# Patient Record
Sex: FEMALE | Race: WHITE | NOT HISPANIC OR LATINO | Employment: OTHER | URBAN - METROPOLITAN AREA
[De-identification: names, ages, dates, MRNs, and addresses within clinical notes are randomized per-mention and may not be internally consistent; named-entity substitution may affect disease eponyms.]

---

## 2018-02-09 ENCOUNTER — HOSPITAL ENCOUNTER (INPATIENT)
Facility: HOSPITAL | Age: 68
LOS: 17 days | Discharge: NURSING, REHAB OR LONG-TERM CARE | DRG: 291 | End: 2018-02-26
Attending: EMERGENCY MEDICINE | Admitting: INTERNAL MEDICINE
Payer: COMMERCIAL

## 2018-02-09 DIAGNOSIS — L03.116 CELLULITIS OF LEFT FOOT: ICD-10-CM

## 2018-02-09 DIAGNOSIS — J90 PLEURAL EFFUSION ON RIGHT: Primary | ICD-10-CM

## 2018-02-09 DIAGNOSIS — L03.116 BILATERAL CELLULITIS OF LOWER LEG: ICD-10-CM

## 2018-02-09 DIAGNOSIS — L03.115 CELLULITIS OF RIGHT FOOT: ICD-10-CM

## 2018-02-09 DIAGNOSIS — N63.10 MASS OF BREAST, RIGHT: ICD-10-CM

## 2018-02-09 DIAGNOSIS — I50.41 ACUTE COMBINED SYSTOLIC AND DIASTOLIC HEART FAILURE: ICD-10-CM

## 2018-02-09 DIAGNOSIS — I50.9 CHF (CONGESTIVE HEART FAILURE): ICD-10-CM

## 2018-02-09 DIAGNOSIS — L03.115 BILATERAL CELLULITIS OF LOWER LEG: ICD-10-CM

## 2018-02-09 PROBLEM — E44.0 PROTEIN-CALORIE MALNUTRITION, MODERATE: Status: ACTIVE | Noted: 2018-02-09

## 2018-02-09 PROBLEM — M86.29: Status: ACTIVE | Noted: 2018-02-09

## 2018-02-09 LAB
ALBUMIN SERPL BCP-MCNC: 3.1 G/DL
ALP SERPL-CCNC: 82 U/L
ALT SERPL W/O P-5'-P-CCNC: 11 U/L
ANION GAP SERPL CALC-SCNC: 11 MMOL/L
APTT BLDCRRT: 25.8 SEC
AST SERPL-CCNC: 22 U/L
BACTERIA #/AREA URNS HPF: ABNORMAL /HPF
BASOPHILS # BLD AUTO: 0.01 K/UL
BASOPHILS NFR BLD: 0.1 %
BILIRUB SERPL-MCNC: 1.2 MG/DL
BILIRUB UR QL STRIP: ABNORMAL
BNP SERPL-MCNC: >4900 PG/ML
BUN SERPL-MCNC: 21 MG/DL
CALCIUM SERPL-MCNC: 9 MG/DL
CHLORIDE SERPL-SCNC: 100 MMOL/L
CLARITY UR: CLEAR
CO2 SERPL-SCNC: 32 MMOL/L
COLOR UR: YELLOW
CORTIS SERPL-MCNC: 31.8 UG/DL
CREAT SERPL-MCNC: 0.7 MG/DL
DIASTOLIC DYSFUNCTION: YES
DIFFERENTIAL METHOD: ABNORMAL
EOSINOPHIL # BLD AUTO: 0 K/UL
EOSINOPHIL NFR BLD: 0 %
ERYTHROCYTE [DISTWIDTH] IN BLOOD BY AUTOMATED COUNT: 18.2 %
EST. GFR  (AFRICAN AMERICAN): >60 ML/MIN/1.73 M^2
EST. GFR  (NON AFRICAN AMERICAN): >60 ML/MIN/1.73 M^2
ESTIMATED PA SYSTOLIC PRESSURE: 24.81
GLUCOSE SERPL-MCNC: 90 MG/DL
GLUCOSE UR QL STRIP: NEGATIVE
HCT VFR BLD AUTO: 42.3 %
HGB BLD-MCNC: 14.4 G/DL
HGB UR QL STRIP: ABNORMAL
HYALINE CASTS #/AREA URNS LPF: 0 /LPF
INR PPP: 1.3
KETONES UR QL STRIP: NEGATIVE
LACTATE SERPL-SCNC: 2 MMOL/L
LACTATE SERPL-SCNC: 2.3 MMOL/L
LEUKOCYTE ESTERASE UR QL STRIP: NEGATIVE
LIPASE SERPL-CCNC: 36 U/L
LYMPHOCYTES # BLD AUTO: 0.9 K/UL
LYMPHOCYTES NFR BLD: 11.1 %
MAGNESIUM SERPL-MCNC: 1.6 MG/DL
MCH RBC QN AUTO: 29.1 PG
MCHC RBC AUTO-ENTMCNC: 34 G/DL
MCV RBC AUTO: 86 FL
MICROSCOPIC COMMENT: ABNORMAL
MITRAL VALVE REGURGITATION: ABNORMAL
MONOCYTES # BLD AUTO: 0.6 K/UL
MONOCYTES NFR BLD: 7.2 %
NEUTROPHILS # BLD AUTO: 6.5 K/UL
NEUTROPHILS NFR BLD: 81.6 %
NITRITE UR QL STRIP: NEGATIVE
PH UR STRIP: 6 [PH] (ref 5–8)
PHOSPHATE SERPL-MCNC: 3 MG/DL
PLATELET # BLD AUTO: 130 K/UL
PMV BLD AUTO: 10.5 FL
POTASSIUM SERPL-SCNC: 3.5 MMOL/L
PROCALCITONIN SERPL IA-MCNC: 0.12 NG/ML
PROT SERPL-MCNC: 6.8 G/DL
PROT UR QL STRIP: ABNORMAL
PROTHROMBIN TIME: 13 SEC
RBC # BLD AUTO: 4.94 M/UL
RBC #/AREA URNS HPF: 15 /HPF (ref 0–4)
RETIRED EF AND QEF - SEE NOTES: 10 (ref 55–65)
SODIUM SERPL-SCNC: 143 MMOL/L
SP GR UR STRIP: 1.02 (ref 1–1.03)
TRICUSPID VALVE REGURGITATION: ABNORMAL
TROPONIN I SERPL DL<=0.01 NG/ML-MCNC: 0.11 NG/ML
TROPONIN I SERPL DL<=0.01 NG/ML-MCNC: 0.12 NG/ML
TSH SERPL DL<=0.005 MIU/L-ACNC: 0.67 UIU/ML
URN SPEC COLLECT METH UR: ABNORMAL
UROBILINOGEN UR STRIP-ACNC: NEGATIVE EU/DL
WBC # BLD AUTO: 8.01 K/UL
WBC #/AREA URNS HPF: 2 /HPF (ref 0–5)

## 2018-02-09 PROCEDURE — 21400001 HC TELEMETRY ROOM

## 2018-02-09 PROCEDURE — 87077 CULTURE AEROBIC IDENTIFY: CPT

## 2018-02-09 PROCEDURE — 25500020 PHARM REV CODE 255: Performed by: INTERNAL MEDICINE

## 2018-02-09 PROCEDURE — 85730 THROMBOPLASTIN TIME PARTIAL: CPT

## 2018-02-09 PROCEDURE — 84443 ASSAY THYROID STIM HORMONE: CPT

## 2018-02-09 PROCEDURE — 85025 COMPLETE CBC W/AUTO DIFF WBC: CPT

## 2018-02-09 PROCEDURE — 96374 THER/PROPH/DIAG INJ IV PUSH: CPT

## 2018-02-09 PROCEDURE — 93306 TTE W/DOPPLER COMPLETE: CPT

## 2018-02-09 PROCEDURE — 83735 ASSAY OF MAGNESIUM: CPT

## 2018-02-09 PROCEDURE — 87186 SC STD MICRODIL/AGAR DIL: CPT

## 2018-02-09 PROCEDURE — 96361 HYDRATE IV INFUSION ADD-ON: CPT

## 2018-02-09 PROCEDURE — 83880 ASSAY OF NATRIURETIC PEPTIDE: CPT

## 2018-02-09 PROCEDURE — 80053 COMPREHEN METABOLIC PANEL: CPT

## 2018-02-09 PROCEDURE — 93005 ELECTROCARDIOGRAM TRACING: CPT

## 2018-02-09 PROCEDURE — 93010 ELECTROCARDIOGRAM REPORT: CPT | Mod: ,,, | Performed by: INTERNAL MEDICINE

## 2018-02-09 PROCEDURE — 83690 ASSAY OF LIPASE: CPT

## 2018-02-09 PROCEDURE — 87088 URINE BACTERIA CULTURE: CPT

## 2018-02-09 PROCEDURE — 84484 ASSAY OF TROPONIN QUANT: CPT

## 2018-02-09 PROCEDURE — 96375 TX/PRO/DX INJ NEW DRUG ADDON: CPT

## 2018-02-09 PROCEDURE — 87040 BLOOD CULTURE FOR BACTERIA: CPT | Mod: 59

## 2018-02-09 PROCEDURE — 99285 EMERGENCY DEPT VISIT HI MDM: CPT | Mod: 25

## 2018-02-09 PROCEDURE — 63600175 PHARM REV CODE 636 W HCPCS

## 2018-02-09 PROCEDURE — 81000 URINALYSIS NONAUTO W/SCOPE: CPT

## 2018-02-09 PROCEDURE — 63600175 PHARM REV CODE 636 W HCPCS: Performed by: NURSE PRACTITIONER

## 2018-02-09 PROCEDURE — 25000003 PHARM REV CODE 250: Performed by: EMERGENCY MEDICINE

## 2018-02-09 PROCEDURE — 25000003 PHARM REV CODE 250: Performed by: NURSE PRACTITIONER

## 2018-02-09 PROCEDURE — A9585 GADOBUTROL INJECTION: HCPCS | Performed by: INTERNAL MEDICINE

## 2018-02-09 PROCEDURE — 63600175 PHARM REV CODE 636 W HCPCS: Performed by: EMERGENCY MEDICINE

## 2018-02-09 PROCEDURE — 84100 ASSAY OF PHOSPHORUS: CPT

## 2018-02-09 PROCEDURE — 84145 PROCALCITONIN (PCT): CPT

## 2018-02-09 PROCEDURE — 93306 TTE W/DOPPLER COMPLETE: CPT | Mod: 26,,, | Performed by: INTERNAL MEDICINE

## 2018-02-09 PROCEDURE — 85610 PROTHROMBIN TIME: CPT

## 2018-02-09 PROCEDURE — 83605 ASSAY OF LACTIC ACID: CPT | Mod: 91

## 2018-02-09 PROCEDURE — 87086 URINE CULTURE/COLONY COUNT: CPT

## 2018-02-09 PROCEDURE — 82533 TOTAL CORTISOL: CPT

## 2018-02-09 PROCEDURE — 84484 ASSAY OF TROPONIN QUANT: CPT | Mod: 91

## 2018-02-09 RX ORDER — MEROPENEM AND SODIUM CHLORIDE 500 MG/50ML
500 INJECTION, SOLUTION INTRAVENOUS
Status: DISCONTINUED | OUTPATIENT
Start: 2018-02-09 | End: 2018-02-13

## 2018-02-09 RX ORDER — ACETAMINOPHEN 325 MG/1
650 TABLET ORAL EVERY 6 HOURS PRN
Status: DISCONTINUED | OUTPATIENT
Start: 2018-02-09 | End: 2018-02-14

## 2018-02-09 RX ORDER — FAMOTIDINE 20 MG/1
20 TABLET, FILM COATED ORAL 2 TIMES DAILY
Status: DISCONTINUED | OUTPATIENT
Start: 2018-02-09 | End: 2018-02-14

## 2018-02-09 RX ORDER — FUROSEMIDE 10 MG/ML
20 INJECTION INTRAMUSCULAR; INTRAVENOUS DAILY
Status: DISCONTINUED | OUTPATIENT
Start: 2018-02-10 | End: 2018-02-09

## 2018-02-09 RX ORDER — HEPARIN SODIUM 5000 [USP'U]/ML
5000 INJECTION, SOLUTION INTRAVENOUS; SUBCUTANEOUS EVERY 8 HOURS
Status: DISCONTINUED | OUTPATIENT
Start: 2018-02-09 | End: 2018-02-26 | Stop reason: HOSPADM

## 2018-02-09 RX ORDER — FUROSEMIDE 10 MG/ML
20 INJECTION INTRAMUSCULAR; INTRAVENOUS 2 TIMES DAILY
Status: DISPENSED | OUTPATIENT
Start: 2018-02-09 | End: 2018-02-11

## 2018-02-09 RX ORDER — GADOBUTROL 604.72 MG/ML
6 INJECTION INTRAVENOUS
Status: COMPLETED | OUTPATIENT
Start: 2018-02-09 | End: 2018-02-09

## 2018-02-09 RX ORDER — FUROSEMIDE 10 MG/ML
20 INJECTION INTRAMUSCULAR; INTRAVENOUS
Status: COMPLETED | OUTPATIENT
Start: 2018-02-09 | End: 2018-02-09

## 2018-02-09 RX ORDER — ONDANSETRON 2 MG/ML
4 INJECTION INTRAMUSCULAR; INTRAVENOUS EVERY 8 HOURS PRN
Status: DISCONTINUED | OUTPATIENT
Start: 2018-02-09 | End: 2018-02-13

## 2018-02-09 RX ORDER — MAG HYDROX/ALUMINUM HYD/SIMETH 200-200-20
30 SUSPENSION, ORAL (FINAL DOSE FORM) ORAL EVERY 6 HOURS PRN
Status: DISCONTINUED | OUTPATIENT
Start: 2018-02-09 | End: 2018-02-14

## 2018-02-09 RX ORDER — MEROPENEM 500 MG/1
500 INJECTION, POWDER, FOR SOLUTION INTRAVENOUS
Status: DISCONTINUED | OUTPATIENT
Start: 2018-02-09 | End: 2018-02-09

## 2018-02-09 RX ORDER — FUROSEMIDE 20 MG/1
20 TABLET ORAL DAILY
Status: DISCONTINUED | OUTPATIENT
Start: 2018-02-10 | End: 2018-02-09 | Stop reason: SDUPTHER

## 2018-02-09 RX ORDER — LISINOPRIL 2.5 MG/1
2.5 TABLET ORAL DAILY
Status: DISCONTINUED | OUTPATIENT
Start: 2018-02-09 | End: 2018-02-14

## 2018-02-09 RX ORDER — DIPHENHYDRAMINE HCL 25 MG
25 CAPSULE ORAL EVERY 6 HOURS PRN
Status: DISCONTINUED | OUTPATIENT
Start: 2018-02-09 | End: 2018-02-13

## 2018-02-09 RX ADMIN — Medication 1 G: at 10:02

## 2018-02-09 RX ADMIN — FUROSEMIDE 20 MG: 10 INJECTION, SOLUTION INTRAMUSCULAR; INTRAVENOUS at 05:02

## 2018-02-09 RX ADMIN — SODIUM CHLORIDE 2082 ML: 0.9 INJECTION, SOLUTION INTRAVENOUS at 10:02

## 2018-02-09 RX ADMIN — MEROPENEM 500 MG: 500 INJECTION, POWDER, FOR SOLUTION INTRAVENOUS at 02:02

## 2018-02-09 RX ADMIN — ACETAMINOPHEN 650 MG: 325 TABLET, FILM COATED ORAL at 11:02

## 2018-02-09 RX ADMIN — GADOBUTROL 6 ML: 604.72 INJECTION INTRAVENOUS at 10:02

## 2018-02-09 RX ADMIN — THERA TABS 1 TABLET: TAB at 05:02

## 2018-02-09 RX ADMIN — MEROPENEM AND SODIUM CHLORIDE 500 MG: 500 INJECTION, SOLUTION INTRAVENOUS at 11:02

## 2018-02-09 RX ADMIN — FAMOTIDINE 20 MG: 20 TABLET, FILM COATED ORAL at 11:02

## 2018-02-09 RX ADMIN — HEPARIN SODIUM 5000 UNITS: 5000 INJECTION, SOLUTION INTRAVENOUS; SUBCUTANEOUS at 11:02

## 2018-02-09 NOTE — ED NOTES
REAL contacted [phone] and gave a verbal report to Mrs Harris.   Patient presented to ED with bilateral cellulitis to her lower legs (see photos and RN note), and cat hair stuck to her legs/wounds.   EPS reports extremely poor living conditions.  EPS aware that patient will be admitted to hospital.

## 2018-02-09 NOTE — HPI
Kelly Mariano is a 68 yo female who states she has not seen a physician since 2009. EMS brought her to the ED today as the patient reported generalized weakness that has worsened over the last 2 days. She had trouble getting off the toilet and when she di,  she had fallen. EMS stated living conditions were deplorable with cat feces on the ibeth. ([employer] contacted). Pt is noted with cellulitis and wounds to the bilateral feet and lower legs. Pt describes anxiety attacks, SOB, leg swelling and difficulty walking. Vital signs showed tachycardia = 112, pt afebrile with controlled blood pressure. Labs indicate thrombocytopenia, metabolic alkalosis, troponin 0.114, BNP > 4900, initial lactic acid 2.3;  CXR shows pulmonary edema and a moderate right sided pleural effusion.

## 2018-02-09 NOTE — ED PROVIDER NOTES
SCRIBE #1 NOTE: I, Reagan Felix, am scribing for, and in the presence of, José Douglas Jr., MD. I have scribed the entire note.      History      Chief Complaint   Patient presents with    Leg Swelling     bilateral leg swelling, scabs, sores present, living conditions are extreme, needs social work according to EMS       Review of patient's allergies indicates:   Allergen Reactions    Pcn [penicillins]         HPI   HPI    2/9/2018, 9:23 AM   History obtained from the EMS and patient      History of Present Illness: Kelly Mariano is a 67 y.o. female patient who presents to the Emergency Department for BLE swelling and sores. Symptoms are constant and moderate in severity. Per EMS, pt's living conditions are extremely poor. EMS states that the pt needs social work assistance. No mitigating or exacerbating factors reported. No other associated sxs reported. Patient denies any chest pain, SOB, palpitations, cough, n/v/d, abd pain, and all other sxs at this time. No further complaints or concerns at this time.         Arrival mode: EMS    PCP: Primary Doctor No       Past Medical History:  History reviewed. No pertinent past medical history.    Past Surgical History:  Past surgical history reviewed not relevant      Family History:  Family history reviewed not relevant      Social History:  Social History    Social History Main Topics    Social History Main Topics    Smoking status: Unknown if ever smoked    Smokeless tobacco: Unknown if ever used    Alcohol Use: Unknown drinking history    Drug Use: Unknown if ever used    Sexual Activity: Unknown       ROS   Review of Systems   Constitutional: Negative for chills and fever.   Respiratory: Negative for cough and shortness of breath.    Cardiovascular: Positive for leg swelling (BLE). Negative for chest pain and palpitations.   Gastrointestinal: Negative for abdominal pain, diarrhea, nausea and vomiting.   Genitourinary: Negative for difficulty urinating,  dysuria, frequency, hematuria and urgency.   Skin: Positive for color change (BLE).        + BLE sores/ scabs   Neurological: Negative for dizziness, syncope, weakness, light-headedness, numbness and headaches.   All other systems reviewed and are negative.      Physical Exam      Initial Vitals [02/09/18 0921]   BP Pulse Resp Temp SpO2   136/84 (!) 112 18 -- 97 %      MAP       101.33          Physical Exam  Nursing Notes and Vital Signs Reviewed.  Constitutional: Patient is in no apparent distress. Well-developed and well-nourished.  Head: Atraumatic. Normocephalic.  Eyes: PERRL. EOM intact. Conjunctivae are not pale. No scleral icterus.  ENT: Mucous membranes are moist.   Neck: Supple. Full ROM. No lymphadenopathy.  Cardiovascular: Tachycardia. Regular rhythm. No murmurs, rubs, or gallops. Distal pulses are 2+ and symmetric.  Pulmonary/Chest: No respiratory distress. Clear to auscultation bilaterally. No wheezing or rales.  Abdominal: Soft and non-distended.  There is no tenderness.  No rebound, guarding, or rigidity. Good bowel sounds.  Musculoskeletal: Moves all extremities. No obvious deformities.   Skin: Warm and dry. Multiple ulcers and abrasions to the BLE. Erythema and edema noted circumferentially to the bilateral mid calves. See images.   Neurological:  Alert, awake, and appropriate.  Normal speech.  No acute focal neurological deficits are appreciated.  Psychiatric: Normal affect. Good eye contact. Appropriate in content.                                ED Course    Procedures  ED Vital Signs:  Vitals:    02/11/18 0901 02/11/18 1101 02/11/18 1153 02/11/18 1301   BP:   (!) 119/59    Pulse: 99 91 98 105   Resp:   18    Temp:       TempSrc:       SpO2:   95%    Weight:       Height:        02/11/18 1501 02/11/18 1536 02/11/18 1701 02/11/18 1930   BP:  (!) 99/53  (!) 115/58   Pulse: 100 92 92 89   Resp:  18  18   Temp:    97.4 °F (36.3 °C)   TempSrc:    Oral   SpO2:  (!) 93%  99%   Weight:       Height:         02/11/18 2100 02/11/18 2300 02/11/18 2330 02/12/18 0100   BP:   (!) 102/56    Pulse: 92 92 91 90   Resp:   20    Temp:   97.4 °F (36.3 °C)    TempSrc:   Axillary    SpO2:   97%    Weight:       Height:        02/12/18 0300 02/12/18 0315 02/12/18 0500   BP:  (!) 109/57    Pulse: 92 98 91   Resp:  20    Temp:  97.5 °F (36.4 °C)    TempSrc:  Axillary    SpO2:  96%    Weight:  70 kg (154 lb 5.2 oz)    Height:          Abnormal Lab Results:  Labs Reviewed   B-TYPE NATRIURETIC PEPTIDE - Abnormal; Notable for the following:        Result Value    BNP >4,900 (*)     All other components within normal limits   CBC W/ AUTO DIFFERENTIAL - Abnormal; Notable for the following:     RDW 18.2 (*)     Platelets 130 (*)     Lymph # 0.9 (*)     Gran% 81.6 (*)     Lymph% 11.1 (*)     All other components within normal limits   COMPREHENSIVE METABOLIC PANEL - Abnormal; Notable for the following:     CO2 32 (*)     Albumin 3.1 (*)     Total Bilirubin 1.2 (*)     All other components within normal limits   LACTIC ACID, PLASMA - Abnormal; Notable for the following:     Lactate (Lactic Acid) 2.3 (*)     All other components within normal limits   PROTIME-INR - Abnormal; Notable for the following:     Prothrombin Time 13.0 (*)     INR 1.3 (*)     All other components within normal limits   TROPONIN I - Abnormal; Notable for the following:     Troponin I 0.124 (*)     All other components within normal limits   URINALYSIS - Abnormal; Notable for the following:     Protein, UA 1+ (*)     Bilirubin (UA) 1+ (*)     Occult Blood UA 2+ (*)     All other components within normal limits   URINALYSIS MICROSCOPIC - Abnormal; Notable for the following:     RBC, UA 15 (*)     All other components within normal limits   TROPONIN I - Abnormal; Notable for the following:     Troponin I 0.114 (*)     All other components within normal limits   APTT   LIPASE   MAGNESIUM   PHOSPHORUS   TSH   LACTIC ACID, PLASMA        All Lab Results:  Results for orders  placed or performed during the hospital encounter of 02/09/18   Blood culture x two cultures. Draw prior to antibiotics.   Result Value Ref Range    Blood Culture, Routine No Growth to date     Blood Culture, Routine No Growth to date     Blood Culture, Routine No Growth to date    Blood culture x two cultures. Draw prior to antibiotics.   Result Value Ref Range    Blood Culture, Routine       Gram stain aer bottle: Gram positive cocci in clusters resembling Staph     Blood Culture, Routine       Results called to and read back by:Alison Ghosh RN 02/10/2018  06:43    Blood Culture, Routine       Gram stain hillary bottle: Gram positive cocci in clusters resembling Staph    Blood Culture, Routine       STAPHYLOCOCCUS AUREUS  Susceptibility pending  ID consult required at [facility].Formerly [facility],[place] and [facility] locations.     Urine culture   Result Value Ref Range    Urine Culture, Routine       COAGULASE-NEGATIVE STAPHYLOCOCCUS SPECIES  10,000 - 49,999 cfu/ml  Susceptibility testing not routinely performed.     Culture, Body Fluid (Aerobic) w/ GS   Result Value Ref Range    Gram Stain Result Rare WBC's     Gram Stain Result No organisms seen    APTT   Result Value Ref Range    aPTT 25.8 21.0 - 32.0 sec   Brain natriuretic peptide   Result Value Ref Range    BNP >4,900 (H) 0 - 99 pg/mL   CBC auto differential   Result Value Ref Range    WBC 8.01 3.90 - 12.70 K/uL    RBC 4.94 4.00 - 5.40 M/uL    Hemoglobin 14.4 12.0 - 16.0 g/dL    Hematocrit 42.3 37.0 - 48.5 %    MCV 86 82 - 98 fL    MCH 29.1 27.0 - 31.0 pg    MCHC 34.0 32.0 - 36.0 g/dL    RDW 18.2 (H) 11.5 - 14.5 %    Platelets 130 (L) 150 - 350 K/uL    MPV 10.5 9.2 - 12.9 fL    Gran # (ANC) 6.5 1.8 - 7.7 K/uL    Lymph # 0.9 (L) 1.0 - 4.8 K/uL    Mono # 0.6 0.3 - 1.0 K/uL    Eos # 0.0 0.0 - 0.5 K/uL    Baso # 0.01 0.00 - 0.20 K/uL    Gran% 81.6 (H) 38.0 - 73.0 %    Lymph% 11.1 (L) 18.0 - 48.0 %    Mono% 7.2 4.0 - 15.0 %    Eosinophil% 0.0 0.0 - 8.0 %    Basophil% 0.1 0.0 - 1.9  %    Differential Method Automated    Comprehensive metabolic panel   Result Value Ref Range    Sodium 143 136 - 145 mmol/L    Potassium 3.5 3.5 - 5.1 mmol/L    Chloride 100 95 - 110 mmol/L    CO2 32 (H) 23 - 29 mmol/L    Glucose 90 70 - 110 mg/dL    BUN, Bld 21 8 - 23 mg/dL    Creatinine 0.7 0.5 - 1.4 mg/dL    Calcium 9.0 8.7 - 10.5 mg/dL    Total Protein 6.8 6.0 - 8.4 g/dL    Albumin 3.1 (L) 3.5 - 5.2 g/dL    Total Bilirubin 1.2 (H) 0.1 - 1.0 mg/dL    Alkaline Phosphatase 82 55 - 135 U/L    AST 22 10 - 40 U/L    ALT 11 10 - 44 U/L    Anion Gap 11 8 - 16 mmol/L    eGFR if African American >60 >60 mL/min/1.73 m^2    eGFR if non African American >60 >60 mL/min/1.73 m^2   Cortisol   Result Value Ref Range    Cortisol 31.8 ug/dL   Lactic acid, plasma #1   Result Value Ref Range    Lactate (Lactic Acid) 2.3 (H) 0.5 - 2.2 mmol/L   Lipase   Result Value Ref Range    Lipase 36 4 - 60 U/L   Magnesium   Result Value Ref Range    Magnesium 1.6 1.6 - 2.6 mg/dL   Phosphorus   Result Value Ref Range    Phosphorus 3.0 2.7 - 4.5 mg/dL   Protime-INR   Result Value Ref Range    Prothrombin Time 13.0 (H) 9.0 - 12.5 sec    INR 1.3 (H) 0.8 - 1.2   Procalcitonin   Result Value Ref Range    Procalcitonin 0.12 <0.25 ng/mL   Troponin I   Result Value Ref Range    Troponin I 0.124 (H) 0.000 - 0.026 ng/mL   TSH   Result Value Ref Range    TSH 0.667 0.400 - 4.000 uIU/mL   Urinalysis   Result Value Ref Range    Specimen UA Urine, Catheterized     Color, UA Yellow Yellow, Straw, Briana    Appearance, UA Clear Clear    pH, UA 6.0 5.0 - 8.0    Specific Gravity, UA 1.020 1.005 - 1.030    Protein, UA 1+ (A) Negative    Glucose, UA Negative Negative    Ketones, UA Negative Negative    Bilirubin (UA) 1+ (A) Negative    Occult Blood UA 2+ (A) Negative    Nitrite, UA Negative Negative    Urobilinogen, UA Negative <2.0 EU/dL    Leukocytes, UA Negative Negative   Lactic acid, plasma #2   Result Value Ref Range    Lactate (Lactic Acid) 2.0 0.5 - 2.2 mmol/L    Urinalysis Microscopic   Result Value Ref Range    RBC, UA 15 (H) 0 - 4 /hpf    WBC, UA 2 0 - 5 /hpf    Bacteria, UA Rare None-Occ /hpf    Hyaline Casts, UA 0 0-1/lpf /lpf    Microscopic Comment SEE COMMENT    Troponin I   Result Value Ref Range    Troponin I 0.114 (H) 0.000 - 0.026 ng/mL   Lactic acid, plasma #3   Result Value Ref Range    Lactate (Lactic Acid) 0.9 0.5 - 2.2 mmol/L   Comprehensive metabolic panel   Result Value Ref Range    Sodium 142 136 - 145 mmol/L    Potassium 4.7 3.5 - 5.1 mmol/L    Chloride 106 95 - 110 mmol/L    CO2 21 (L) 23 - 29 mmol/L    Glucose 104 70 - 110 mg/dL    BUN, Bld 21 8 - 23 mg/dL    Creatinine 0.7 0.5 - 1.4 mg/dL    Calcium 8.5 (L) 8.7 - 10.5 mg/dL    Total Protein 6.9 6.0 - 8.4 g/dL    Albumin 2.8 (L) 3.5 - 5.2 g/dL    Total Bilirubin 0.8 0.1 - 1.0 mg/dL    Alkaline Phosphatase 76 55 - 135 U/L    AST 36 10 - 40 U/L    ALT 14 10 - 44 U/L    Anion Gap 15 8 - 16 mmol/L    eGFR if African American >60 >60 mL/min/1.73 m^2    eGFR if non African American >60 >60 mL/min/1.73 m^2   VANCOMYCIN, TROUGH before 4th dose   Result Value Ref Range    Vancomycin-Trough 16.8 10.0 - 22.0 ug/mL   CBC auto differential   Result Value Ref Range    WBC 6.79 3.90 - 12.70 K/uL    RBC 4.44 4.00 - 5.40 M/uL    Hemoglobin 12.8 12.0 - 16.0 g/dL    Hematocrit 42.4 37.0 - 48.5 %    MCV 96 82 - 98 fL    MCH 28.8 27.0 - 31.0 pg    MCHC 30.2 (L) 32.0 - 36.0 g/dL    RDW 18.0 (H) 11.5 - 14.5 %    Platelets 118 (L) 150 - 350 K/uL    MPV 10.9 9.2 - 12.9 fL    Gran # (ANC) 5.8 1.8 - 7.7 K/uL    Lymph # 0.6 (L) 1.0 - 4.8 K/uL    Mono # 0.4 0.3 - 1.0 K/uL    Eos # 0.0 0.0 - 0.5 K/uL    Baso # 0.00 0.00 - 0.20 K/uL    Gran% 85.2 (H) 38.0 - 73.0 %    Lymph% 8.8 (L) 18.0 - 48.0 %    Mono% 5.9 4.0 - 15.0 %    Eosinophil% 0.1 0.0 - 8.0 %    Basophil% 0.0 0.0 - 1.9 %    Differential Method Automated    Comprehensive metabolic panel   Result Value Ref Range    Sodium 141 136 - 145 mmol/L    Potassium 3.1 (L) 3.5 - 5.1  mmol/L    Chloride 98 95 - 110 mmol/L    CO2 35 (H) 23 - 29 mmol/L    Glucose 95 70 - 110 mg/dL    BUN, Bld 18 8 - 23 mg/dL    Creatinine 0.7 0.5 - 1.4 mg/dL    Calcium 8.2 (L) 8.7 - 10.5 mg/dL    Total Protein 5.9 (L) 6.0 - 8.4 g/dL    Albumin 2.6 (L) 3.5 - 5.2 g/dL    Total Bilirubin 0.7 0.1 - 1.0 mg/dL    Alkaline Phosphatase 74 55 - 135 U/L    AST 21 10 - 40 U/L    ALT 13 10 - 44 U/L    Anion Gap 8 8 - 16 mmol/L    eGFR if African American >60 >60 mL/min/1.73 m^2    eGFR if non African American >60 >60 mL/min/1.73 m^2   Hemoglobin A1c   Result Value Ref Range    Hemoglobin A1C 5.5 4.0 - 5.6 %    Estimated Avg Glucose 111 68 - 131 mg/dL   High sensitivity CRP   Result Value Ref Range    CRP, High Sensitivity 28.01 (H) 0.00 - 3.19 mg/L   Magnesium   Result Value Ref Range    Magnesium 1.5 (L) 1.6 - 2.6 mg/dL   Phosphorus   Result Value Ref Range    Phosphorus 2.7 2.7 - 4.5 mg/dL   Sedimentation rate, manual   Result Value Ref Range    Sed Rate 10 0 - 20 mm/Hr   Lactate dehydrogenase   Result Value Ref Range     110 - 260 U/L   Glucose, random   Result Value Ref Range    Glucose 102 70 - 110 mg/dL   Protein, total   Result Value Ref Range    Total Protein 5.8 (L) 6.0 - 8.4 g/dL   Cholesterol, Body Fluid (Reference Lab) Pleural Fluid, Right   Result Value Ref Range    Body Fluid Source, Refrigerated Pleural Fluid, Right    Protein, Peritoneal, Pleural Fluid or CHIP Drainage, In-House Thoracentesis Fluid   Result Value Ref Range    Body Fluid Source, Total Protein Thoracentesis Fluid     Body Fluid, Protein 1.7 Not established g/dL   Albumin, Peritoneal, Pleural Fluid or CHIP Drainage, In-House Thoracentesis Fluid   Result Value Ref Range    Body Fluid Source, Albumin Thoracentesis Fluid     Body Fluid Albumin 1.0 See text g/dL   Glucose, Peritoneal, Pleural Fluid or CHIP Drainage, In-House Thoracentesis Fluid   Result Value Ref Range    Body Fluid Source, Glucose Thoracentesis Fluid     Glucose, Fluid 107 Not  established mg/dL   Amylase, Peritoneal, Pleural Fluid or CHIP Drainage, In-House Thoracentesis Fluid   Result Value Ref Range    Body Fluid Source Amylase Thoracentesis Fluid     Amylase, Fluid 16 Not established U/L   WBC & Diff,Body Fluid Thoracentesis Fluid   Result Value Ref Range    Body Fluid Type Thoracentesis Fluid     Fluid Appearance Clear     Fluid Color Yellow     WBC, Body Fluid 131 /cu mm    Segs, Fluid 34 %    Lymphs, Fluid 6 %    Monocytes/Macrophages, Fluid 60 %   2D echo with color flow doppler   Result Value Ref Range    EF 10 (A) 55 - 65    Mitral Valve Regurgitation MILD TO MODERATE     Diastolic Dysfunction Yes (A)     Est. PA Systolic Pressure 24.81     Tricuspid Valve Regurgitation MILD TO MODERATE    POCT glucose   Result Value Ref Range    POCT Glucose 109 70 - 110 mg/dL         Imaging Results:  Imaging Results          X-Ray Chest 1 View (Final result)  Result time 02/11/18 16:28:09    Final result by ASHWIN Cobb Sr., MD (02/11/18 16:28:09)                 Impression:        1. There's been interval development of a tiny right apical pneumothorax. This is consistent with the patient's recent thoracentesis.  2. The findings are characteristic of moderate cardiomegaly with mild bilateral pulmonary edema.  3. There is opacification of the costophrenic angles bilaterally. These are characteristic of tiny pleural effusions.  4. Surgical changes      Electronically signed by: ASHWIN COBB MD  Date:     02/11/18  Time:    16:28              Narrative:    One-view chest x-ray    Clinical History: Rectal effusion; recent thoracentesis    Finding: Comparison was made to prior examination performed at 5:25 AM on 2/11/2018. There's been interval development of a tiny right apical pneumothorax. There is moderate cardiomegaly. There is a mild amount of interstitial and alveolar opacities seen in both lungs. There is opacification of the costophrenic angles bilaterally. A right PICC remains in place.  There is partial visualization of anterior spinal fusion hardware in the cervical spine.                             US Chest Mediastinum (Final result)  Result time 02/11/18 16:02:21    Final result by ASHWIN Cobb Sr., MD (02/11/18 16:02:21)                 Impression:      1. Large right pleural effusion  2. Small left pleural effusion      Electronically signed by: ASHWIN COBB MD  Date:     02/11/18  Time:    16:02              Narrative:    Ultrasound examination of the thorax    Clinical History: Pleural effusions    Technique: Multiple static ultrasound images were submitted for interpretation.    Finding: There are bilateral pleural effusions. The calculated volume of fluid in the right pleural space this 1088 mL. The calculated amount of fluid in the left pleural space is 157 mL of fluid.                             Fl Modified Barium Swallow Speech (Final result)  Result time 02/11/18 11:44:02    Final result by AHSWIN Cobb Sr., MD (02/11/18 11:44:02)                 Narrative:    Modified barium swallow examination    Clinical History:     Dysphasia    Findings/impression: This examination was performed by speech pathology. There was no radiologist present during the procedure.          Electronically signed by: ASHWIN COBB MD  Date:     02/11/18  Time:    11:44                              X-Ray Chest 1 View (Final result)  Result time 02/11/18 08:16:25    Final result by Rodrigo Gallegos MD (02/11/18 08:16:25)                 Impression:     See above            Electronically signed by: RODRIGO GALLEGOS MD  Date:     02/11/18  Time:    08:16              Narrative:    Exam: Portable chest radiograph    Clinical History:   right pleural effusion .     Comparison: Chest x-ray, 02/10/2018.    Findings:.     There is a right-sided PICC with tip in the SVC. There is a large right pleural effusion, unchanged.  There is compressive atelectasis and right hilar region and right lower lobe and right middle lobe.  There is cardiomegaly.  There is a small pleural effusion with left basilar atelectasis and/or infiltrate.  No pneumothorax.                             CTA Lower Extremity Bilateral (Final result)  Result time 02/10/18 18:41:03   Procedure changed from CTA Lower Extremity Left     Final result by Rodrigo Gallegos MD (02/10/18 18:41:03)                 Impression:      1.  There is no hemodynamically significant stenosis of either lower extremity arterial vasculature.  There is three-vessel runoff at the ankles bilaterally.    2.  There is an infrarenal abdominal aorta ulcerative plaque which is incompletely imaged on this exam.  3. Ulcerative plaque versus small focal dissection measuring 8 mm in the left common iliac artery which causes a mild to moderate grade focal stenosis just above the bifurcation into the left external and left internal iliac arteries.      Electronically signed by: RODRIGO GALLEGOS MD  Date:     02/10/18  Time:    18:41              Narrative:    CT angiogram of the bilateral lower extremities with IV contrast    Indication: Lower extremity wound.  Peripheral vascular disease.    Technique: The pelvis and bilateral lower extremities were scanned during the intravenous administration of 100 cc of Omnipaque 350 utilizing an arterial angiogram protocol.  Axial and 3-D MIP images are reviewed.    Findings: The distal infrarenal abdominal aorta just above its bifurcation shows an ulcerative plaque which is incompletely imaged on this exam.  In the left common iliac artery there is an ulcerative plaque versus a small focal dissection measuring 8 mm which causes a mild to moderate grade stenosis of the distal left common iliac artery just proximal to the bifurcation into the external iliac and internal iliac arteries.  The bilateral hypogastric arteries are patent.  The bilateral external iliac arteries and bilateral common femoral arteries are patent.  The bilateral profunda femoris are patent.  The  bilateral superficial femoral arteries and bilateral popliteal arteries are patent.  There is three-vessel runoff to the ankles bilaterally.  Both lower extremities shows some mild marginal calcific and soft atherosclerotic plaque throughout the arterial tree but none that causes any significant stenosis.    There is subcutaneous edema throughout the pelvis and bilateral lower extremities.  No abscess is seen.                             X-Ray Chest 1 View for PICC_Central line (Final result)  Result time 02/10/18 15:55:12    Final result by ASHWIN Cobb Sr., MD (02/10/18 15:55:12)                 Impression:        1. There has been interval placement of a right PICC. The tip is in the superior vena cava. There is no pneumothorax.  2. There has been interval worsening of the appearance of the lungs. There is a moderate amount of interstitial and alveolar opacities seen in the medial aspect of both lungs. The right border of the heart is silhouetted. This is characteristic of pulmonary edema.  3. There is opacification of the base of the right hemithorax. This is characteristic of a moderate size right pleural effusion with associated atelectasis.      Electronically signed by: ASHWIN COBB MD  Date:     02/10/18  Time:    15:55              Narrative:    One-view chest x-ray    Clinical History: PICC placement    Finding: Comparison was made to prior examination performed on 2/9/2018. There has been interval placement of a right PICC. The tip is in the superior vena cava. There has been interval worsening of the appearance of the lungs. There is a moderate amount of interstitial and alveolar opacities seen in the medial aspect of both lungs. The right border of the heart is silhouetted. There is opacification of the base of the right hemithorax. The left costophrenic angle is sharp. There is no pneumothorax.                             MRI Foot Toes W WO Contrast Right (Final result)  Result time 02/10/18  07:56:49    Final result by Rodrigo Gallegos MD (02/10/18 07:56:49)                 Impression:      1.  There is no evidence for osteomyelitis of the right foot.  2.  No abscess.  3.  Nonspecific subcutaneous and muscle edema in the plantar and dorsal aspects of the right foot.      Electronically signed by: RODRIGO GALLEGOS MD  Date:     02/10/18  Time:    07:56              Narrative:    Right foot MRI with and without intravenous contrast.    Indication: Osteomyelitis right foot.  Diabetes.    Technique: Multiplanar multisequence imaging of the right foot is performed with and without intravenous administration of 6 cc of Gadavist.    Comparison: Right foot x-ray, 02/09/2018.    Findings: There is no loss of the normal T1 fatty marrow signal intensity and any of the bones of the right foot.  No marrow enhancement or osseous destruction or cortical erosion.  No marrow edema is seen on the T2 sequences.    There is no focal fluid collection in the right foot.  There is dorsal subcutaneous edema and muscle edema in the plantar and dorsal aspects of the right foot.  The tendons are intact.                             MRI Foot Toes W WO Contrast Left (Final result)  Result time 02/10/18 08:01:23    Final result by Rodrigo Gallegos MD (02/10/18 08:01:23)                 Impression:      1.  No evidence for osteomyelitis of the left foot.  2.  No left foot soft tissue abscess.  3.  Nonspecific muscle edema in the plantar and dorsal musculature of the left foot and mild dorsal subcutaneous edema.      Electronically signed by: RODRIGO GALLEGOS MD  Date:     02/10/18  Time:    08:01              Narrative:    Left foot MRI with and without intravenous contrast    Indication: Left foot osteomyelitis.    Technique: Multiplanar multisequence imaging of the left foot is performed with and without the intravenous administration of 6 cc of Gadavist.    Comparison: Left foot x-ray, 02/09/2018.    Findings: There is no loss of the normal T1  fatty marrow signal intensity are.  No osseous erosion or cortical destruction.  No marrow enhancement or marrow edema.    There is no soft tissue abscess.  The tendons appear intact.  There is nonspecific muscle edema in the plantar musculature and dorsal musculature of the left foot.  There is some mild dorsal subcutaneous edema.                             US Lower Extremity Arteries Bilateral (Final result)  Result time 02/09/18 16:46:28   Procedure changed from US Lower Extrem Arteries Bilat with KIRAN (xpd)     Final result by Griffin Muniz MD (02/09/18 16:46:28)                 Impression:        Limited study.    No hemodynamically significant stenosis demonstrated in the right or left lower extremity arterial system within the visualized segments. Consider CTA.      Electronically signed by: GRIFFIN MUNIZ MD  Date:     02/09/18  Time:    16:46              Narrative:    BILATERAL LOWER EXTREMITY DUPLEX  ARTERIAL ULTRASOUND EXAMINATION.     Technique:  Bilateral lower extremity arterial duplex ultrasound examination performed. Multiple gray scale and color doppler images were obtained in addition to waveform analysis.      Comparison: None.    History:  Peripheral vascular disease    Findings: Study is limited by skin ulceration and wounds.    The peak systolic velocities on the right are as follows, in centimeters/second:  Common femoral artery: 116  Superficial femoral artery, proximal: 92  Superficial femoral artery, distal: 136  Popliteal artery: 94  Posterior tibial artery: 36  Dorsalis pedis artery: 18  Anterior tibial artery: 40  Peroneal artery not seen    The peak systolic velocities on the left are as follows, in centimeters/second:  Common femoral artery: 108  Superficial femoral artery, proximal: 87  Superficial femoral artery, distal: 84  Popliteal artery: 67  Posterior tibial artery:  61  Dorsalis pedis artery: Not seen  Anterior tibial artery:  56  Peroneal artery not seen    Biphasic  waveforms are seen below the superficial femoral arteries bilaterally.                             X-Ray Foot Complete Left (Final result)  Result time 02/09/18 11:24:21    Final result by ASHWIN Cobb Sr., MD (02/09/18 11:24:21)                 Impression:      1. There is mild prominence of the soft tissue thickness in the dorsum of the left foot. This is consistent with the patient's history and characteristic of cellulitis.  2. There is a suspected erosion in the periarticular portion of the medial proximal aspect of the possible phalanx of the great toe. This is characteristic of an inflammatory arthritis. However, osteomyelitis cannot be excluded.  3. There is no dislocation. There are mild degenerative changes in the midfoot.   4. There is a small spur at the site of attachment of the plantar fascia to the calcaneus. There is a moderate-sized spur at the site of attachment of the Achilles tendon to the calcaneus.  5. If additional imaging evaluation is clinically indicated, I recommend consideration of an MRI examination of the foot without and with IV contrast.      Electronically signed by: ASHWIN COBB MD  Date:     02/09/18  Time:    11:24              Narrative:    3 view x-ray of the left foot    Clinical History:     Cellulitis of left lower limb    Findings: There is mild prominence of the soft tissue thickness in the dorsum of the left foot. There is a suspected erosion in the periarticular portion of the medial proximal aspect of the possible phalanx of the great toe. There is no dislocation. There are mild degenerative changes in the midfoot. There is a small spur at the site of attachment of the plantar fascia to the calcaneus. There is a moderate-sized spur at the site of attachment of the Achilles tendon to the calcaneus.                             X-Ray Foot Complete Right (Final result)  Result time 02/09/18 11:15:47    Final result by ASHWIN Cobb Sr., MD (02/09/18 11:15:47)                  Impression:      1. There is suspected bony demineralization in the proximal half of the distal phalanx of the great toe. Question myelitis cannot be excluded additional imaging evaluation is clinically indicated, recommend consideration of an MRI examination of the right foot without and with IV contrast.  2. There is moderate prominence of the soft tissue thickness in the dorsum of the right foot. This is consistent with patient's history and characteristic of cellulitis.  3. There is a small spur at the site of attachment of the plantar fascia to the calcaneus. There is a moderate-sized right-sided attachment of the Achilles tendon to the calcaneus.   4. There are mild degenerative changes in the midfoot.       Electronically signed by: ASHWIN COBB MD  Date:     02/09/18  Time:    11:15              Narrative:    3 view x-ray of the right foot    Clinical History:     Cellulitis    Findings:     There is no fracture. There is no dislocation. There is suspected bony demineralization in the proximal half of the distal phalanx of the great toe. There is a small spur at the site of attachment of the plantar fascia to the calcaneus. There is a moderate-sized right-sided attachment of the Achilles tendon to the calcaneus. There are mild degenerative changes in the midfoot. There is moderate prominence of the soft tissue thickness in the dorsum of the right foot.                             X-Ray Chest AP Portable (Final result)  Result time 02/09/18 10:43:38    Final result by ASHWIN Cobb Sr., MD (02/09/18 10:43:38)                 Impression:        1. There is a mild amount of interstitial and alveolar opacities seen in the medial aspect of both lungs. This is characteristic of pulmonary edema.  2. There is opacification of the inferior one third of the right hemithorax. This is characteristic of a moderate size right pleural effusion with associated atelectasis.   3. There is partial visualization of  anterior spinal fusion hardware projected over the cervical spine.         Electronically signed by: ASHWIN RAYMOND MD  Date:     02/09/18  Time:    10:43              Narrative:    One-view chest x-ray    Clinical History: Sepsis    Finding: There is opacification of the inferior one third of the right hemithorax. There is a mild amount of interstitial and alveolar opacities seen in the medial aspect of both lungs. There is no pneumothorax. The left costophrenic angle is sharp. The right border the heart is silhouetted. There is partial visualization of anterior spinal fusion hardware projected over the cervical spine.                             The EKG was ordered, reviewed, and independently interpreted by the ED provider.  Interpretation time: 9:41  Rate: 109 BPM  Rhythm: sinus tachycardia  Interpretation: Possible left atrial enlargement. Left posterior fascicular block. Anterior infarct. No STEMI.           The Emergency Provider reviewed the vital signs and test results, which are outlined above.    ED Discussion     12:54 PM: Discussed case with TONY Harvey ([facility]) who recommends administering meropenem. Dr. Boston agrees with current care and management of pt and accepts admission.   Admitting Service: Hospital medicine   Admitting Physician: Dr. Hall  Admit to: Tele    12:54 PM: Re-evaluated pt. I have discussed test results, shared treatment plan, and the need for admission with patient and family at bedside. Pt and family express understanding at this time and agree with all information. All questions answered. Pt and family have no further questions or concerns at this time. Pt is ready for admit.      ED Medication(s):  Medications   acetaminophen tablet 650 mg (650 mg Oral Given 2/11/18 6955)   aluminum-magnesium hydroxide-simethicone 200-200-20 mg/5 mL suspension 30 mL (30 mLs Oral Given 2/11/18 6932)   diphenhydrAMINE capsule 25 mg (25 mg Oral Given 2/11/18 0146)    famotidine tablet 20 mg (20 mg Oral Given 2/11/18 2112)   ondansetron injection 4 mg (not administered)   folic acid-vit B6-vit B12 2.5-25-2 mg tablet 1 tablet (1 tablet Oral Given 2/11/18 0858)   multivitamin tablet 1 tablet (1 tablet Oral Given 2/11/18 0858)   lisinopril tablet 2.5 mg (2.5 mg Oral Given 2/11/18 0858)   furosemide injection 20 mg (20 mg Intravenous Given 2/11/18 0855)   heparin (porcine) injection 5,000 Units (5,000 Units Subcutaneous Given 2/12/18 0548)   meropenem-0.9% sodium chloride 500 mg/50 mL IVPB (500 mg Intravenous New Bag 2/12/18 0548)   vancomycin 1 g in 0.9% sodium chloride 250 mL IVPB (ready to mix system) (1 g Intravenous Trough Due 30 minutes Before Dose 2/12/18 0815)   furosemide injection 40 mg (40 mg Intravenous Given 2/11/18 1731)   metoprolol succinate (TOPROL-XL) 24 hr tablet 25 mg (25 mg Oral Given 2/11/18 1123)   lidocaine HCL 10 mg/ml (1%) injection 1 mL (not administered)   sodium chloride 0.9% bolus 2,082 mL (0 mL/kg × 69.4 kg Intravenous Stopped 2/9/18 1235)   vancomycin 1 g in 0.9% sodium chloride 250 mL IVPB (ready to mix system) (1 g Intravenous New Bag 2/9/18 1059)   furosemide injection 20 mg (20 mg Intravenous Given 2/9/18 1751)   gadobutrol 6 mL (6 mLs Intravenous Given 2/9/18 2235)   omnipaque 350 iohexol 100 mL (100 mLs Intravenous Given 2/10/18 1807)   ez paste cream ba sulfate 20 g (20 g Oral Given 2/11/18 1052)   ez paque ba sulfate 200 mL (200 mLs Oral Given 2/11/18 1053)   potassium chloride SA CR tablet 40 mEq (40 mEq Oral Given 2/11/18 2112)   magnesium sulfate 2g in water 50mL IVPB (premix) (2 g Intravenous New Bag 2/11/18 1350)       There are no discharge medications for this patient.      Follow-up Information     Brett Puga MD. Call in 1 day.    Specialty:  General Surgery  Why:  make appt for out pt follow up.  If this clniic does not accept your insurance, find another vascular doc  Contact information:  [address]  [address]  VASCULAR  [facility] [address]  [phone]                     Medical Decision Making    Medical Decision Making:   Clinical Tests:   Lab Tests: Reviewed and Ordered  Radiological Study: Reviewed and Ordered  Medical Tests: Reviewed and Ordered           Scribe Attestation:   Scribe #1: I performed the above scribed service and the documentation accurately describes the services I performed. I attest to the accuracy of the note.    Attending:   Physician Attestation Statement for Scribe #1: I, José Douglas Jr., MD, personally performed the services described in this documentation, as scribed by Reagan Felix, in my presence, and it is both accurate and complete.          Clinical Impression       ICD-10-CM ICD-9-CM   1. Pleural effusion on right J90 511.9   2. Bilateral cellulitis of lower leg L03.116 682.6    L03.115    3. Cellulitis of left foot L03.116 682.7   4. Cellulitis of right foot L03.115 682.7   5. CHF (congestive heart failure) I50.9 428.0       Disposition:   Disposition: Admitted  Condition: Julian Douglas Jr., MD  02/12/18 0601

## 2018-02-09 NOTE — ED NOTES
"Level of Consciousness: The patient is awake, alert, and oriented with appropriate affect and speech; oriented to person, place and time.  Appearance: Sitting up in bed with no acute distress noted. Pt in hospital gown.   Skin: WDL except: BLE, drainage, brown crust and cat hair attached to skin. Mucous membranes are dry. Fingers are cyanotic in appearance. Pt states they have "been this way for about two weeks." Redness to groin areas and under breasts. Pictures added to patient's chart.   Musculoskeletal: Swelling to BLE. Generalized weakness. Mild impairment. Pt states ankles hurt.   Respiratory: Airway open and patent, respirations spontaneous, even and unlabored. No accessory muscles in use.   Cardiac: Slightly tachycardiac at 105 BPM, capillary refill > 3 seconds.  Abdomen: Soft, non-tender to palpation. No distention noted. Turc[place] draining yellow urine.   Neurologic: PERRLA, face exhibits symmetrical expression, hand grasps equal and even bilaterally, normal sensation noted to all extremities and face when touched by a finger.    Patient verbalized understanding of status and plan of care. Side rails up x 2, call light in reach, bed low and locked. Will continue to monitor.        "

## 2018-02-09 NOTE — ASSESSMENT & PLAN NOTE
Telemetry monitoring, daily weights, low sodium diet, fluid restriction,    Lasix 20 mg IV twice  Cardiology consult  Lisinopril 2.5 mg  Elevated troponin likely due to congestive heart failure - trend

## 2018-02-09 NOTE — ASSESSMENT & PLAN NOTE
Bilateral foot plain films note some questionable osteo to bilateral great toes  MRI pending  Evaluate arterial blood flow with U/S and ABIs

## 2018-02-09 NOTE — ED NOTES
Hospital medicine at bedside. Pt requesting sip of water.  Per Dr. Hall, ok to give sip of water.

## 2018-02-09 NOTE — PROGRESS NOTES
Clinical Pharmacy Consult Note: Vancomycin Dosing   Date: 2/9/2018      Pharmacy consulted by Dr. Douglas to dose vancomycin for treatment of cellulitis.   Goal trough: 12-15 mcg/mL   Tmax/24h: 97.8    Estimated Creatinine Clearance: 75.6 mL/min (based on SCr of 0.7 mg/dL).   Lab Results   Component Value Date    BUN 21 02/09/2018      Lab Results   Component Value Date    WBC 8.01 02/09/2018      Microbiology Results (last 7 days)       Procedure Component Value Units Date/Time    Urine culture [[id number]] Collected:  02/09/18 1048    Order Status:  Sent Specimen:  Urine from Urine, Catheterized Updated:  02/09/18 1048    Blood culture x two cultures. Draw prior to antibiotics. [[id number]] Collected:  02/09/18 1000    Order Status:  Sent Specimen:  Blood from Peripheral, Forearm, Right Updated:  02/09/18 1012    Blood culture x two cultures. Draw prior to antibiotics. [[id number]] Collected:  02/09/18 0955    Order Status:  Sent Specimen:  Blood from Peripheral, Antecubital, Left Updated:  02/09/18 1012           [place] BW: 54.7 Kg<---Will use for dosing weight.    Actual BW: 69.4 Kg (<130% IBW)     Antibiotics       Start     Stop Route Frequency Ordered    02/09/18 2300  vancomycin 1 g in 0.9% sodium chloride 250 mL IVPB (ready to mix system)      -- IV Every 12 hours (non-standard times) 02/09/18 1320    02/09/18 1308  meropenem injection 500 mg      -- IV Every 6 hours (non-standard times) 02/09/18 1242          Plan: Based on Estimated Creatinine Clearance: 75.6 mL/min (based on SCr of 0.7 mg/dL). and weight of 54.7 Kg, pharmacy will initiate vancomycin 1000 mg IV every 12 hours and draw a level prior to the 4th dose.   Vancomycin trough due 02/10/18 at 2200.     Pharmacy will continue to follow patients clinical status, renal function, C&S, and adjust dose as necessary to maintain trough levels between 12 and 15 mcg/mL.     Thank you for allowing us to participate in this patient's care.     Baylee López    2/9/2018 1:25 PM

## 2018-02-09 NOTE — H&P
[facility] Hospital Medicine  History & Physical    Patient Name: Kelly Mariano  MRN: [id number]  Admission Date: 2/9/2018  Attending Physician: José Douglas Jr., MD   Primary Care Provider: Primary Doctor No         Patient information was obtained from patient and ER records.     Subjective:     Principal Problem:Bilateral cellulitis of lower leg    Chief Complaint:   Chief Complaint   Patient presents with    Leg Swelling     bilateral leg swelling, scabs, sores present, living conditions are extreme, needs social work according to EMS        HPI: Kelly Mariano is a 66 yo female who states she has not seen a physician since 2009. EMS brought her to the ED today as the patient reported generalized weakness that has worsened over the last 2 days. She had trouble getting off the toilet and when she di,  she had fallen. EMS stated living conditions were deplorable with cat feces on the ibeth. ([employer]lder[employer] contacted). Pt is noted with cellulitis and wounds to the bilateral feet and lower legs. Pt describes anxiety attacks, SOB, leg swelling and difficulty walking. Vital signs showed tachycardia = 112, pt afebrile with controlled blood pressure. Labs indicate thrombocytopenia, metabolic alkalosis, troponin 0.114, BNP > 4900, initial lactic acid 2.3;  CXR shows pulmonary edema and a moderate right sided pleural effusion.    History reviewed.  Denies any medical history    Past Surgical History:   Procedure Laterality Date    SPINAL FUSION         Review of patient's allergies indicates:   Allergen Reactions    Pcn [penicillins] Itching       No current facility-administered medications on file prior to encounter.      No current outpatient prescriptions on file prior to encounter.     Family History     Reviewed and not pertinent        Social History Main Topics    Smoking status: Former Smoker     Types: Cigarettes    Smokeless tobacco: Not on file    Alcohol use Yes      Comment:  occasional    Drug use: No    Sexual activity: Not on file     Review of Systems   Constitutional: Positive for activity change, appetite change and fatigue. Negative for chills, diaphoresis and fever.   HENT: Negative.    Eyes: Negative for pain and redness.   Respiratory: Positive for shortness of breath. Negative for cough and wheezing.    Cardiovascular: Positive for leg swelling. Negative for chest pain and palpitations.   Gastrointestinal: Negative for abdominal pain, constipation, diarrhea, nausea and vomiting.   Genitourinary: Negative for decreased urine volume, difficulty urinating, dysuria and hematuria.   Musculoskeletal: Negative for arthralgias and myalgias.        Falls   Skin: Positive for rash and wound.   Neurological: Positive for weakness. Negative for dizziness and light-headedness.   Psychiatric/Behavioral: Negative for confusion. The patient is not nervous/anxious.      Objective:     Vital Signs (Most Recent):  Temp: 98.5 °F (36.9 °C) (02/09/18 1448)  Pulse: 100 (02/09/18 1401)  Resp: 18 (02/09/18 1404)  BP: 115/65 (02/09/18 1401)  SpO2: 100 % (02/09/18 1401) Vital Signs (24h Range):  Temp:  [97.8 °F (36.6 °C)-98.5 °F (36.9 °C)] 98.5 °F (36.9 °C)  Pulse:  [100-112] 100  Resp:  [18-19] 18  SpO2:  [97 %-100 %] 100 %  BP: (115-136)/(65-84) 115/65     Weight: 69.4 kg (153 lb)  Body mass index is 26.26 kg/m².    Physical Exam   Constitutional: She is oriented to person, place, and time. She appears well-developed.  Non-toxic appearance.   Unkempt  female - appears stated age   HENT:   Head: Normocephalic and atraumatic.       Mouth/Throat: Mucous membranes are dry.   Dry tongue and oral MM   Eyes: Conjunctivae are normal.   Neck: Normal range of motion. Neck supple.   Cardiovascular: Regular rhythm.  Tachycardia present.    Pulmonary/Chest: Effort normal. She has rales.   Abdominal: Soft. Bowel sounds are normal. She exhibits distension. There is no tenderness. There is no rebound and  no guarding.   Musculoskeletal: She exhibits edema (+2/+3 pitting edema to BLE).   Generalized weakness   Neurological: She is alert and oriented to person, place, and time.   Skin:   Abrasion to forehead and left chin area  Candidal appearing rash underneath breasts and abdominal fold  Ulcerations and cellulitis to feet   Psychiatric: She has a normal mood and affect. Her behavior is normal.   Nursing note and vitals reviewed.                          Significant Labs:   CBC:   Recent Labs  Lab 02/09/18  0955   WBC 8.01   HGB 14.4   HCT 42.3   *     CMP:   Recent Labs  Lab 02/09/18  0955      K 3.5      CO2 32*   GLU 90   BUN 21   CREATININE 0.7   CALCIUM 9.0   PROT 6.8   ALBUMIN 3.1*   BILITOT 1.2*   ALKPHOS 82   AST 22   ALT 11   ANIONGAP 11   EGFRNONAA >60     Lactic Acid:   Recent Labs  Lab 02/09/18  0955 02/09/18  1417   LACTATE 2.3* 2.0     Troponin:   Recent Labs  Lab 02/09/18 0955 02/09/18  1557   TROPONINI 0.124* 0.114*       Significant Imaging: I have reviewed and interpreted all pertinent imaging results/findings within the past 24 hours.         Assessment/Plan:     * Bilateral cellulitis of lower leg      IV Merrem and Vanco  Wound Care  Arterial US to evaluate blood flow for proper healing        Subacute osteomyelitis of multiple sites    Bilateral foot plain films note some questionable osteo to bilateral great toes  MRI pending  Evaluate arterial blood flow with U/S and ABIs          Protein-calorie malnutrition, moderate    Will start Folbic, MVI  Nutrition consult          Acute combined systolic and diastolic heart failure    Telemetry monitoring, daily weights, low sodium diet, fluid restriction,    Lasix 20 mg IV twice  Cardiology consult  Lisinopril 2.5 mg  Elevated troponin likely due to congestive heart failure - trend              Pleural effusion on right    Diuresis with IV lasix  Supplemental oxygen to maintain sat > 92 %  Will likely need thoracentesis, consult  Pulmonology when needed            VTE Risk Mitigation         Ordered     Medium Risk of VTE  Once      02/09/18 1300             Odette Layton NP  Department of Hospital Medicine   [facility]

## 2018-02-09 NOTE — ASSESSMENT & PLAN NOTE
Diuresis with IV lasix  Supplemental oxygen to maintain sat > 92 %  Will likely need thoracentesis, consult Pulmonology when needed

## 2018-02-09 NOTE — SUBJECTIVE & OBJECTIVE
History reviewed. No pertinent past medical history.    Past Surgical History:   Procedure Laterality Date    SPINAL FUSION         Review of patient's allergies indicates:   Allergen Reactions    Pcn [penicillins] Itching       No current facility-administered medications on file prior to encounter.      No current outpatient prescriptions on file prior to encounter.     Family History     None        Social History Main Topics    Smoking status: Former Smoker     Types: Cigarettes    Smokeless tobacco: Not on file    Alcohol use Yes      Comment: occasional    Drug use: No    Sexual activity: Not on file     Review of Systems   Constitutional: Positive for activity change, appetite change and fatigue. Negative for chills, diaphoresis and fever.   HENT: Negative.    Eyes: Negative for pain and redness.   Respiratory: Positive for shortness of breath. Negative for cough and wheezing.    Cardiovascular: Positive for leg swelling. Negative for chest pain and palpitations.   Gastrointestinal: Negative for abdominal pain, constipation, diarrhea, nausea and vomiting.   Genitourinary: Negative for decreased urine volume, difficulty urinating, dysuria and hematuria.   Musculoskeletal: Negative for arthralgias and myalgias.        Falls   Skin: Positive for rash and wound.   Neurological: Positive for weakness. Negative for dizziness and light-headedness.   Psychiatric/Behavioral: Negative for confusion. The patient is not nervous/anxious.      Objective:     Vital Signs (Most Recent):  Temp: 98.5 °F (36.9 °C) (02/09/18 1448)  Pulse: 100 (02/09/18 1401)  Resp: 18 (02/09/18 1404)  BP: 115/65 (02/09/18 1401)  SpO2: 100 % (02/09/18 1401) Vital Signs (24h Range):  Temp:  [97.8 °F (36.6 °C)-98.5 °F (36.9 °C)] 98.5 °F (36.9 °C)  Pulse:  [100-112] 100  Resp:  [18-19] 18  SpO2:  [97 %-100 %] 100 %  BP: (115-136)/(65-84) 115/65     Weight: 69.4 kg (153 lb)  Body mass index is 26.26 kg/m².    Physical Exam   Constitutional:  She is oriented to person, place, and time. She appears well-developed.  Non-toxic appearance.   Unkempt  female - appears stated age   HENT:   Head: Normocephalic and atraumatic.       Mouth/Throat: Mucous membranes are dry.   Dry tongue and oral MM   Eyes: Conjunctivae are normal.   Neck: Normal range of motion. Neck supple.   Cardiovascular: Regular rhythm.  Tachycardia present.    Pulmonary/Chest: Effort normal. She has rales.   Abdominal: Soft. Bowel sounds are normal. She exhibits distension. There is no tenderness. There is no rebound and no guarding.   Musculoskeletal: She exhibits edema (+2/+3 pitting edema to BLE).   Generalized weakness   Neurological: She is alert and oriented to person, place, and time.   Skin:   Abrasion to forehead and left chin area  Candidal appearing rash underneath breasts and abdominal fold  Ulcerations and cellulitis to feet   Psychiatric: She has a normal mood and affect. Her behavior is normal.   Nursing note and vitals reviewed.                          Significant Labs:   CBC:   Recent Labs  Lab 02/09/18  0955   WBC 8.01   HGB 14.4   HCT 42.3   *     CMP:   Recent Labs  Lab 02/09/18  0955      K 3.5      CO2 32*   GLU 90   BUN 21   CREATININE 0.7   CALCIUM 9.0   PROT 6.8   ALBUMIN 3.1*   BILITOT 1.2*   ALKPHOS 82   AST 22   ALT 11   ANIONGAP 11   EGFRNONAA >60     Lactic Acid:   Recent Labs  Lab 02/09/18  0955 02/09/18  1417   LACTATE 2.3* 2.0     Troponin:   Recent Labs  Lab 02/09/18  0955 02/09/18  1557   TROPONINI 0.124* 0.114*       Significant Imaging: I have reviewed and interpreted all pertinent imaging results/findings within the past 24 hours.

## 2018-02-10 LAB
ALBUMIN SERPL BCP-MCNC: 2.8 G/DL
ALP SERPL-CCNC: 76 U/L
ALT SERPL W/O P-5'-P-CCNC: 14 U/L
ANION GAP SERPL CALC-SCNC: 15 MMOL/L
AST SERPL-CCNC: 36 U/L
BILIRUB SERPL-MCNC: 0.8 MG/DL
BUN SERPL-MCNC: 21 MG/DL
CALCIUM SERPL-MCNC: 8.5 MG/DL
CHLORIDE SERPL-SCNC: 106 MMOL/L
CO2 SERPL-SCNC: 21 MMOL/L
CREAT SERPL-MCNC: 0.7 MG/DL
EST. GFR  (AFRICAN AMERICAN): >60 ML/MIN/1.73 M^2
EST. GFR  (NON AFRICAN AMERICAN): >60 ML/MIN/1.73 M^2
GLUCOSE SERPL-MCNC: 104 MG/DL
POTASSIUM SERPL-SCNC: 4.7 MMOL/L
PROT SERPL-MCNC: 6.9 G/DL
SODIUM SERPL-SCNC: 142 MMOL/L

## 2018-02-10 PROCEDURE — 25000003 PHARM REV CODE 250: Performed by: NURSE PRACTITIONER

## 2018-02-10 PROCEDURE — 36569 INSJ PICC 5 YR+ W/O IMAGING: CPT

## 2018-02-10 PROCEDURE — 94761 N-INVAS EAR/PLS OXIMETRY MLT: CPT

## 2018-02-10 PROCEDURE — 97802 MEDICAL NUTRITION INDIV IN: CPT

## 2018-02-10 PROCEDURE — 99223 1ST HOSP IP/OBS HIGH 75: CPT | Mod: ,,, | Performed by: INTERNAL MEDICINE

## 2018-02-10 PROCEDURE — 02HV33Z INSERTION OF INFUSION DEVICE INTO SUPERIOR VENA CAVA, PERCUTANEOUS APPROACH: ICD-10-PCS | Performed by: FAMILY MEDICINE

## 2018-02-10 PROCEDURE — 63600175 PHARM REV CODE 636 W HCPCS: Performed by: FAMILY MEDICINE

## 2018-02-10 PROCEDURE — 63600175 PHARM REV CODE 636 W HCPCS

## 2018-02-10 PROCEDURE — 27000221 HC OXYGEN, UP TO 24 HOURS

## 2018-02-10 PROCEDURE — 36415 COLL VENOUS BLD VENIPUNCTURE: CPT

## 2018-02-10 PROCEDURE — 63600175 PHARM REV CODE 636 W HCPCS: Performed by: NURSE PRACTITIONER

## 2018-02-10 PROCEDURE — 80053 COMPREHEN METABOLIC PANEL: CPT

## 2018-02-10 PROCEDURE — 63600175 PHARM REV CODE 636 W HCPCS: Performed by: EMERGENCY MEDICINE

## 2018-02-10 PROCEDURE — 63600175 PHARM REV CODE 636 W HCPCS: Performed by: INTERNAL MEDICINE

## 2018-02-10 PROCEDURE — 25500020 PHARM REV CODE 255: Performed by: FAMILY MEDICINE

## 2018-02-10 PROCEDURE — 21400001 HC TELEMETRY ROOM

## 2018-02-10 PROCEDURE — 99222 1ST HOSP IP/OBS MODERATE 55: CPT | Mod: ,,, | Performed by: INTERNAL MEDICINE

## 2018-02-10 RX ADMIN — MEROPENEM AND SODIUM CHLORIDE 500 MG: 500 INJECTION, SOLUTION INTRAVENOUS at 05:02

## 2018-02-10 RX ADMIN — VANCOMYCIN HYDROCHLORIDE 1 G: 1 INJECTION, POWDER, LYOPHILIZED, FOR SOLUTION INTRAVENOUS at 05:02

## 2018-02-10 RX ADMIN — ACETAMINOPHEN 650 MG: 325 TABLET, FILM COATED ORAL at 05:02

## 2018-02-10 RX ADMIN — MEROPENEM AND SODIUM CHLORIDE 500 MG: 500 INJECTION, SOLUTION INTRAVENOUS at 11:02

## 2018-02-10 RX ADMIN — FUROSEMIDE 20 MG: 10 INJECTION, SOLUTION INTRAMUSCULAR; INTRAVENOUS at 07:02

## 2018-02-10 RX ADMIN — HEPARIN SODIUM 5000 UNITS: 5000 INJECTION, SOLUTION INTRAVENOUS; SUBCUTANEOUS at 02:02

## 2018-02-10 RX ADMIN — FAMOTIDINE 20 MG: 20 TABLET, FILM COATED ORAL at 09:02

## 2018-02-10 RX ADMIN — LISINOPRIL 2.5 MG: 2.5 TABLET ORAL at 09:02

## 2018-02-10 RX ADMIN — HEPARIN SODIUM 5000 UNITS: 5000 INJECTION, SOLUTION INTRAVENOUS; SUBCUTANEOUS at 09:02

## 2018-02-10 RX ADMIN — MEROPENEM AND SODIUM CHLORIDE 500 MG: 500 INJECTION, SOLUTION INTRAVENOUS at 06:02

## 2018-02-10 RX ADMIN — Medication 1 TABLET: at 09:02

## 2018-02-10 RX ADMIN — VANCOMYCIN HYDROCHLORIDE 1 G: 1 INJECTION, POWDER, LYOPHILIZED, FOR SOLUTION INTRAVENOUS at 01:02

## 2018-02-10 RX ADMIN — IOHEXOL 100 ML: 350 INJECTION, SOLUTION INTRAVENOUS at 06:02

## 2018-02-10 RX ADMIN — THERA TABS 1 TABLET: TAB at 09:02

## 2018-02-10 RX ADMIN — HEPARIN SODIUM 5000 UNITS: 5000 INJECTION, SOLUTION INTRAVENOUS; SUBCUTANEOUS at 06:02

## 2018-02-10 NOTE — PLAN OF CARE
Problem: Patient Care Overview  Goal: Plan of Care Review  Outcome: Ongoing (interventions implemented as appropriate)  Shift assessment completed. Patient updated on POC for the day, complaints of pain - PRN medication administered. IV saline locked in RAC. Neuro: alert & oriented x3. Continuous telemetry monitoring maintained, patient NSR, HR= 95-97. O2 @ 3L , Sats=greater than 92%. Patient voids per [place]. MS:Generalized weakness. Skin:excoriated skin under breasts. Breast tissue hardened. Excoriated skin on abdomen. Bilateral leg and feet wounds. Heels floated off the bed. Reviewed fall precautions with patient and bed alarm on. Hourly rounding done. Bed low & locked. Call light within reach. Will continue to monitor.

## 2018-02-10 NOTE — PLAN OF CARE
Problem: Patient Care Overview  Goal: Plan of Care Review  Pt on 02 tolerating well. No distress noted at this time.

## 2018-02-10 NOTE — CONSULTS
Preliminary consult note                  66 y/o fmelae with no significant Pmhx with significant cellulitis of BLE. Pt also with mild CHF and echo shwoing EF10-15%. EKG is wnl limits.Continue diuresis, b blockers, ace (-) and antibiotics. Ischemic w/u once cellulitis improves.

## 2018-02-10 NOTE — PROCEDURES
"Kelly Mariano is a 67 y.o. female patient.    Temp: 97.5 °F (36.4 °C) (02/10/18 1144)  Pulse: 100 (02/10/18 1144)  Resp: 18 (02/10/18 1144)  BP: 132/62 (02/10/18 1144)  SpO2: 97 % (02/10/18 1144)  Weight: 69.4 kg (153 lb) (02/10/18 1000)  Height: 5' 4" (162.6 cm) (02/10/18 1000)    PICC  Date/Time: 2/10/2018 3:36 PM  Performed by: AUBREY FARIA  Supervising provider: MANSOOR FERREIRA  Consent Done: Yes  Time out: Immediately prior to procedure a time out was called to verify the correct patient, procedure, equipment, support staff and site/side marked as required  Indications: med administration and vascular access  Anesthesia: local infiltration  Local anesthetic: lidocaine 1% without epinephrine  Anesthetic Total (mL): 3  Preparation: skin prepped with ChloraPrep  Skin prep agent dried: skin prep agent completely dried prior to procedure  Sterile barriers: all five maximum sterile barriers used - cap, mask, sterile gown, sterile gloves, and large sterile sheet  Hand hygiene: hand hygiene performed prior to central venous catheter insertion  Location details: right basilic  Catheter type: double lumen  Catheter size: 5 Fr  Catheter Length: 33cm    Ultrasound guidance: yes  Vessel Caliber: medium and patent, compressibility normal  Vascular Doppler: not done  Needle advanced into vessel with real time Ultrasound guidance.  Guidewire confirmed in vessel.  Sterile sheath used.  no esophageal manometryNumber of attempts: 1  Post-procedure: blood return through all ports, chlorhexidine patch and sterile dressing applied  Estimated blood loss (mL): 1  Specimens: No  Implants: No  Assessment: placement verified by x-ray  Tip Termination Explanation: see radiology report  Complications: none  Comments: biopatch to site, written instructions at bedside,   Chest x ray reviewed, pending radiologist report    Do Not use until placement verified by MD Aubrey Faria  2/10/2018    "

## 2018-02-10 NOTE — PLAN OF CARE
Problem: Patient Care Overview  Goal: Plan of Care Review  Outcome: Ongoing (interventions implemented as appropriate)  Shift assessment completed. Patient updated on POC for the day, complaints of pain - PRN medication administered. IV saline locked in RAC. No redness at site. IVF infusing:  ***  Neuro: ***  CVR: Continuous telemetry monitoring maintained, patient *** , HR= ***  Resp: BBS *** , O2 @ *** , Sats=***  GI:***  :***  MS:***  Skin:***  Reviewed fall precautions with patient and bed alarm on.

## 2018-02-10 NOTE — PROGRESS NOTES
Vancomycin Progress Notes:    Estimated Creatinine Clearance: 75.6 mL/min (based on SCr of 0.7 mg/dL).  white blood cell count = 8.01  Tmax/24h = 98.5 F  Cultures -blood and urine collected/pending  Serum creatinine =0.7 from 2/9  Estimated Creatinine Clearance: 75.6 mL/min (based on SCr of 0.7 mg/dL).  Continue current dose of 1 gm iv q12hrs  Adjusted trough time due 2/11 @ 0030/Asha Sagastume [facility] 2/10/2018 9:16 AM

## 2018-02-10 NOTE — PLAN OF CARE
Problem: Patient Care Overview  Goal: Plan of Care Review  Outcome: Ongoing (interventions implemented as appropriate)  Recommendation/Intervention:   1. Continue low sodium diet with 1500ml fluid restriction (or per team)   2. Continue multivitamin supplement as ordered   3. On-site RD to perform nutrition focused physical assessment at follow-up and to assess diet education needs     Goals:   1. Meet >85% EEN and EPN   2. Prevent dry weight loss of >2% per week   3. Promote nutrition related labs wnl

## 2018-02-10 NOTE — ASSESSMENT & PLAN NOTE
Nutrition Diagnosis:  Decreased nutrient needs (Na)    Related to (etiology):   CHF    Signs and Symptoms (as evidenced by):   Pt with edema 2/2 CHF    Interventions/Recommendations (treatment strategy):  Low sodium diet with 1500ml fluid restriction    Nutrition Diagnosis Status:   New

## 2018-02-10 NOTE — CONSULTS
[facility] -   Adult Nutrition  Consult Note    SUMMARY     Recommendations    Recommendation/Intervention:   1. Continue low sodium diet with 1500ml fluid restriction (or per team)   2. Continue multivitamin supplement as ordered   3. On-site RD to perform nutrition focused physical assessment at follow-up and to assess diet education needs    Goals:   1. Meet >85% EEN and EPN   2. Prevent dry weight loss of >2% per week   3. Promote nutrition related labs wnl    Nutrition Goal Status: new  Communication of RD Recs: other (comment) (care plan)    Reason for Assessment    Reason for Assessment: consult  Diagnosis:  1. Pleural effusion on right    2. Bilateral cellulitis of lower leg    3. Cellulitis of left foot    4. Cellulitis of right foot    5. CHF (congestive heart failure)      History reviewed. No pertinent past medical history.     General Information Comments: Remote assessment completed. Pt noted with possible malnutrition. +2/+3 pitting edema to BLE noted. Cellulitis and wounds to feet and legs.     Nutrition Discharge Planning: d/c on low Na diet    Nutrition Prescription Ordered    Current Diet Order: low Na  Nutrition Order Comments: 1500ml fluid restriction    Evaluation of Received Nutrients/Fluid Intake    % Intake of Estimated Energy Needs: SAEID  % Meal Intake: SAEID    Nutrition/Diet History    Food Preferences: No cultural/spiritual preferences noted     Labs/Tests/Procedures/Meds    Pertinent Labs Reviewed: reviewed  Lab Results   Component Value Date     02/09/2018    K 3.5 02/09/2018     02/09/2018    CO2 32 (H) 02/09/2018    BUN 21 02/09/2018    CREATININE 0.7 02/09/2018    CALCIUM 9.0 02/09/2018    PHOS 3.0 02/09/2018    MG 1.6 02/09/2018    ESTGFRAFRICA >60 02/09/2018    EGFRNONAA >60 02/09/2018    ALBUMIN 3.1 (L) 02/09/2018     Pertinent Medications Reviewed: reviewed  Scheduled Meds:   famotidine  20 mg Oral BID    folic acid-vit B6-vit B12 2.5-25-2 mg  1 tablet  "Oral Daily    furosemide  20 mg Intravenous BID    heparin (porcine)  5,000 Units Subcutaneous Q8H    lisinopril  2.5 mg Oral Daily    meropenem (MERREM) IVPB  500 mg Intravenous Q6H    multivitamin  1 tablet Oral Daily    vancomycin 1 g in 0.9% sodium chloride 250 mL IVPB (ready to mix system)  1 g Intravenous Q12H     Physical Findings    Overall Physical Appearance: edematous  Skin: other (see comments) (cellulitis and leg wounds noted)    Anthropometrics    Temp: 96.3 °F (35.7 °C)  Height: 5' 4" (162.6 cm)  Weight Method: Bed Scale  Weight: 69.4 kg (153 lb)  Ideal Body Weight (IBW), Female: 120 lb  % Ideal Body Weight, Female (lb): 127.5 lb  BMI (Calculated): 26.3  BMI Grade: 25 - 29.9 - overweight     Estimated/Assessed Needs    Weight Used For Calorie Calculations: 69.4 kg (153 lb)   Energy Calorie Requirements (kcal): 5328-6052  Energy Need Method: Kcal/kg (25-30 kcal/kg)     Weight Used For Protein Calculations: 69.4 kg (153 lb)  Protein Requirements: 69-83 gm/d (1-1.2 gm/kg)  Fluid Requirements (mL): 1500 (fluid restriction per team)    Assessment and Plan    Acute combined systolic and diastolic heart failure    Nutrition Diagnosis:  Decreased nutrient needs (Na)    Related to (etiology):   CHF    Signs and Symptoms (as evidenced by):   Pt with edema 2/2 CHF    Interventions/Recommendations (treatment strategy):  Low sodium diet with 1500ml fluid restriction    Nutrition Diagnosis Status:   New          Monitor and Evaluation    Food and Nutrient Intake: energy intake, food and beverage intake  Food and Nutrient Adminstration: diet order  Knowledge/Beliefs/Attitudes: food and nutrition knowledge/skill, beliefs and attitudes  Physical Activity and Function: nutrition-related ADLs and IADLs  Anthropometric Measurements: weight, weight change  Biochemical Data, Medical Tests and Procedures: electrolyte and renal panel, gastrointestinal profile, glucose/endocrine profile, inflammatory profile, lipid " profile  Nutrition-Focused Physical Findings: overall appearance, extremities, muscles and bones, skin    Nutrition Risk    Level of Risk: other (see comments) (f/u 2x/wk)    Nutrition Follow-Up    RD Follow-up?: Yes

## 2018-02-11 PROBLEM — N63.10 MASS OF BREAST, RIGHT: Status: ACTIVE | Noted: 2018-02-11

## 2018-02-11 PROBLEM — R79.89 ELEVATED TROPONIN: Status: ACTIVE | Noted: 2018-02-11

## 2018-02-11 PROBLEM — B95.61 BACTEREMIA DUE TO STAPHYLOCOCCUS AUREUS: Status: ACTIVE | Noted: 2018-02-11

## 2018-02-11 PROBLEM — R78.81 BACTEREMIA DUE TO STAPHYLOCOCCUS AUREUS: Status: ACTIVE | Noted: 2018-02-11

## 2018-02-11 LAB
ALBUMIN FLD-MCNC: 1 G/DL
ALBUMIN SERPL BCP-MCNC: 2.6 G/DL
ALP SERPL-CCNC: 74 U/L
ALT SERPL W/O P-5'-P-CCNC: 13 U/L
AMYLASE, BODY FLUID: 16 U/L
ANION GAP SERPL CALC-SCNC: 8 MMOL/L
APPEARANCE FLD: CLEAR
AST SERPL-CCNC: 21 U/L
BACTERIA UR CULT: NORMAL
BASOPHILS # BLD AUTO: 0 K/UL
BASOPHILS NFR BLD: 0 %
BILIRUB SERPL-MCNC: 0.7 MG/DL
BODY FLD TYPE: NORMAL
BODY FLUID SOURCE AMYLASE: NORMAL
BUN SERPL-MCNC: 18 MG/DL
CALCIUM SERPL-MCNC: 8.2 MG/DL
CHLORIDE SERPL-SCNC: 98 MMOL/L
CO2 SERPL-SCNC: 35 MMOL/L
COLOR FLD: YELLOW
CREAT SERPL-MCNC: 0.7 MG/DL
CRP SERPL-MCNC: 28.01 MG/L
DIFFERENTIAL METHOD: ABNORMAL
EOSINOPHIL # BLD AUTO: 0 K/UL
EOSINOPHIL NFR BLD: 0.1 %
ERYTHROCYTE [DISTWIDTH] IN BLOOD BY AUTOMATED COUNT: 18 %
ERYTHROCYTE [SEDIMENTATION RATE] IN BLOOD BY WESTERGREN METHOD: 10 MM/HR
EST. GFR  (AFRICAN AMERICAN): >60 ML/MIN/1.73 M^2
EST. GFR  (NON AFRICAN AMERICAN): >60 ML/MIN/1.73 M^2
ESTIMATED AVG GLUCOSE: 111 MG/DL
GLUCOSE FLD-MCNC: 107 MG/DL
GLUCOSE SERPL-MCNC: 102 MG/DL
GLUCOSE SERPL-MCNC: 95 MG/DL
HBA1C MFR BLD HPLC: 5.5 %
HCT VFR BLD AUTO: 42.4 %
HGB BLD-MCNC: 12.8 G/DL
LACTATE SERPL-SCNC: 0.9 MMOL/L
LDH SERPL L TO P-CCNC: 112 U/L
LYMPHOCYTES # BLD AUTO: 0.6 K/UL
LYMPHOCYTES NFR BLD: 8.8 %
LYMPHOCYTES NFR FLD MANUAL: 6 %
MAGNESIUM SERPL-MCNC: 1.5 MG/DL
MCH RBC QN AUTO: 28.8 PG
MCHC RBC AUTO-ENTMCNC: 30.2 G/DL
MCV RBC AUTO: 96 FL
MONOCYTES # BLD AUTO: 0.4 K/UL
MONOCYTES NFR BLD: 5.9 %
MONOS+MACROS NFR FLD MANUAL: 60 %
NEUTROPHILS # BLD AUTO: 5.8 K/UL
NEUTROPHILS NFR BLD: 85.2 %
NEUTROPHILS NFR FLD MANUAL: 34 %
PHOSPHATE SERPL-MCNC: 2.7 MG/DL
PLATELET # BLD AUTO: 118 K/UL
PMV BLD AUTO: 10.9 FL
POCT GLUCOSE: 109 MG/DL (ref 70–110)
POTASSIUM SERPL-SCNC: 3.1 MMOL/L
PROT FLD-MCNC: 1.7 G/DL
PROT SERPL-MCNC: 5.8 G/DL
PROT SERPL-MCNC: 5.9 G/DL
RBC # BLD AUTO: 4.44 M/UL
SODIUM SERPL-SCNC: 141 MMOL/L
SPECIMEN SOURCE: NORMAL
VANCOMYCIN TROUGH SERPL-MCNC: 16.8 UG/ML
WBC # BLD AUTO: 6.79 K/UL
WBC # FLD: 131 /CU MM

## 2018-02-11 PROCEDURE — 85651 RBC SED RATE NONAUTOMATED: CPT

## 2018-02-11 PROCEDURE — 92611 MOTION FLUOROSCOPY/SWALLOW: CPT

## 2018-02-11 PROCEDURE — 87102 FUNGUS ISOLATION CULTURE: CPT

## 2018-02-11 PROCEDURE — 80202 ASSAY OF VANCOMYCIN: CPT

## 2018-02-11 PROCEDURE — 94761 N-INVAS EAR/PLS OXIMETRY MLT: CPT

## 2018-02-11 PROCEDURE — 89051 BODY FLUID CELL COUNT: CPT

## 2018-02-11 PROCEDURE — 85025 COMPLETE CBC W/AUTO DIFF WBC: CPT

## 2018-02-11 PROCEDURE — 25000003 PHARM REV CODE 250: Performed by: NURSE PRACTITIONER

## 2018-02-11 PROCEDURE — 87070 CULTURE OTHR SPECIMN AEROBIC: CPT

## 2018-02-11 PROCEDURE — 21400001 HC TELEMETRY ROOM

## 2018-02-11 PROCEDURE — 25000003 PHARM REV CODE 250: Performed by: FAMILY MEDICINE

## 2018-02-11 PROCEDURE — 84157 ASSAY OF PROTEIN OTHER: CPT

## 2018-02-11 PROCEDURE — 63600175 PHARM REV CODE 636 W HCPCS

## 2018-02-11 PROCEDURE — 27000221 HC OXYGEN, UP TO 24 HOURS

## 2018-02-11 PROCEDURE — 63600175 PHARM REV CODE 636 W HCPCS: Performed by: FAMILY MEDICINE

## 2018-02-11 PROCEDURE — 82947 ASSAY GLUCOSE BLOOD QUANT: CPT

## 2018-02-11 PROCEDURE — 86141 C-REACTIVE PROTEIN HS: CPT

## 2018-02-11 PROCEDURE — 99223 1ST HOSP IP/OBS HIGH 75: CPT | Mod: ,,, | Performed by: INTERNAL MEDICINE

## 2018-02-11 PROCEDURE — 63600175 PHARM REV CODE 636 W HCPCS: Performed by: INTERNAL MEDICINE

## 2018-02-11 PROCEDURE — 88305 TISSUE EXAM BY PATHOLOGIST: CPT | Mod: 26,,, | Performed by: PATHOLOGY

## 2018-02-11 PROCEDURE — 88305 TISSUE EXAM BY PATHOLOGIST: CPT | Performed by: PATHOLOGY

## 2018-02-11 PROCEDURE — 32555 ASPIRATE PLEURA W/ IMAGING: CPT | Mod: RT,,, | Performed by: INTERNAL MEDICINE

## 2018-02-11 PROCEDURE — 83615 LACTATE (LD) (LDH) ENZYME: CPT

## 2018-02-11 PROCEDURE — 84100 ASSAY OF PHOSPHORUS: CPT

## 2018-02-11 PROCEDURE — 82150 ASSAY OF AMYLASE: CPT

## 2018-02-11 PROCEDURE — 96372 THER/PROPH/DIAG INJ SC/IM: CPT

## 2018-02-11 PROCEDURE — 83735 ASSAY OF MAGNESIUM: CPT

## 2018-02-11 PROCEDURE — 63600175 PHARM REV CODE 636 W HCPCS: Performed by: PHYSICIAN ASSISTANT

## 2018-02-11 PROCEDURE — 63600175 PHARM REV CODE 636 W HCPCS: Performed by: NURSE PRACTITIONER

## 2018-02-11 PROCEDURE — 99233 SBSQ HOSP IP/OBS HIGH 50: CPT | Mod: 25,,, | Performed by: INTERNAL MEDICINE

## 2018-02-11 PROCEDURE — 83605 ASSAY OF LACTIC ACID: CPT

## 2018-02-11 PROCEDURE — 0W993ZZ DRAINAGE OF RIGHT PLEURAL CAVITY, PERCUTANEOUS APPROACH: ICD-10-PCS | Performed by: INTERNAL MEDICINE

## 2018-02-11 PROCEDURE — 80053 COMPREHEN METABOLIC PANEL: CPT

## 2018-02-11 PROCEDURE — 82042 OTHER SOURCE ALBUMIN QUAN EA: CPT

## 2018-02-11 PROCEDURE — 84155 ASSAY OF PROTEIN SERUM: CPT

## 2018-02-11 PROCEDURE — 87205 SMEAR GRAM STAIN: CPT

## 2018-02-11 PROCEDURE — 25000003 PHARM REV CODE 250: Performed by: PHYSICIAN ASSISTANT

## 2018-02-11 PROCEDURE — 82945 GLUCOSE OTHER FLUID: CPT

## 2018-02-11 PROCEDURE — 83036 HEMOGLOBIN GLYCOSYLATED A1C: CPT

## 2018-02-11 PROCEDURE — 84311 SPECTROPHOTOMETRY: CPT

## 2018-02-11 PROCEDURE — 83615 LACTATE (LD) (LDH) ENZYME: CPT | Mod: 91

## 2018-02-11 PROCEDURE — 99232 SBSQ HOSP IP/OBS MODERATE 35: CPT | Mod: ,,, | Performed by: INTERNAL MEDICINE

## 2018-02-11 PROCEDURE — 87116 MYCOBACTERIA CULTURE: CPT

## 2018-02-11 PROCEDURE — 88112 CYTOPATH CELL ENHANCE TECH: CPT | Mod: 26,,, | Performed by: PATHOLOGY

## 2018-02-11 RX ORDER — POTASSIUM CHLORIDE 20 MEQ/1
40 TABLET, EXTENDED RELEASE ORAL 2 TIMES DAILY
Status: COMPLETED | OUTPATIENT
Start: 2018-02-11 | End: 2018-02-11

## 2018-02-11 RX ORDER — LIDOCAINE HYDROCHLORIDE 10 MG/ML
1 INJECTION INFILTRATION; PERINEURAL
Status: DISPENSED | OUTPATIENT
Start: 2018-02-11 | End: 2018-02-12

## 2018-02-11 RX ORDER — MAGNESIUM SULFATE HEPTAHYDRATE 40 MG/ML
2 INJECTION, SOLUTION INTRAVENOUS ONCE
Status: COMPLETED | OUTPATIENT
Start: 2018-02-11 | End: 2018-02-11

## 2018-02-11 RX ORDER — ASPIRIN 81 MG/1
81 TABLET ORAL DAILY
Status: DISCONTINUED | OUTPATIENT
Start: 2018-02-11 | End: 2018-02-11

## 2018-02-11 RX ORDER — FUROSEMIDE 10 MG/ML
40 INJECTION INTRAMUSCULAR; INTRAVENOUS 2 TIMES DAILY
Status: DISCONTINUED | OUTPATIENT
Start: 2018-02-11 | End: 2018-02-13

## 2018-02-11 RX ORDER — METOPROLOL SUCCINATE 25 MG/1
25 TABLET, EXTENDED RELEASE ORAL DAILY
Status: DISCONTINUED | OUTPATIENT
Start: 2018-02-11 | End: 2018-02-14

## 2018-02-11 RX ADMIN — THERA TABS 1 TABLET: TAB at 08:02

## 2018-02-11 RX ADMIN — POTASSIUM CHLORIDE 40 MEQ: 1500 TABLET, EXTENDED RELEASE ORAL at 11:02

## 2018-02-11 RX ADMIN — DIPHENHYDRAMINE HYDROCHLORIDE 25 MG: 25 CAPSULE ORAL at 01:02

## 2018-02-11 RX ADMIN — MEROPENEM AND SODIUM CHLORIDE 500 MG: 500 INJECTION, SOLUTION INTRAVENOUS at 11:02

## 2018-02-11 RX ADMIN — MEROPENEM AND SODIUM CHLORIDE 500 MG: 500 INJECTION, SOLUTION INTRAVENOUS at 05:02

## 2018-02-11 RX ADMIN — FUROSEMIDE 20 MG: 10 INJECTION, SOLUTION INTRAMUSCULAR; INTRAVENOUS at 08:02

## 2018-02-11 RX ADMIN — ALUMINUM HYDROXIDE, MAGNESIUM HYDROXIDE, AND SIMETHICONE 30 ML: 200; 200; 20 SUSPENSION ORAL at 06:02

## 2018-02-11 RX ADMIN — HEPARIN SODIUM 5000 UNITS: 5000 INJECTION, SOLUTION INTRAVENOUS; SUBCUTANEOUS at 01:02

## 2018-02-11 RX ADMIN — ASPIRIN 81 MG: 81 TABLET, COATED ORAL at 11:02

## 2018-02-11 RX ADMIN — ALUMINUM HYDROXIDE, MAGNESIUM HYDROXIDE, AND SIMETHICONE 30 ML: 200; 200; 20 SUSPENSION ORAL at 12:02

## 2018-02-11 RX ADMIN — FAMOTIDINE 20 MG: 20 TABLET, FILM COATED ORAL at 09:02

## 2018-02-11 RX ADMIN — FAMOTIDINE 20 MG: 20 TABLET, FILM COATED ORAL at 08:02

## 2018-02-11 RX ADMIN — LISINOPRIL 2.5 MG: 2.5 TABLET ORAL at 08:02

## 2018-02-11 RX ADMIN — MAGNESIUM SULFATE HEPTAHYDRATE 2 G: 40 INJECTION, SOLUTION INTRAVENOUS at 01:02

## 2018-02-11 RX ADMIN — Medication 1 TABLET: at 08:02

## 2018-02-11 RX ADMIN — VANCOMYCIN HYDROCHLORIDE 1 G: 1 INJECTION, POWDER, LYOPHILIZED, FOR SOLUTION INTRAVENOUS at 09:02

## 2018-02-11 RX ADMIN — ACETAMINOPHEN 650 MG: 325 TABLET, FILM COATED ORAL at 08:02

## 2018-02-11 RX ADMIN — FUROSEMIDE 40 MG: 10 INJECTION, SOLUTION INTRAMUSCULAR; INTRAVENOUS at 05:02

## 2018-02-11 RX ADMIN — HEPARIN SODIUM 5000 UNITS: 5000 INJECTION, SOLUTION INTRAVENOUS; SUBCUTANEOUS at 06:02

## 2018-02-11 RX ADMIN — METOPROLOL SUCCINATE 25 MG: 25 TABLET, EXTENDED RELEASE ORAL at 11:02

## 2018-02-11 RX ADMIN — HEPARIN SODIUM 5000 UNITS: 5000 INJECTION, SOLUTION INTRAVENOUS; SUBCUTANEOUS at 10:02

## 2018-02-11 RX ADMIN — POTASSIUM CHLORIDE 40 MEQ: 1500 TABLET, EXTENDED RELEASE ORAL at 09:02

## 2018-02-11 NOTE — PT/OT/SLP PROGRESS
Speech Language Pathology Treatment  MODIFIED BARIUM SWALLOW STUDY    Patient Name:  Kelly Mariano   MRN:  [id number]  Admitting Diagnosis: Bilateral cellulitis of lower leg    Recommendations:                 General Recommendations:  Soft Mechanical Consistency diet and Nectar Thick Liquids  Diet recommendations:  SEE ABOVE     Aspiration Precautions: In place  General Precautions: Standard, aspiration, nectar thick  Communication strategies:  Verbal    Subjective     Pt is a 67 year old female admitted with cellulitis and large mass in right breast;  Pt. was recommended for a MBSS due to concerns regarding swallowing status.   Patient goals: to go home     Pain/Comfort:  · Pain Rating 1: 0/10    Objective:     Has the patient been evaluated by SLP for swallowing?   Yes  Keep patient NPO? No   Current Respiratory Status: nasal cannula      MODIFIED BARIUM SWALLOW STUDY RESULTS:  THIN LIQUIDS:  Min delay in initiating a swallow;  Pt. With ASPIRATION DURING THE SWALLOW WITH NO COUGH ;  Min residue and clearing with 2 swallows.    THICK LIQUIDS: Min delay in initiating a swallow;  Pt. With No SUPRAGLOTTIC PENETRATION OR ASPIRATION;   Min.-mod residue following swallow and cleared with multiple cued swallows.    PUREED:   Min delay in initiating a swallow;   Pt with NO SUPRAGLOTTIC PENETRATION OR ASPIRATION;   Min residue with cues to clear    SOLIDS:  Min delay in initiating a swallow;   Pt with NO SUPRAGLOTTIC PENETRATION OR ASPIRATION;   Min residue with cues to clear    Assessment:     Kelly Mariano is a 67 y.o. female with an SLP diagnosis of DYSPHAGIA.   Per this MBSS, she presented with SILENT ASPIRATION OF THIN LIQUIDS.  No supraglottic penetration or aspiration of thick liquids, pureed or solids.  Pt.did need cues to clear pharyngeal residue in pyriform sinuses.   Overall, tongue base retraction and laryngeal elevation were decreased as well as sensory component with silent aspiration.  Pt alert and followed  commands well during the study.   She has been recommended for a soft mechanical consistency diet and nectar thick liquids as well as S.T.   Results and recommendations were reviewed with the patient.    Goals:    SLP Goals        Problem: SLP Goal    Goal Priority Disciplines Outcome   SLP Goal     SLP    Description:  Long Term Goal:  Pt to tolerate least restrictive diet consistency safely and efficiently.    Short Term Goals:  1. Tongue Base Retraction and Laryngeal Elevation tasks x20 with min cues  2. Oral Motor tasks x20 with min cues  3. Recommend education on swallow precautions and diet recommendations.                    Plan:     · Patient to be seen:      · Plan of Care expires:     · Plan of Care reviewed with:  patient, spouse, caregiver   · SLP Follow-Up:          Discharge recommendations:    S.T at discharge to address swallowing skills  Barriers to Discharge:  Pt  Needs assistance    Time Tracking:     SLP Treatment Date:   02/11/18  Speech Start Time:  1015  Speech Stop Time:  1045     Speech Total Time (min):  30 min    Billable Minutes: 30 minutes    Miesha Santos CCC-SLP  02/11/2018

## 2018-02-11 NOTE — ASSESSMENT & PLAN NOTE
Optimize CHF regimen.  Cardiology consult  Preload and afterload reduction.  Daily weighing.  Dietary salt restriction.  Alcohol and tobacco avoidance.

## 2018-02-11 NOTE — HOSPITAL COURSE
2/11/18-Patient seen and examined today, resting in bed. States she feels ok, still weak and fatigued. Complains of SOB. No chest pain or tightness. BC +Staph. Labs reviewed. K low at 3.1. Creatinine stable.     2/12/18 Pt hemodynamically stable on Toprol and Lisinopril. Will need biopsy of breast mass. Will discuss stress once infection treated and pt is diuresed.     2/13/18- Patient remains hemodynamically stable. No reports of chest pain or SOB. Patient is right post thoracentesis on 2/11/18. 1500cc drained.  Noted to have EF of 10-15% on echo. Has right breast mass that needs biopsy. Remains of IV abx for extensive lower extremity cellulitis.     2/14/18- Patient with noted to have Bibasilar infiltrates and effusions on repeat CXR. Pulmonology started Diamox and stopped Lasix. Still awaiting breast biopsy results. BP stable . Had hypoxia this morning and is confused. No family at bedside     2/15/18-Patient seen and examined today, sitting up in bed. Still confused. BP remains stable. Labs reviewed, K slightly low at 3.4. Biopsy results still pending.     2/16/18 - no acute events overnight. Pt seems more coherent but distant/angry. Diuresed well    2/17/18 - Mental status improved, continued on diuretics, seems improved     2/18/18 - Improved mental status, no acute events o/n. Denies CP/SOB. Diuretics increased with metolazone. HF meds titrated.

## 2018-02-11 NOTE — ASSESSMENT & PLAN NOTE
Diagnostic thoracentesis:  Complications  of the procedure discussed in detail with patient. Complications including but not limited to infection that may require hospital  admission, bleeding that may require blood transfusion and or hospital  admission, perforation of the lung which may require surgery. Patient expressed and verbalized understanding.  Alternate treatments and material risks associated with such alternatives were  discussed with pateint. These include radiologic surveillance  with  minimal risk and sugery with an indeterminate risk. The material risks  of refusing the procedure was discussed in detail. This includes no  diagnosis or confirmation of diagnisis and rendering of appropriate treatment the risk of which depends on the nature of the diagnosed illness. Patient expressed and verbalized understanding. Pleural fluid will be sent for chemistry, microbiology and cytology.

## 2018-02-11 NOTE — PROGRESS NOTES
Estimated Creatinine Clearance: 79 mL/min (based on SCr of 0.7 mg/dL).  white blood cell count = 6.79  Tmax/24h =97.9 F  Cultures -Urine- collected   Blood-STAPHYLOCOCCUS AUREUS   Susceptibility pending   Serum creatinine stable @ 0.7  Continue current dose of 1 gm iv q12hrs  Note: doses were hung late by about 3 -4hrs on 2 doses  Instructed nursing on importance of hanging doses on time to get an accurate trough  Reordering trough for 2/12 @ 0815  Asha Sagastume [facility] 2/11/2018 9:13 AM

## 2018-02-11 NOTE — SUBJECTIVE & OBJECTIVE
History reviewed. No pertinent past medical history.    Past Surgical History:   Procedure Laterality Date    SPINAL FUSION         Review of patient's allergies indicates:   Allergen Reactions    Pcn [penicillins] Itching     Scheduled Meds:   famotidine  20 mg Oral BID    folic acid-vit B6-vit B12 2.5-25-2 mg  1 tablet Oral Daily    furosemide  20 mg Intravenous BID    heparin (porcine)  5,000 Units Subcutaneous Q8H    lisinopril  2.5 mg Oral Daily    meropenem (MERREM) IVPB  500 mg Intravenous Q6H    multivitamin  1 tablet Oral Daily    vancomycin 1 g in 0.9% sodium chloride 250 mL IVPB (ready to mix system)  1 g Intravenous Q12H     Continuous Infusions:  PRN Meds:.acetaminophen, aluminum-magnesium hydroxide-simethicone, diphenhydrAMINE, ondansetron    Family History     None        Social History Main Topics    Smoking status: Former Smoker     Types: Cigarettes    Smokeless tobacco: Not on file    Alcohol use Yes      Comment: occasional    Drug use: No    Sexual activity: Not on file           Review of Systems   Constitutional: Positive for activity change, appetite change and fatigue. Negative for chills, diaphoresis and fever.   HENT: Negative.    Eyes: Negative for pain and redness.   Respiratory: Positive for shortness of breath. Negative for cough and wheezing.    Cardiovascular: Positive for leg swelling. Negative for chest pain and palpitations.   Gastrointestinal: Negative for abdominal pain, constipation, diarrhea, nausea and vomiting.   Genitourinary: Negative for decreased urine volume, difficulty urinating, dysuria and hematuria.   Musculoskeletal: Negative for arthralgias and myalgias.        Falls   Skin: Positive for rash and wound.   Neurological: Positive for weakness. Negative for dizziness and light-headedness.   Psychiatric/Behavioral: Negative for confusion. The patient is not nervous/anxious.      Objective:     Vital Signs (Most Recent):  Temp: 97.7 °F (36.5 °C)  (02/10/18 1556)  Pulse: 99 (02/10/18 1556)  Resp: 18 (02/10/18 1556)  BP: (!) 125/56 (02/10/18 1556)  SpO2: 96 % (02/10/18 1556) Vital Signs (24h Range):  Temp:  [96.3 °F (35.7 °C)-97.7 °F (36.5 °C)] 97.7 °F (36.5 °C)  Pulse:  [] 99  Resp:  [18-20] 18  SpO2:  [95 %-99 %] 96 %  BP: (112-132)/(56-67) 125/56     Weight: 69.4 kg (153 lb)  Body mass index is 26.26 kg/m².      Intake/Output Summary (Last 24 hours) at 02/10/18 1909  Last data filed at 02/10/18 1200   Gross per 24 hour   Intake              360 ml   Output             1200 ml   Net             -840 ml       Physical Exam   Constitutional: She is oriented to person, place, and time. She appears well-developed.  Non-toxic appearance.   Unkempt  female - appears stated age   HENT:   Head: Normocephalic and atraumatic.       Mouth/Throat: Mucous membranes are dry.   Dry tongue and oral MM   Eyes: Conjunctivae are normal.   Neck: Normal range of motion. Neck supple.   Cardiovascular: Regular rhythm.  Tachycardia present.    Pulmonary/Chest: Effort normal. She has rales.   Abdominal: Soft. Bowel sounds are normal. She exhibits distension. There is no tenderness. There is no rebound and no guarding.   Musculoskeletal: She exhibits edema (+2/+3 pitting edema to BLE).   Generalized weakness   Neurological: She is alert and oriented to person, place, and time.   Skin:   Abrasion to forehead and left chin area  Candidal appearing rash underneath breasts and abdominal fold  Ulcerations and cellulitis to feet   Psychiatric: She has a normal mood and affect. Her behavior is normal.   Nursing note and vitals reviewed.      Vents:  Oxygen Concentration (%): 28 (02/10/18 0846)    Lines/Drains/Airways     Peripherally Inserted Central Catheter Line                 PICC Double Lumen 02/10/18 1524 right basilic less than 1 day          Drain                 Urethral Catheter 02/09/18 1045 Latex 16 Fr. 1 day          Peripheral Intravenous Line                  Peripheral IV - Single Lumen 02/09/18 0955 Left Antecubital 1 day         Peripheral IV - Single Lumen 02/09/18 Right Antecubital 1 day                Significant Labs:    CBC/Anemia Profile:    Recent Labs  Lab 02/09/18 0955   WBC 8.01   HGB 14.4   HCT 42.3   *   MCV 86   RDW 18.2*        Chemistries:    Recent Labs  Lab 02/09/18  0955 02/10/18  1303    142   K 3.5 4.7    106   CO2 32* 21*   BUN 21 21   CREATININE 0.7 0.7   CALCIUM 9.0 8.5*   ALBUMIN 3.1* 2.8*   PROT 6.8 6.9   BILITOT 1.2* 0.8   ALKPHOS 82 76   ALT 11 14   AST 22 36   MG 1.6  --    PHOS 3.0  --        Lactic Acid:   Recent Labs  Lab 02/09/18  0955 02/09/18  1417   LACTATE 2.3* 2.0     Troponin:   Recent Labs  Lab 02/09/18 0955 02/09/18  1557   TROPONINI 0.124* 0.114*       Significant Imaging:       X-Ray Chest 1 View for PICC_Central line     1. There has been interval placement of a right PICC. The tip is in the superior vena cava. There is no pneumothorax.  2. There has been interval worsening of the appearance of the lungs. There is a moderate amount of interstitial and alveolar opacities seen in the medial aspect of both lungs. The right border of the heart is silhouetted. This is characteristic of pulmonary edema.  3. There is opacification of the base of the right hemithorax. This is characteristic of a moderate size right pleural effusion with associated atelectasis.

## 2018-02-11 NOTE — SUBJECTIVE & OBJECTIVE
Oncology Treatment Plan:   [No treatment plan]    Medications:  Continuous Infusions:  Scheduled Meds:   famotidine  20 mg Oral BID    folic acid-vit B6-vit B12 2.5-25-2 mg  1 tablet Oral Daily    furosemide  20 mg Intravenous BID    heparin (porcine)  5,000 Units Subcutaneous Q8H    lisinopril  2.5 mg Oral Daily    meropenem (MERREM) IVPB  500 mg Intravenous Q6H    multivitamin  1 tablet Oral Daily    vancomycin 1 g in 0.9% sodium chloride 250 mL IVPB (ready to mix system)  1 g Intravenous Q12H     PRN Meds:acetaminophen, aluminum-magnesium hydroxide-simethicone, diphenhydrAMINE, ondansetron     Review of patient's allergies indicates:   Allergen Reactions    Pcn [penicillins] Itching        History reviewed. No pertinent past medical history.  Past Surgical History:   Procedure Laterality Date    SPINAL FUSION       Family History     None        Social History Main Topics    Smoking status: Former Smoker     Types: Cigarettes    Smokeless tobacco: Not on file    Alcohol use Yes      Comment: occasional    Drug use: No    Sexual activity: Not on file       Review of Systems   Constitutional: Positive for activity change and fatigue. Negative for appetite change, chills, diaphoresis, fever and unexpected weight change.   HENT: Negative for congestion, dental problem, drooling, ear discharge, ear pain, facial swelling, hearing loss, mouth sores, nosebleeds, postnasal drip, rhinorrhea, sinus pressure, sneezing, sore throat, tinnitus, trouble swallowing and voice change.    Eyes: Negative for photophobia, pain, discharge, redness, itching and visual disturbance.   Respiratory: Negative for cough, choking, chest tightness, shortness of breath, wheezing and stridor.    Cardiovascular: Positive for chest pain. Negative for palpitations and leg swelling.   Gastrointestinal: Negative for abdominal distention, abdominal pain, anal bleeding, blood in stool, constipation, diarrhea, nausea, rectal pain and  vomiting.   Endocrine: Negative for cold intolerance, heat intolerance, polydipsia, polyphagia and polyuria.   Genitourinary: Negative for decreased urine volume, difficulty urinating, dyspareunia, dysuria, enuresis, flank pain, frequency, genital sores, hematuria, menstrual problem, pelvic pain, urgency, vaginal bleeding, vaginal discharge and vaginal pain.   Musculoskeletal: Negative for arthralgias, back pain, gait problem, joint swelling, myalgias, neck pain and neck stiffness.   Skin: Negative for color change, pallor and rash.   Allergic/Immunologic: Negative for environmental allergies, food allergies and immunocompromised state.   Neurological: Positive for weakness. Negative for dizziness, tremors, seizures, syncope, facial asymmetry, speech difficulty, light-headedness, numbness and headaches.   Hematological: Negative for adenopathy. Does not bruise/bleed easily.   Psychiatric/Behavioral: Positive for dysphoric mood. Negative for agitation, behavioral problems, confusion, decreased concentration, hallucinations, self-injury, sleep disturbance and suicidal ideas. The patient is nervous/anxious. The patient is not hyperactive.      Objective:     Vital Signs (Most Recent):  Temp: 97.3 °F (36.3 °C) (02/11/18 0756)  Pulse: 99 (02/11/18 0756)  Resp: 18 (02/11/18 0756)  BP: 114/64 (02/11/18 0756)  SpO2: (!) 92 % (02/11/18 0756) Vital Signs (24h Range):  Temp:  [96.3 °F (35.7 °C)-97.9 °F (36.6 °C)] 97.3 °F (36.3 °C)  Pulse:  [] 99  Resp:  [18-22] 18  SpO2:  [92 %-99 %] 92 %  BP: (109-132)/(56-67) 114/64     Weight: 76.2 kg (167 lb 15.9 oz)  Body mass index is 28.84 kg/m².  Body surface area is 1.85 meters squared.      Intake/Output Summary (Last 24 hours) at 02/11/18 3717  Last data filed at 02/11/18 0530   Gross per 24 hour   Intake              360 ml   Output             1575 ml   Net            -1215 ml       Physical Exam   Constitutional: She is oriented to person, place, and time. She has a sickly  appearance. She appears ill. She appears distressed.   HENT:   Head: Normocephalic and atraumatic.   Right Ear: External ear normal.   Left Ear: External ear normal.   Nose: Nose normal. Right sinus exhibits no maxillary sinus tenderness and no frontal sinus tenderness. Left sinus exhibits no maxillary sinus tenderness and no frontal sinus tenderness.   Mouth/Throat: Oropharynx is clear and moist. No oropharyngeal exudate.   Eyes: Conjunctivae, EOM and lids are normal. Pupils are equal, round, and reactive to light. Right eye exhibits no discharge. Left eye exhibits no discharge. Right conjunctiva is not injected. Right conjunctiva has no hemorrhage. Left conjunctiva is not injected. Left conjunctiva has no hemorrhage. No scleral icterus.   Neck: Normal range of motion. Neck supple. No JVD present. No tracheal deviation present. No thyromegaly present.   Cardiovascular: Normal rate, regular rhythm, normal heart sounds and intact distal pulses.    Pulmonary/Chest: Effort normal and breath sounds normal. No stridor. No respiratory distress. She exhibits mass, edema and swelling. She exhibits no tenderness. Right breast exhibits mass and tenderness. Breasts are asymmetrical. There is breast swelling.       Abdominal: Soft. Bowel sounds are normal. She exhibits no distension and no mass. There is no splenomegaly or hepatomegaly. There is no tenderness. There is no rebound.   Genitourinary: There is breast tenderness.   Musculoskeletal: Normal range of motion. She exhibits no edema or tenderness.   Lymphadenopathy:     She has no cervical adenopathy.     She has no axillary adenopathy.        Right: No supraclavicular adenopathy present.        Left: No supraclavicular adenopathy present.   Neurological: She is alert and oriented to person, place, and time. No cranial nerve deficit. Coordination normal.   Skin: Skin is dry. No rash noted. She is not diaphoretic. No erythema.   Psychiatric: Her behavior is normal.  Judgment and thought content normal. Her mood appears anxious. She exhibits a depressed mood.   Vitals reviewed.      Significant Labs:   BMP:   Recent Labs  Lab 02/09/18  0955 02/10/18  1303 02/11/18  0436   GLU 90 104 95    142 141   K 3.5 4.7 3.1*    106 98   CO2 32* 21* 35*   BUN 21 21 18   CREATININE 0.7 0.7 0.7   CALCIUM 9.0 8.5* 8.2*   MG 1.6  --  1.5*   , CBC:   Recent Labs  Lab 02/09/18  0955 02/11/18  0436   WBC 8.01 6.79   HGB 14.4 12.8   HCT 42.3 42.4   * 118*    and CMP:   Recent Labs  Lab 02/09/18  0955 02/10/18  1303 02/11/18  0436    142 141   K 3.5 4.7 3.1*    106 98   CO2 32* 21* 35*   GLU 90 104 95   BUN 21 21 18   CREATININE 0.7 0.7 0.7   CALCIUM 9.0 8.5* 8.2*   PROT 6.8 6.9 5.9*   ALBUMIN 3.1* 2.8* 2.6*   BILITOT 1.2* 0.8 0.7   ALKPHOS 82 76 74   AST 22 36 21   ALT 11 14 13   ANIONGAP 11 15 8   EGFRNONAA >60 >60 >60       Diagnostic Results:  I have reviewed and interpreted all pertinent imaging results/findings within the past 24 hours.

## 2018-02-11 NOTE — PROCEDURES
"Kelly Mariano is a 67 y.o. female patient.    Temp: 97.3 °F (36.3 °C) (18 0756)  Pulse: 98 (18 1153)  Resp: 18 (18 1153)  BP: (!) 119/59 (18 1153)  SpO2: 95 % (18 1153)  Weight: 76.2 kg (167 lb 15.9 oz) (18 0756)  Height: 5' 4" (162.6 cm) (02/10/18 1000)       Thoracentesis  Date/Time: 2018 3:00 PM  Location procedure was performed: [facility] TELEMETRY  Performed by: CHETAN ANDRE  Authorized by: CHETAN ANDRE   Pre-operative diagnosis: Pleural effusion right  Post-operative diagnosis: SAme  Consent Done: Yes  Consent: Verbal consent obtained. Written consent obtained.  Risks and benefits: risks, benefits and alternatives were discussed  Consent given by: patient  Patient understanding: patient states understanding of the procedure being performed  Patient consent: the patient's understanding of the procedure matches consent given  Procedure consent: procedure consent matches procedure scheduled  Relevant documents: relevant documents present and verified  Test results: test results available and properly labeled  Site marked: the operative site was marked  Imaging studies: imaging studies available  Patient identity confirmed: , MRN, name and verbally with patient  Time out: Immediately prior to procedure a "time out" was called to verify the correct patient, procedure, equipment, support staff and site/side marked as required.  Procedure purpose: diagnostic and therapeutic  Indications: pleural effusion  Preparation: Patient was prepped and draped in the usual sterile fashion.  Local anesthesia used: yes    Anesthesia:  Local anesthesia used: yes  Local Anesthetic: lidocaine 1% without epinephrine  Anesthetic total: 10 mL  Patient sedated: no  Preparation: skin prepped with ChloraPrep  Patient position: supported by bedside stand  Ultrasound guidance: yes  Location: right posterior  Intercostal space: 8th  Puncture method: over-the-needle catheter  Needle size: 18  Catheter " size: 8 [place]  Number of attempts: 1  Drainage amount: 1500 ml  Drainage characteristics: serous  Patient tolerance: Patient tolerated the procedure well with no immediate complications  Chest x-ray performed: yes  Chest x-ray interpreted by me.  Chest x-ray findings: normal findings  Complications: No  Estimated blood loss (mL): 5  Specimens: No  Implants: No  Comments: Specimens:   Pleural fluid cell count  Pleural fluid LDH  Pleural fluid Protein  Pleural fluid Microbiology - Bacterial, fungal and AFB  Pleural fluid cytology          Ivan Alfonso  2/11/2018

## 2018-02-11 NOTE — PLAN OF CARE
Problem: Patient Care Overview  Goal: Plan of Care Review  Outcome: Ongoing (interventions implemented as appropriate)  Patient remained free from injury.  C/o LE pain.PRN pain med administered as prescribed. Calm. Watching TV. No distress noted. Oriented x3. Q2 turns. Wedge in place. Respirations even and non labored. PICC patent and infusing. LE elevated. Bed locked and low. Call light in reach. Safety measures in place. Will continue to monitor. Reviewed plan of care. Patient verbalized understanding and teach back.    12hr chart check complete.

## 2018-02-11 NOTE — ASSESSMENT & PLAN NOTE
-Likely secondary to demand ischemia from bacteremia, cellulitis, and volume overload  -Add ASA 81 mg daily  -Check lipid panel  -See plan under CHF

## 2018-02-11 NOTE — HOSPITAL COURSE
2/10- no fever, other vss. Pt feels weakness, leg swelling and sob improving.Blood cultures positive for staph, on vanc. and guillermo. MRI negative for osteo. ID consulted. CTA BLE to determine if adequate circulation. Per nurse, concerned for left breast deformity, concerning for malignancy, hemonc consulted.   2/11 - Legs swelling and weakness improved, no longer sob. Echo shows ejection fraction 10%, diuresing well on lasix. Per cards left heart cath or stress test when better. Seen by hem/onc this am, concern for breast ca, right pleural effusion drained today w/o complications, swallow study positive for aspiration, counseled patient. Continued on antibx for lower extremity cellulitis. CT legs with no obvious arterial occlusion, however CTA lower extremeties describes Ulcerative plaque versus small focal dissection measuring 8 mm in the left common iliac artery which causes a mild to moderate grade focal stenosis just above the bifurcation into the left external and left internal iliac arteries.  Contacted Brett Puga, vascular surgeon, no intervention needed now. Recommending lower extremity leg wraps and follow up w/vascular in outpt.   2/12 - Patient feeling better, platelets trending down, crp elevated at 28, sed rate normal. Blood cultures positive for s. Aeureus, awaiting sensitivity.  Xray with tiny right pneumothorax after thoracentesis. General sx biopsied breast mass.   2/13 - Pt with alt mental status this am, alkalotic, possibly secondary to over-diuresis and thoracentesis, xray shows pleural effusion, returned possibly due to chf and/or met. Breast ca. Case discussed w/oncology and no additional imaging needed at this point. Blood cultures positive for MRSA, vanc continued  2/14 - increased lethargy, bicarb improved,chest xray unchanged,  awaiting breast biopsy path report, being diuresed w/acetozolamide, attempted to contact Xavier Rosenbaum, left vmail. Meeting with case management and elderly protection  services to discuss contacting any family member of friend, ordered CT head/chest/abd/pelvis   2/15 - vss, including pulse ox on 2 Liters, mentation slightly improved, CT head/chest/abd/pelvis - head no acute processes, chest/abd/pelvis - possible pelvic mass cannot exclude bone mets  2/16 - ABG and platelets improved , cxray unchanged, cytology from thoracentesis negative for malignancy.   mentation drastically improved, pt able to re-state conversation w/hemonc this am. Pt wanting full work up and tx for breast ca at this point. Pt requesting nursing home placement, is demonstrating competency in decision making. However concern for grave disability due to such severe self-neglect in home environment, that she is better suited to live in a supervised setting.     2/17- pt looks comfortable and more alert and responsive. No walking yet. Both legs covered in tight dressing which is CDI. CXR shows recurrence of R sided effusion, however no SOB and no malignancy seen on thoracentesis.    2/18- continues to improve slowly, repeat Blood Cx x 2 remain NGTD. Dressing is clean, eating drinking better, now more open to placement. Await Breast Biopsy    2/19- feels much better, leg wounds improved, getting OT/ PT/ St and improving, eating better, HCO3 improved after switching bumex to Diamox. Will get a repeat CXR in am.    2/20-- feels tired, lying in bed, states she walked three times yesterday-- hence exhausted. Still await any SNF accepting her. She refused PT/OT today. Repeat CXR today appears better. HCO3 improved on diamox. Legs look better.will give inj B 12 and start some ritalin as well.  2/21- resting in bed quietly, did a little exercise with OT/ PT but not much. Started on Ritalin today to improve vigor and vitality and to improved appetite, await SNF placement.    2/22- feels better except some abd bloating, gassiness, has had a regular BM in a couple of days. Remains in bed mostly, await SNF placement.   2/23-  looks much better, walking in the hallway with PT. Await placement authorization from her insurance.  2/24- looks well but c/o abd pain despite the large BM yesterday and two more BMs today, improved after Ritalin held and recurred with the Ritalin again. However her BP improved with Ritalin. So Ritalin stopped and replaced with Midodrin and Bentyl added for abd pain prn.   2/25- resting comfortably, abd pain and BP improved. Await placement.    Patient is 68 yo female admitted to hospital for weakness , Breast Mass, EF 10%. Patient underwent a biopsy of the breast mass with plan for sx followup. Cardiology evaluated patient while in house. Patient with b/l le cellulitis which improved over the course of her stay. Patient seen by PT/OT recommending SNF placement. Patient to benefit from ongoing support at SNF. Patient seen and examined today stating she is improving steadily. Patient deemed stable for discharge to SNF.

## 2018-02-11 NOTE — HOSPITAL COURSE
Admitted to telemetry. IV antibiotics. IV diuresis. Cardiology consulted. Wound care consulted.  Thoracentesis at bedside today.  2/13 Worsening resp failure due to hypoxmia - placed on AVAPS today  2/15 Tolerating Noninvasive Positive Pressure Ventilation at night. Still very weak  2/16  Noninvasive Positive Pressure Ventilation - adjusted  2/17 Improved mentation but still too weak to sit on side of bed  2/18 Still very weak, unable to get out of bed  2/19 comfortable in bed, no distress on 2 L oxygen.  2/20 comfortable in bed, no distress on 2 L oxygen, c/o diarrhea overnight   2/26: No interval concerns

## 2018-02-11 NOTE — CONSULTS
[facility] -   Hematology/Oncology  Consult Note    Patient Name: Kelly Mariano  MRN: [id number]  Admission Date: 2/9/2018  Hospital Length of Stay: 2 days  Code Status: Full Code   Attending Provider: Odilon Subramanian MD  Consulting Provider: Alberto Purvis MD  Primary Care Physician: Primary Doctor No  Principal Problem:Bilateral cellulitis of lower leg    Consults  Subjective:     HPI:  67-year-old female admitted to the hospital for cellulitis was found have a large mass involving her right breast also asked see the patient for further evaluation    Oncology Treatment Plan:   [No treatment plan]    Medications:  Continuous Infusions:  Scheduled Meds:   famotidine  20 mg Oral BID    folic acid-vit B6-vit B12 2.5-25-2 mg  1 tablet Oral Daily    furosemide  20 mg Intravenous BID    heparin (porcine)  5,000 Units Subcutaneous Q8H    lisinopril  2.5 mg Oral Daily    meropenem (MERREM) IVPB  500 mg Intravenous Q6H    multivitamin  1 tablet Oral Daily    vancomycin 1 g in 0.9% sodium chloride 250 mL IVPB (ready to mix system)  1 g Intravenous Q12H     PRN Meds:acetaminophen, aluminum-magnesium hydroxide-simethicone, diphenhydrAMINE, ondansetron     Review of patient's allergies indicates:   Allergen Reactions    Pcn [penicillins] Itching        History reviewed. No pertinent past medical history.  Past Surgical History:   Procedure Laterality Date    SPINAL FUSION       Family History     None        Social History Main Topics    Smoking status: Former Smoker     Types: Cigarettes    Smokeless tobacco: Not on file    Alcohol use Yes      Comment: occasional    Drug use: No    Sexual activity: Not on file       Review of Systems   Constitutional: Positive for activity change and fatigue. Negative for appetite change, chills, diaphoresis, fever and unexpected weight change.   HENT: Negative for congestion, dental problem, drooling, ear discharge, ear pain, facial swelling, hearing loss, mouth  sores, nosebleeds, postnasal drip, rhinorrhea, sinus pressure, sneezing, sore throat, tinnitus, trouble swallowing and voice change.    Eyes: Negative for photophobia, pain, discharge, redness, itching and visual disturbance.   Respiratory: Negative for cough, choking, chest tightness, shortness of breath, wheezing and stridor.    Cardiovascular: Positive for chest pain. Negative for palpitations and leg swelling.   Gastrointestinal: Negative for abdominal distention, abdominal pain, anal bleeding, blood in stool, constipation, diarrhea, nausea, rectal pain and vomiting.   Endocrine: Negative for cold intolerance, heat intolerance, polydipsia, polyphagia and polyuria.   Genitourinary: Negative for decreased urine volume, difficulty urinating, dyspareunia, dysuria, enuresis, flank pain, frequency, genital sores, hematuria, menstrual problem, pelvic pain, urgency, vaginal bleeding, vaginal discharge and vaginal pain.   Musculoskeletal: Negative for arthralgias, back pain, gait problem, joint swelling, myalgias, neck pain and neck stiffness.   Skin: Negative for color change, pallor and rash.   Allergic/Immunologic: Negative for environmental allergies, food allergies and immunocompromised state.   Neurological: Positive for weakness. Negative for dizziness, tremors, seizures, syncope, facial asymmetry, speech difficulty, light-headedness, numbness and headaches.   Hematological: Negative for adenopathy. Does not bruise/bleed easily.   Psychiatric/Behavioral: Positive for dysphoric mood. Negative for agitation, behavioral problems, confusion, decreased concentration, hallucinations, self-injury, sleep disturbance and suicidal ideas. The patient is nervous/anxious. The patient is not hyperactive.      Objective:     Vital Signs (Most Recent):  Temp: 97.3 °F (36.3 °C) (02/11/18 0756)  Pulse: 99 (02/11/18 0756)  Resp: 18 (02/11/18 0756)  BP: 114/64 (02/11/18 0756)  SpO2: (!) 92 % (02/11/18 0756) Vital Signs (24h  Range):  Temp:  [96.3 °F (35.7 °C)-97.9 °F (36.6 °C)] 97.3 °F (36.3 °C)  Pulse:  [] 99  Resp:  [18-22] 18  SpO2:  [92 %-99 %] 92 %  BP: (109-132)/(56-67) 114/64     Weight: 76.2 kg (167 lb 15.9 oz)  Body mass index is 28.84 kg/m².  Body surface area is 1.85 meters squared.      Intake/Output Summary (Last 24 hours) at 02/11/18 0757  Last data filed at 02/11/18 0530   Gross per 24 hour   Intake              360 ml   Output             1575 ml   Net            -1215 ml       Physical Exam   Constitutional: She is oriented to person, place, and time. She has a sickly appearance. She appears ill. She appears distressed.   HENT:   Head: Normocephalic and atraumatic.   Right Ear: External ear normal.   Left Ear: External ear normal.   Nose: Nose normal. Right sinus exhibits no maxillary sinus tenderness and no frontal sinus tenderness. Left sinus exhibits no maxillary sinus tenderness and no frontal sinus tenderness.   Mouth/Throat: Oropharynx is clear and moist. No oropharyngeal exudate.   Eyes: Conjunctivae, EOM and lids are normal. Pupils are equal, round, and reactive to light. Right eye exhibits no discharge. Left eye exhibits no discharge. Right conjunctiva is not injected. Right conjunctiva has no hemorrhage. Left conjunctiva is not injected. Left conjunctiva has no hemorrhage. No scleral icterus.   Neck: Normal range of motion. Neck supple. No JVD present. No tracheal deviation present. No thyromegaly present.   Cardiovascular: Normal rate, regular rhythm, normal heart sounds and intact distal pulses.    Pulmonary/Chest: Effort normal and breath sounds normal. No stridor. No respiratory distress. She exhibits mass, edema and swelling. She exhibits no tenderness. Right breast exhibits mass and tenderness. Breasts are asymmetrical. There is breast swelling.       Abdominal: Soft. Bowel sounds are normal. She exhibits no distension and no mass. There is no splenomegaly or hepatomegaly. There is no tenderness.  There is no rebound.   Genitourinary: There is breast tenderness.   Musculoskeletal: Normal range of motion. She exhibits no edema or tenderness.   Lymphadenopathy:     She has no cervical adenopathy.     She has no axillary adenopathy.        Right: No supraclavicular adenopathy present.        Left: No supraclavicular adenopathy present.   Neurological: She is alert and oriented to person, place, and time. No cranial nerve deficit. Coordination normal.   Skin: Skin is dry. No rash noted. She is not diaphoretic. No erythema.   Psychiatric: Her behavior is normal. Judgment and thought content normal. Her mood appears anxious. She exhibits a depressed mood.   Vitals reviewed.      Significant Labs:   BMP:   Recent Labs  Lab 02/09/18  0955 02/10/18  1303 02/11/18  0436   GLU 90 104 95    142 141   K 3.5 4.7 3.1*    106 98   CO2 32* 21* 35*   BUN 21 21 18   CREATININE 0.7 0.7 0.7   CALCIUM 9.0 8.5* 8.2*   MG 1.6  --  1.5*   , CBC:   Recent Labs  Lab 02/09/18  0955 02/11/18  0436   WBC 8.01 6.79   HGB 14.4 12.8   HCT 42.3 42.4   * 118*    and CMP:   Recent Labs  Lab 02/09/18  0955 02/10/18  1303 02/11/18  0436    142 141   K 3.5 4.7 3.1*    106 98   CO2 32* 21* 35*   GLU 90 104 95   BUN 21 21 18   CREATININE 0.7 0.7 0.7   CALCIUM 9.0 8.5* 8.2*   PROT 6.8 6.9 5.9*   ALBUMIN 3.1* 2.8* 2.6*   BILITOT 1.2* 0.8 0.7   ALKPHOS 82 76 74   AST 22 36 21   ALT 11 14 13   ANIONGAP 11 15 8   EGFRNONAA >60 >60 >60       Diagnostic Results:  I have reviewed and interpreted all pertinent imaging results/findings within the past 24 hours.    Assessment/Plan:     Mass of breast, right    Patient has a large mass involving her right breast highly suggestive of malignancy with his cramping right arm swelling and cellulitis on anterior chest wall possible secondary to metastatic disease would recommend a biopsy of her right breast to obtain adequate tissue for diagnosis including ER/TX and HER-2/jaclyn status  which should direct therapy            Thank you for your consult. I will follow-up with patient. Please contact us if you have any additional questions.    Alberto Purvis MD  Hematology/Oncology  [facility] [place]

## 2018-02-11 NOTE — ASSESSMENT & PLAN NOTE
-Unclear etiology  -Add Torpol XL 12.5 mg nightly  -Continue IV diuresis, ACEI  -Consider inotropic support pending diuretic response  -Will need ischemic workup-LHC vs stress test once infection issues improve and she is more euvolemic

## 2018-02-11 NOTE — SUBJECTIVE & OBJECTIVE
Interval History: Seen and examined at bedside. Hospital chart reviewed. No acute interval detrimental events noted. She reports that she  has moderately improved      Review of Systems   Constitutional: Negative for chills and weight loss.   HENT: Negative for ear discharge and ear pain.    Eyes: Negative for double vision and photophobia.   Respiratory: Positive for shortness of breath.    Cardiovascular: Negative for palpitations and orthopnea.   Gastrointestinal: Negative for nausea and vomiting.   Genitourinary: Negative for dysuria and urgency.   Musculoskeletal: Negative for neck pain.   Skin: Positive for rash.   Neurological: Negative for tingling and headaches.   Psychiatric/Behavioral: Negative for substance abuse and suicidal ideas.       Scheduled Meds:   aspirin  81 mg Oral Daily    famotidine  20 mg Oral BID    folic acid-vit B6-vit B12 2.5-25-2 mg  1 tablet Oral Daily    furosemide  20 mg Intravenous BID    furosemide  40 mg Intravenous BID    heparin (porcine)  5,000 Units Subcutaneous Q8H    lisinopril  2.5 mg Oral Daily    meropenem (MERREM) IVPB  500 mg Intravenous Q6H    metoprolol succinate  25 mg Oral Daily    multivitamin  1 tablet Oral Daily    potassium chloride  40 mEq Oral BID    vancomycin 1 g in 0.9% sodium chloride 250 mL IVPB (ready to mix system)  1 g Intravenous Q12H     Continuous Infusions:  PRN Meds:.acetaminophen, aluminum-magnesium hydroxide-simethicone, diphenhydrAMINE, ondansetron     Objective:     Vital Signs (Most Recent):  Temp: 97.3 °F (36.3 °C) (02/11/18 0756)  Pulse: 98 (02/11/18 1153)  Resp: 18 (02/11/18 1153)  BP: (!) 119/59 (02/11/18 1153)  SpO2: 95 % (02/11/18 1153) Vital Signs (24h Range):  Temp:  [96.3 °F (35.7 °C)-97.9 °F (36.6 °C)] 97.3 °F (36.3 °C)  Pulse:  [] 98  Resp:  [18-22] 18  SpO2:  [92 %-98 %] 95 %  BP: (109-125)/(56-65) 119/59     Weight: 76.2 kg (167 lb 15.9 oz)  Body mass index is 28.84 kg/m².      Intake/Output Summary (Last 24  hours) at 02/11/18 1304  Last data filed at 02/11/18 0530   Gross per 24 hour   Intake                0 ml   Output             1575 ml   Net            -1575 ml       Physical Exam   Constitutional: She is oriented to person, place, and time. She appears well-developed.  Non-toxic appearance.   Unkempt  female - appears stated age   HENT:   Head: Normocephalic and atraumatic.       Mouth/Throat: Mucous membranes are dry.   Dry tongue and oral MM   Eyes: Conjunctivae are normal.   Neck: Normal range of motion. Neck supple.   Cardiovascular: Regular rhythm.  Tachycardia present.    Pulmonary/Chest: Effort normal. She has rales.   Abdominal: Soft. Bowel sounds are normal. She exhibits distension. There is no tenderness. There is no rebound and no guarding.   Musculoskeletal: She exhibits edema (+2/+3 pitting edema to BLE).   Generalized weakness   Neurological: She is alert and oriented to person, place, and time.   Skin:   Abrasion to forehead and left chin area  Candidal appearing rash underneath breasts and abdominal fold  Ulcerations and cellulitis to feet   Psychiatric: She has a normal mood and affect. Her behavior is normal.   Nursing note and vitals reviewed.      Vents:  Oxygen Concentration (%): 28 (02/11/18 0900)    Lines/Drains/Airways     Peripherally Inserted Central Catheter Line                 PICC Double Lumen 02/10/18 1524 right basilic less than 1 day          Drain                 Urethral Catheter 02/09/18 1045 Latex 16 Fr. 2 days                Significant Labs:    CBC/Anemia Profile:    Recent Labs  Lab 02/11/18  0436   WBC 6.79   HGB 12.8   HCT 42.4   *   MCV 96   RDW 18.0*        Chemistries:    Recent Labs  Lab 02/10/18  1303 02/11/18  0436    141   K 4.7 3.1*    98   CO2 21* 35*   BUN 21 18   CREATININE 0.7 0.7   CALCIUM 8.5* 8.2*   ALBUMIN 2.8* 2.6*   PROT 6.9 5.9*   BILITOT 0.8 0.7   ALKPHOS 76 74   ALT 14 13   AST 36 21   MG  --  1.5*   PHOS  --  2.7            Significant Imaging:    CXR: There is a right-sided PICC with tip in the SVC. There is a large right pleural effusion, unchanged.  There is compressive atelectasis and right hilar region and right lower lobe and right middle lobe. There is cardiomegaly.  There is a small pleural effusion with left basilar atelectasis and/or infiltrate.  No pneumothorax.

## 2018-02-11 NOTE — ASSESSMENT & PLAN NOTE
Telemetry monitoring, daily weights, low sodium diet, fluid restriction,    Lasix 20 mg IV twice  Cardiology consult  Lisinopril 2.5 mg  Elevated troponin likely due to congestive heart failure - trend  2/10 -   1 - Moderate left atrial enlargement.     2 - Eccentric hypertrophy.     3 - No wall motion abnormalities.     4 - Severely depressed left ventricular systolic function (EF 10-15%).     5 - Impaired LV relaxation, normal LAP (grade 1 diastolic dysfunction).     6 - Low normal to mildly depressed right ventricular systolic function .     7 - The estimated PA systolic pressure is 25 mmHg.     8 - Mild to moderate mitral regurgitation.     9 - Mild to moderate tricuspid regurgitation.     10 - Intermediate central venous pressure.     11 - Right pleural effusion.    Cards following

## 2018-02-11 NOTE — PROGRESS NOTES
[facility]  Pulmonology  Progress Note    Patient Name: Kelly Mariano  MRN: [id number]  Admission Date: 2/9/2018  Hospital Length of Stay: 2 days  Code Status: Full Code  Attending Provider: Odilon Subramanian MD  Primary Care Provider: Primary Doctor No   Principal Problem: Bilateral cellulitis of lower leg    Subjective:     Interval History: Seen and examined at bedside. Hospital chart reviewed. No acute interval detrimental events noted. She reports that she  has moderately improved      Review of Systems   Constitutional: Negative for chills and weight loss.   HENT: Negative for ear discharge and ear pain.    Eyes: Negative for double vision and photophobia.   Respiratory: Positive for shortness of breath.    Cardiovascular: Negative for palpitations and orthopnea.   Gastrointestinal: Negative for nausea and vomiting.   Genitourinary: Negative for dysuria and urgency.   Musculoskeletal: Negative for neck pain.   Skin: Positive for rash.   Neurological: Negative for tingling and headaches.   Psychiatric/Behavioral: Negative for substance abuse and suicidal ideas.       Scheduled Meds:   aspirin  81 mg Oral Daily    famotidine  20 mg Oral BID    folic acid-vit B6-vit B12 2.5-25-2 mg  1 tablet Oral Daily    furosemide  20 mg Intravenous BID    furosemide  40 mg Intravenous BID    heparin (porcine)  5,000 Units Subcutaneous Q8H    lisinopril  2.5 mg Oral Daily    meropenem (MERREM) IVPB  500 mg Intravenous Q6H    metoprolol succinate  25 mg Oral Daily    multivitamin  1 tablet Oral Daily    potassium chloride  40 mEq Oral BID    vancomycin 1 g in 0.9% sodium chloride 250 mL IVPB (ready to mix system)  1 g Intravenous Q12H     Continuous Infusions:  PRN Meds:.acetaminophen, aluminum-magnesium hydroxide-simethicone, diphenhydrAMINE, ondansetron     Objective:     Vital Signs (Most Recent):  Temp: 97.3 °F (36.3 °C) (02/11/18 0756)  Pulse: 98 (02/11/18 1153)  Resp: 18 (02/11/18 1153)  BP: (!)  119/59 (02/11/18 1153)  SpO2: 95 % (02/11/18 1153) Vital Signs (24h Range):  Temp:  [96.3 °F (35.7 °C)-97.9 °F (36.6 °C)] 97.3 °F (36.3 °C)  Pulse:  [] 98  Resp:  [18-22] 18  SpO2:  [92 %-98 %] 95 %  BP: (109-125)/(56-65) 119/59     Weight: 76.2 kg (167 lb 15.9 oz)  Body mass index is 28.84 kg/m².      Intake/Output Summary (Last 24 hours) at 02/11/18 1304  Last data filed at 02/11/18 0530   Gross per 24 hour   Intake                0 ml   Output             1575 ml   Net            -1575 ml       Physical Exam   Constitutional: She is oriented to person, place, and time. She appears well-developed.  Non-toxic appearance.   Unkempt  female - appears stated age   HENT:   Head: Normocephalic and atraumatic.       Mouth/Throat: Mucous membranes are dry.   Dry tongue and oral MM   Eyes: Conjunctivae are normal.   Neck: Normal range of motion. Neck supple.   Cardiovascular: Regular rhythm.  Tachycardia present.    Pulmonary/Chest: Effort normal. She has rales.   Abdominal: Soft. Bowel sounds are normal. She exhibits distension. There is no tenderness. There is no rebound and no guarding.   Musculoskeletal: She exhibits edema (+2/+3 pitting edema to BLE).   Generalized weakness   Neurological: She is alert and oriented to person, place, and time.   Skin:   Abrasion to forehead and left chin area  Candidal appearing rash underneath breasts and abdominal fold  Ulcerations and cellulitis to feet   Psychiatric: She has a normal mood and affect. Her behavior is normal.   Nursing note and vitals reviewed.      Vents:  Oxygen Concentration (%): 28 (02/11/18 0900)    Lines/Drains/Airways     Peripherally Inserted Central Catheter Line                 PICC Double Lumen 02/10/18 1524 right basilic less than 1 day          Drain                 Urethral Catheter 02/09/18 1045 Latex 16 Fr. 2 days                Significant Labs:    CBC/Anemia Profile:    Recent Labs  Lab 02/11/18  0436   WBC 6.79   HGB 12.8   HCT 42.4    *   MCV 96   RDW 18.0*        Chemistries:    Recent Labs  Lab 02/10/18  1303 02/11/18  0436    141   K 4.7 3.1*    98   CO2 21* 35*   BUN 21 18   CREATININE 0.7 0.7   CALCIUM 8.5* 8.2*   ALBUMIN 2.8* 2.6*   PROT 6.9 5.9*   BILITOT 0.8 0.7   ALKPHOS 76 74   ALT 14 13   AST 36 21   MG  --  1.5*   PHOS  --  2.7           Significant Imaging:    CXR: There is a right-sided PICC with tip in the SVC. There is a large right pleural effusion, unchanged.  There is compressive atelectasis and right hilar region and right lower lobe and right middle lobe. There is cardiomegaly.  There is a small pleural effusion with left basilar atelectasis and/or infiltrate.  No pneumothorax.      Assessment/Plan:     Acute combined systolic and diastolic heart failure    Optimize CHF regimen.  Cardiology consult  Preload and afterload reduction.  Daily weighing.  Dietary salt restriction.  Alcohol and tobacco avoidance.        Pleural effusion on right    Diagnostic thoracentesis:  Complications  of the procedure discussed in detail with patient. Complications including but not limited to infection that may require hospital  admission, bleeding that may require blood transfusion and or hospital  admission, perforation of the lung which may require surgery. Patient expressed and verbalized understanding.  Alternate treatments and material risks associated with such alternatives were  discussed with pateint. These include radiologic surveillance  with  minimal risk and sugery with an indeterminate risk. The material risks  of refusing the procedure was discussed in detail. This includes no  diagnosis or confirmation of diagnisis and rendering of appropriate treatment the risk of which depends on the nature of the diagnosed illness. Patient expressed and verbalized understanding. Pleural fluid will be sent for chemistry, microbiology and cytology.               Ivan Alfonso MD  Pulmonology  [facility]

## 2018-02-11 NOTE — HPI
67 year old female who  has no past medical history on file admitted for bilateral cellulits, CHF and moderate right sided pleural effusion.  She had  thoracentesis ×1  1600 mls: -ve for malignancy, Lymphocytes

## 2018-02-11 NOTE — PROGRESS NOTES
[facility] Hospital Medicine  Progress Note    Patient Name: Kelly Mariano  MRN: [id number]  Patient Class: IP- Inpatient   Admission Date: 2/9/2018  Length of Stay: 1 days  Attending Physician: Odilon Subramanian MD  Primary Care Provider: Primary Doctor No        Subjective:     Principal Problem:Bilateral cellulitis of lower leg    HPI:  Kelly Mariano is a 68 yo female who states she has not seen a physician since 2009. EMS brought her to the ED today as the patient reported generalized weakness that has worsened over the last 2 days. She had trouble getting off the toilet and when she di,  she had fallen. EMS stated living conditions were deplorable with cat feces on the ibeth. ([employer] contacted). Pt is noted with cellulitis and wounds to the bilateral feet and lower legs. Pt describes anxiety attacks, SOB, leg swelling and difficulty walking. Vital signs showed tachycardia = 112, pt afebrile with controlled blood pressure. Labs indicate thrombocytopenia, metabolic alkalosis, troponin 0.114, BNP > 4900, initial lactic acid 2.3;  CXR shows pulmonary edema and a moderate right sided pleural effusion.    Hospital Course:  2/10- no fever, other vss. Pt feels weakness, leg swelling and sob improving.Blood cultures positive for staph, on vanc. and guillermo. MRI negative for osteo. ID consulted. CTA BLE to determine if adequate circulation. Per nurse, concerned for left breast deformity, concerning for malignancy, hemonc consulted.     Interval History:     Review of Systems   Constitutional: Positive for activity change, appetite change and fatigue. Negative for chills, diaphoresis and fever.   HENT: Negative.    Eyes: Negative for pain and redness.   Respiratory: Positive for shortness of breath. Negative for cough and wheezing.    Cardiovascular: Positive for leg swelling. Negative for chest pain and palpitations.   Gastrointestinal: Negative for abdominal pain, constipation, diarrhea,  nausea and vomiting.   Genitourinary: Negative for decreased urine volume, difficulty urinating, dysuria and hematuria.   Musculoskeletal: Negative for arthralgias and myalgias.        Falls   Skin: Positive for rash and wound.   Neurological: Positive for weakness. Negative for dizziness and light-headedness.   Psychiatric/Behavioral: Negative for confusion. The patient is not nervous/anxious.      Objective:     Vital Signs (Most Recent):  Temp: 97.6 °F (36.4 °C) (02/10/18 1952)  Pulse: 102 (02/10/18 1952)  Resp: (!) 22 (02/10/18 1952)  BP: 122/65 (02/10/18 1952)  SpO2: (!) 93 % (02/10/18 1952) Vital Signs (24h Range):  Temp:  [96.3 °F (35.7 °C)-97.7 °F (36.5 °C)] 97.6 °F (36.4 °C)  Pulse:  [] 102  Resp:  [18-22] 22  SpO2:  [93 %-99 %] 93 %  BP: (112-132)/(56-67) 122/65     Weight: 69.4 kg (153 lb)  Body mass index is 26.26 kg/m².    Intake/Output Summary (Last 24 hours) at 02/10/18 2054  Last data filed at 02/10/18 1200   Gross per 24 hour   Intake              360 ml   Output                0 ml   Net              360 ml      Physical Exam   Constitutional: She is oriented to person, place, and time. She appears well-developed.  Non-toxic appearance.   Unkempt  female - appears stated age   HENT:   Head: Normocephalic and atraumatic.       Mouth/Throat: Mucous membranes are dry.   Dry tongue and oral MM   Eyes: Conjunctivae are normal.   Neck: Normal range of motion. Neck supple.   Cardiovascular: Regular rhythm.  Tachycardia present.    Pulmonary/Chest: Effort normal. She has rales.   Abdominal: Soft. Bowel sounds are normal. She exhibits distension. There is no tenderness. There is no rebound and no guarding.   Musculoskeletal: She exhibits edema (+2/+3 pitting edema to BLE).   Generalized weakness   Neurological: She is alert and oriented to person, place, and time.   Skin:   Abrasion to forehead and left chin area  Candidal appearing rash underneath breasts and abdominal fold  Ulcerations and  cellulitis to feet   Psychiatric: She has a normal mood and affect. Her behavior is normal.   Nursing note and vitals reviewed.      Significant Labs: All pertinent labs within the past 24 hours have been reviewed.    Significant Imaging: I have reviewed all pertinent imaging results/findings within the past 24 hours.    Assessment/Plan:      * Bilateral cellulitis of lower leg      IV Merrem and Vanco  Wound Care  Arterial US to evaluate blood flow for proper healing        Subacute osteomyelitis of multiple sites    Bilateral foot plain films note some questionable osteo to bilateral great toes  MRI pending  Evaluate arterial blood flow with U/S and ABIs          Protein-calorie malnutrition, moderate    Will start Folbic, MVI  Nutrition consult          Acute combined systolic and diastolic heart failure    Telemetry monitoring, daily weights, low sodium diet, fluid restriction,    Lasix 20 mg IV twice  Cardiology consult  Lisinopril 2.5 mg  Elevated troponin likely due to congestive heart failure - trend  2/10 -   1 - Moderate left atrial enlargement.     2 - Eccentric hypertrophy.     3 - No wall motion abnormalities.     4 - Severely depressed left ventricular systolic function (EF 10-15%).     5 - Impaired LV relaxation, normal LAP (grade 1 diastolic dysfunction).     6 - Low normal to mildly depressed right ventricular systolic function .     7 - The estimated PA systolic pressure is 25 mmHg.     8 - Mild to moderate mitral regurgitation.     9 - Mild to moderate tricuspid regurgitation.     10 - Intermediate central venous pressure.     11 - Right pleural effusion.    Cards following            Pleural effusion on right    Diuresis with IV lasix  Supplemental oxygen to maintain sat > 92 %  Will likely need thoracentesis, consult Pulmonology when needed  2/10 - moderate size, concerning for malignancy due to left breast deformity (metastic disease?)          VTE Risk Mitigation         Ordered     heparin  (porcine) injection 5,000 Units  Every 8 hours     Route:  Subcutaneous        02/09/18 4806     Medium Risk of VTE  Once      02/09/18 1300              Odilon Subramanian MD  Department of Hospital Medicine   [facility]

## 2018-02-11 NOTE — SUBJECTIVE & OBJECTIVE
Interval History:     Review of Systems   Constitutional: Positive for activity change, appetite change and fatigue. Negative for chills, diaphoresis and fever.   HENT: Negative.    Eyes: Negative for pain and redness.   Respiratory: Positive for shortness of breath. Negative for cough and wheezing.    Cardiovascular: Positive for leg swelling. Negative for chest pain and palpitations.   Gastrointestinal: Negative for abdominal pain, constipation, diarrhea, nausea and vomiting.   Genitourinary: Negative for decreased urine volume, difficulty urinating, dysuria and hematuria.   Musculoskeletal: Negative for arthralgias and myalgias.        Falls   Skin: Positive for rash and wound.   Neurological: Positive for weakness. Negative for dizziness and light-headedness.   Psychiatric/Behavioral: Negative for confusion. The patient is not nervous/anxious.      Objective:     Vital Signs (Most Recent):  Temp: 97.6 °F (36.4 °C) (02/10/18 1952)  Pulse: 102 (02/10/18 1952)  Resp: (!) 22 (02/10/18 1952)  BP: 122/65 (02/10/18 1952)  SpO2: (!) 93 % (02/10/18 1952) Vital Signs (24h Range):  Temp:  [96.3 °F (35.7 °C)-97.7 °F (36.5 °C)] 97.6 °F (36.4 °C)  Pulse:  [] 102  Resp:  [18-22] 22  SpO2:  [93 %-99 %] 93 %  BP: (112-132)/(56-67) 122/65     Weight: 69.4 kg (153 lb)  Body mass index is 26.26 kg/m².    Intake/Output Summary (Last 24 hours) at 02/10/18 2054  Last data filed at 02/10/18 1200   Gross per 24 hour   Intake              360 ml   Output                0 ml   Net              360 ml      Physical Exam   Constitutional: She is oriented to person, place, and time. She appears well-developed.  Non-toxic appearance.   Unkempt  female - appears stated age   HENT:   Head: Normocephalic and atraumatic.       Mouth/Throat: Mucous membranes are dry.   Dry tongue and oral MM   Eyes: Conjunctivae are normal.   Neck: Normal range of motion. Neck supple.   Cardiovascular: Regular rhythm.  Tachycardia present.     Pulmonary/Chest: Effort normal. She has rales.   Abdominal: Soft. Bowel sounds are normal. She exhibits distension. There is no tenderness. There is no rebound and no guarding.   Musculoskeletal: She exhibits edema (+2/+3 pitting edema to BLE).   Generalized weakness   Neurological: She is alert and oriented to person, place, and time.   Skin:   Abrasion to forehead and left chin area  Candidal appearing rash underneath breasts and abdominal fold  Ulcerations and cellulitis to feet   Psychiatric: She has a normal mood and affect. Her behavior is normal.   Nursing note and vitals reviewed.      Significant Labs: All pertinent labs within the past 24 hours have been reviewed.    Significant Imaging: I have reviewed all pertinent imaging results/findings within the past 24 hours.

## 2018-02-11 NOTE — HPI
67-year-old female admitted to the hospital for cellulitis was found have a large mass involving her right breast also asked see the patient for further evaluation

## 2018-02-11 NOTE — ASSESSMENT & PLAN NOTE
Patient has a large mass involving her right breast highly suggestive of malignancy with his cramping right arm swelling and cellulitis on anterior chest wall possible secondary to metastatic disease would recommend a biopsy of her right breast to obtain adequate tissue for diagnosis including ER/WV and HER-2/jaclyn status which should direct therapy

## 2018-02-11 NOTE — SUBJECTIVE & OBJECTIVE
Review of Systems   Constitution: Positive for weakness and malaise/fatigue.   HENT: Negative.    Eyes: Negative.    Cardiovascular: Positive for dyspnea on exertion.   Respiratory: Positive for shortness of breath.    Endocrine: Negative.    Hematologic/Lymphatic: Negative.    Skin: Positive for poor wound healing and rash.   Musculoskeletal: Positive for arthritis.   Gastrointestinal: Negative.    Genitourinary: Negative.    Psychiatric/Behavioral: Negative.    Allergic/Immunologic: Negative.      Objective:     Vital Signs (Most Recent):  Temp: 97.3 °F (36.3 °C) (02/11/18 0756)  Pulse: 98 (02/11/18 0900)  Resp: 18 (02/11/18 0900)  BP: 114/64 (02/11/18 0756)  SpO2: 97 % (02/11/18 0900) Vital Signs (24h Range):  Temp:  [96.3 °F (35.7 °C)-97.9 °F (36.6 °C)] 97.3 °F (36.3 °C)  Pulse:  [] 98  Resp:  [18-22] 18  SpO2:  [92 %-98 %] 97 %  BP: (109-132)/(56-65) 114/64     Weight: 76.2 kg (167 lb 15.9 oz)  Body mass index is 28.84 kg/m².     SpO2: 97 %  O2 Device (Oxygen Therapy): nasal cannula      Intake/Output Summary (Last 24 hours) at 02/11/18 1051  Last data filed at 02/11/18 0530   Gross per 24 hour   Intake              120 ml   Output             1575 ml   Net            -1455 ml       Lines/Drains/Airways     Peripherally Inserted Central Catheter Line                 PICC Double Lumen 02/10/18 1524 right basilic less than 1 day          Drain                 Urethral Catheter 02/09/18 1045 Latex 16 Fr. 2 days                Physical Exam   Constitutional: She is oriented to person, place, and time. No distress.   Disheveled     HENT:   Head: Normocephalic and atraumatic.   Eyes: Pupils are equal, round, and reactive to light. Right eye exhibits no discharge. Left eye exhibits no discharge.   Neck: Neck supple. JVD present.   Cardiovascular: S1 normal and S2 normal.  Tachycardia present.  Exam reveals no S3 and no S4.    Murmur heard.  High-pitched blowing holosystolic murmur is present  at the  apex  Pulmonary/Chest: No respiratory distress. She has no wheezes.   Diminished BS RML, RLL  Left-sided rales, diminished at base   Abdominal: Soft. She exhibits no distension. There is no tenderness. There is no rebound.   +truncal edema       Musculoskeletal: She exhibits edema (BLE).   Neurological: She is alert and oriented to person, place, and time.   Skin: Skin is warm and dry. She is not diaphoretic.   Cellulitis BLE  Yeast appearing rash on stomach and near breast folds   Psychiatric: She has a normal mood and affect. Her behavior is normal. Thought content normal.   Nursing note and vitals reviewed.      Significant Labs:   Blood Culture: No results for input(s): LABBLOO in the last 48 hours., CMP   Recent Labs  Lab 02/10/18  1303 02/11/18  0436    141   K 4.7 3.1*    98   CO2 21* 35*    95   BUN 21 18   CREATININE 0.7 0.7   CALCIUM 8.5* 8.2*   PROT 6.9 5.9*   ALBUMIN 2.8* 2.6*   BILITOT 0.8 0.7   ALKPHOS 76 74   AST 36 21   ALT 14 13   ANIONGAP 15 8   ESTGFRAFRICA >60 >60   EGFRNONAA >60 >60   , CBC   Recent Labs  Lab 02/11/18  0436   WBC 6.79   HGB 12.8   HCT 42.4   *   , Lipid Panel No results for input(s): CHOL, HDL, LDLCALC, TRIG, CHOLHDL in the last 48 hours., Troponin   Recent Labs  Lab 02/09/18  1557   TROPONINI 0.114*    and All pertinent lab results from the last 24 hours have been reviewed.    Significant Imaging: Echocardiogram:   2D echo with color flow doppler:   Results for orders placed or performed during the hospital encounter of 02/09/18   2D echo with color flow doppler   Result Value Ref Range    EF 10 (A) 55 - 65    Mitral Valve Regurgitation MILD TO MODERATE     Diastolic Dysfunction Yes (A)     Est. PA Systolic Pressure 24.81     Tricuspid Valve Regurgitation MILD TO MODERATE

## 2018-02-11 NOTE — ASSESSMENT & PLAN NOTE
Diuresis with IV lasix  Supplemental oxygen to maintain sat > 92 %  Will likely need thoracentesis, consult Pulmonology when needed  2/10 - moderate size, concerning for malignancy due to left breast deformity (metastic disease?)

## 2018-02-11 NOTE — CONSULTS
[facility] -   Cardiology  Consult Note    Patient Name: Kelly Mariano  MRN: [id number]  Admission Date: 2/9/2018  Hospital Length of Stay: 1 days  Code Status: Full Code   Attending Provider: Odilon Subramanian MD   Consulting Provider: Albino Hernandez MD  Primary Care Physician: Primary Doctor No  Principal Problem:Bilateral cellulitis of lower leg    Patient information was obtained from patient and ER records.     Consults  Subjective:     Chief Complaint:  SOB     HPI:   Consult Note  Cardiology    Consult Requested By:  Reason for Consult:     SUBJECTIVE:     History of Present Illness:  Patient is a 67 y.o. female presents with LE cellulitis and CHF.  66 y/o fmelae with no significant Pmhx with significant cellulitis of BLE. Pt also with mild CHF and echo shwoing EF10-15%. EKG is wnl limits.  Continue diuresis, b blockers, ace (-) and antibiotics. Ischemic w/u once cellulitis improves  Review of patient's allergies indicates:   Allergen Reactions    Pcn [penicillins] Itching       History reviewed. No pertinent past medical history.  Past Surgical History:   Procedure Laterality Date    SPINAL FUSION       History reviewed. No pertinent family history.  Social History   Substance Use Topics    Smoking status: Former Smoker     Types: Cigarettes    Smokeless tobacco: Not on file    Alcohol use Yes      Comment: occasional        Review of Systems:  Constitutional: negative  Eyes: negative  Ears, nose, mouth, throat, and face: negative  Respiratory: negative  Cardiovascular: negative  Gastrointestinal: negative  Genitourinary:negative  Hematologic/lymphatic: negative  Musculoskeletal:negative,   Neurological: negative  Behavioral/Psych: negative  Endocrine: negative  Allergic/Immunologic: negative    OBJECTIVE:     Vital Signs (Most Recent)  Temp: 97.6 °F (36.4 °C) (02/10/18 1952)  Pulse: 102 (02/10/18 1952)  Resp: (!) 22 (02/10/18 1952)  BP: 122/65 (02/10/18 1952)  SpO2: (!) 93 % (02/10/18  1952)    Vital Signs Range (Last 24H):  Temp:  [96.3 °F (35.7 °C)-97.7 °F (36.5 °C)]   Pulse:  []   Resp:  [18-22]   BP: (112-132)/(56-67)   SpO2:  [93 %-99 %]     Current Facility-Administered Medications   Medication    acetaminophen tablet 650 mg    aluminum-magnesium hydroxide-simethicone 200-200-20 mg/5 mL suspension 30 mL    diphenhydrAMINE capsule 25 mg    famotidine tablet 20 mg    folic acid-vit B6-vit B12 2.5-25-2 mg tablet 1 tablet    furosemide injection 20 mg    heparin (porcine) injection 5,000 Units    lisinopril tablet 2.5 mg    meropenem-0.9% sodium chloride 500 mg/50 mL IVPB    multivitamin tablet 1 tablet    ondansetron injection 4 mg    vancomycin 1 g in 0.9% sodium chloride 250 mL IVPB (ready to mix system)     No current facility-administered medications on file prior to encounter.      No current outpatient prescriptions on file prior to encounter.       Physical Exam:  General appearance: alert, appears stated age and cooperative  Head: Normocephalic, without obvious abnormality, atraumatic  Eyes:  conjunctivae/corneas clear. PERRL, EOM's intact. Fundi benign.  Nose: no discharge  Throat: normal findings: tongue midline and normal  Neck: no adenopathy, no carotid bruit, no JVD, supple, symmetrical, trachea midline and thyroid not enlarged, symmetric, no tenderness/mass/nodules  Back:  no skin lesions, erythema, or scars  Lungs:  clear to auscultation bilaterally  Chest wall: no tenderness  Heart: regular rate and rhythm, S1, S2 normal, no murmur, click, rub or gallop  Abdomen: soft, non-tender; bowel sounds normal; no masses,  no organomegaly  Extremities: extremities normal, atraumatic, no cyanosis or edema  Pulses: 2+ and symmetric  Skin: Skin color, texture, turgor normal. No rashes or lesions  Neurologic: Grossly normal    Laboratory:  Chemistry:   Lab Results   Component Value Date     02/10/2018    K 4.7 02/10/2018     02/10/2018    CO2 21 (L) 02/10/2018     BUN 21 02/10/2018    CREATININE 0.7 02/10/2018    CALCIUM 8.5 (L) 02/10/2018     Cardiac Markers:   Lab Results   Component Value Date    TROPONINI 0.114 (H) 02/09/2018     Cardiac Markers (Last 3):   Lab Results   Component Value Date    TROPONINI 0.114 (H) 02/09/2018    TROPONINI 0.124 (H) 02/09/2018     CBC:   Lab Results   Component Value Date    WBC 8.01 02/09/2018    HGB 14.4 02/09/2018    HCT 42.3 02/09/2018    MCV 86 02/09/2018     (L) 02/09/2018     Lipids: No results found for: CHOL, TRIG, HDL, LDLDIRECT  Coagulation:   Lab Results   Component Value Date    INR 1.3 (H) 02/09/2018    APTT 25.8 02/09/2018       Diagnostic Results:  ECG: Reviewed  X-Ray: Reviewed  US: Reviewed  CT: Reviewed  Echo: Reviewed      ASSESSMENT/PLAN:     Patient Active Problem List   Diagnosis    Bilateral cellulitis of lower leg    Cellulitis of left foot    Pleural effusion on right    Cellulitis of right foot    Acute combined systolic and diastolic heart failure    Protein-calorie malnutrition, moderate    Subacute osteomyelitis of multiple sites        Plan: Continue diuresis, b blockers, ace (-) and antibiotics. Ischemic w/u once cellulitis improves    History reviewed. No pertinent past medical history.    Past Surgical History:   Procedure Laterality Date    SPINAL FUSION         Review of patient's allergies indicates:   Allergen Reactions    Pcn [penicillins] Itching       No current facility-administered medications on file prior to encounter.      No current outpatient prescriptions on file prior to encounter.     Family History     None        Social History Main Topics    Smoking status: Former Smoker     Types: Cigarettes    Smokeless tobacco: Not on file    Alcohol use Yes      Comment: occasional    Drug use: No    Sexual activity: Not on file     Review of Systems   Constitution: Negative. Negative for weight gain.   HENT: Negative.    Eyes: Negative.    Cardiovascular: Negative.  Negative for  chest pain, leg swelling and palpitations.   Respiratory: Negative.  Negative for shortness of breath.    Endocrine: Negative.    Hematologic/Lymphatic: Negative.    Skin: Negative.    Musculoskeletal: Negative for muscle weakness.   Gastrointestinal: Negative.    Genitourinary: Negative.    Neurological: Negative.  Negative for dizziness.   Psychiatric/Behavioral: Negative.    Allergic/Immunologic: Negative.      Objective:     Vital Signs (Most Recent):  Temp: 97.6 °F (36.4 °C) (02/10/18 1952)  Pulse: 102 (02/10/18 1952)  Resp: (!) 22 (02/10/18 1952)  BP: 122/65 (02/10/18 1952)  SpO2: (!) 93 % (02/10/18 1952) Vital Signs (24h Range):  Temp:  [96.3 °F (35.7 °C)-97.7 °F (36.5 °C)] 97.6 °F (36.4 °C)  Pulse:  [] 102  Resp:  [18-22] 22  SpO2:  [93 %-99 %] 93 %  BP: (112-132)/(56-67) 122/65     Weight: 69.4 kg (153 lb)  Body mass index is 26.26 kg/m².    SpO2: (!) 93 %  O2 Device (Oxygen Therapy): room air      Intake/Output Summary (Last 24 hours) at 02/10/18 2056  Last data filed at 02/10/18 1200   Gross per 24 hour   Intake              360 ml   Output                0 ml   Net              360 ml       Lines/Drains/Airways     Peripherally Inserted Central Catheter Line                 PICC Double Lumen 02/10/18 1524 right basilic less than 1 day          Drain                 Urethral Catheter 02/09/18 1045 Latex 16 Fr. 1 day          Peripheral Intravenous Line                 Peripheral IV - Single Lumen 02/09/18 Right Antecubital 1 day                Physical Exam   Constitutional: She is oriented to person, place, and time. She appears well-developed and well-nourished.   HENT:   Head: Normocephalic and atraumatic.   Eyes: Conjunctivae and EOM are normal. Pupils are equal, round, and reactive to light.   Neck: Normal range of motion. Neck supple.   Cardiovascular: Normal rate, regular rhythm, normal heart sounds and intact distal pulses.    Pulmonary/Chest: Effort normal and breath sounds normal.    Abdominal: Soft. Bowel sounds are normal.   Musculoskeletal: Normal range of motion.   Neurological: She is alert and oriented to person, place, and time.   Skin: Skin is warm and dry.   Psychiatric: She has a normal mood and affect.   Nursing note and vitals reviewed.      Significant Labs: All pertinent lab results from the last 24 hours have been reviewed.    Significant Imaging: X-Ray: CXR: X-Ray Chest 1 View (CXR): No results found for this visit on 02/09/18.    Assessment and Plan:     Acute combined systolic and diastolic heart failure    Continue diuresis, b blockers, ace (-) and antibiotics. Ischemic w/u once cellulitis improves            VTE Risk Mitigation         Ordered     heparin (porcine) injection 5,000 Units  Every 8 hours     Route:  Subcutaneous        02/09/18 1749     Medium Risk of VTE  Once      02/09/18 1300          Thank you for your consult. I will follow-up with patient. Please contact us if you have any additional questions.    Albino Hernandez MD  Cardiology   [facility] -

## 2018-02-11 NOTE — CONSULTS
[facility] -   Pulmonology  Consult Note    Patient Name: Kelly Mariano  MRN: [id number]  Admission Date: 2/9/2018  Hospital Length of Stay: 1 days  Code Status: Full Code  Attending Physician: Odilon Subramanian MD  Primary Care Provider: Primary Doctor No   Principal Problem: Bilateral cellulitis of lower leg      Subjective:     HPI:  67 year old female who  has no past medical history on file admitted for bilateral cellulits, CHF and moderate right sided pleural effusion.     History reviewed. No pertinent past medical history.    Past Surgical History:   Procedure Laterality Date    SPINAL FUSION         Review of patient's allergies indicates:   Allergen Reactions    Pcn [penicillins] Itching     Scheduled Meds:   famotidine  20 mg Oral BID    folic acid-vit B6-vit B12 2.5-25-2 mg  1 tablet Oral Daily    furosemide  20 mg Intravenous BID    heparin (porcine)  5,000 Units Subcutaneous Q8H    lisinopril  2.5 mg Oral Daily    meropenem (MERREM) IVPB  500 mg Intravenous Q6H    multivitamin  1 tablet Oral Daily    vancomycin 1 g in 0.9% sodium chloride 250 mL IVPB (ready to mix system)  1 g Intravenous Q12H     Continuous Infusions:  PRN Meds:.acetaminophen, aluminum-magnesium hydroxide-simethicone, diphenhydrAMINE, ondansetron    Family History     None        Social History Main Topics    Smoking status: Former Smoker     Types: Cigarettes    Smokeless tobacco: Not on file    Alcohol use Yes      Comment: occasional    Drug use: No    Sexual activity: Not on file           Review of Systems   Constitutional: Positive for activity change, appetite change and fatigue. Negative for chills, diaphoresis and fever.   HENT: Negative.    Eyes: Negative for pain and redness.   Respiratory: Positive for shortness of breath. Negative for cough and wheezing.    Cardiovascular: Positive for leg swelling. Negative for chest pain and palpitations.   Gastrointestinal: Negative for abdominal pain,  constipation, diarrhea, nausea and vomiting.   Genitourinary: Negative for decreased urine volume, difficulty urinating, dysuria and hematuria.   Musculoskeletal: Negative for arthralgias and myalgias.        Falls   Skin: Positive for rash and wound.   Neurological: Positive for weakness. Negative for dizziness and light-headedness.   Psychiatric/Behavioral: Negative for confusion. The patient is not nervous/anxious.      Objective:     Vital Signs (Most Recent):  Temp: 97.7 °F (36.5 °C) (02/10/18 1556)  Pulse: 99 (02/10/18 1556)  Resp: 18 (02/10/18 1556)  BP: (!) 125/56 (02/10/18 1556)  SpO2: 96 % (02/10/18 1556) Vital Signs (24h Range):  Temp:  [96.3 °F (35.7 °C)-97.7 °F (36.5 °C)] 97.7 °F (36.5 °C)  Pulse:  [] 99  Resp:  [18-20] 18  SpO2:  [95 %-99 %] 96 %  BP: (112-132)/(56-67) 125/56     Weight: 69.4 kg (153 lb)  Body mass index is 26.26 kg/m².      Intake/Output Summary (Last 24 hours) at 02/10/18 1909  Last data filed at 02/10/18 1200   Gross per 24 hour   Intake              360 ml   Output             1200 ml   Net             -840 ml       Physical Exam   Constitutional: She is oriented to person, place, and time. She appears well-developed.  Non-toxic appearance.   Unkempt  female - appears stated age   HENT:   Head: Normocephalic and atraumatic.       Mouth/Throat: Mucous membranes are dry.   Dry tongue and oral MM   Eyes: Conjunctivae are normal.   Neck: Normal range of motion. Neck supple.   Cardiovascular: Regular rhythm.  Tachycardia present.    Pulmonary/Chest: Effort normal. She has rales.   Abdominal: Soft. Bowel sounds are normal. She exhibits distension. There is no tenderness. There is no rebound and no guarding.   Musculoskeletal: She exhibits edema (+2/+3 pitting edema to BLE).   Generalized weakness   Neurological: She is alert and oriented to person, place, and time.   Skin:   Abrasion to forehead and left chin area  Candidal appearing rash underneath breasts and abdominal  fold  Ulcerations and cellulitis to feet   Psychiatric: She has a normal mood and affect. Her behavior is normal.   Nursing note and vitals reviewed.      Vents:  Oxygen Concentration (%): 28 (02/10/18 0846)    Lines/Drains/Airways     Peripherally Inserted Central Catheter Line                 PICC Double Lumen 02/10/18 1524 right basilic less than 1 day          Drain                 Urethral Catheter 02/09/18 1045 Latex 16 Fr. 1 day          Peripheral Intravenous Line                 Peripheral IV - Single Lumen 02/09/18 0955 Left Antecubital 1 day         Peripheral IV - Single Lumen 02/09/18 Right Antecubital 1 day                Significant Labs:    CBC/Anemia Profile:    Recent Labs  Lab 02/09/18 0955   WBC 8.01   HGB 14.4   HCT 42.3   *   MCV 86   RDW 18.2*        Chemistries:    Recent Labs  Lab 02/09/18  0955 02/10/18  1303    142   K 3.5 4.7    106   CO2 32* 21*   BUN 21 21   CREATININE 0.7 0.7   CALCIUM 9.0 8.5*   ALBUMIN 3.1* 2.8*   PROT 6.8 6.9   BILITOT 1.2* 0.8   ALKPHOS 82 76   ALT 11 14   AST 22 36   MG 1.6  --    PHOS 3.0  --        Lactic Acid:   Recent Labs  Lab 02/09/18 0955 02/09/18  1417   LACTATE 2.3* 2.0     Troponin:   Recent Labs  Lab 02/09/18 0955 02/09/18  1557   TROPONINI 0.124* 0.114*       Significant Imaging:       X-Ray Chest 1 View for PICC_Central line     1. There has been interval placement of a right PICC. The tip is in the superior vena cava. There is no pneumothorax.  2. There has been interval worsening of the appearance of the lungs. There is a moderate amount of interstitial and alveolar opacities seen in the medial aspect of both lungs. The right border of the heart is silhouetted. This is characteristic of pulmonary edema.  3. There is opacification of the base of the right hemithorax. This is characteristic of a moderate size right pleural effusion with associated atelectasis.        Assessment/Plan:     * Bilateral cellulitis of lower leg     Broad spectrum antibiotics.  Wound care.         Acute combined systolic and diastolic heart failure    Optimize CHF regimen.  Cardiology consult  Preload and afterload reduction.  Daily weighing.  Dietary salt restriction.  Alcohol and tobacco avoidance.        Pleural effusion on right    Diagnostic thoracentesis:  Complications  of the procedure discussed in detail with patient. Complications including but not limited to infection that may require hospital  admission, bleeding that may require blood transfusion and or hospital  admission, perforation of the lung which may require surgery. Patient expressed and verbalized understanding.  Alternate treatments and material risks associated with such alternatives were  discussed with pateint. These include radiologic surveillance  with  minimal risk and sugery with an indeterminate risk. The material risks  of refusing the procedure was discussed in detail. This includes no  diagnosis or confirmation of diagnisis and rendering of appropriate treatment the risk of which depends on the nature of the diagnosed illness. Patient expressed and verbalized understanding. Pleural fluid will be sent for chemistry, microbiology and cytology.              Thank you for your consult. I will follow-up with patient. Please contact us if you have any additional questions.     Ivan Alfonso MD  Pulmonology  [facility] -

## 2018-02-11 NOTE — PROGRESS NOTES
[facility] -   Cardiology  Progress Note    Patient Name: Kelly Mariano  MRN: [id number]  Admission Date: 2/9/2018  Hospital Length of Stay: 2 days  Code Status: Full Code   Attending Physician: Odilon Subramanian MD   Primary Care Physician: Primary Doctor No  Expected Discharge Date:   Principal Problem:Bilateral cellulitis of lower leg    Subjective:   HPI:  Patient is a 67 y.o. female presents with LE cellulitis and CHF.  66 y/o fmelae with no significant Pmhx with significant cellulitis of BLE. Pt also with mild CHF and echo shwoing EF10-15%. EKG is wnl limits.    Hospital Course:   2/11/18-Patient seen and examined today, resting in bed. States she feels ok, still weak and fatigued. Complains of SOB. No chest pain or tightness. BC +Staph. Right breast mass noted, suspicious for malignancy. Labs reviewed. K low at 3.1. Creatinine stable.        Review of Systems   Constitution: Positive for weakness and malaise/fatigue.   HENT: Negative.    Eyes: Negative.    Cardiovascular: Positive for dyspnea on exertion.   Respiratory: Positive for shortness of breath.    Endocrine: Negative.    Hematologic/Lymphatic: Negative.    Skin: Positive for poor wound healing and rash.   Musculoskeletal: Positive for arthritis.   Gastrointestinal: Negative.    Genitourinary: Negative.    Psychiatric/Behavioral: Negative.    Allergic/Immunologic: Negative.      Objective:     Vital Signs (Most Recent):  Temp: 97.3 °F (36.3 °C) (02/11/18 0756)  Pulse: 98 (02/11/18 0900)  Resp: 18 (02/11/18 0900)  BP: 114/64 (02/11/18 0756)  SpO2: 97 % (02/11/18 0900) Vital Signs (24h Range):  Temp:  [96.3 °F (35.7 °C)-97.9 °F (36.6 °C)] 97.3 °F (36.3 °C)  Pulse:  [] 98  Resp:  [18-22] 18  SpO2:  [92 %-98 %] 97 %  BP: (109-132)/(56-65) 114/64     Weight: 76.2 kg (167 lb 15.9 oz)  Body mass index is 28.84 kg/m².     SpO2: 97 %  O2 Device (Oxygen Therapy): nasal cannula      Intake/Output Summary (Last 24 hours) at 02/11/18 1051  Last data  filed at 02/11/18 0530   Gross per 24 hour   Intake              120 ml   Output             1575 ml   Net            -1455 ml       Lines/Drains/Airways     Peripherally Inserted Central Catheter Line                 PICC Double Lumen 02/10/18 1524 right basilic less than 1 day          Drain                 Urethral Catheter 02/09/18 1045 Latex 16 Fr. 2 days                Physical Exam   Constitutional: She is oriented to person, place, and time. No distress.   Disheveled     HENT:   Head: Normocephalic and atraumatic.   Eyes: Pupils are equal, round, and reactive to light. Right eye exhibits no discharge. Left eye exhibits no discharge.   Neck: Neck supple. JVD present.   Cardiovascular: S1 normal and S2 normal.  Tachycardia present.  Exam reveals no S3 and no S4.    Murmur heard.  High-pitched blowing holosystolic murmur is present  at the apex  Pulmonary/Chest: No respiratory distress. She has no wheezes.   Diminished BS RML, RLL  Left-sided rales, diminished at base   Abdominal: Soft. She exhibits no distension. There is no tenderness. There is no rebound.   +truncal edema       Musculoskeletal: She exhibits edema (BLE).   Neurological: She is alert and oriented to person, place, and time.   Skin: Skin is warm and dry. She is not diaphoretic.   Cellulitis BLE  Yeast appearing rash on stomach and near breast folds   Psychiatric: She has a normal mood and affect. Her behavior is normal. Thought content normal.   Nursing note and vitals reviewed.      Significant Labs:   Blood Culture: No results for input(s): LABBLOO in the last 48 hours., CMP   Recent Labs  Lab 02/10/18  1303 02/11/18  0436    141   K 4.7 3.1*    98   CO2 21* 35*    95   BUN 21 18   CREATININE 0.7 0.7   CALCIUM 8.5* 8.2*   PROT 6.9 5.9*   ALBUMIN 2.8* 2.6*   BILITOT 0.8 0.7   ALKPHOS 76 74   AST 36 21   ALT 14 13   ANIONGAP 15 8   ESTGFRAFRICA >60 >60   EGFRNONAA >60 >60   , CBC   Recent Labs  Lab 02/11/18  0436   WBC 6.79    HGB 12.8   HCT 42.4   *   , Lipid Panel No results for input(s): CHOL, HDL, LDLCALC, TRIG, CHOLHDL in the last 48 hours., Troponin   Recent Labs  Lab 02/09/18  1557   TROPONINI 0.114*    and All pertinent lab results from the last 24 hours have been reviewed.    Significant Imaging: Echocardiogram:   2D echo with color flow doppler:   Results for orders placed or performed during the hospital encounter of 02/09/18   2D echo with color flow doppler   Result Value Ref Range    EF 10 (A) 55 - 65    Mitral Valve Regurgitation MILD TO MODERATE     Diastolic Dysfunction Yes (A)     Est. PA Systolic Pressure 24.81     Tricuspid Valve Regurgitation MILD TO MODERATE      Assessment and Plan:   Patient who presents with acute combined CHF, LE cellulitis, and bacteremia. Needs diuresis/med optimization. Add BB to regimen. Add ASA 81 mg daily. Continue other meds. Will need ischemic workup once condition improves.    * Bilateral cellulitis of lower leg    -Mgmt as per primary team  -Continue abx        Elevated troponin    -Likely secondary to demand ischemia from bacteremia, cellulitis, and volume overload  -Add ASA 81 mg daily  -Check lipid panel  -See plan under CHF        Acute combined systolic and diastolic heart failure    -Unclear etiology  -Add Torpol XL 25 mg daily  -Continue IV diuresis, ACEI  -Replete K  -Consider inotropic support pending diuretic response  -Will need ischemic workup-LHC vs stress test once infection issues improve and she is more euvolemic        Pleural effusion on right    -Will likely need thoracentesis  -Continue IV diuresis              VTE Risk Mitigation         Ordered     heparin (porcine) injection 5,000 Units  Every 8 hours     Route:  Subcutaneous        02/09/18 1419     Medium Risk of VTE  Once      02/09/18 1300          Lorraine Chung PA-C  Cardiology  [facility]    Chart reviewed. Dr. Hernandez examined patient and agrees with plan as outlined above.

## 2018-02-11 NOTE — PLAN OF CARE
Problem: Patient Care Overview  Goal: Plan of Care Review  Outcome: Ongoing (interventions implemented as appropriate)  POC discussed w/patient, verbalized understanding. NSR on monitor. VSS. Voids per polanco by gravity. Assistance provided to reposition patient. Fall precautions in place, side rails up X 2, belongings in reach of patient. Patient complained of gas and unable to sleep during the night. Given Malox and benadryl and is currently sleeping. Pillows used for postioning and legs evaluated. Purple port of PICC line unable to be flushed or blood return. Will continue to monitor.

## 2018-02-11 NOTE — NURSING
Jonathan breast tissue is hard. Right breast larger than left. Dr. Subramanian was notified. Oncology consult in place. Will continue to monitor.

## 2018-02-11 NOTE — HPI
Patient is a 67 y.o. female presents with LE cellulitis and CHF.  66 y/o fmelae with no significant Pmhx with significant cellulitis of BLE. Pt also with mild CHF and echo shwoing EF10-15%. EKG is wnl limits.

## 2018-02-12 LAB
ALBUMIN SERPL BCP-MCNC: 2.4 G/DL
ALP SERPL-CCNC: 72 U/L
ALT SERPL W/O P-5'-P-CCNC: 9 U/L
ANION GAP SERPL CALC-SCNC: 8 MMOL/L
AST SERPL-CCNC: 22 U/L
BASOPHILS # BLD AUTO: 0 K/UL
BASOPHILS NFR BLD: 0 %
BILIRUB SERPL-MCNC: 0.6 MG/DL
BNP SERPL-MCNC: 4675 PG/ML
BODY FLUID SOURCE, LDH: NORMAL
BUN SERPL-MCNC: 13 MG/DL
CALCIUM SERPL-MCNC: 7.7 MG/DL
CHLORIDE SERPL-SCNC: 95 MMOL/L
CO2 SERPL-SCNC: 40 MMOL/L
CREAT SERPL-MCNC: 0.6 MG/DL
DIFFERENTIAL METHOD: ABNORMAL
EOSINOPHIL # BLD AUTO: 0 K/UL
EOSINOPHIL NFR BLD: 0.3 %
ERYTHROCYTE [DISTWIDTH] IN BLOOD BY AUTOMATED COUNT: 17.6 %
EST. GFR  (AFRICAN AMERICAN): >60 ML/MIN/1.73 M^2
EST. GFR  (NON AFRICAN AMERICAN): >60 ML/MIN/1.73 M^2
GLUCOSE SERPL-MCNC: 94 MG/DL
HCT VFR BLD AUTO: 42.4 %
HGB BLD-MCNC: 12.7 G/DL
LDH FLD L TO P-CCNC: 51 U/L
LYMPHOCYTES # BLD AUTO: 0.5 K/UL
LYMPHOCYTES NFR BLD: 7.5 %
MCH RBC QN AUTO: 28.6 PG
MCHC RBC AUTO-ENTMCNC: 30 G/DL
MCV RBC AUTO: 96 FL
MONOCYTES # BLD AUTO: 0.4 K/UL
MONOCYTES NFR BLD: 5.7 %
NEUTROPHILS # BLD AUTO: 5.8 K/UL
NEUTROPHILS NFR BLD: 86.5 %
PATH INTERP FLD-IMP: NORMAL
PLATELET # BLD AUTO: 110 K/UL
PMV BLD AUTO: 11.1 FL
POTASSIUM SERPL-SCNC: 3.8 MMOL/L
PROT SERPL-MCNC: 5.4 G/DL
RBC # BLD AUTO: 4.44 M/UL
SODIUM SERPL-SCNC: 143 MMOL/L
TROPONIN I SERPL DL<=0.01 NG/ML-MCNC: 0.07 NG/ML
VANCOMYCIN TROUGH SERPL-MCNC: 17.9 UG/ML
WBC # BLD AUTO: 6.66 K/UL

## 2018-02-12 PROCEDURE — 87040 BLOOD CULTURE FOR BACTERIA: CPT

## 2018-02-12 PROCEDURE — 25000003 PHARM REV CODE 250: Performed by: PHYSICIAN ASSISTANT

## 2018-02-12 PROCEDURE — 85025 COMPLETE CBC W/AUTO DIFF WBC: CPT

## 2018-02-12 PROCEDURE — 80053 COMPREHEN METABOLIC PANEL: CPT

## 2018-02-12 PROCEDURE — 25000003 PHARM REV CODE 250: Performed by: NURSE PRACTITIONER

## 2018-02-12 PROCEDURE — 63600175 PHARM REV CODE 636 W HCPCS: Performed by: INTERNAL MEDICINE

## 2018-02-12 PROCEDURE — 36415 COLL VENOUS BLD VENIPUNCTURE: CPT

## 2018-02-12 PROCEDURE — 97803 MED NUTRITION INDIV SUBSEQ: CPT

## 2018-02-12 PROCEDURE — 80202 ASSAY OF VANCOMYCIN: CPT

## 2018-02-12 PROCEDURE — 27000221 HC OXYGEN, UP TO 24 HOURS

## 2018-02-12 PROCEDURE — 99222 1ST HOSP IP/OBS MODERATE 55: CPT | Mod: 57,,, | Performed by: PHYSICIAN ASSISTANT

## 2018-02-12 PROCEDURE — 21400001 HC TELEMETRY ROOM

## 2018-02-12 PROCEDURE — 88307 TISSUE EXAM BY PATHOLOGIST: CPT | Mod: 26,,, | Performed by: PATHOLOGY

## 2018-02-12 PROCEDURE — 63600175 PHARM REV CODE 636 W HCPCS: Performed by: FAMILY MEDICINE

## 2018-02-12 PROCEDURE — 88307 TISSUE EXAM BY PATHOLOGIST: CPT | Performed by: PATHOLOGY

## 2018-02-12 PROCEDURE — 84484 ASSAY OF TROPONIN QUANT: CPT

## 2018-02-12 PROCEDURE — 83880 ASSAY OF NATRIURETIC PEPTIDE: CPT

## 2018-02-12 PROCEDURE — 92526 ORAL FUNCTION THERAPY: CPT

## 2018-02-12 PROCEDURE — 99233 SBSQ HOSP IP/OBS HIGH 50: CPT | Mod: ,,, | Performed by: INTERNAL MEDICINE

## 2018-02-12 PROCEDURE — 63600175 PHARM REV CODE 636 W HCPCS: Performed by: NURSE PRACTITIONER

## 2018-02-12 PROCEDURE — 94761 N-INVAS EAR/PLS OXIMETRY MLT: CPT

## 2018-02-12 PROCEDURE — 25000003 PHARM REV CODE 250: Performed by: FAMILY MEDICINE

## 2018-02-12 PROCEDURE — 96372 THER/PROPH/DIAG INJ SC/IM: CPT

## 2018-02-12 PROCEDURE — 19100 BX BREAST PERCUT W/O IMAGE: CPT | Mod: ,,, | Performed by: PHYSICIAN ASSISTANT

## 2018-02-12 PROCEDURE — 63600175 PHARM REV CODE 636 W HCPCS: Performed by: PHYSICIAN ASSISTANT

## 2018-02-12 PROCEDURE — 99232 SBSQ HOSP IP/OBS MODERATE 35: CPT | Mod: ,,, | Performed by: INTERNAL MEDICINE

## 2018-02-12 RX ORDER — LIDOCAINE HYDROCHLORIDE AND EPINEPHRINE 10; 10 MG/ML; UG/ML
10 INJECTION, SOLUTION INFILTRATION; PERINEURAL ONCE
Status: COMPLETED | OUTPATIENT
Start: 2018-02-12 | End: 2018-02-12

## 2018-02-12 RX ADMIN — FUROSEMIDE 40 MG: 10 INJECTION, SOLUTION INTRAMUSCULAR; INTRAVENOUS at 08:02

## 2018-02-12 RX ADMIN — LIDOCAINE HYDROCHLORIDE,EPINEPHRINE BITARTRATE 10 ML: 10; .01 INJECTION, SOLUTION INFILTRATION; PERINEURAL at 02:02

## 2018-02-12 RX ADMIN — HEPARIN SODIUM 5000 UNITS: 5000 INJECTION, SOLUTION INTRAVENOUS; SUBCUTANEOUS at 09:02

## 2018-02-12 RX ADMIN — MEROPENEM AND SODIUM CHLORIDE 500 MG: 500 INJECTION, SOLUTION INTRAVENOUS at 11:02

## 2018-02-12 RX ADMIN — Medication 1 TABLET: at 08:02

## 2018-02-12 RX ADMIN — HEPARIN SODIUM 5000 UNITS: 5000 INJECTION, SOLUTION INTRAVENOUS; SUBCUTANEOUS at 02:02

## 2018-02-12 RX ADMIN — MEROPENEM AND SODIUM CHLORIDE 500 MG: 500 INJECTION, SOLUTION INTRAVENOUS at 05:02

## 2018-02-12 RX ADMIN — VANCOMYCIN HYDROCHLORIDE 1 G: 1 INJECTION, POWDER, LYOPHILIZED, FOR SOLUTION INTRAVENOUS at 10:02

## 2018-02-12 RX ADMIN — METOPROLOL SUCCINATE 25 MG: 25 TABLET, EXTENDED RELEASE ORAL at 08:02

## 2018-02-12 RX ADMIN — FAMOTIDINE 20 MG: 20 TABLET, FILM COATED ORAL at 08:02

## 2018-02-12 RX ADMIN — FUROSEMIDE 40 MG: 10 INJECTION, SOLUTION INTRAMUSCULAR; INTRAVENOUS at 05:02

## 2018-02-12 RX ADMIN — HEPARIN SODIUM 5000 UNITS: 5000 INJECTION, SOLUTION INTRAVENOUS; SUBCUTANEOUS at 05:02

## 2018-02-12 RX ADMIN — THERA TABS 1 TABLET: TAB at 08:02

## 2018-02-12 RX ADMIN — FAMOTIDINE 20 MG: 20 TABLET, FILM COATED ORAL at 09:02

## 2018-02-12 RX ADMIN — LISINOPRIL 2.5 MG: 2.5 TABLET ORAL at 08:02

## 2018-02-12 NOTE — PLAN OF CARE
Problem: Patient Care Overview  Goal: Plan of Care Review  Outcome: Ongoing (interventions implemented as appropriate)  Recommendations     Recommendation/Intervention: 1. Remove bariatric restriction. 2. Add oral supplements ~ boost plus all meals. 3. Will continue to encourage PO intake.  Goals: PO intake > 75 % by next RD visit   Nutrition Goal Status: new  Communication of RD Recs: POC, sticky note

## 2018-02-12 NOTE — SUBJECTIVE & OBJECTIVE
 famotidine  20 mg Oral BID    folic acid-vit B6-vit B12 2.5-25-2 mg  1 tablet Oral Daily    furosemide  40 mg Intravenous BID    heparin (porcine)  5,000 Units Subcutaneous Q8H    lisinopril  2.5 mg Oral Daily    meropenem (MERREM) IVPB  500 mg Intravenous Q6H    metoprolol succinate  25 mg Oral Daily    multivitamin  1 tablet Oral Daily    vancomycin 1 g in 0.9% sodium chloride 250 mL IVPB (ready to mix system)  1 g Intravenous Q18H       Review of Systems   Constitution: Positive for weakness and malaise/fatigue.   HENT: Negative.    Eyes: Negative.    Cardiovascular: Positive for dyspnea on exertion (improved).   Respiratory: Negative for shortness of breath (improved).    Endocrine: Negative.    Hematologic/Lymphatic: Negative.    Skin: Positive for poor wound healing and rash.   Musculoskeletal: Positive for arthritis.   Gastrointestinal: Negative.    Genitourinary: Negative.    Psychiatric/Behavioral: Negative.    Allergic/Immunologic: Negative.      Objective:     Vital Signs (Most Recent):  Temp: 98.8 °F (37.1 °C) (02/12/18 1649)  Pulse: 95 (02/12/18 1649)  Resp: 18 (02/12/18 1212)  BP: (!) 110/56 (02/12/18 1649)  SpO2: 99 % (02/12/18 1649) Vital Signs (24h Range):  Temp:  [97.4 °F (36.3 °C)-98.8 °F (37.1 °C)] 98.8 °F (37.1 °C)  Pulse:  [87-98] 95  Resp:  [17-20] 18  SpO2:  [93 %-100 %] 99 %  BP: ()/(53-59) 110/56     Weight: 70 kg (154 lb 5.2 oz)  Body mass index is 26.49 kg/m².     SpO2: 99 %  O2 Device (Oxygen Therapy): nasal cannula      Intake/Output Summary (Last 24 hours) at 02/12/18 1712  Last data filed at 02/12/18 1400   Gross per 24 hour   Intake              360 ml   Output             3375 ml   Net            -3015 ml       Lines/Drains/Airways     Peripherally Inserted Central Catheter Line                 PICC Double Lumen 02/10/18 1524 right basilic 2 days          Drain                 Urethral Catheter 02/09/18 1045 Latex 16 Fr. 3 days                Physical Exam    Constitutional: She is oriented to person, place, and time. No distress.   Disheveled     HENT:   Head: Normocephalic and atraumatic.   Eyes: Pupils are equal, round, and reactive to light. Right eye exhibits no discharge. Left eye exhibits no discharge.   Neck: Neck supple. No JVD present.   Cardiovascular: S1 normal and S2 normal.  Tachycardia present.  Exam reveals no S3 and no S4.    Murmur heard.  High-pitched blowing holosystolic murmur is present  at the apex  Pulmonary/Chest: No respiratory distress. She has no wheezes.   Diminished BS RML, RLL  Left-sided rales, diminished at base   Abdominal: Soft. She exhibits no distension. There is no tenderness. There is no rebound.   +truncal edema       Musculoskeletal: She exhibits edema (BLE - improved).   Neurological: She is alert and oriented to person, place, and time.   Skin: Skin is warm and dry. She is not diaphoretic.   Cellulitis BLE  Yeast appearing rash on stomach and near breast folds   Psychiatric: She has a normal mood and affect. Her behavior is normal. Thought content normal.   Nursing note and vitals reviewed.      Significant Labs:   All pertinent lab results from the last 24 hours have been reviewed. and   Recent Lab Results       02/12/18  0826 02/12/18  0620 02/12/18  0423 02/11/18  2328      Albumin  2.4(L)       Alkaline Phosphatase  72       ALT  9(L)       Anion Gap  8       AST  22       Baso #  0.00       Basophil%  0.0       Total Bilirubin  0.6  Comment:  For infants and newborns, interpretation of results should be based  on gestational age, weight and in agreement with clinical  observations.  Premature Infant recommended reference ranges:  Up to 24 hours.............<8.0 mg/dL  Up to 48 hours............<12.0 mg/dL  3-5 days..................<15.0 mg/dL  6-29 days.................<15.0 mg/dL         BNP   4,675  Comment:  Values of less than 100 pg/ml are consistent with non-CHF populations.(H)      BUN, Bld  13       Calcium   7.7(L)       Chloride  95       CO2  40(H)       Creatinine  0.6       Differential Method  Automated       eGFR if   >60       eGFR if non   >60  Comment:  Calculation used to obtain the estimated glomerular filtration  rate (eGFR) is the CKD-EPI equation.          Eos #  0.0       Eosinophil%  0.3       Glucose  94       Gran # (ANC)  5.8       Gran%  86.5(H)       Hematocrit  42.4       Hemoglobin  12.7       Lymph #  0.5(L)       Lymph%  7.5(L)       MCH  28.6       MCHC  30.0(L)       MCV  96       Mono #  0.4       Mono%  5.7       MPV  11.1       Platelets  110(L)       POCT Glucose    109     Potassium  3.8       Total Protein  5.4(L)       RBC  4.44       RDW  17.6(H)       Sodium  143       Troponin I   0.072  Comment:  The reference interval for Troponin I represents the 99th percentile   cutoff   for our facility and is consistent with 3rd generation assay   performance.  (H)      Vancomycin-Trough 17.9        WBC  6.66             Significant Imaging: Echocardiogram:   2D echo with color flow doppler:   Results for orders placed or performed during the hospital encounter of 02/09/18   2D echo with color flow doppler   Result Value Ref Range    EF 10 (A) 55 - 65    Mitral Valve Regurgitation MILD TO MODERATE     Diastolic Dysfunction Yes (A)     Est. PA Systolic Pressure 24.81     Tricuspid Valve Regurgitation MILD TO MODERATE     and X-Ray: CXR: X-Ray Chest 1 View (CXR):   Results for orders placed or performed during the hospital encounter of 02/09/18   X-Ray Chest 1 View    Narrative    One-view chest x-ray    Clinical History: Rectal effusion; recent thoracentesis    Finding: Comparison was made to prior examination performed at 5:25 AM on 2/11/2018. There's been interval development of a tiny right apical pneumothorax. There is moderate cardiomegaly. There is a mild amount of interstitial and alveolar opacities seen in both lungs. There is opacification of the  costophrenic angles bilaterally. A right PICC remains in place. There is partial visualization of anterior spinal fusion hardware in the cervical spine.    Impression        1. There's been interval development of a tiny right apical pneumothorax. This is consistent with the patient's recent thoracentesis.  2. The findings are characteristic of moderate cardiomegaly with mild bilateral pulmonary edema.  3. There is opacification of the costophrenic angles bilaterally. These are characteristic of tiny pleural effusions.  4. Surgical changes      Electronically signed by: ASHWIN RAYMOND MD  Date:     02/11/18  Time:    16:28

## 2018-02-12 NOTE — PROGRESS NOTES
[facility] -   Cardiology  Progress Note    Patient Name: Kelly Mariano  MRN: [id number]  Admission Date: 2/9/2018  Hospital Length of Stay: 3 days  Code Status: Full Code   Attending Physician: Odilon Subramanian MD   Primary Care Physician: Primary Doctor No  Expected Discharge Date:   Principal Problem:Bilateral cellulitis of lower leg    Subjective:   HPI  Patient is a 67 y.o. female presents with LE cellulitis and CHF.  66 y/o male with no significant Pmhx with significant cellulitis of BLE. Pt also with mild CHF and echo shwoing EF10-15%. EKG is wnl limits.      Hospital Course:   2/11/18-Patient seen and examined today, resting in bed. States she feels ok, still weak and fatigued. Complains of SOB. No chest pain or tightness. BC +Staph. Labs reviewed. K low at 3.1. Creatinine stable.     2/12/18 Pt hemodynamically table on Toprol and Lisinpril. Will need biopsy of breast mass. Will discuss stress once infection treated and pt is diuresed.      famotidine  20 mg Oral BID    folic acid-vit B6-vit B12 2.5-25-2 mg  1 tablet Oral Daily    furosemide  40 mg Intravenous BID    heparin (porcine)  5,000 Units Subcutaneous Q8H    lisinopril  2.5 mg Oral Daily    meropenem (MERREM) IVPB  500 mg Intravenous Q6H    metoprolol succinate  25 mg Oral Daily    multivitamin  1 tablet Oral Daily    vancomycin 1 g in 0.9% sodium chloride 250 mL IVPB (ready to mix system)  1 g Intravenous Q18H       Review of Systems   Constitution: Positive for weakness and malaise/fatigue.   HENT: Negative.    Eyes: Negative.    Cardiovascular: Positive for dyspnea on exertion (improved).   Respiratory: Negative for shortness of breath (improved).    Endocrine: Negative.    Hematologic/Lymphatic: Negative.    Skin: Positive for poor wound healing and rash.   Musculoskeletal: Positive for arthritis.   Gastrointestinal: Negative.    Genitourinary: Negative.    Psychiatric/Behavioral: Negative.    Allergic/Immunologic: Negative.       Objective:     Vital Signs (Most Recent):  Temp: 98.8 °F (37.1 °C) (02/12/18 1649)  Pulse: 95 (02/12/18 1649)  Resp: 18 (02/12/18 1212)  BP: (!) 110/56 (02/12/18 1649)  SpO2: 99 % (02/12/18 1649) Vital Signs (24h Range):  Temp:  [97.4 °F (36.3 °C)-98.8 °F (37.1 °C)] 98.8 °F (37.1 °C)  Pulse:  [87-98] 95  Resp:  [17-20] 18  SpO2:  [93 %-100 %] 99 %  BP: ()/(53-59) 110/56     Weight: 70 kg (154 lb 5.2 oz)  Body mass index is 26.49 kg/m².     SpO2: 99 %  O2 Device (Oxygen Therapy): nasal cannula      Intake/Output Summary (Last 24 hours) at 02/12/18 1712  Last data filed at 02/12/18 1400   Gross per 24 hour   Intake              360 ml   Output             3375 ml   Net            -3015 ml       Lines/Drains/Airways     Peripherally Inserted Central Catheter Line                 PICC Double Lumen 02/10/18 1524 right basilic 2 days          Drain                 Urethral Catheter 02/09/18 1045 Latex 16 Fr. 3 days                Physical Exam   Constitutional: She is oriented to person, place, and time. No distress.   Disheveled     HENT:   Head: Normocephalic and atraumatic.   Eyes: Pupils are equal, round, and reactive to light. Right eye exhibits no discharge. Left eye exhibits no discharge.   Neck: Neck supple. No JVD present.   Cardiovascular: S1 normal and S2 normal.  Tachycardia present.  Exam reveals no S3 and no S4.    Murmur heard.  High-pitched blowing holosystolic murmur is present  at the apex  Pulmonary/Chest: No respiratory distress. She has no wheezes.   Diminished BS RML, RLL  Left-sided rales, diminished at base   Abdominal: Soft. She exhibits no distension. There is no tenderness. There is no rebound.   +truncal edema       Musculoskeletal: She exhibits edema (BLE - improved).   Neurological: She is alert and oriented to person, place, and time.   Skin: Skin is warm and dry. She is not diaphoretic.   Cellulitis BLE  Yeast appearing rash on stomach and near breast folds   Psychiatric: She has  a normal mood and affect. Her behavior is normal. Thought content normal.   Nursing note and vitals reviewed.      Significant Labs:   All pertinent lab results from the last 24 hours have been reviewed. and   Recent Lab Results       02/12/18  0826 02/12/18  0620 02/12/18  0423 02/11/18  2328      Albumin  2.4(L)       Alkaline Phosphatase  72       ALT  9(L)       Anion Gap  8       AST  22       Baso #  0.00       Basophil%  0.0       Total Bilirubin  0.6  Comment:  For infants and newborns, interpretation of results should be based  on gestational age, weight and in agreement with clinical  observations.  Premature Infant recommended reference ranges:  Up to 24 hours.............<8.0 mg/dL  Up to 48 hours............<12.0 mg/dL  3-5 days..................<15.0 mg/dL  6-29 days.................<15.0 mg/dL         BNP   4,675  Comment:  Values of less than 100 pg/ml are consistent with non-CHF populations.(H)      BUN, Bld  13       Calcium  7.7(L)       Chloride  95       CO2  40(H)       Creatinine  0.6       Differential Method  Automated       eGFR if   >60       eGFR if non   >60  Comment:  Calculation used to obtain the estimated glomerular filtration  rate (eGFR) is the CKD-EPI equation.          Eos #  0.0       Eosinophil%  0.3       Glucose  94       Gran # (ANC)  5.8       Gran%  86.5(H)       Hematocrit  42.4       Hemoglobin  12.7       Lymph #  0.5(L)       Lymph%  7.5(L)       MCH  28.6       MCHC  30.0(L)       MCV  96       Mono #  0.4       Mono%  5.7       MPV  11.1       Platelets  110(L)       POCT Glucose    109     Potassium  3.8       Total Protein  5.4(L)       RBC  4.44       RDW  17.6(H)       Sodium  143       Troponin I   0.072  Comment:  The reference interval for Troponin I represents the 99th percentile   cutoff   for our facility and is consistent with 3rd generation assay   performance.  (H)      Vancomycin-Trough 17.9        WBC  6.66              Significant Imaging: Echocardiogram:   2D echo with color flow doppler:   Results for orders placed or performed during the hospital encounter of 02/09/18   2D echo with color flow doppler   Result Value Ref Range    EF 10 (A) 55 - 65    Mitral Valve Regurgitation MILD TO MODERATE     Diastolic Dysfunction Yes (A)     Est. PA Systolic Pressure 24.81     Tricuspid Valve Regurgitation MILD TO MODERATE     and X-Ray: CXR: X-Ray Chest 1 View (CXR):   Results for orders placed or performed during the hospital encounter of 02/09/18   X-Ray Chest 1 View    Narrative    One-view chest x-ray    Clinical History: Rectal effusion; recent thoracentesis    Finding: Comparison was made to prior examination performed at 5:25 AM on 2/11/2018. There's been interval development of a tiny right apical pneumothorax. There is moderate cardiomegaly. There is a mild amount of interstitial and alveolar opacities seen in both lungs. There is opacification of the costophrenic angles bilaterally. A right PICC remains in place. There is partial visualization of anterior spinal fusion hardware in the cervical spine.    Impression        1. There's been interval development of a tiny right apical pneumothorax. This is consistent with the patient's recent thoracentesis.  2. The findings are characteristic of moderate cardiomegaly with mild bilateral pulmonary edema.  3. There is opacification of the costophrenic angles bilaterally. These are characteristic of tiny pleural effusions.  4. Surgical changes      Electronically signed by: ASHWIN RAYMOND MD  Date:     02/11/18  Time:    16:28      Assessment and Plan:         * Bilateral cellulitis of lower leg    -Mgmt as per primary team  -Continue abx        Elevated troponin    -Likely secondary to demand ischemia from bacteremia, cellulitis, and volume overload  -Add ASA 81 mg daily  -See plan under CHF        Acute combined systolic and diastolic heart failure    -Unclear etiology  -Cont Torpol  XL , titrate  -Continue IV diuresis, ACEI   -Will need ischemic workup-LHC vs stress test once infection issues improve and she is euvolemic        Pleural effusion on right    S/p thoracentesis - 1500 cc  -Continue IV diuresis              VTE Risk Mitigation         Ordered     heparin (porcine) injection 5,000 Units  Every 8 hours     Route:  Subcutaneous        02/09/18 1749     Medium Risk of VTE  Once      02/09/18 1300          Ramu Martinez Md, MD  Cardiology  [facility]

## 2018-02-12 NOTE — PROGRESS NOTES
[facility] Hospital Medicine  Progress Note    Patient Name: Kelly Mariano  MRN: [id number]  Patient Class: IP- Inpatient   Admission Date: 2/9/2018  Length of Stay: 2 days  Attending Physician: Odilon Subramanian MD  Primary Care Provider: Primary Doctor No        Subjective:     Principal Problem:Bilateral cellulitis of lower leg    HPI:  Kelly Mariano is a 68 yo female who states she has not seen a physician since 2009. EMS brought her to the ED today as the patient reported generalized weakness that has worsened over the last 2 days. She had trouble getting off the toilet and when she di,  she had fallen. EMS stated living conditions were deplorable with cat feces on the ibeth. (Elderly Prote[employer] Services contacted). Pt is noted with cellulitis and wounds to the bilateral feet and lower legs. Pt describes anxiety attacks, SOB, leg swelling and difficulty walking. Vital signs showed tachycardia = 112, pt afebrile with controlled blood pressure. Labs indicate thrombocytopenia, metabolic alkalosis, troponin 0.114, BNP > 4900, initial lactic acid 2.3;  CXR shows pulmonary edema and a moderate right sided pleural effusion.    Hospital Course:  2/10- no fever, other vss. Pt feels weakness, leg swelling and sob improving.Blood cultures positive for staph, on vanc. and guillermo. MRI negative for osteo. ID consulted. CTA BLE to determine if adequate circulation. Per nurse, concerned for left breast deformity, concerning for malignancy, hemonc consulted.   2/11 - Legs swelling and weakness improved, no longer sob. Echo shows ejection fraction 10%, diuresing well on lasix. Per cards left heart cath or stress test when better. Seen by hem/onc this am, concern for breast ca, right pleural effusion drained today w/o complications, swallow study positive for aspiration, counseled patient. Continued on antibx for lower extremity cellulitis. CT legs with no obvious arterial occlusion, however CTA lower extremeties  describes Ulcerative plaque versus small focal dissection measuring 8 mm in the left common iliac artery which causes a mild to moderate grade focal stenosis just above the bifurcation into the left external and left internal iliac arteries.  Contacted Brett Puga, vascular surgeon, no intervention needed now. Recommending lower extremity leg wraps and follow up w/vascular in outpt.     Interval History:     Review of Systems   Constitutional: Positive for activity change, appetite change and fatigue. Negative for chills, diaphoresis and fever.   HENT: Negative.    Eyes: Negative for pain and redness.   Respiratory: Positive for shortness of breath. Negative for cough and wheezing.    Cardiovascular: Positive for leg swelling. Negative for chest pain and palpitations.   Gastrointestinal: Negative for abdominal pain, constipation, diarrhea, nausea and vomiting.   Genitourinary: Negative for decreased urine volume, difficulty urinating, dysuria and hematuria.   Musculoskeletal: Negative for arthralgias and myalgias.        Falls   Skin: Positive for rash and wound.   Neurological: Positive for weakness. Negative for dizziness and light-headedness.   Psychiatric/Behavioral: Negative for confusion. The patient is not nervous/anxious.      Objective:     Vital Signs (Most Recent):  Temp: 97.4 °F (36.3 °C) (02/11/18 1930)  Pulse: 89 (02/11/18 1930)  Resp: 18 (02/11/18 1930)  BP: (!) 115/58 (02/11/18 1930)  SpO2: 99 % (02/11/18 1930) Vital Signs (24h Range):  Temp:  [96.3 °F (35.7 °C)-97.9 °F (36.6 °C)] 97.4 °F (36.3 °C)  Pulse:  [] 89  Resp:  [18] 18  SpO2:  [92 %-99 %] 99 %  BP: ()/(53-64) 115/58     Weight: 76.2 kg (167 lb 15.9 oz)  Body mass index is 28.84 kg/m².    Intake/Output Summary (Last 24 hours) at 02/11/18 2106  Last data filed at 02/11/18 0530   Gross per 24 hour   Intake                0 ml   Output              975 ml   Net             -975 ml      Physical Exam   Constitutional: She is  oriented to person, place, and time. She appears well-developed.  Non-toxic appearance.   Unkempt  female - appears stated age   HENT:   Head: Normocephalic and atraumatic.       Mouth/Throat: Mucous membranes are dry.   Dry tongue and oral MM   Eyes: Conjunctivae are normal.   Neck: Normal range of motion. Neck supple.   Cardiovascular: Regular rhythm.  Tachycardia present.    Pulmonary/Chest: Effort normal. She has rales.   Abdominal: Soft. Bowel sounds are normal. She exhibits distension. There is no tenderness. There is no rebound and no guarding.   Musculoskeletal: She exhibits edema (+2/+3 pitting edema to BLE).   Generalized weakness   Neurological: She is alert and oriented to person, place, and time.   Skin:   Abrasion to forehead and left chin area  Candidal appearing rash underneath breasts and abdominal fold  Ulcerations and cellulitis to feet   Psychiatric: She has a normal mood and affect. Her behavior is normal.   Nursing note and vitals reviewed.      Significant Labs: All pertinent labs within the past 24 hours have been reviewed.    Significant Imaging: I have reviewed all pertinent imaging results/findings within the past 24 hours.    Assessment/Plan:      * Bilateral cellulitis of lower leg      IV Merrem and Vanco  Wound Care  Arterial US to evaluate blood flow for proper healing        Mass of breast, right    2/11 - oncology recommending biopsy of breast mass          Subacute osteomyelitis of multiple sites    Bilateral foot plain films note some questionable osteo to bilateral great toes  MRI pending  Evaluate arterial blood flow with U/S and ABIs          Protein-calorie malnutrition, moderate    Will start Folbic, MVI  Nutrition consult          Acute combined systolic and diastolic heart failure    Telemetry monitoring, daily weights, low sodium diet, fluid restriction,    Lasix 20 mg IV twice  Cardiology consult  Lisinopril 2.5 mg  Elevated troponin likely due to congestive  heart failure - trend  2/10 -   1 - Moderate left atrial enlargement.     2 - Eccentric hypertrophy.     3 - No wall motion abnormalities.     4 - Severely depressed left ventricular systolic function (EF 10-15%).     5 - Impaired LV relaxation, normal LAP (grade 1 diastolic dysfunction).     6 - Low normal to mildly depressed right ventricular systolic function .     7 - The estimated PA systolic pressure is 25 mmHg.     8 - Mild to moderate mitral regurgitation.     9 - Mild to moderate tricuspid regurgitation.     10 - Intermediate central venous pressure.     11 - Right pleural effusion.    Cards following            Pleural effusion on right    Diuresis with IV lasix  Supplemental oxygen to maintain sat > 92 %  Will likely need thoracentesis, consult Pulmonology when needed  2/10 - moderate size, concerning for malignancy due to left breast deformity (metastic disease?)          VTE Risk Mitigation         Ordered     heparin (porcine) injection 5,000 Units  Every 8 hours     Route:  Subcutaneous        02/09/18 1749     Medium Risk of VTE  Once      02/09/18 1300              Odilon Subramanian MD  Department of Hospital Medicine   [facility]

## 2018-02-12 NOTE — ASSESSMENT & PLAN NOTE
Cardiac echo did not show endocarditis .  Will follow final drug susceptibility of org  Continue vanco.  Source is likely the lower extremity skin lesions

## 2018-02-12 NOTE — PROGRESS NOTES
[facility] -   Adult Nutrition  Progress Note    SUMMARY     Recommendations    Recommendation/Intervention: 1. Remove bariatric restriction. 2. Add oral supplements ~ boost plus all meals. 3. Will continue to encourage PO intake.  Goals: PO intake > 75 % by next RD visit   Nutrition Goal Status: new  Communication of RD Recs: POC, sticky note    Reason for Assessment    Reason for Assessment: RD follow up  Diagnosis:  1. Pleural effusion on right    2. Bilateral cellulitis of lower leg    3. Cellulitis of left foot    4. Cellulitis of right foot    5. CHF (congestive heart failure)      History reviewed. No pertinent past medical history.     General Information Comments: Leg swelling and weakness improved. Pt seen by oncology, awaiting tissue diagnosis, concern for breast cancer. Pt on O2. No family members at bedside.  Pt unable to provide weight hx. Per Epic records ~ no weight hx. Patient reports good intake prior to admission. PO intake ~ 50 % today.     Nutrition Discharge Planning: d/c on low Na diet with dental soft consistency per SLP    Nutrition Prescription Ordered    Current Diet Order: low Na Bariatric soft mechanical consistency and nectar thick liquids  Nutrition Order Comments: 1500ml fluid restriction    Evaluation of Received Nutrients/Fluid Intake    Intake/Output Summary (Last 24 hours) at 02/12/18 1558  Last data filed at 02/12/18 1400   Gross per 24 hour   Intake              360 ml   Output             3375 ml   Net            -3015 ml       % Intake of Estimated Energy Needs: 50 - 75 %  % Meal Intake: 50 %      Nutrition/Diet History    Food Preferences: No cultural/Anglican food preferences reported     Labs/Tests/Procedures/Meds    Pertinent Labs Reviewed: reviewed  Lab Results   Component Value Date     02/12/2018    K 3.8 02/12/2018    CL 95 02/12/2018    CO2 40 (H) 02/12/2018    BUN 13 02/12/2018    CREATININE 0.6 02/12/2018    CALCIUM 7.7 (L) 02/12/2018    PHOS  "2.7 02/11/2018    MG 1.5 (L) 02/11/2018    ESTGFRAFRICA >60 02/12/2018    EGFRNONAA >60 02/12/2018    ALBUMIN 2.4 (L) 02/12/2018     Lab Results   Component Value Date    ALBUMIN 2.4 (L) 02/12/2018     Lab Results   Component Value Date    CALCIUM 7.7 (L) 02/12/2018    PHOS 2.7 02/11/2018       Recent Labs  Lab 02/11/18  2328   POCTGLUCOSE 109       Pertinent Medications :  reviewed    Physical Findings    Overall Physical Appearance: edematous  Skin: other (see comments) (cellulitis and leg wounds noted)    Anthropometrics    Temp: 96.3 °F (35.7 °C)  Height: 5' 4" (162.6 cm)  Weight Method: Bed Scale  Weight: 69.4 kg (153 lb)  Ideal Body Weight (IBW), Female: 120 lb  % Ideal Body Weight, Female (lb): 127.5 lb  BMI (Calculated): 26.3  BMI Grade: 25 - 29.9 - overweight     Estimated/Assessed Needs    Weight Used For Calorie Calculations: 69.4 kg (153 lb)   Energy Calorie Requirements (kcal): 7006-5498  Energy Need Method: Kcal/kg (25-30 kcal/kg)     Weight Used For Protein Calculations: 69.4 kg (153 lb)  Protein Requirements: 83 gm/d (1.2 gm/kg)  Fluid Requirements (mL): 1500 (fluid restriction per team)    Assessment and Plan    Acute combined systolic and diastolic heart failure    Nutrition Diagnosis:  Decreased nutrient needs (Na)    Related to (etiology):   CHF    Signs and Symptoms (as evidenced by):   Pt with edema 2/2 CHF    Interventions/Recommendations (treatment strategy):  Low sodium diet with 1500ml fluid restriction    Nutrition Diagnosis Status:   New          Monitor and Evaluation    Food and Nutrient Intake: energy intake, food and beverage intake  Food and Nutrient Adminstration: diet order  Knowledge/Beliefs/Attitudes: food and nutrition knowledge/skill, beliefs and attitudes  Physical Activity and Function: nutrition-related ADLs and IADLs  Anthropometric Measurements: weight, weight change  Biochemical Data, Medical Tests and Procedures: electrolyte and renal panel, gastrointestinal profile, " glucose/endocrine profile, inflammatory profile, lipid profile  Nutrition-Focused Physical Findings: overall appearance, extremities, muscles and bones, skin      Nutrition Follow-Up    RD Follow-up?: Yes (1xweekly)

## 2018-02-12 NOTE — ASSESSMENT & PLAN NOTE
Cardiac echo did not show endocarditis .  Will follow final drug susceptibility of org  Continue vanco and meropenem for now   Source is likely the lower extremity skin lesions

## 2018-02-12 NOTE — CONSULTS
Pharmacy Vancomycin Consult Note    WBC=6.66  Tmax=97.9  CrCl=88.5ml/min Scr=0.6    Cultures: Urine-coag neg staph  Blood-staph-susceptibility pending  Goal trough=15-20mcg/ml    Vancomycin trough=17.9mcg/ml   HOWEVER,, the second dose was given nearly 4 hours late. If that dose had been given on time, this trough would likely be much higher than it is now.  I will adjust dose to Vancomycin 1 gram Q18.  Will get trough before next dose to make sure interval is appropriate.  Trough due 2/13 @0300    Thank you for allowing us to participate in this patient's care.  Afsaneh Hollingsworth, Pharm D 2/12/2018 9:24 AM

## 2018-02-12 NOTE — PLAN OF CARE
Problem: Patient Care Overview  Goal: Plan of Care Review  Outcome: Ongoing (interventions implemented as appropriate)  POC discussed w/patient, verbalized understanding. NSR on monitor. VSS. Voids per polanco. No c/o pain. Weight shift assistance provided. IV atx administered.  Fall precautions in place, bed alarm on. Patient denies needs at this time.

## 2018-02-12 NOTE — PT/OT/SLP PROGRESS
Speech Language Pathology Treatment    Patient Name:  Kelly Mariano   MRN:  [id number]  Admitting Diagnosis: Bilateral cellulitis of lower leg    Recommendations:                 General Recommendations:  Dysphagia therapy  Diet recommendations:  Chopped meat, Liquid Diet Level: Nectar Thick   Aspiration Precautions: 1 bite/sip at a time, HOB to 90 degrees and Small bites/sips   General Precautions: Standard, aspiration  Communication strategies:  none    Subjective     Pt alert but confused  Patient goals: none stated    Pain/Comfort:  · Pain Rating 1: 0/10  · Pain Rating Post-Intervention 2: 0/10    Objective:     Has the patient been evaluated by SLP for swallowing?   Yes  Keep patient NPO? No   Current Respiratory Status: nasal cannula      Pt tolerated meal of puree, mech soft and nectar thick liquids without overt s/s of aspiration. She was cued to use compensatory strategies but did not follow instructions. Pt completed oral motor and tongue base retraction ex x 5- 10 each with max cues. Reduced participation with ex due to inability to follow commands.     Assessment:     Kelly Mariano is a 67 y.o. female with an SLP diagnosis of Dysphagia.  She presents with impaired swallow.    Goals:    SLP Goals        Problem: SLP Goal    Goal Priority Disciplines Outcome   SLP Goal     SLP Ongoing (interventions implemented as appropriate)   Description:  Long Term Goal:  Pt to tolerate least restrictive diet consistency safely and efficiently.    Short Term Goals:  1. Tongue Base Retraction and Laryngeal Elevation tasks x20 with min cues  2. Oral Motor tasks x20 with min cues  3. Recommend education on swallow precautions and diet recommendations.                    Plan:     · Patient to be seen:      · Plan of Care expires:     · Plan of Care reviewed with:  patient   · SLP Follow-Up:  Yes       Discharge recommendations:      Barriers to Discharge:  None    Time Tracking:     SLP Treatment Date:   02/12/18  Speech Start  Time:  1206  Speech Stop Time:  1222     Speech Total Time (min):  16 min    Billable Minutes: Treatment Swallowing Dysfunction 16    Anni Xie CCC-SLP  02/12/2018

## 2018-02-12 NOTE — PLAN OF CARE
Problem: SLP Goal  Goal: SLP Goal  Long Term Goal:  Pt to tolerate least restrictive diet consistency safely and efficiently.    Short Term Goals:  1. Tongue Base Retraction and Laryngeal Elevation tasks x20 with min cues  2. Oral Motor tasks x20 with min cues  3. Recommend education on swallow precautions and diet recommendations.   Outcome: Ongoing (interventions implemented as appropriate)  Pt tolerated mech soft and nectar thick liquids without difficulty.

## 2018-02-12 NOTE — PLAN OF CARE
Problem: Patient Care Overview  Goal: Plan of Care Review  Outcome: Ongoing (interventions implemented as appropriate)  Pt AAO x 4   VSS.  Pt remained free of falls this shift.   Pt pain has been controlled.  Plan of care reviewed. Patient verbalizes understanding.    Patient sinus tach on monitor.   Bed low, side rails up x 2, wheels locked, call light in reach.   Patient instructed to call for assistance.   Hourly rounding completed.   Will continue to monitor.

## 2018-02-12 NOTE — CONSULTS
[facility] -   General Surgery  Consult Note    Patient Name: Kelly Mariano  MRN: [id number]  Code Status: Full Code  Admission Date: 2/9/2018  Hospital Length of Stay: 3 days  Attending Physician: Vishal Martinez MD  Primary Care Provider: Primary Doctor No    Patient information was obtained from patient and ER records.     Inpatient consult to General Surgery  Consult performed by: ÁNGEL JOSE  Consult ordered by: VISHAL MARTINEZ        Subjective:     Principal Problem: Bilateral cellulitis of lower leg    History of Present Illness: Kelly Mariano is a 67 y.o. female who is currently admitted for lower extremity cellulitis and was found to have a large mass of her right breast and right upper extremity edema. General Surgery is consulted for biopsy of the breast mass.   Pt says she first noticed mass several months ago. It has gradually increased in size. There is mild associated discomfort and no nipple discharge    No current facility-administered medications on file prior to encounter.      No current outpatient prescriptions on file prior to encounter.       Review of patient's allergies indicates:   Allergen Reactions    Pcn [penicillins] Itching       History reviewed. No pertinent past medical history.  Past Surgical History:   Procedure Laterality Date    SPINAL FUSION       Family History     None        Social History Main Topics    Smoking status: Former Smoker     Types: Cigarettes    Smokeless tobacco: Not on file    Alcohol use Yes      Comment: occasional    Drug use: No    Sexual activity: Not on file     Review of Systems   Constitutional: Positive for activity change and fatigue. Negative for chills and fever.   Respiratory: Negative for shortness of breath.    Cardiovascular: Negative for chest pain.   Gastrointestinal: Negative for abdominal pain, nausea and vomiting.   Genitourinary: Negative for dysuria.   Musculoskeletal: Positive for arthralgias. Negative for  myalgias.   Skin: Positive for color change, rash and wound.   Neurological: Negative for weakness.   Hematological: Does not bruise/bleed easily.   Psychiatric/Behavioral: Positive for dysphoric mood. The patient is nervous/anxious.      Objective:     Vital Signs (Most Recent):  Temp: 97.9 °F (36.6 °C) (02/12/18 0744)  Pulse: 95 (02/12/18 0841)  Resp: 17 (02/12/18 0744)  BP: (!) 111/58 (02/12/18 0841)  SpO2: 100 % (02/12/18 0744) Vital Signs (24h Range):  Temp:  [97.4 °F (36.3 °C)-97.9 °F (36.6 °C)] 97.9 °F (36.6 °C)  Pulse:  [] 95  Resp:  [17-20] 17  SpO2:  [93 %-100 %] 100 %  BP: ()/(53-59) 111/58     Weight: 70 kg (154 lb 5.2 oz)  Body mass index is 26.49 kg/m².    Physical Exam   Constitutional: She appears well-developed and well-nourished.   HENT:   Head: Normocephalic and atraumatic.   Eyes: EOM are normal.   Cardiovascular: Normal rate and regular rhythm.    Pulmonary/Chest: Effort normal. No respiratory distress.       Musculoskeletal: Normal range of motion. She exhibits edema (osito LE).   Neurological: She is alert.   Skin: Skin is warm. Rash noted. There is erythema.   Cellulitis, crusting of osito LE and feet   Psychiatric: She has a normal mood and affect. Thought content normal.   Vitals reviewed.      Significant Labs:  CBC:   Recent Labs  Lab 02/12/18  0620   WBC 6.66   RBC 4.44   HGB 12.7   HCT 42.4   *   MCV 96   MCH 28.6   MCHC 30.0*     CMP:   Recent Labs  Lab 02/12/18  0620   GLU 94   CALCIUM 7.7*   ALBUMIN 2.4*   PROT 5.4*      K 3.8   CO2 40*   CL 95   BUN 13   CREATININE 0.6   ALKPHOS 72   ALT 9*   AST 22   BILITOT 0.6       Significant Diagnostics:  I have reviewed all pertinent imaging results/findings within the past 24 hours.    Assessment/Plan:     Mass of breast, right    Large breast mass of right breast  Will plan for Trucut biopsy at the bedside under local anesthetic.   The procedure was discussed with the patient, who agrees to proceed as planned.            VTE Risk Mitigation         Ordered     heparin (porcine) injection 5,000 Units  Every 8 hours     Route:  Subcutaneous        02/09/18 1749     Medium Risk of VTE  Once      02/09/18 1300          Thank you for your consult. I will follow-up with patient. Please contact us if you have any additional questions.    Yuliet Quach PA-C  General Surgery  [facility]

## 2018-02-12 NOTE — HPI
67 year old wo,an who was admitted with bilateral lower extremity swelling   and sores.. Per EMS, pt's living conditions are extremely poor.   Since admission ,blood cultures on 02/09-staph aureus .  CT angiogram of the lower extremity -02/10-   1.  There is no hemodynamically significant stenosis of either lower extremity arterial vasculature.  There is three-vessel runoff at the ankles bilaterally.      Chest ultrasound done on 02/11- 1. Large right pleural effusion  MRI of the foot -both left and right did not show any evidence of osteomyelitis.

## 2018-02-12 NOTE — PROGRESS NOTES
[facility] -   Hematology/Oncology  Progress Note    Patient Name: Kelly Mariano  Admission Date: 2/9/2018  Hospital Length of Stay: 3 days  Code Status: Full Code     Subjective:     HPI:  67-year-old female admitted to the hospital for cellulitis was found have a large mass involving her right breast also asked see the patient for further evaluation    Interval History: Patient resting comfortably at bed spoke with hospital medicine needs biopsy either through general surgery interventional radiology for tissue diagnosis Oncology Treatment Plan:   [No treatment plan]    Medications:  Continuous Infusions:  Scheduled Meds:   famotidine  20 mg Oral BID    folic acid-vit B6-vit B12 2.5-25-2 mg  1 tablet Oral Daily    furosemide  40 mg Intravenous BID    heparin (porcine)  5,000 Units Subcutaneous Q8H    lisinopril  2.5 mg Oral Daily    meropenem (MERREM) IVPB  500 mg Intravenous Q6H    metoprolol succinate  25 mg Oral Daily    multivitamin  1 tablet Oral Daily    vancomycin 1 g in 0.9% sodium chloride 250 mL IVPB (ready to mix system)  1 g Intravenous Q12H     PRN Meds:acetaminophen, aluminum-magnesium hydroxide-simethicone, diphenhydrAMINE, ondansetron     Review of Systems   Constitutional: Positive for activity change and fatigue. Negative for appetite change, chills, diaphoresis, fever and unexpected weight change.   HENT: Negative for congestion, dental problem, drooling, ear discharge, ear pain, facial swelling, hearing loss, mouth sores, nosebleeds, postnasal drip, rhinorrhea, sinus pressure, sneezing, sore throat, tinnitus, trouble swallowing and voice change.    Eyes: Negative for photophobia, pain, discharge, redness, itching and visual disturbance.   Respiratory: Negative for cough, choking, chest tightness, shortness of breath, wheezing and stridor.    Cardiovascular: Negative for chest pain, palpitations and leg swelling.   Gastrointestinal: Negative for abdominal distention,  abdominal pain, anal bleeding, blood in stool, constipation, diarrhea, nausea, rectal pain and vomiting.   Endocrine: Negative for cold intolerance, heat intolerance, polydipsia, polyphagia and polyuria.   Genitourinary: Negative for decreased urine volume, difficulty urinating, dyspareunia, dysuria, enuresis, flank pain, frequency, genital sores, hematuria, menstrual problem, pelvic pain, urgency, vaginal bleeding, vaginal discharge and vaginal pain.   Musculoskeletal: Negative for arthralgias, back pain, gait problem, joint swelling, myalgias, neck pain and neck stiffness.   Skin: Negative for color change, pallor and rash.   Allergic/Immunologic: Negative for environmental allergies, food allergies and immunocompromised state.   Neurological: Positive for weakness. Negative for dizziness, tremors, seizures, syncope, facial asymmetry, speech difficulty, light-headedness, numbness and headaches.   Hematological: Negative for adenopathy. Does not bruise/bleed easily.   Psychiatric/Behavioral: Positive for dysphoric mood. Negative for agitation, behavioral problems, confusion, decreased concentration, hallucinations, self-injury, sleep disturbance and suicidal ideas. The patient is nervous/anxious. The patient is not hyperactive.      Objective:     Vital Signs (Most Recent):  Temp: 97.9 °F (36.6 °C) (02/12/18 0744)  Pulse: 87 (02/12/18 0744)  Resp: 17 (02/12/18 0744)  BP: (!) 98/53 (02/12/18 0744)  SpO2: 100 % (02/12/18 0744) Vital Signs (24h Range):  Temp:  [97.4 °F (36.3 °C)-97.9 °F (36.6 °C)] 97.9 °F (36.6 °C)  Pulse:  [] 87  Resp:  [17-20] 17  SpO2:  [93 %-100 %] 100 %  BP: ()/(53-59) 98/53     Weight: 70 kg (154 lb 5.2 oz)  Body mass index is 26.49 kg/m².  Body surface area is 1.78 meters squared.      Intake/Output Summary (Last 24 hours) at 02/12/18 0819  Last data filed at 02/12/18 5157   Gross per 24 hour   Intake                0 ml   Output             2400 ml   Net            -2400 ml        Physical Exam   Constitutional: She is oriented to person, place, and time. She has a sickly appearance. She appears ill. She appears distressed.   HENT:   Head: Normocephalic and atraumatic.   Right Ear: External ear normal.   Left Ear: External ear normal.   Nose: Nose normal. Right sinus exhibits no maxillary sinus tenderness and no frontal sinus tenderness. Left sinus exhibits no maxillary sinus tenderness and no frontal sinus tenderness.   Mouth/Throat: Oropharynx is clear and moist. No oropharyngeal exudate.   Eyes: Conjunctivae, EOM and lids are normal. Pupils are equal, round, and reactive to light. Right eye exhibits no discharge. Left eye exhibits no discharge. Right conjunctiva is not injected. Right conjunctiva has no hemorrhage. Left conjunctiva is not injected. Left conjunctiva has no hemorrhage. No scleral icterus.   Neck: Normal range of motion. Neck supple. No JVD present. No tracheal deviation present. No thyromegaly present.   Cardiovascular: Normal rate and regular rhythm.    Pulmonary/Chest: Effort normal. No stridor. No respiratory distress. She exhibits no tenderness. Right breast exhibits mass and skin change. There is breast swelling.       Abdominal: Soft. She exhibits no distension and no mass. There is no splenomegaly or hepatomegaly. There is no tenderness. There is no rebound.   Musculoskeletal: Normal range of motion. She exhibits no edema or tenderness.   Lymphadenopathy:     She has no cervical adenopathy.     She has no axillary adenopathy.        Right: No supraclavicular adenopathy present.        Left: No supraclavicular adenopathy present.   Neurological: She is alert and oriented to person, place, and time. No cranial nerve deficit. Coordination normal.   Skin: Skin is dry. No rash noted. She is not diaphoretic. No erythema.   Psychiatric: Her behavior is normal. Judgment and thought content normal. Her mood appears anxious. She exhibits a depressed mood.   Vitals  reviewed.      Significant Labs:   BMP:   Recent Labs  Lab 02/10/18  1303 02/11/18  0436 02/11/18  1512 02/12/18  0620    95 102 94    141  --  143   K 4.7 3.1*  --  3.8    98  --  95   CO2 21* 35*  --  40*   BUN 21 18  --  13   CREATININE 0.7 0.7  --  0.6   CALCIUM 8.5* 8.2*  --  7.7*   MG  --  1.5*  --   --    , CBC:   Recent Labs  Lab 02/11/18  0436 02/12/18  0620   WBC 6.79 6.66   HGB 12.8 12.7   HCT 42.4 42.4   * 110*    and CMP:   Recent Labs  Lab 02/10/18  1303 02/11/18  0436 02/11/18  1512 02/12/18  0620    141  --  143   K 4.7 3.1*  --  3.8    98  --  95   CO2 21* 35*  --  40*    95 102 94   BUN 21 18  --  13   CREATININE 0.7 0.7  --  0.6   CALCIUM 8.5* 8.2*  --  7.7*   PROT 6.9 5.9* 5.8* 5.4*   ALBUMIN 2.8* 2.6*  --  2.4*   BILITOT 0.8 0.7  --  0.6   ALKPHOS 76 74  --  72   AST 36 21  --  22   ALT 14 13  --  9*   ANIONGAP 15 8  --  8   EGFRNONAA >60 >60  --  >60       Diagnostic Results:  I have reviewed and interpreted all pertinent imaging results/findings within the past 24 hours.    Assessment/Plan:     Mass of breast, right    Patient has a large mass involving her right breast highly suggestive of malignancy with his cramping right arm swelling and cellulitis on anterior chest wall possible secondary to metastatic disease would recommend a biopsy of her right breast to obtain adequate tissue for diagnosis including ER/DC and HER-2/jaclyn status which should direct therapy.  2/12/18 awaiting tissue diagnosis either through general surgery or interventional radiology to determine diagnosis for treatment recommendations for ER/DC and HER-2/jaclyn status discussed with hospital medicine course of action            Thank you for your consult. I will follow-up with patient. Please contact us if you have any additional questions.     Alberto Purvis MD  Hematology/Oncology  [facility]

## 2018-02-12 NOTE — SUBJECTIVE & OBJECTIVE
Interval History:     Review of Systems   Constitutional: Positive for activity change, appetite change and fatigue. Negative for chills, diaphoresis and fever.   HENT: Negative.    Eyes: Negative for pain and redness.   Respiratory: Positive for shortness of breath. Negative for cough and wheezing.    Cardiovascular: Positive for leg swelling. Negative for chest pain and palpitations.   Gastrointestinal: Negative for abdominal pain, constipation, diarrhea, nausea and vomiting.   Genitourinary: Negative for decreased urine volume, difficulty urinating, dysuria and hematuria.   Musculoskeletal: Negative for arthralgias and myalgias.        Falls   Skin: Positive for rash and wound.   Neurological: Positive for weakness. Negative for dizziness and light-headedness.   Psychiatric/Behavioral: Negative for confusion. The patient is not nervous/anxious.      Objective:     Vital Signs (Most Recent):  Temp: 97.4 °F (36.3 °C) (02/11/18 1930)  Pulse: 89 (02/11/18 1930)  Resp: 18 (02/11/18 1930)  BP: (!) 115/58 (02/11/18 1930)  SpO2: 99 % (02/11/18 1930) Vital Signs (24h Range):  Temp:  [96.3 °F (35.7 °C)-97.9 °F (36.6 °C)] 97.4 °F (36.3 °C)  Pulse:  [] 89  Resp:  [18] 18  SpO2:  [92 %-99 %] 99 %  BP: ()/(53-64) 115/58     Weight: 76.2 kg (167 lb 15.9 oz)  Body mass index is 28.84 kg/m².    Intake/Output Summary (Last 24 hours) at 02/11/18 2106  Last data filed at 02/11/18 0530   Gross per 24 hour   Intake                0 ml   Output              975 ml   Net             -975 ml      Physical Exam   Constitutional: She is oriented to person, place, and time. She appears well-developed.  Non-toxic appearance.   Unkempt  female - appears stated age   HENT:   Head: Normocephalic and atraumatic.       Mouth/Throat: Mucous membranes are dry.   Dry tongue and oral MM   Eyes: Conjunctivae are normal.   Neck: Normal range of motion. Neck supple.   Cardiovascular: Regular rhythm.  Tachycardia present.     Pulmonary/Chest: Effort normal. She has rales.   Abdominal: Soft. Bowel sounds are normal. She exhibits distension. There is no tenderness. There is no rebound and no guarding.   Musculoskeletal: She exhibits edema (+2/+3 pitting edema to BLE).   Generalized weakness   Neurological: She is alert and oriented to person, place, and time.   Skin:   Abrasion to forehead and left chin area  Candidal appearing rash underneath breasts and abdominal fold  Ulcerations and cellulitis to feet   Psychiatric: She has a normal mood and affect. Her behavior is normal.   Nursing note and vitals reviewed.      Significant Labs: All pertinent labs within the past 24 hours have been reviewed.    Significant Imaging: I have reviewed all pertinent imaging results/findings within the past 24 hours.

## 2018-02-12 NOTE — SUBJECTIVE & OBJECTIVE
Interval History: Patient resting comfortably at bed spoke with [facility] medicine needs biopsy either through general surgery interventional radiology for tissue diagnosis Oncology Treatment Plan:   [No treatment plan]    Medications:  Continuous Infusions:  Scheduled Meds:   famotidine  20 mg Oral BID    folic acid-vit B6-vit B12 2.5-25-2 mg  1 tablet Oral Daily    furosemide  40 mg Intravenous BID    heparin (porcine)  5,000 Units Subcutaneous Q8H    lisinopril  2.5 mg Oral Daily    meropenem (MERREM) IVPB  500 mg Intravenous Q6H    metoprolol succinate  25 mg Oral Daily    multivitamin  1 tablet Oral Daily    vancomycin 1 g in 0.9% sodium chloride 250 mL IVPB (ready to mix system)  1 g Intravenous Q12H     PRN Meds:acetaminophen, aluminum-magnesium hydroxide-simethicone, diphenhydrAMINE, ondansetron     Review of Systems   Constitutional: Positive for activity change and fatigue. Negative for appetite change, chills, diaphoresis, fever and unexpected weight change.   HENT: Negative for congestion, dental problem, drooling, ear discharge, ear pain, facial swelling, hearing loss, mouth sores, nosebleeds, postnasal drip, rhinorrhea, sinus pressure, sneezing, sore throat, tinnitus, trouble swallowing and voice change.    Eyes: Negative for photophobia, pain, discharge, redness, itching and visual disturbance.   Respiratory: Negative for cough, choking, chest tightness, shortness of breath, wheezing and stridor.    Cardiovascular: Negative for chest pain, palpitations and leg swelling.   Gastrointestinal: Negative for abdominal distention, abdominal pain, anal bleeding, blood in stool, constipation, diarrhea, nausea, rectal pain and vomiting.   Endocrine: Negative for cold intolerance, heat intolerance, polydipsia, polyphagia and polyuria.   Genitourinary: Negative for decreased urine volume, difficulty urinating, dyspareunia, dysuria, enuresis, flank pain, frequency, genital sores, hematuria, menstrual  problem, pelvic pain, urgency, vaginal bleeding, vaginal discharge and vaginal pain.   Musculoskeletal: Negative for arthralgias, back pain, gait problem, joint swelling, myalgias, neck pain and neck stiffness.   Skin: Negative for color change, pallor and rash.   Allergic/Immunologic: Negative for environmental allergies, food allergies and immunocompromised state.   Neurological: Positive for weakness. Negative for dizziness, tremors, seizures, syncope, facial asymmetry, speech difficulty, light-headedness, numbness and headaches.   Hematological: Negative for adenopathy. Does not bruise/bleed easily.   Psychiatric/Behavioral: Positive for dysphoric mood. Negative for agitation, behavioral problems, confusion, decreased concentration, hallucinations, self-injury, sleep disturbance and suicidal ideas. The patient is nervous/anxious. The patient is not hyperactive.      Objective:     Vital Signs (Most Recent):  Temp: 97.9 °F (36.6 °C) (02/12/18 0744)  Pulse: 87 (02/12/18 0744)  Resp: 17 (02/12/18 0744)  BP: (!) 98/53 (02/12/18 0744)  SpO2: 100 % (02/12/18 0744) Vital Signs (24h Range):  Temp:  [97.4 °F (36.3 °C)-97.9 °F (36.6 °C)] 97.9 °F (36.6 °C)  Pulse:  [] 87  Resp:  [17-20] 17  SpO2:  [93 %-100 %] 100 %  BP: ()/(53-59) 98/53     Weight: 70 kg (154 lb 5.2 oz)  Body mass index is 26.49 kg/m².  Body surface area is 1.78 meters squared.      Intake/Output Summary (Last 24 hours) at 02/12/18 0819  Last data filed at 02/12/18 0410   Gross per 24 hour   Intake                0 ml   Output             2400 ml   Net            -2400 ml       Physical Exam   Constitutional: She is oriented to person, place, and time. She has a sickly appearance. She appears ill. She appears distressed.   HENT:   Head: Normocephalic and atraumatic.   Right Ear: External ear normal.   Left Ear: External ear normal.   Nose: Nose normal. Right sinus exhibits no maxillary sinus tenderness and no frontal sinus tenderness. Left  sinus exhibits no maxillary sinus tenderness and no frontal sinus tenderness.   Mouth/Throat: Oropharynx is clear and moist. No oropharyngeal exudate.   Eyes: Conjunctivae, EOM and lids are normal. Pupils are equal, round, and reactive to light. Right eye exhibits no discharge. Left eye exhibits no discharge. Right conjunctiva is not injected. Right conjunctiva has no hemorrhage. Left conjunctiva is not injected. Left conjunctiva has no hemorrhage. No scleral icterus.   Neck: Normal range of motion. Neck supple. No JVD present. No tracheal deviation present. No thyromegaly present.   Cardiovascular: Normal rate and regular rhythm.    Pulmonary/Chest: Effort normal. No stridor. No respiratory distress. She exhibits no tenderness. Right breast exhibits mass and skin change. There is breast swelling.       Abdominal: Soft. She exhibits no distension and no mass. There is no splenomegaly or hepatomegaly. There is no tenderness. There is no rebound.   Musculoskeletal: Normal range of motion. She exhibits no edema or tenderness.   Lymphadenopathy:     She has no cervical adenopathy.     She has no axillary adenopathy.        Right: No supraclavicular adenopathy present.        Left: No supraclavicular adenopathy present.   Neurological: She is alert and oriented to person, place, and time. No cranial nerve deficit. Coordination normal.   Skin: Skin is dry. No rash noted. She is not diaphoretic. No erythema.   Psychiatric: Her behavior is normal. Judgment and thought content normal. Her mood appears anxious. She exhibits a depressed mood.   Vitals reviewed.      Significant Labs:   BMP:   Recent Labs  Lab 02/10/18  1303 02/11/18  0436 02/11/18  1512 02/12/18  0620    95 102 94    141  --  143   K 4.7 3.1*  --  3.8    98  --  95   CO2 21* 35*  --  40*   BUN 21 18  --  13   CREATININE 0.7 0.7  --  0.6   CALCIUM 8.5* 8.2*  --  7.7*   MG  --  1.5*  --   --    , CBC:   Recent Labs  Lab 02/11/18  6306  02/12/18  0620   WBC 6.79 6.66   HGB 12.8 12.7   HCT 42.4 42.4   * 110*    and CMP:   Recent Labs  Lab 02/10/18  1303 02/11/18  0436 02/11/18  1512 02/12/18  0620    141  --  143   K 4.7 3.1*  --  3.8    98  --  95   CO2 21* 35*  --  40*    95 102 94   BUN 21 18  --  13   CREATININE 0.7 0.7  --  0.6   CALCIUM 8.5* 8.2*  --  7.7*   PROT 6.9 5.9* 5.8* 5.4*   ALBUMIN 2.8* 2.6*  --  2.4*   BILITOT 0.8 0.7  --  0.6   ALKPHOS 76 74  --  72   AST 36 21  --  22   ALT 14 13  --  9*   ANIONGAP 15 8  --  8   EGFRNONAA >60 >60  --  >60       Diagnostic Results:  I have reviewed and interpreted all pertinent imaging results/findings within the past 24 hours.

## 2018-02-12 NOTE — ASSESSMENT & PLAN NOTE
Patient has a large mass involving her right breast highly suggestive of malignancy with his cramping right arm swelling and cellulitis on anterior chest wall possible secondary to metastatic disease would recommend a biopsy of her right breast to obtain adequate tissue for diagnosis including ER/ME and HER-2/jaclyn status which should direct therapy.  2/12/18 awaiting tissue diagnosis either through general surgery or interventional radiology to determine diagnosis for treatment recommendations for ER/ME and HER-2/jaclyn status discussed with hospital medicine course of action

## 2018-02-12 NOTE — HPI
Kelly Mariano is a 67 y.o. female who is currently admitted for lower extremity cellulitis and was found to have a large mass of her right breast and right upper extremity edema. General Surgery is consulted for biopsy of the breast mass.   Pt says she first noticed mass several months ago. It has gradually increased in size. There is mild associated discomfort and no nipple discharge

## 2018-02-12 NOTE — PROGRESS NOTES
[facility]  Infectious Disease  Progress Note    Patient Name: Kelly Mariano  MRN: [id number]  Admission Date: 2/9/2018  Length of Stay: 3 days  Attending Physician: Odilon Subramanian MD  Primary Care Provider: Primary Doctor No    Isolation Status: No active isolations  Assessment/Plan:      * Bilateral cellulitis of lower leg    Will continue vanco/meropenem for now.    Wound care, will use cultures to deescalate therapy         Bacteremia due to Staphylococcus aureus    Cardiac echo did not show endocarditis .  Will follow final drug susceptibility of org  Continue vanco and meropenem for now   Source is likely the lower extremity skin lesions         Acute combined systolic and diastolic heart failure    Diuresis as per primary team            Anticipated Disposition:     Thank you for your consult. I will follow-up with patient. Please contact us if you have any additional questions.    Xavier Castillo MD  Infectious Disease  [facility]    Subjective:     Principal Problem:Bilateral cellulitis of lower leg    HPI: 67 year old wo,an who was admitted with bilateral lower extremity swelling   and sores.. Per EMS, pt's living conditions are extremely poor.   Since admission ,blood cultures on 02/09-staph aureus .  CT angiogram of the lower extremity -02/10-   1.  There is no hemodynamically significant stenosis of either lower extremity arterial vasculature.  There is three-vessel runoff at the ankles bilaterally.      Chest ultrasound done on 02/11- 1. Large right pleural effusion  MRI of the foot -both left and right did not show any evidence of osteomyelitis.  Interval History:  67 year old woman with staph aureus bacteremia and leg cellulitis .  Chest x-ray showed small right apical pneumothorax.  She feels better.    Review of Systems  Objective:     Vital Signs (Most Recent):  Temp: 98.8 °F (37.1 °C) (02/12/18 1649)  Pulse: 95 (02/12/18 1649)  Resp: 18 (02/12/18 1212)  BP: (!)  110/56 (02/12/18 1649)  SpO2: 99 % (02/12/18 1649) Vital Signs (24h Range):  Temp:  [97.4 °F (36.3 °C)-98.8 °F (37.1 °C)] 98.8 °F (37.1 °C)  Pulse:  [87-98] 95  Resp:  [17-20] 18  SpO2:  [93 %-100 %] 99 %  BP: ()/(53-59) 110/56     Weight: 70 kg (154 lb 5.2 oz)  Body mass index is 26.49 kg/m².    Estimated Creatinine Clearance: 88.5 mL/min (based on SCr of 0.6 mg/dL).    Physical Exam   Constitutional: She is oriented to person, place, and time. She appears well-developed.  Non-toxic appearance.   Unkempt  female - appears stated age   HENT:   Head: Normocephalic and atraumatic.       Mouth/Throat: Mucous membranes are dry.   Dry tongue and oral MM   Eyes: Conjunctivae are normal.   Neck: Normal range of motion. Neck supple.   Cardiovascular: Regular rhythm.  Tachycardia present.    Pulmonary/Chest: Effort normal. She has rales.   Abdominal: Soft. Bowel sounds are normal. She exhibits distension. There is no tenderness. There is no rebound and no guarding.   Musculoskeletal: She exhibits edema (+2/+3 pitting edema to BLE).   Generalized weakness   Neurological: She is alert and oriented to person, place, and time.   Skin:   Abrasion to forehead and left chin area  Candidal appearing rash underneath breasts and abdominal fold  Ulcerations and cellulitis to feet   Psychiatric: She has a normal mood and affect. Her behavior is normal.   Nursing note and vitals reviewed.      Significant Labs:   Blood Culture:   Recent Labs  Lab 02/09/18  0955 02/09/18  1000   LABBLOO No Growth to date  No Growth to date  No Growth to date Gram stain aer bottle: Gram positive cocci in clusters resembling Staph   Results called to and read back by:Alison Ghosh RN 02/10/2018  06:43  Gram stain hillary bottle: Gram positive cocci in clusters resembling Staph  STAPHYLOCOCCUS AUREUSSusceptibility pendingID consult required at [facility],[place] and [facility].       Significant Imaging: I have reviewed all pertinent  imaging results/findings within the past 24 hours.

## 2018-02-12 NOTE — CONSULTS
02/12/18 1015   Handoff Report   Given To DC Miranda   Pain/Comfort Assessments   Pain Assessment Performed Yes       Number Scale   Presence of Pain denies   Skin   Skin WDL ex   Skin Color Pale   Skin Temperature warm   Skin Moisture Dry   Skin Integrity Other (see comments)  (wounds)   Harjit Risk Assessment   Sensory Perception 4-->no impairment   Moisture 4-->rarely moist   Activity 1-->bedfast   Mobility 2-->very limited   Nutrition 2-->probably inadequate   Friction and Shear 1-->problem   Harjit Score 14       Wound 02/09/18 Ulceration lower Leg   Date First Assessed: 02/09/18   Pre-existing: Yes  Wound Type: Ulceration  Side: Left  Orientation: lower  Location: Leg   Wound Image     Wound WDL ex   Dressing Appearance Open to air   Drainage Amount None   Appearance Dry;Yellow   Periwound Area Dry;Redness;Swelling   Wound Edges Callused;Other (see comments)  (hyperkeratotic)   Care Cleansed with:;Soap and water;Applied:;Skin Barrier;Moisturizing agent   Dressing Applied;Hydrogel;Hydrofiber;Foam;Compression wrap   Periwound Care Absorptive dressing applied;Cleansed with pH balanced cleanser;Dry periwound area maintained;Moisturizer applied;Skin barrier film applied   Dressing Change Due 02/14/18       Wound 02/09/18 Ulceration lower Leg   Date First Assessed: 02/09/18   Pre-existing: Yes  Wound Type: Ulceration  Side: Right  Orientation: lower  Location: Leg   Wound WDL ex   Dressing Appearance Open to air   Drainage Amount None   Appearance Yellow;Red;Dry   Periwound Area Dry;Redness;Swelling   Wound Edges Callused;Other (see comments)  (hyperkeratotic)   Care Cleansed with:;Soap and water;Applied:;Skin Barrier   Dressing Applied;Hydrogel;Hydrofiber;Foam;Compression wrap   Periwound Care Absorptive dressing applied;Cleansed with pH balanced cleanser;Dry periwound area maintained;Moisturizer applied;Skin barrier film applied   Dressing Change Due 02/14/18   Skin Interventions   Pressure Reduction Devices  "Pressure-redistributing mattress utilized;Foam padding utilized   Pressure Reduction Techniques frequent weight shift encouraged;heels elevated off bed;positioned off wounds   Skin Protection Adhesive use limited;Incontinence pads;Skin sealant/moisture barrier       Consulted on this 66 y/o F patient for present on admission wounds and ulcerations to BLE. Patient lives alone in poor conditions.  She does not ambulate much.  She has multiple dry ulcerations to BLE with hyperkeratotic wound edges, dry thick callused skin.  Edema has improved since admission.  Bilateral feel and lower extremity  Wounds noted.  Wound beds dry and yellow. Animal hair noted to be adherent in wound beds, and unable to get it all out.   normlgel AG applied to dry wound beds and covered with aquacel extra, and topped with mepilex non bordered foam.  AttracTain cream applied to BLE dry flaky scaly skin.  Dressings secured with Tubigrip compression stocking. BLE then wrapped with cast padding and 4" Coban for compression.  UNNAs boot compression wrap not recommended at this time due to limited mobility of brandyn, but can be readressed as OP if her mobility improves.  She will need Wound care follow up, either SNF or  to continue to treat these wounds, and may need debridment of hyperkeritotic wound edges in clinic once vascular status established. (she is to F/U with vascular surgery per Dr. Prince notes).  Please see below for wound care recommendations at this time:    BLE ulcerations:  1. Cleanse with easi cleanse foam wipes  2. Pat dry  3. Paint intact sarah wound skin with cavilon  4. Apply Normlgel AG to dry wound beds  5. Cover with Aquacel extra  6. Cover with Mepilex foam  7. Secure with Tubigrip compression stocking  8. Wrap with specialist cast padding 4"  9. Wrap with 4" Coban for compression  10. Change M/W/F per wound care nurse    Skin Care Precautions / Pressure Injury Prevention:  1. Follow "Guidelines for Prevention of " "Pressure Ulcers in At Risk Patients"  These guidelines can be found on the [facility] Intr[employer]et by searching "Wound Care / Ostomy Resources"  2. Document wound assessment in [facility] using guidelines in Brijesh's "Assessment : Wound" procedure  3. Limit the amount of linen/underpad between patient and mattress surface to ONE fitted sheet and ONE covidien underpad - NO draw sheet/briefs.  4. Obtain Easi Cleans Foam Wipes for providing sarah care - avoid the use of wash cloths to areas affected by IAD.  5. Apply Clear Barrier Ointment to perineal / perirectal areas in a thin even layer to clean dry skin BID and after each episode of pericare  6. Apply sween 24 moisturizer cream to all dry skin after daily bath and prn  7. Obtain foam wedge from materials management to assist with maintaining proper position changes at least q 2hours and document actual position in EPIC q 2hours  8. Elevate heels off mattress on 2 separate pillows placed lengthwise under each leg supporting the leg from knee to ankle.  Document in EPIC flow sheet every 2 hours.  9. .Do NOT elevate HOB greater than 30 degrees unless contraindicated.  10. Remove SCD/Plexi Pulses/JESS's every 12 hours for 30 minutes and assess skin underneath these devices for breakdown              "

## 2018-02-12 NOTE — SUBJECTIVE & OBJECTIVE
No current facility-administered medications on file prior to encounter.      No current outpatient prescriptions on file prior to encounter.       Review of patient's allergies indicates:   Allergen Reactions    Pcn [penicillins] Itching       History reviewed. No pertinent past medical history.  Past Surgical History:   Procedure Laterality Date    SPINAL FUSION       Family History     None        Social History Main Topics    Smoking status: Former Smoker     Types: Cigarettes    Smokeless tobacco: Not on file    Alcohol use Yes      Comment: occasional    Drug use: No    Sexual activity: Not on file     Review of Systems   Constitutional: Positive for activity change and fatigue. Negative for chills and fever.   Respiratory: Negative for shortness of breath.    Cardiovascular: Negative for chest pain.   Gastrointestinal: Negative for abdominal pain, nausea and vomiting.   Genitourinary: Negative for dysuria.   Musculoskeletal: Positive for arthralgias. Negative for myalgias.   Skin: Positive for color change, rash and wound.   Neurological: Negative for weakness.   Hematological: Does not bruise/bleed easily.   Psychiatric/Behavioral: Positive for dysphoric mood. The patient is nervous/anxious.      Objective:     Vital Signs (Most Recent):  Temp: 97.9 °F (36.6 °C) (02/12/18 0744)  Pulse: 95 (02/12/18 0841)  Resp: 17 (02/12/18 0744)  BP: (!) 111/58 (02/12/18 0841)  SpO2: 100 % (02/12/18 0744) Vital Signs (24h Range):  Temp:  [97.4 °F (36.3 °C)-97.9 °F (36.6 °C)] 97.9 °F (36.6 °C)  Pulse:  [] 95  Resp:  [17-20] 17  SpO2:  [93 %-100 %] 100 %  BP: ()/(53-59) 111/58     Weight: 70 kg (154 lb 5.2 oz)  Body mass index is 26.49 kg/m².    Physical Exam   Constitutional: She appears well-developed and well-nourished.   HENT:   Head: Normocephalic and atraumatic.   Eyes: EOM are normal.   Cardiovascular: Normal rate and regular rhythm.    Pulmonary/Chest: Effort normal. No respiratory distress.        Musculoskeletal: Normal range of motion. She exhibits edema (osito LE).   Neurological: She is alert.   Skin: Skin is warm. Rash noted. There is erythema.   Cellulitis, crusting of osito LE and feet   Psychiatric: She has a normal mood and affect. Thought content normal.   Vitals reviewed.      Significant Labs:  CBC:   Recent Labs  Lab 02/12/18  0620   WBC 6.66   RBC 4.44   HGB 12.7   HCT 42.4   *   MCV 96   MCH 28.6   MCHC 30.0*     CMP:   Recent Labs  Lab 02/12/18  0620   GLU 94   CALCIUM 7.7*   ALBUMIN 2.4*   PROT 5.4*      K 3.8   CO2 40*   CL 95   BUN 13   CREATININE 0.6   ALKPHOS 72   ALT 9*   AST 22   BILITOT 0.6       Significant Diagnostics:  I have reviewed all pertinent imaging results/findings within the past 24 hours.

## 2018-02-12 NOTE — SUBJECTIVE & OBJECTIVE
Interval History:  67 year old woman with staph aureus bacteremia and leg cellulitis .  Chest x-ray showed small right apical pneumothorax.  She feels better.    Review of Systems  Objective:     Vital Signs (Most Recent):  Temp: 98.8 °F (37.1 °C) (02/12/18 1649)  Pulse: 95 (02/12/18 1649)  Resp: 18 (02/12/18 1212)  BP: (!) 110/56 (02/12/18 1649)  SpO2: 99 % (02/12/18 1649) Vital Signs (24h Range):  Temp:  [97.4 °F (36.3 °C)-98.8 °F (37.1 °C)] 98.8 °F (37.1 °C)  Pulse:  [87-98] 95  Resp:  [17-20] 18  SpO2:  [93 %-100 %] 99 %  BP: ()/(53-59) 110/56     Weight: 70 kg (154 lb 5.2 oz)  Body mass index is 26.49 kg/m².    Estimated Creatinine Clearance: 88.5 mL/min (based on SCr of 0.6 mg/dL).    Physical Exam   Constitutional: She is oriented to person, place, and time. She appears well-developed.  Non-toxic appearance.   Unkempt  female - appears stated age   HENT:   Head: Normocephalic and atraumatic.       Mouth/Throat: Mucous membranes are dry.   Dry tongue and oral MM   Eyes: Conjunctivae are normal.   Neck: Normal range of motion. Neck supple.   Cardiovascular: Regular rhythm.  Tachycardia present.    Pulmonary/Chest: Effort normal. She has rales.   Abdominal: Soft. Bowel sounds are normal. She exhibits distension. There is no tenderness. There is no rebound and no guarding.   Musculoskeletal: She exhibits edema (+2/+3 pitting edema to BLE).   Generalized weakness   Neurological: She is alert and oriented to person, place, and time.   Skin:   Abrasion to forehead and left chin area  Candidal appearing rash underneath breasts and abdominal fold  Ulcerations and cellulitis to feet   Psychiatric: She has a normal mood and affect. Her behavior is normal.   Nursing note and vitals reviewed.      Significant Labs:   Blood Culture:   Recent Labs  Lab 02/09/18  0955 02/09/18  1000   LABBLOO No Growth to date  No Growth to date  No Growth to date Gram stain aer bottle: Gram positive cocci in clusters  resembling Staph   Results called to and read back by:Alison Ghosh RN 02/10/2018  06:43  Gram stain hillary bottle: Gram positive cocci in clusters resembling Staph  STAPHYLOCOCCUS AUREUSSusceptibility pendingID consult required at [facility] [place]Juanita Crowell and Elizabeth quesada.       Significant Imaging: I have reviewed all pertinent imaging results/findings within the past 24 hours.

## 2018-02-12 NOTE — ASSESSMENT & PLAN NOTE
Large breast mass of right breast  Will plan for Trucut biopsy at the bedside under local anesthetic.   The procedure was discussed with the patient, who agrees to proceed as planned.

## 2018-02-12 NOTE — ASSESSMENT & PLAN NOTE
-Unclear etiology  -Cont Torpol XL , titrate  -Continue IV diuresis, ACEI   -Will need ischemic workup-LHC vs stress test once infection issues improve and she is euvolemic

## 2018-02-12 NOTE — SUBJECTIVE & OBJECTIVE
History reviewed. No pertinent past medical history.    Past Surgical History:   Procedure Laterality Date    SPINAL FUSION         Review of patient's allergies indicates:   Allergen Reactions    Pcn [penicillins] Itching       Medications:  No prescriptions prior to admission.     Antibiotics     Start     Stop Route Frequency Ordered    02/10/18 1330  vancomycin 1 g in 0.9% sodium chloride 250 mL IVPB (ready to mix system)      -- IV Every 12 hours (non-standard times) 02/10/18 0926    02/09/18 2030  meropenem-0.9% sodium chloride 500 mg/50 mL IVPB      -- IV Every 6 hours (non-standard times) 02/09/18 1814        Antifungals     None        Antivirals     None             There is no immunization history on file for this patient.    Family History     None        Social History     Social History    Marital status: Single     Spouse name: N/A    Number of children: N/A    Years of education: N/A     Social History Main Topics    Smoking status: Former Smoker     Types: Cigarettes    Smokeless tobacco: None    Alcohol use Yes      Comment: occasional    Drug use: No    Sexual activity: Not Asked     Other Topics Concern    None     Social History Narrative    None     Review of Systems   Constitutional: Positive for activity change, appetite change and fatigue. Negative for chills, diaphoresis and fever.   HENT: Negative.    Eyes: Negative for pain and redness.   Respiratory: Positive for shortness of breath. Negative for cough and wheezing.    Cardiovascular: Positive for leg swelling. Negative for chest pain and palpitations.   Gastrointestinal: Negative for abdominal pain, constipation, diarrhea, nausea and vomiting.   Genitourinary: Negative for decreased urine volume, difficulty urinating, dysuria and hematuria.   Musculoskeletal: Negative for arthralgias and myalgias.        Falls   Skin: Positive for rash and wound.   Neurological: Positive for weakness. Negative for dizziness and  light-headedness.   Psychiatric/Behavioral: Negative for confusion. The patient is not nervous/anxious.      Objective:     Vital Signs (Most Recent):  Temp: 97.4 °F (36.3 °C) (02/11/18 1930)  Pulse: 89 (02/11/18 1930)  Resp: 18 (02/11/18 1930)  BP: (!) 115/58 (02/11/18 1930)  SpO2: 99 % (02/11/18 1930) Vital Signs (24h Range):  Temp:  [96.3 °F (35.7 °C)-97.9 °F (36.6 °C)] 97.4 °F (36.3 °C)  Pulse:  [] 89  Resp:  [18] 18  SpO2:  [92 %-99 %] 99 %  BP: ()/(53-64) 115/58     Weight: 76.2 kg (167 lb 15.9 oz)  Body mass index is 28.84 kg/m².    Estimated Creatinine Clearance: 79 mL/min (based on SCr of 0.7 mg/dL).    Physical Exam   Constitutional: She is oriented to person, place, and time. She appears well-developed.  Non-toxic appearance.   Unkempt  female - appears stated age   HENT:   Head: Normocephalic and atraumatic.       Mouth/Throat: Mucous membranes are dry.   Dry tongue and oral MM   Eyes: Conjunctivae are normal.   Neck: Normal range of motion. Neck supple.   Cardiovascular: Regular rhythm.  Tachycardia present.    Pulmonary/Chest: Effort normal. She has rales.   Abdominal: Soft. Bowel sounds are normal. She exhibits distension. There is no tenderness. There is no rebound and no guarding.   Musculoskeletal: She exhibits edema (+2/+3 pitting edema to BLE).   Generalized weakness   Neurological: She is alert and oriented to person, place, and time.   Skin:   Abrasion to forehead and left chin area  Candidal appearing rash underneath breasts and abdominal fold  Ulcerations and cellulitis to feet   Psychiatric: She has a normal mood and affect. Her behavior is normal.   Nursing note and vitals reviewed.      Significant Labs:   Blood Culture:   Recent Labs  Lab 02/09/18  0955 02/09/18  1000   LABBLOO No Growth to date  No Growth to date  No Growth to date Gram stain aer bottle: Gram positive cocci in clusters resembling Staph   Results called to and read back by:Alison Ghosh RN  02/10/2018  06:43  Gram stain hillary bottle: Gram positive cocci in clusters resembling Staph  STAPHYLOCOCCUS AUREUSSusceptibility pendingID consult required [place] [facility] [place]Juanita Crowell and Elizabeth quesada.     BMP:   Recent Labs  Lab 02/11/18 0436 02/11/18  1512   GLU 95 102     --    K 3.1*  --    CL 98  --    CO2 35*  --    BUN 18  --    CREATININE 0.7  --    CALCIUM 8.2*  --    MG 1.5*  --      CBC:   Recent Labs  Lab 02/11/18 0436   WBC 6.79   HGB 12.8   HCT 42.4   *     All pertinent labs within the past 24 hours have been reviewed.    Significant Imaging: I have reviewed all pertinent imaging results/findings within the past 24 hours.

## 2018-02-13 PROBLEM — R41.82 ALTERED MENTAL STATUS: Status: ACTIVE | Noted: 2018-02-13

## 2018-02-13 PROBLEM — J96.21 ACUTE ON CHRONIC RESPIRATORY FAILURE WITH HYPOXIA AND HYPERCAPNIA: Status: ACTIVE | Noted: 2018-02-13

## 2018-02-13 PROBLEM — J96.22 ACUTE ON CHRONIC RESPIRATORY FAILURE WITH HYPOXIA AND HYPERCAPNIA: Status: ACTIVE | Noted: 2018-02-13

## 2018-02-13 LAB
ALBUMIN SERPL BCP-MCNC: 2.5 G/DL
ALLENS TEST: ABNORMAL
ALP SERPL-CCNC: 68 U/L
ALT SERPL W/O P-5'-P-CCNC: 9 U/L
ANION GAP SERPL CALC-SCNC: 10 MMOL/L
AST SERPL-CCNC: 24 U/L
BACTERIA BLD CULT: NORMAL
BASOPHILS # BLD AUTO: 0.01 K/UL
BASOPHILS NFR BLD: 0.1 %
BILIRUB SERPL-MCNC: 0.7 MG/DL
BUN SERPL-MCNC: 12 MG/DL
CALCIUM SERPL-MCNC: 8 MG/DL
CHLORIDE SERPL-SCNC: 88 MMOL/L
CO2 SERPL-SCNC: 43 MMOL/L
CREAT SERPL-MCNC: 0.5 MG/DL
DELSYS: ABNORMAL
DIFFERENTIAL METHOD: ABNORMAL
EOSINOPHIL # BLD AUTO: 0 K/UL
EOSINOPHIL NFR BLD: 0.1 %
ERYTHROCYTE [DISTWIDTH] IN BLOOD BY AUTOMATED COUNT: 17 %
EST. GFR  (AFRICAN AMERICAN): >60 ML/MIN/1.73 M^2
EST. GFR  (NON AFRICAN AMERICAN): >60 ML/MIN/1.73 M^2
FIO2: 28
GLUCOSE SERPL-MCNC: 74 MG/DL
HCO3 UR-SCNC: 57.7 MMOL/L (ref 24–28)
HCT VFR BLD AUTO: 43.5 %
HGB BLD-MCNC: 12.9 G/DL
LYMPHOCYTES # BLD AUTO: 0.5 K/UL
LYMPHOCYTES NFR BLD: 6.9 %
MAGNESIUM SERPL-MCNC: 1.4 MG/DL
MCH RBC QN AUTO: 28.4 PG
MCHC RBC AUTO-ENTMCNC: 29.7 G/DL
MCV RBC AUTO: 96 FL
MODE: ABNORMAL
MONOCYTES # BLD AUTO: 0.5 K/UL
MONOCYTES NFR BLD: 6.5 %
NEUTROPHILS # BLD AUTO: 6 K/UL
NEUTROPHILS NFR BLD: 86.4 %
PCO2 BLDA: 84.5 MMHG (ref 35–45)
PH SMN: 7.44 [PH] (ref 7.35–7.45)
PLATELET # BLD AUTO: 105 K/UL
PMV BLD AUTO: 11.2 FL
PO2 BLDA: 98 MMHG (ref 80–100)
POC BE: >30 MMOL/L
POC SATURATED O2: 97 % (ref 95–100)
POCT GLUCOSE: 104 MG/DL (ref 70–110)
POTASSIUM SERPL-SCNC: 3.6 MMOL/L
PROT SERPL-MCNC: 5.8 G/DL
RBC # BLD AUTO: 4.55 M/UL
SAMPLE: ABNORMAL
SITE: ABNORMAL
SODIUM SERPL-SCNC: 141 MMOL/L
VANCOMYCIN TROUGH SERPL-MCNC: 14.2 UG/ML
WBC # BLD AUTO: 6.96 K/UL

## 2018-02-13 PROCEDURE — 63600175 PHARM REV CODE 636 W HCPCS: Performed by: PHYSICIAN ASSISTANT

## 2018-02-13 PROCEDURE — 27000190 HC CPAP FULL FACE MASK W/VALVE

## 2018-02-13 PROCEDURE — 92526 ORAL FUNCTION THERAPY: CPT

## 2018-02-13 PROCEDURE — 25000003 PHARM REV CODE 250: Performed by: INTERNAL MEDICINE

## 2018-02-13 PROCEDURE — 80053 COMPREHEN METABOLIC PANEL: CPT

## 2018-02-13 PROCEDURE — 80202 ASSAY OF VANCOMYCIN: CPT

## 2018-02-13 PROCEDURE — 99900035 HC TECH TIME PER 15 MIN (STAT)

## 2018-02-13 PROCEDURE — 0HBT3ZX EXCISION OF RIGHT BREAST, PERCUTANEOUS APPROACH, DIAGNOSTIC: ICD-10-PCS | Performed by: SURGERY

## 2018-02-13 PROCEDURE — 83735 ASSAY OF MAGNESIUM: CPT

## 2018-02-13 PROCEDURE — 99233 SBSQ HOSP IP/OBS HIGH 50: CPT | Mod: ,,, | Performed by: INTERNAL MEDICINE

## 2018-02-13 PROCEDURE — 94660 CPAP INITIATION&MGMT: CPT

## 2018-02-13 PROCEDURE — 63600175 PHARM REV CODE 636 W HCPCS: Performed by: NURSE PRACTITIONER

## 2018-02-13 PROCEDURE — 27000221 HC OXYGEN, UP TO 24 HOURS

## 2018-02-13 PROCEDURE — 25000003 PHARM REV CODE 250: Performed by: FAMILY MEDICINE

## 2018-02-13 PROCEDURE — 25000003 PHARM REV CODE 250: Performed by: PHYSICIAN ASSISTANT

## 2018-02-13 PROCEDURE — 63600175 PHARM REV CODE 636 W HCPCS: Performed by: INTERNAL MEDICINE

## 2018-02-13 PROCEDURE — 21400001 HC TELEMETRY ROOM

## 2018-02-13 PROCEDURE — 85025 COMPLETE CBC W/AUTO DIFF WBC: CPT

## 2018-02-13 PROCEDURE — 99232 SBSQ HOSP IP/OBS MODERATE 35: CPT | Mod: ,,, | Performed by: INTERNAL MEDICINE

## 2018-02-13 PROCEDURE — 25000003 PHARM REV CODE 250: Performed by: NURSE PRACTITIONER

## 2018-02-13 PROCEDURE — 63600175 PHARM REV CODE 636 W HCPCS: Performed by: FAMILY MEDICINE

## 2018-02-13 PROCEDURE — 82803 BLOOD GASES ANY COMBINATION: CPT

## 2018-02-13 PROCEDURE — 36600 WITHDRAWAL OF ARTERIAL BLOOD: CPT

## 2018-02-13 RX ORDER — ACETAZOLAMIDE 250 MG/1
250 TABLET ORAL 3 TIMES DAILY
Status: COMPLETED | OUTPATIENT
Start: 2018-02-13 | End: 2018-02-16

## 2018-02-13 RX ORDER — ACETAZOLAMIDE 250 MG/1
250 TABLET ORAL 3 TIMES DAILY
Status: DISCONTINUED | OUTPATIENT
Start: 2018-02-13 | End: 2018-02-13

## 2018-02-13 RX ORDER — POTASSIUM CHLORIDE 20 MEQ/1
40 TABLET, EXTENDED RELEASE ORAL ONCE
Status: DISCONTINUED | OUTPATIENT
Start: 2018-02-13 | End: 2018-02-13

## 2018-02-13 RX ORDER — POTASSIUM CHLORIDE 7.45 MG/ML
10 INJECTION INTRAVENOUS
Status: COMPLETED | OUTPATIENT
Start: 2018-02-13 | End: 2018-02-13

## 2018-02-13 RX ADMIN — ACETAZOLAMIDE 250 MG: 250 TABLET ORAL at 08:02

## 2018-02-13 RX ADMIN — THERA TABS 1 TABLET: TAB at 09:02

## 2018-02-13 RX ADMIN — LISINOPRIL 2.5 MG: 2.5 TABLET ORAL at 09:02

## 2018-02-13 RX ADMIN — HEPARIN SODIUM 5000 UNITS: 5000 INJECTION, SOLUTION INTRAVENOUS; SUBCUTANEOUS at 03:02

## 2018-02-13 RX ADMIN — POTASSIUM CHLORIDE 10 MEQ: 10 INJECTION, SOLUTION INTRAVENOUS at 04:02

## 2018-02-13 RX ADMIN — POTASSIUM CHLORIDE 10 MEQ: 10 INJECTION, SOLUTION INTRAVENOUS at 05:02

## 2018-02-13 RX ADMIN — FAMOTIDINE 20 MG: 20 TABLET, FILM COATED ORAL at 08:02

## 2018-02-13 RX ADMIN — METOPROLOL SUCCINATE 25 MG: 25 TABLET, EXTENDED RELEASE ORAL at 09:02

## 2018-02-13 RX ADMIN — HEPARIN SODIUM 5000 UNITS: 5000 INJECTION, SOLUTION INTRAVENOUS; SUBCUTANEOUS at 05:02

## 2018-02-13 RX ADMIN — HEPARIN SODIUM 5000 UNITS: 5000 INJECTION, SOLUTION INTRAVENOUS; SUBCUTANEOUS at 08:02

## 2018-02-13 RX ADMIN — SODIUM CHLORIDE 500 ML: 0.9 INJECTION, SOLUTION INTRAVENOUS at 03:02

## 2018-02-13 RX ADMIN — FUROSEMIDE 40 MG: 10 INJECTION, SOLUTION INTRAMUSCULAR; INTRAVENOUS at 09:02

## 2018-02-13 RX ADMIN — Medication 1 TABLET: at 09:02

## 2018-02-13 RX ADMIN — MEROPENEM AND SODIUM CHLORIDE 500 MG: 500 INJECTION, SOLUTION INTRAVENOUS at 11:02

## 2018-02-13 RX ADMIN — MEROPENEM AND SODIUM CHLORIDE 500 MG: 500 INJECTION, SOLUTION INTRAVENOUS at 04:02

## 2018-02-13 RX ADMIN — VANCOMYCIN HYDROCHLORIDE 1 G: 1 INJECTION, POWDER, LYOPHILIZED, FOR SOLUTION INTRAVENOUS at 08:02

## 2018-02-13 RX ADMIN — POTASSIUM CHLORIDE 10 MEQ: 10 INJECTION, SOLUTION INTRAVENOUS at 06:02

## 2018-02-13 RX ADMIN — FAMOTIDINE 20 MG: 20 TABLET, FILM COATED ORAL at 09:02

## 2018-02-13 NOTE — ASSESSMENT & PLAN NOTE
Patient has a large mass involving her right breast highly suggestive of malignancy with his cramping right arm swelling and cellulitis on anterior chest wall possible secondary to metastatic disease would recommend a biopsy of her right breast to obtain adequate tissue for diagnosis including ER/MD and HER-2/jaclyn status which should direct therapy.  2/12/18 awaiting tissue diagnosis either through general surgery or interventional radiology to determine diagnosis for treatment recommendations for ER/MD and HER-2/jaclyn status discussed with hospital medicine course of action.  2/13/18 awaiting results of thoracentesis will need ER/MD HER-2/jaclyn status would recommend needle biopsy of breast with is a higher likelihood of making diagnosis as well as obtaining ER/MD and HER-2 status if she is ER/MD positive kidney treated with hormonal therapy either by mouth or by an injection on a monthly basis with 50-60% response rates in a non-curative setting

## 2018-02-13 NOTE — PROGRESS NOTES
[facility] Hospital Medicine  Progress Note    Patient Name: Kelly Mariano  MRN: [id number]  Patient Class: IP- Inpatient   Admission Date: 2/9/2018  Length of Stay: 3 days  Attending Physician: Odilon Subramanian MD  Primary Care Provider: Primary Doctor No        Subjective:     Principal Problem:Bilateral cellulitis of lower leg    HPI:  Kelly Mariano is a 68 yo female who states she has not seen a physician since 2009. EMS brought her to the ED today as the patient reported generalized weakness that has worsened over the last 2 days. She had trouble getting off the toilet and when she di,  she had fallen. EMS stated living conditions were deplorable with cat feces on the ibeth. (Elder[employer] contacted). Pt is noted with cellulitis and wounds to the bilateral feet and lower legs. Pt describes anxiety attacks, SOB, leg swelling and difficulty walking. Vital signs showed tachycardia = 112, pt afebrile with controlled blood pressure. Labs indicate thrombocytopenia, metabolic alkalosis, troponin 0.114, BNP > 4900, initial lactic acid 2.3;  CXR shows pulmonary edema and a moderate right sided pleural effusion.    Hospital Course:  2/10- no fever, other vss. Pt feels weakness, leg swelling and sob improving.Blood cultures positive for staph, on vanc. and guillermo. MRI negative for osteo. ID consulted. CTA BLE to determine if adequate circulation. Per nurse, concerned for left breast deformity, concerning for malignancy, hemonc consulted.   2/11 - Legs swelling and weakness improved, no longer sob. Echo shows ejection fraction 10%, diuresing well on lasix. Per cards left heart cath or stress test when better. Seen by hem/onc this am, concern for breast ca, right pleural effusion drained today w/o complications, swallow study positive for aspiration, counseled patient. Continued on antibx for lower extremity cellulitis. CT legs with no obvious arterial occlusion, however CTA lower extremeties  describes Ulcerative plaque versus small focal dissection measuring 8 mm in the left common iliac artery which causes a mild to moderate grade focal stenosis just above the bifurcation into the left external and left internal iliac arteries.  Contacted Brett Puga, vascular surgeon, no intervention needed now. Recommending lower extremity leg wraps and follow up w/vascular in outpt.   2/12 - Patient feeling better, platelets trending down, crp elevated at 28, sed rate normal. Blood cultures positive for s. Aeureus, awaiting sensitivity.  Xray with tiny right pneumothorax after thoracentesis. General sx biopsied breast mass.     Interval History:     Review of Systems   Constitutional: Positive for activity change, appetite change and fatigue. Negative for chills, diaphoresis and fever.   HENT: Negative.    Eyes: Negative for pain and redness.   Respiratory: Positive for shortness of breath. Negative for cough and wheezing.    Cardiovascular: Positive for leg swelling. Negative for chest pain and palpitations.   Gastrointestinal: Negative for abdominal pain, constipation, diarrhea, nausea and vomiting.   Genitourinary: Negative for decreased urine volume, difficulty urinating, dysuria and hematuria.   Musculoskeletal: Negative for arthralgias and myalgias.        Falls   Skin: Positive for rash and wound.   Neurological: Positive for weakness. Negative for dizziness and light-headedness.   Psychiatric/Behavioral: Negative for confusion. The patient is not nervous/anxious.      Objective:     Vital Signs (Most Recent):  Temp: 98.8 °F (37.1 °C) (02/12/18 1649)  Pulse: 92 (02/12/18 2158)  Resp: 15 (02/12/18 1944)  BP: (!) 95/50 (02/12/18 1944)  SpO2: 99 % (02/12/18 1649) Vital Signs (24h Range):  Temp:  [97.4 °F (36.3 °C)-98.8 °F (37.1 °C)] 98.8 °F (37.1 °C)  Pulse:  [87-99] 92  Resp:  [15-20] 15  SpO2:  [93 %-100 %] 99 %  BP: ()/(50-59) 95/50     Weight: 70 kg (154 lb 5.2 oz)  Body mass index is 26.49  kg/m².    Intake/Output Summary (Last 24 hours) at 02/12/18 2242  Last data filed at 02/12/18 2158   Gross per 24 hour   Intake              910 ml   Output             4925 ml   Net            -4015 ml      Physical Exam   Constitutional: She is oriented to person, place, and time. She appears well-developed.  Non-toxic appearance.   Unkempt  female - appears stated age   HENT:   Head: Normocephalic and atraumatic.       Mouth/Throat: Mucous membranes are dry.   Dry tongue and oral MM   Eyes: Conjunctivae are normal.   Neck: Normal range of motion. Neck supple.   Cardiovascular: Regular rhythm.  Tachycardia present.    Pulmonary/Chest: Effort normal. She has rales.   Abdominal: Soft. Bowel sounds are normal. She exhibits distension. There is no tenderness. There is no rebound and no guarding.   Musculoskeletal: She exhibits edema (+2/+3 pitting edema to BLE).   Generalized weakness   Neurological: She is alert and oriented to person, place, and time.   Skin:   Abrasion to forehead and left chin area  Candidal appearing rash underneath breasts and abdominal fold  Ulcerations and cellulitis to feet   Psychiatric: She has a normal mood and affect. Her behavior is normal.   Nursing note and vitals reviewed.      Significant Labs: All pertinent labs within the past 24 hours have been reviewed.    Significant Imaging: I have reviewed all pertinent imaging results/findings within the past 24 hours.    Assessment/Plan:      * Bilateral cellulitis of lower leg      IV Merrem and Vanco  Wound Care  Arterial US to evaluate blood flow for proper healing  2/12 - s.a. In blood, awaiting sensitivities, continued on vanc and repeat blood cultures        Mass of breast, right    2/11 - oncology recommending biopsy of breast mass  2/12 - path specimen sent for eval of breast mass, and for [id number]. Case discussed with gen. Surgery and hem/onc. Will consider any additional imaging for staging while inpt.            Subacute osteomyelitis of multiple sites    Bilateral foot plain films note some questionable osteo to bilateral great toes  MRI pending  Evaluate arterial blood flow with U/S and ABIs          Protein-calorie malnutrition, moderate    Will start Folbic, MVI  Nutrition consult          Acute combined systolic and diastolic heart failure    Telemetry monitoring, daily weights, low sodium diet, fluid restriction,    Lasix 20 mg IV twice  Cardiology consult  Lisinopril 2.5 mg  Elevated troponin likely due to congestive heart failure - trend  2/10 -   1 - Moderate left atrial enlargement.     2 - Eccentric hypertrophy.     3 - No wall motion abnormalities.     4 - Severely depressed left ventricular systolic function (EF 10-15%).     5 - Impaired LV relaxation, normal LAP (grade 1 diastolic dysfunction).     6 - Low normal to mildly depressed right ventricular systolic function .     7 - The estimated PA systolic pressure is 25 mmHg.     8 - Mild to moderate mitral regurgitation.     9 - Mild to moderate tricuspid regurgitation.     10 - Intermediate central venous pressure.     11 - Right pleural effusion.    Cards following            Pleural effusion on right    Diuresis with IV lasix  Supplemental oxygen to maintain sat > 92 %  Will likely need thoracentesis, consult Pulmonology when needed  2/10 - moderate size, concerning for malignancy due to left breast deformity (metastic disease?)  2/12 - will follow path report analysis of pleural fluid          VTE Risk Mitigation         Ordered     heparin (porcine) injection 5,000 Units  Every 8 hours     Route:  Subcutaneous        02/09/18 1749     Medium Risk of VTE  Once      02/09/18 1300              Odilon Subramanian MD  Department of Hospital Medicine   [facility]

## 2018-02-13 NOTE — SUBJECTIVE & OBJECTIVE
Interval History:     Review of Systems   Constitutional: Positive for activity change, appetite change and fatigue. Negative for chills, diaphoresis and fever.   HENT: Negative.    Eyes: Negative for pain and redness.   Respiratory: Positive for shortness of breath. Negative for cough and wheezing.    Cardiovascular: Positive for leg swelling. Negative for chest pain and palpitations.   Gastrointestinal: Negative for abdominal pain, constipation, diarrhea, nausea and vomiting.   Genitourinary: Negative for decreased urine volume, difficulty urinating, dysuria and hematuria.   Musculoskeletal: Negative for arthralgias and myalgias.        Falls   Skin: Positive for rash and wound.   Neurological: Positive for weakness. Negative for dizziness and light-headedness.   Psychiatric/Behavioral: Negative for confusion. The patient is not nervous/anxious.      Objective:     Vital Signs (Most Recent):  Temp: 98.8 °F (37.1 °C) (02/12/18 1649)  Pulse: 92 (02/12/18 2158)  Resp: 15 (02/12/18 1944)  BP: (!) 95/50 (02/12/18 1944)  SpO2: 99 % (02/12/18 1649) Vital Signs (24h Range):  Temp:  [97.4 °F (36.3 °C)-98.8 °F (37.1 °C)] 98.8 °F (37.1 °C)  Pulse:  [87-99] 92  Resp:  [15-20] 15  SpO2:  [93 %-100 %] 99 %  BP: ()/(50-59) 95/50     Weight: 70 kg (154 lb 5.2 oz)  Body mass index is 26.49 kg/m².    Intake/Output Summary (Last 24 hours) at 02/12/18 2242  Last data filed at 02/12/18 2158   Gross per 24 hour   Intake              910 ml   Output             4925 ml   Net            -4015 ml      Physical Exam   Constitutional: She is oriented to person, place, and time. She appears well-developed.  Non-toxic appearance.   Unkempt  female - appears stated age   HENT:   Head: Normocephalic and atraumatic.       Mouth/Throat: Mucous membranes are dry.   Dry tongue and oral MM   Eyes: Conjunctivae are normal.   Neck: Normal range of motion. Neck supple.   Cardiovascular: Regular rhythm.  Tachycardia present.     Pulmonary/Chest: Effort normal. She has rales.   Abdominal: Soft. Bowel sounds are normal. She exhibits distension. There is no tenderness. There is no rebound and no guarding.   Musculoskeletal: She exhibits edema (+2/+3 pitting edema to BLE).   Generalized weakness   Neurological: She is alert and oriented to person, place, and time.   Skin:   Abrasion to forehead and left chin area  Candidal appearing rash underneath breasts and abdominal fold  Ulcerations and cellulitis to feet   Psychiatric: She has a normal mood and affect. Her behavior is normal.   Nursing note and vitals reviewed.      Significant Labs: All pertinent labs within the past 24 hours have been reviewed.    Significant Imaging: I have reviewed all pertinent imaging results/findings within the past 24 hours.

## 2018-02-13 NOTE — PLAN OF CARE
Problem: Pressure Ulcer Risk (Harjit Scale) (Adult,Obstetrics,Pediatric)  Intervention: Prevent/Manage Excess Moisture  Patient turn q 2hrs with wedge

## 2018-02-13 NOTE — ASSESSMENT & PLAN NOTE
IV Merrem and Vanco  Wound Care  Arterial US to evaluate blood flow for proper healing  2/12 - s.a. In blood, awaiting sensitivities, continued on vanc and repeat blood cultures

## 2018-02-13 NOTE — PROGRESS NOTES
[facility]   Pulmonology  Progress Note    Patient Name: Kelly Mariano  MRN: [id number]  Admission Date: 2/9/2018  Hospital Length of Stay: 4 days  Code Status: Full Code  Attending Provider: Odilon Subramanian MD  Primary Care Provider: Primary Doctor No   Principal Problem: Bilateral cellulitis of lower leg    Subjective: worsening dyspnea     Interval History: Seen and examined at bedside. Hospital chart reviewed. No acute interval detrimental events noted. She reports that she  has moderately improved      Review of Systems   Constitutional: Negative for chills and weight loss.   HENT: Negative for ear discharge and ear pain.    Eyes: Negative for double vision and photophobia.   Respiratory: Positive for shortness of breath.    Cardiovascular: Negative for palpitations and orthopnea.   Gastrointestinal: Negative for nausea and vomiting.   Genitourinary: Negative for dysuria and urgency.   Musculoskeletal: Negative for neck pain.   Skin: Positive for rash.   Neurological: Negative for tingling and headaches.   Psychiatric/Behavioral: Negative for substance abuse and suicidal ideas.       Scheduled Meds:   acetaZOLAMIDE  250 mg Oral TID    famotidine  20 mg Oral BID    folic acid-vit B6-vit B12 2.5-25-2 mg  1 tablet Oral Daily    heparin (porcine)  5,000 Units Subcutaneous Q8H    lisinopril  2.5 mg Oral Daily    metoprolol succinate  25 mg Oral Daily    multivitamin  1 tablet Oral Daily    potassium chloride  10 mEq Intravenous Q1H    vancomycin 1 g in 0.9% sodium chloride 250 mL IVPB (ready to mix system)  1 g Intravenous Q18H     Continuous Infusions:  PRN Meds:.acetaminophen, aluminum-magnesium hydroxide-simethicone     Objective:     Vital Signs (Most Recent):  Temp: 98.4 °F (36.9 °C) (02/13/18 1556)  Pulse: 96 (02/13/18 1556)  Resp: 20 (02/13/18 1556)  BP: (!) 116/57 (02/13/18 1556)  SpO2: 96 % (02/13/18 1556) Vital Signs (24h Range):  Temp:  [97.3 °F (36.3 °C)-98.4 °F (36.9 °C)] 98.4 °F  (36.9 °C)  Pulse:  [86-99] 96  Resp:  [15-20] 20  SpO2:  [96 %-99 %] 96 %  BP: ()/(50-64) 116/57     Weight: 65.3 kg (143 lb 15.4 oz)  Body mass index is 24.71 kg/m².      Intake/Output Summary (Last 24 hours) at 02/13/18 1745  Last data filed at 02/13/18 1618   Gross per 24 hour   Intake              100 ml   Output             4350 ml   Net            -4250 ml       Physical Exam   Constitutional: She is oriented to person, place, and time. She appears well-developed.  Non-toxic appearance.   Unkempt  female - appears stated age   HENT:   Head: Normocephalic and atraumatic.       Mouth/Throat: Mucous membranes are dry.   Dry tongue and oral MM   Eyes: Conjunctivae are normal.   Neck: Normal range of motion. Neck supple.   Cardiovascular: Regular rhythm.  Tachycardia present.    Pulmonary/Chest: Effort normal. She has rales.   Abdominal: Soft. Bowel sounds are normal. She exhibits distension. There is no tenderness. There is no rebound and no guarding.   Musculoskeletal: She exhibits edema (+2/+3 pitting edema to BLE).   Generalized weakness   Neurological: She is alert and oriented to person, place, and time.   Skin:   Abrasion to forehead and left chin area  Candidal appearing rash underneath breasts and abdominal fold  Ulcerations and cellulitis to feet   Psychiatric: She has a normal mood and affect. Her behavior is normal.   Nursing note and vitals reviewed.      Vents:  Oxygen Concentration (%): 28 (02/13/18 1715)    Lines/Drains/Airways     Peripherally Inserted Central Catheter Line                 PICC Double Lumen 02/10/18 1524 right basilic 3 days          Drain                 Urethral Catheter 02/09/18 1045 Latex 16 Fr. 4 days                Significant Labs:    CBC/Anemia Profile:    Recent Labs  Lab 02/12/18  0620 02/13/18  0419   WBC 6.66 6.96   HGB 12.7 12.9   HCT 42.4 43.5   * 105*   MCV 96 96   RDW 17.6* 17.0*        Chemistries:    Recent Labs  Lab 02/12/18  0620  02/13/18  0419 02/13/18  1610    141  --    K 3.8 3.6  --    CL 95 88*  --    CO2 40* 43*  --    BUN 13 12  --    CREATININE 0.6 0.5  --    CALCIUM 7.7* 8.0*  --    ALBUMIN 2.4* 2.5*  --    PROT 5.4* 5.8*  --    BILITOT 0.6 0.7  --    ALKPHOS 72 68  --    ALT 9* 9*  --    AST 22 24  --    MG  --   --  1.4*           Significant Imaging:    CXR: There is a right-sided PICC with tip in the SVC. There is a large right pleural effusion, unchanged.  There is compressive atelectasis and right hilar region and right lower lobe and right middle lobe. There is cardiomegaly.  There is a small pleural effusion with left basilar atelectasis and/or infiltrate.  No pneumothorax.      Assessment/Plan:     Acute on chronic respiratory failure with hypoxia and hypercapnia    2/13 Switch to diamox, continue AVAPS        Noninvasive positive pressure ventilation initiated and monitored/ continued at the patient's beside with continuous monitoring of pulse oximetry, cardiac rhythm and patient's mental status. Coordination of care with respiratory therapist and nursing staff for manipulation of variables of  Ventilation pressure ( Inspiratory and Expiratory), oxygen concentration, backup rate and volume settings with monitoring of patient's response to therapy.         Abimael Vance MD  Pulmonology  [facility]

## 2018-02-13 NOTE — SUBJECTIVE & OBJECTIVE
Interval History: Seen and examined at bedside. Hospital chart reviewed. No acute interval detrimental events noted. She reports that she  has moderately improved      Review of Systems   Constitutional: Negative for chills and weight loss.   HENT: Negative for ear discharge and ear pain.    Eyes: Negative for double vision and photophobia.   Respiratory: Positive for shortness of breath.    Cardiovascular: Negative for palpitations and orthopnea.   Gastrointestinal: Negative for nausea and vomiting.   Genitourinary: Negative for dysuria and urgency.   Musculoskeletal: Negative for neck pain.   Skin: Positive for rash.   Neurological: Negative for tingling and headaches.   Psychiatric/Behavioral: Negative for substance abuse and suicidal ideas.       Scheduled Meds:   acetaZOLAMIDE  250 mg Oral TID    famotidine  20 mg Oral BID    folic acid-vit B6-vit B12 2.5-25-2 mg  1 tablet Oral Daily    heparin (porcine)  5,000 Units Subcutaneous Q8H    lisinopril  2.5 mg Oral Daily    metoprolol succinate  25 mg Oral Daily    multivitamin  1 tablet Oral Daily    potassium chloride  10 mEq Intravenous Q1H    vancomycin 1 g in 0.9% sodium chloride 250 mL IVPB (ready to mix system)  1 g Intravenous Q18H     Continuous Infusions:  PRN Meds:.acetaminophen, aluminum-magnesium hydroxide-simethicone     Objective:     Vital Signs (Most Recent):  Temp: 98.4 °F (36.9 °C) (02/13/18 1556)  Pulse: 96 (02/13/18 1556)  Resp: 20 (02/13/18 1556)  BP: (!) 116/57 (02/13/18 1556)  SpO2: 96 % (02/13/18 1556) Vital Signs (24h Range):  Temp:  [97.3 °F (36.3 °C)-98.4 °F (36.9 °C)] 98.4 °F (36.9 °C)  Pulse:  [86-99] 96  Resp:  [15-20] 20  SpO2:  [96 %-99 %] 96 %  BP: ()/(50-64) 116/57     Weight: 65.3 kg (143 lb 15.4 oz)  Body mass index is 24.71 kg/m².      Intake/Output Summary (Last 24 hours) at 02/13/18 5228  Last data filed at 02/13/18 1618   Gross per 24 hour   Intake              100 ml   Output             4350 ml   Net             -4250 ml       Physical Exam   Constitutional: She is oriented to person, place, and time. She appears well-developed.  Non-toxic appearance.   Unkempt  female - appears stated age   HENT:   Head: Normocephalic and atraumatic.       Mouth/Throat: Mucous membranes are dry.   Dry tongue and oral MM   Eyes: Conjunctivae are normal.   Neck: Normal range of motion. Neck supple.   Cardiovascular: Regular rhythm.  Tachycardia present.    Pulmonary/Chest: Effort normal. She has rales.   Abdominal: Soft. Bowel sounds are normal. She exhibits distension. There is no tenderness. There is no rebound and no guarding.   Musculoskeletal: She exhibits edema (+2/+3 pitting edema to BLE).   Generalized weakness   Neurological: She is alert and oriented to person, place, and time.   Skin:   Abrasion to forehead and left chin area  Candidal appearing rash underneath breasts and abdominal fold  Ulcerations and cellulitis to feet   Psychiatric: She has a normal mood and affect. Her behavior is normal.   Nursing note and vitals reviewed.      Vents:  Oxygen Concentration (%): 28 (02/13/18 1715)    Lines/Drains/Airways     Peripherally Inserted Central Catheter Line                 PICC Double Lumen 02/10/18 1524 right basilic 3 days          Drain                 Urethral Catheter 02/09/18 1045 Latex 16 Fr. 4 days                Significant Labs:    CBC/Anemia Profile:    Recent Labs  Lab 02/12/18  0620 02/13/18  0419   WBC 6.66 6.96   HGB 12.7 12.9   HCT 42.4 43.5   * 105*   MCV 96 96   RDW 17.6* 17.0*        Chemistries:    Recent Labs  Lab 02/12/18  0620 02/13/18  0419 02/13/18  1610    141  --    K 3.8 3.6  --    CL 95 88*  --    CO2 40* 43*  --    BUN 13 12  --    CREATININE 0.6 0.5  --    CALCIUM 7.7* 8.0*  --    ALBUMIN 2.4* 2.5*  --    PROT 5.4* 5.8*  --    BILITOT 0.6 0.7  --    ALKPHOS 72 68  --    ALT 9* 9*  --    AST 22 24  --    MG  --   --  1.4*           Significant Imaging:    CXR: There is a  right-sided PICC with tip in the SVC. There is a large right pleural effusion, unchanged.  There is compressive atelectasis and right hilar region and right lower lobe and right middle lobe. There is cardiomegaly.  There is a small pleural effusion with left basilar atelectasis and/or infiltrate.  No pneumothorax.

## 2018-02-13 NOTE — ASSESSMENT & PLAN NOTE
-Likely secondary to demand ischemia from bacteremia, cellulitis, and volume overload  -Add ASA 81 mg daily  -See plan under CHF

## 2018-02-13 NOTE — SUBJECTIVE & OBJECTIVE
Interval History: Patient is resting in sleeping awaiting results of thoracentesis also would recommend consideration of needle biopsy of breast    Oncology Treatment Plan:   [No treatment plan]    Medications:  Continuous Infusions:  Scheduled Meds:   famotidine  20 mg Oral BID    folic acid-vit B6-vit B12 2.5-25-2 mg  1 tablet Oral Daily    furosemide  40 mg Intravenous BID    heparin (porcine)  5,000 Units Subcutaneous Q8H    lisinopril  2.5 mg Oral Daily    meropenem (MERREM) IVPB  500 mg Intravenous Q6H    metoprolol succinate  25 mg Oral Daily    multivitamin  1 tablet Oral Daily    vancomycin 1 g in 0.9% sodium chloride 250 mL IVPB (ready to mix system)  1 g Intravenous Q18H     PRN Meds:acetaminophen, aluminum-magnesium hydroxide-simethicone, diphenhydrAMINE, ondansetron     Review of Systems   Constitutional: Positive for activity change and fatigue. Negative for appetite change, chills, diaphoresis, fever and unexpected weight change.   HENT: Negative for congestion, dental problem, drooling, ear discharge, ear pain, facial swelling, hearing loss, mouth sores, nosebleeds, postnasal drip, rhinorrhea, sinus pressure, sneezing, sore throat, tinnitus, trouble swallowing and voice change.    Eyes: Negative for photophobia, pain, discharge, redness, itching and visual disturbance.   Respiratory: Negative for cough, choking, chest tightness, shortness of breath, wheezing and stridor.    Cardiovascular: Negative for chest pain, palpitations and leg swelling.   Gastrointestinal: Negative for abdominal distention, abdominal pain, anal bleeding, blood in stool, constipation, diarrhea, nausea, rectal pain and vomiting.   Endocrine: Negative for cold intolerance, heat intolerance, polydipsia, polyphagia and polyuria.   Genitourinary: Negative for decreased urine volume, difficulty urinating, dyspareunia, dysuria, enuresis, flank pain, frequency, genital sores, hematuria, menstrual problem, pelvic pain,  urgency, vaginal bleeding, vaginal discharge and vaginal pain.   Musculoskeletal: Negative for arthralgias, back pain, gait problem, joint swelling, myalgias, neck pain and neck stiffness.   Skin: Negative for color change, pallor and rash.   Allergic/Immunologic: Negative for environmental allergies, food allergies and immunocompromised state.   Neurological: Positive for weakness. Negative for dizziness, tremors, seizures, syncope, facial asymmetry, speech difficulty, light-headedness, numbness and headaches.   Hematological: Negative for adenopathy. Does not bruise/bleed easily.   Psychiatric/Behavioral: Positive for dysphoric mood. Negative for agitation, behavioral problems, confusion, decreased concentration, hallucinations, self-injury, sleep disturbance and suicidal ideas. The patient is nervous/anxious. The patient is not hyperactive.      Objective:     Vital Signs (Most Recent):  Temp: 97.6 °F (36.4 °C) (02/13/18 0732)  Pulse: 93 (02/13/18 0732)  Resp: 18 (02/13/18 0732)  BP: 118/64 (02/13/18 0732)  SpO2: 97 % (02/13/18 0732) Vital Signs (24h Range):  Temp:  [97.3 °F (36.3 °C)-98.8 °F (37.1 °C)] 97.6 °F (36.4 °C)  Pulse:  [86-99] 93  Resp:  [15-20] 18  SpO2:  [93 %-99 %] 97 %  BP: ()/(50-64) 118/64     Weight: 65.3 kg (143 lb 15.4 oz)  Body mass index is 24.71 kg/m².  Body surface area is 1.72 meters squared.      Intake/Output Summary (Last 24 hours) at 02/13/18 0815  Last data filed at 02/13/18 0435   Gross per 24 hour   Intake              890 ml   Output             4150 ml   Net            -3260 ml       Physical Exam   Constitutional: She is oriented to person, place, and time. She appears cachectic. She has a sickly appearance. She appears ill. She appears distressed.   HENT:   Head: Normocephalic and atraumatic.   Right Ear: External ear normal.   Left Ear: External ear normal.   Nose: Nose normal. Right sinus exhibits no maxillary sinus tenderness and no frontal sinus tenderness. Left sinus  exhibits no maxillary sinus tenderness and no frontal sinus tenderness.   Mouth/Throat: Oropharynx is clear and moist. No oropharyngeal exudate.   Eyes: Conjunctivae, EOM and lids are normal. Pupils are equal, round, and reactive to light. Right eye exhibits no discharge. Left eye exhibits no discharge. Right conjunctiva is not injected. Right conjunctiva has no hemorrhage. Left conjunctiva is not injected. Left conjunctiva has no hemorrhage. No scleral icterus.   Neck: Normal range of motion. Neck supple. No JVD present. No tracheal deviation present. No thyromegaly present.   Cardiovascular: Normal rate and regular rhythm.    Pulmonary/Chest: Effort normal. No stridor. No respiratory distress. She exhibits no tenderness.   Abdominal: Soft. She exhibits no distension and no mass. There is no splenomegaly or hepatomegaly. There is no tenderness. There is no rebound.   Musculoskeletal: Normal range of motion. She exhibits no edema or tenderness.   Lymphadenopathy:     She has no cervical adenopathy.     She has no axillary adenopathy.        Right: No supraclavicular adenopathy present.        Left: No supraclavicular adenopathy present.   Neurological: She is alert and oriented to person, place, and time. No cranial nerve deficit. Coordination normal.   Skin: Skin is dry. No rash noted. She is not diaphoretic. No erythema.   Psychiatric: Her behavior is normal. Judgment and thought content normal. Her mood appears anxious. She exhibits a depressed mood.   Vitals reviewed.      Significant Labs:   BMP:   Recent Labs  Lab 02/11/18  1512 02/12/18 0620 02/13/18 0419    94 74   NA  --  143 141   K  --  3.8 3.6   CL  --  95 88*   CO2  --  40* 43*   BUN  --  13 12   CREATININE  --  0.6 0.5   CALCIUM  --  7.7* 8.0*   , CBC:   Recent Labs  Lab 02/12/18 0620 02/13/18 0419   WBC 6.66 6.96   HGB 12.7 12.9   HCT 42.4 43.5   * 105*    and CMP:   Recent Labs  Lab 02/11/18  1512 02/12/18  0620 02/13/18  0419   NA   --  143 141   K  --  3.8 3.6   CL  --  95 88*   CO2  --  40* 43*    94 74   BUN  --  13 12   CREATININE  --  0.6 0.5   CALCIUM  --  7.7* 8.0*   PROT 5.8* 5.4* 5.8*   ALBUMIN  --  2.4* 2.5*   BILITOT  --  0.6 0.7   ALKPHOS  --  72 68   AST  --  22 24   ALT  --  9* 9*   ANIONGAP  --  8 10   EGFRNONAA  --  >60 >60       Diagnostic Results:  I have reviewed and interpreted all pertinent imaging results/findings within the past 24 hours.

## 2018-02-13 NOTE — PT/OT/SLP PROGRESS
Speech Language Pathology Treatment    Patient Name:  Kelly Mariano   MRN:  [id number]  Admitting Diagnosis: Bilateral cellulitis of lower leg    Recommendations:                 General Recommendations:  Dysphagia therapy  Diet recommendations:  Chopped meat, No Bread, No Rice, Liquid Diet Level: Nectar Thick   Aspiration Precautions: 1 bite/sip at a time, Assistance with meals, HOB to 90 degrees and Small bites/sips   General Precautions: Standard, aspiration  Communication strategies:  none    Subjective     Pt with increased confusion over the last 2 days.  Patient goals: unable to state     Pain/Comfort:  · Pain Rating 1: 0/10  · Pain Rating Post-Intervention 2: 0/10    Objective:     Has the patient been evaluated by SLP for swallowing?   Yes  Keep patient NPO? No   Current Respiratory Status: nasal cannula      Pt presents with increased confusion since initial assessment. She is unable to follow commands or complete swallow ex at this time. She tolerated sips of nectar thick liquids and soft solids without difficulty. She had difficulty, weak cough, with dry cracker. Recommend changing po diet to mech soft with chopped meats, no rice or bread, nectar thick liquids.     Assessment:     Kelly Mariano is a 67 y.o. female with an SLP diagnosis of Dysphagia.  She presents with impaired swallow.    Goals:    SLP Goals        Problem: SLP Goal    Goal Priority Disciplines Outcome   SLP Goal     SLP Ongoing (interventions implemented as appropriate)   Description:  Long Term Goal:  Pt to tolerate least restrictive diet consistency safely and efficiently.    Short Term Goals:  1. Tongue Base Retraction and Laryngeal Elevation tasks x20 with min cues  2. Oral Motor tasks x20 with min cues  3. Recommend education on swallow precautions and diet recommendations.                    Plan:     · Patient to be seen:  3 x/week   · Plan of Care expires:     · Plan of Care reviewed with:  patient   · SLP Follow-Up:  Yes        Discharge recommendations:      Barriers to Discharge:      Time Tracking:     SLP Treatment Date:   02/13/18  Speech Start Time:  1119  Speech Stop Time:  1129     Speech Total Time (min):  10 min    Billable Minutes: Treatment Swallowing Dysfunction 10    Anni Xie CCC-SLP  02/13/2018

## 2018-02-13 NOTE — PROGRESS NOTES
[facility]  Infectious Disease  Progress Note    Patient Name: Kelly Mariano  MRN: [id number]  Admission Date: 2/9/2018  Length of Stay: 4 days  Attending Physician: Odilon Subramanian MD  Primary Care Provider: Primary Doctor No    Isolation Status: Contact  Assessment/Plan:      Bacteremia due to Staphylococcus aureus    Cardiac echo did not show endocarditis .  Isolate is MRSA-source is likely the lower extremity lesion    Will stop meropenem and continue Vancomycin    Repeat blood culture is negative          Cellulitis of right foot    Will continue IV vancomycin .  Wound care            Anticipated Disposition:     Thank you for your consult. I will follow-up with patient. Please contact us if you have any additional questions.    Xavier Castillo MD  Infectious Disease  [facility]    Subjective:     Principal Problem:Bilateral cellulitis of lower leg    HPI: 67 year old wo,an who was admitted with bilateral lower extremity swelling   and sores.. Per EMS, pt's living conditions are extremely poor.   Since admission ,blood cultures on 02/09-staph aureus .  CT angiogram of the lower extremity -02/10-   1.  There is no hemodynamically significant stenosis of either lower extremity arterial vasculature.  There is three-vessel runoff at the ankles bilaterally.      Chest ultrasound done on 02/11- 1. Large right pleural effusion  MRI of the foot -both left and right did not show any evidence of osteomyelitis.  Interval History:  67 year old woman with staph aureus bacteremia and leg cellulitis .  Chest x-ray showed small right apical pneumothorax.  She feels better.    Review of Systems   Constitutional: Positive for activity change, appetite change and fatigue. Negative for chills, diaphoresis and fever.   HENT: Negative.    Eyes: Negative for pain and redness.   Respiratory: Positive for shortness of breath. Negative for cough and wheezing.    Cardiovascular: Positive for leg swelling.  Negative for chest pain and palpitations.   Gastrointestinal: Negative for abdominal pain, constipation, diarrhea, nausea and vomiting.   Genitourinary: Negative for decreased urine volume, difficulty urinating, dysuria and hematuria.   Musculoskeletal: Negative for arthralgias and myalgias.        Falls   Skin: Positive for rash and wound.   Neurological: Positive for weakness. Negative for dizziness and light-headedness.   Psychiatric/Behavioral: Negative for confusion. The patient is not nervous/anxious.      Objective:     Vital Signs (Most Recent):  Temp: 98.1 °F (36.7 °C) (02/13/18 1115)  Pulse: 96 (02/13/18 1115)  Resp: 18 (02/13/18 1115)  BP: (!) 110/54 (02/13/18 1115)  SpO2: 97 % (02/13/18 1115) Vital Signs (24h Range):  Temp:  [97.3 °F (36.3 °C)-98.8 °F (37.1 °C)] 98.1 °F (36.7 °C)  Pulse:  [86-99] 96  Resp:  [15-20] 18  SpO2:  [93 %-99 %] 97 %  BP: ()/(50-64) 110/54     Weight: 65.3 kg (143 lb 15.4 oz)  Body mass index is 24.71 kg/m².    Estimated Creatinine Clearance: 95.6 mL/min (based on SCr of 0.5 mg/dL).    Physical Exam   Constitutional: She is oriented to person, place, and time. She appears well-developed.  Non-toxic appearance.   Unkempt  female - appears stated age   HENT:   Head: Normocephalic and atraumatic.       Mouth/Throat: Mucous membranes are dry.   Dry tongue and oral MM   Eyes: Conjunctivae are normal.   Neck: Normal range of motion. Neck supple.   Cardiovascular: Regular rhythm.  Tachycardia present.    Pulmonary/Chest: Effort normal. She has rales.   Abdominal: Soft. Bowel sounds are normal. She exhibits distension. There is no tenderness. There is no rebound and no guarding.   Musculoskeletal: She exhibits edema (+2/+3 pitting edema to BLE).   Generalized weakness   Neurological: She is alert and oriented to person, place, and time.   Skin:   Abrasion to forehead and left chin area  Candidal appearing rash underneath breasts and abdominal fold  Ulcerations and  cellulitis to feet   Psychiatric: She has a normal mood and affect. Her behavior is normal.   Nursing note and vitals reviewed.      Significant Labs:   Blood Culture:     Recent Labs  Lab 02/09/18  0955 02/09/18  1000 02/12/18  1801   LABBLOO No Growth to date  No Growth to date  No Growth to date  No Growth to date Gram stain aer bottle: Gram positive cocci in clusters resembling Staph   Results called to and read back by:Alison Ghosh RN 02/10/2018  06:43  Gram stain hillary bottle: Gram positive cocci in clusters resembling Staph  METHICILLIN RESISTANT STAPHYLOCOCCUS AUREUSID consult required at [facility].[place] and [place] locations. No Growth to date       Significant Imaging: I have reviewed all pertinent imaging results/findings within the past 24 hours.

## 2018-02-13 NOTE — PROCEDURES
"Biopsy right breast m ass  Date/Time: 02/13/2018  Performed by: Yuliet Quach PA-C  Under the supervision of: Hossein Perdomo MD  Consent Done: Yes, Written  Time out: Immediately prior to procedure a "time out" was called to verify the correct patient, procedure, equipment, support staff and site/side marked as required.      INDICATIONS: right breast mass  LOCATION: right breast    PREP:  Patient was prepped and draped in the normal sterile fashion after being placed in the supine position    ANESTHESIA:  Anesthesia: Local infiltration  Local anesthetic: Lidocaine 1% with epinephrine    PROCEDURE DETAILS:  A small incision was made in the skin, and the Trucut core biopsy device was inserted into the mass and discharged to obtain tissue biopsy x 2.   The same procedure was repeated in another area of the mass. The wounds had a small amount of bleeding so interrupted suture was used to close wounds. Hemostasis was obtained.   Sterile dressings: Gauze and tegaderm  Post-op diagnosis: Same as pre-op diagnosis.  Needle, instrument, and sponge counts were correct.  Patient tolerated the procedure well with no immediate complications.  Post-operative instructions were provided for the patient.  Patient was discharged and will follow up for wound check and pathology results. and Patient was discharged and will follow up for wound check.      "

## 2018-02-13 NOTE — PLAN OF CARE
"Met with patient in an attempt to locate family and or assist with housing / placement. Patient answered yes or right to every question. Asked how is Xavier Rosenbaum ( primary contact ) is related to her and she replied yes, yes, right. I asked if anyone lives with her and again she replied yes, yes, right. I told her that the NP who saw her in the emergency room asked us to contact " [employer]" regarding their assisting with housing and she replied" yes, yes, right" I asked her for a contact at " [employer]: and she said 'yes, yes, right "     Met Maciel her primary nurse to discuss her mentation. Per Maciel the patient has been saying the same thing all day. " yes, yes" or "right , right" . Primary nurse that had the patient yesterday said that the patient was able to verbalize the procedure that she was having.     Contacted Dr Subramanian, discussed patient mental status and her inability to answer questions appropriately. Dr Subramanian to evaluate patient.    Contacted [facility] @ [phone], left message. Attempted to contact patient's primary contact Xavier Rosenbaum , no answer. Also attempted to contact landlord at [facility], they would not give out any information. Spoke with Elliott NOBLE in ED , discussed other possible ways to locate patient's next of kin.   "

## 2018-02-13 NOTE — SUBJECTIVE & OBJECTIVE
Review of Systems   Constitution: Positive for weakness and malaise/fatigue.   HENT: Negative.    Eyes: Negative.    Cardiovascular: Positive for dyspnea on exertion (improved).   Respiratory: Negative for shortness of breath (improved).    Endocrine: Negative.    Hematologic/Lymphatic: Negative.    Skin: Positive for poor wound healing and rash.   Musculoskeletal: Positive for arthritis.   Gastrointestinal: Negative.    Genitourinary: Negative.    Psychiatric/Behavioral: Negative.    Allergic/Immunologic: Negative.      Objective:     Vital Signs (Most Recent):  Temp: 98.1 °F (36.7 °C) (02/13/18 1115)  Pulse: 95 (02/13/18 1300)  Resp: 18 (02/13/18 1115)  BP: (!) 110/54 (02/13/18 1115)  SpO2: 97 % (02/13/18 1115) Vital Signs (24h Range):  Temp:  [97.3 °F (36.3 °C)-98.8 °F (37.1 °C)] 98.1 °F (36.7 °C)  Pulse:  [86-99] 95  Resp:  [15-20] 18  SpO2:  [97 %-99 %] 97 %  BP: ()/(50-64) 110/54     Weight: 65.3 kg (143 lb 15.4 oz)  Body mass index is 24.71 kg/m².     SpO2: 97 %  O2 Device (Oxygen Therapy): nasal cannula      Intake/Output Summary (Last 24 hours) at 02/13/18 1517  Last data filed at 02/13/18 0435   Gross per 24 hour   Intake              650 ml   Output             4150 ml   Net            -3500 ml       Lines/Drains/Airways     Peripherally Inserted Central Catheter Line                 PICC Double Lumen 02/10/18 1524 right basilic 2 days          Drain                 Urethral Catheter 02/09/18 1045 Latex 16 Fr. 4 days                Physical Exam   Constitutional: She is oriented to person, place, and time. No distress.   Disheveled     HENT:   Head: Normocephalic and atraumatic.   Eyes: Pupils are equal, round, and reactive to light. Right eye exhibits no discharge. Left eye exhibits no discharge.   Neck: Neck supple. No JVD present.   Cardiovascular: S1 normal and S2 normal.  Exam reveals no S3 and no S4.    Murmur heard.  High-pitched blowing holosystolic murmur is present  at the  apex  Pulmonary/Chest: No respiratory distress. She has no wheezes.   Diminished BS RML, RLL  Left-sided rales, diminished at base   Abdominal: Soft. She exhibits no distension. There is no tenderness. There is no rebound.   +truncal edema       Musculoskeletal: She exhibits edema (BLE - improved).   Neurological: She is alert and oriented to person, place, and time.   Skin: Skin is warm and dry. She is not diaphoretic.   Cellulitis BLE  Yeast appearing rash on stomach and near breast folds  Dressing noted to BLE    Psychiatric: She has a normal mood and affect. Her behavior is normal. Thought content normal.   Nursing note and vitals reviewed.      Significant Labs:   All pertinent lab results from the last 24 hours have been reviewed. and   Recent Lab Results       02/13/18  0419 02/13/18  0342 02/12/18  1801      Albumin 2.5(L)       Alkaline Phosphatase 68       ALT 9(L)       Anion Gap 10       AST 24       Baso # 0.01       Basophil% 0.1       Total Bilirubin 0.7  Comment:  For infants and newborns, interpretation of results should be based  on gestational age, weight and in agreement with clinical  observations.  Premature Infant recommended reference ranges:  Up to 24 hours.............<8.0 mg/dL  Up to 48 hours............<12.0 mg/dL  3-5 days..................<15.0 mg/dL  6-29 days.................<15.0 mg/dL         Blood Culture, Routine   No Growth to date[P]     BUN, Bld 12       Calcium 8.0(L)       Chloride 88(L)       CO2 43  Comment:  CO2 critical result(s) called and verbal readback obtained from   Maciel Jamison RN, 02/13/2018 07:57  (HH)       Creatinine 0.5       Differential Method Automated       eGFR if  >60       eGFR if non  >60  Comment:  Calculation used to obtain the estimated glomerular filtration  rate (eGFR) is the CKD-EPI equation.          Eos # 0.0       Eosinophil% 0.1       Glucose 74       Gran # (ANC) 6.0       Gran% 86.4(H)       Hematocrit  43.5       Hemoglobin 12.9       Lymph # 0.5(L)       Lymph% 6.9(L)       MCH 28.4       MCHC 29.7(L)       MCV 96       Mono # 0.5       Mono% 6.5       MPV 11.2       Platelets 105(L)       Potassium 3.6       Total Protein 5.8(L)       RBC 4.55       RDW 17.0(H)       Sodium 141       Vancomycin-Trough  14.2      WBC 6.96             Significant Imaging: Echocardiogram:   2D echo with color flow doppler:   Results for orders placed or performed during the hospital encounter of 02/09/18   2D echo with color flow doppler   Result Value Ref Range    EF 10 (A) 55 - 65    Mitral Valve Regurgitation MILD TO MODERATE     Diastolic Dysfunction Yes (A)     Est. PA Systolic Pressure 24.81     Tricuspid Valve Regurgitation MILD TO MODERATE     and X-Ray: CXR: X-Ray Chest 1 View (CXR):   Results for orders placed or performed during the hospital encounter of 02/09/18   X-Ray Chest 1 View    Narrative    One-view chest x-ray    Clinical History: Rectal effusion; recent thoracentesis    Finding: Comparison was made to prior examination performed at 5:25 AM on 2/11/2018. There's been interval development of a tiny right apical pneumothorax. There is moderate cardiomegaly. There is a mild amount of interstitial and alveolar opacities seen in both lungs. There is opacification of the costophrenic angles bilaterally. A right PICC remains in place. There is partial visualization of anterior spinal fusion hardware in the cervical spine.    Impression        1. There's been interval development of a tiny right apical pneumothorax. This is consistent with the patient's recent thoracentesis.  2. The findings are characteristic of moderate cardiomegaly with mild bilateral pulmonary edema.  3. There is opacification of the costophrenic angles bilaterally. These are characteristic of tiny pleural effusions.  4. Surgical changes      Electronically signed by: ASHWIN RAYMOND MD  Date:     02/11/18  Time:    16:28

## 2018-02-13 NOTE — SUBJECTIVE & OBJECTIVE
Interval History:  67 year old woman with staph aureus bacteremia and leg cellulitis .  Chest x-ray showed small right apical pneumothorax.  She feels better.    Review of Systems   Constitutional: Positive for activity change, appetite change and fatigue. Negative for chills, diaphoresis and fever.   HENT: Negative.    Eyes: Negative for pain and redness.   Respiratory: Positive for shortness of breath. Negative for cough and wheezing.    Cardiovascular: Positive for leg swelling. Negative for chest pain and palpitations.   Gastrointestinal: Negative for abdominal pain, constipation, diarrhea, nausea and vomiting.   Genitourinary: Negative for decreased urine volume, difficulty urinating, dysuria and hematuria.   Musculoskeletal: Negative for arthralgias and myalgias.        Falls   Skin: Positive for rash and wound.   Neurological: Positive for weakness. Negative for dizziness and light-headedness.   Psychiatric/Behavioral: Negative for confusion. The patient is not nervous/anxious.      Objective:     Vital Signs (Most Recent):  Temp: 98.1 °F (36.7 °C) (02/13/18 1115)  Pulse: 96 (02/13/18 1115)  Resp: 18 (02/13/18 1115)  BP: (!) 110/54 (02/13/18 1115)  SpO2: 97 % (02/13/18 1115) Vital Signs (24h Range):  Temp:  [97.3 °F (36.3 °C)-98.8 °F (37.1 °C)] 98.1 °F (36.7 °C)  Pulse:  [86-99] 96  Resp:  [15-20] 18  SpO2:  [93 %-99 %] 97 %  BP: ()/(50-64) 110/54     Weight: 65.3 kg (143 lb 15.4 oz)  Body mass index is 24.71 kg/m².    Estimated Creatinine Clearance: 95.6 mL/min (based on SCr of 0.5 mg/dL).    Physical Exam   Constitutional: She is oriented to person, place, and time. She appears well-developed.  Non-toxic appearance.   Unkempt  female - appears stated age   HENT:   Head: Normocephalic and atraumatic.       Mouth/Throat: Mucous membranes are dry.   Dry tongue and oral MM   Eyes: Conjunctivae are normal.   Neck: Normal range of motion. Neck supple.   Cardiovascular: Regular rhythm.  Tachycardia  present.    Pulmonary/Chest: Effort normal. She has rales.   Abdominal: Soft. Bowel sounds are normal. She exhibits distension. There is no tenderness. There is no rebound and no guarding.   Musculoskeletal: She exhibits edema (+2/+3 pitting edema to BLE).   Generalized weakness   Neurological: She is alert and oriented to person, place, and time.   Skin:   Abrasion to forehead and left chin area  Candidal appearing rash underneath breasts and abdominal fold  Ulcerations and cellulitis to feet   Psychiatric: She has a normal mood and affect. Her behavior is normal.   Nursing note and vitals reviewed.      Significant Labs:   Blood Culture:     Recent Labs  Lab 02/09/18  0955 02/09/18  1000 02/12/18  1801   LABBLOO No Growth to date  No Growth to date  No Growth to date  No Growth to date Gram stain aer bottle: Gram positive cocci in clusters resembling Staph   Results called to and read back by:Alison Ghosh RN 02/10/2018  06:43  Gram stain hillary bottle: Gram positive cocci in clusters resembling Staph  METHICILLIN RESISTANT STAPHYLOCOCCUS AUREUSID consult required at [facility] and [place] locations. No Growth to date       Significant Imaging: I have reviewed all pertinent imaging results/findings within the past 24 hours.

## 2018-02-13 NOTE — ASSESSMENT & PLAN NOTE
Diuresis with IV lasix  Supplemental oxygen to maintain sat > 92 %  Will likely need thoracentesis, consult Pulmonology when needed  2/10 - moderate size, concerning for malignancy due to left breast deformity (metastic disease?)  2/12 - will follow path report analysis of pleural fluid

## 2018-02-13 NOTE — ASSESSMENT & PLAN NOTE
Cardiac echo did not show endocarditis .  Isolate is MRSA-source is likely the lower extremity lesion    Will stop meropenem and continue Vancomycin    Repeat blood culture is negative

## 2018-02-13 NOTE — PLAN OF CARE
Problem: SLP Goal  Goal: SLP Goal  Long Term Goal:  Pt to tolerate least restrictive diet consistency safely and efficiently.    Short Term Goals:  1. Tongue Base Retraction and Laryngeal Elevation tasks x20 with min cues  2. Oral Motor tasks x20 with min cues  3. Recommend education on swallow precautions and diet recommendations.   Outcome: Ongoing (interventions implemented as appropriate)  Pt with increased confusion the last 2 days and not following commands.

## 2018-02-13 NOTE — PROGRESS NOTES
"MD stated to give 500mL bolus over 4 hours since pt is also getting 3 doses of iv potassium as well.    Pt stating name now but still contused and only repeating name and says "yes" to everything.  Will continue to monitor.  "

## 2018-02-13 NOTE — CONSULTS
Pharmacy Vancomycin Consult Note    WBC=6.96  Tmax=98.8  Crcl=95.6ml/mi Scr=0.5    Cultures: urine-staph; blood-staph aureus-susceptibility pending  Dx. Cellulitis/bacteremia  Goal trough=15-20mcg/ml    Vancomycin trough-14.2mcg/ml (drawn about an hour late and just resulted)  Continue current dose: Vancomycin 1 gram Q18  Adjusted time and spoke with geneva escobedo so dose following dose could be given ASAP.   Next trough due 2/14 @2000    Thank you for allowing us to participate in this patient's care.  Afsaneh Hollingsworth, Pharm D 2/13/2018 8:18 AM

## 2018-02-13 NOTE — PROGRESS NOTES
[facility] -   Cardiology  Progress Note    Patient Name: Kelly Mariano  MRN: [id number]  Admission Date: 2/9/2018  Hospital Length of Stay: 4 days  Code Status: Full Code   Attending Physician: Odilon Subramanian MD   Primary Care Physician: Primary Doctor No  Expected Discharge Date:   Principal Problem:Bilateral cellulitis of lower leg    Subjective:   Brief HPI:  Patient is a 67 y.o. female presents with LE cellulitis and CHF.  68 y/o fmelae with no significant Pmhx with significant cellulitis of BLE. Pt also with mild CHF and echo shwoing EF10-15%. EKG is wnl limits.    Hospital Course:   2/11/18-Patient seen and examined today, resting in bed. States she feels ok, still weak and fatigued. Complains of SOB. No chest pain or tightness. BC +Staph. Labs reviewed. K low at 3.1. Creatinine stable.     2/12/18 Pt hemodynamically stable on Toprol and Lisinopril. Will need biopsy of breast mass. Will discuss stress once infection treated and pt is diuresed.     2/13/18- Patient remains hemodynamically stable. No reports of chest pain or SOB. Patient is right post thoracentesis on 2/11/18. 1500cc drained.  Noted to have EF of 10-15% on echo. Has right breast mass that needs biopsy. Remains of IV abx for extensive lower extremity cellulitis.       Review of Systems   Constitution: Positive for weakness and malaise/fatigue.   HENT: Negative.    Eyes: Negative.    Cardiovascular: Positive for dyspnea on exertion (improved).   Respiratory: Negative for shortness of breath (improved).    Endocrine: Negative.    Hematologic/Lymphatic: Negative.    Skin: Positive for poor wound healing and rash.   Musculoskeletal: Positive for arthritis.   Gastrointestinal: Negative.    Genitourinary: Negative.    Psychiatric/Behavioral: Negative.    Allergic/Immunologic: Negative.      Objective:     Vital Signs (Most Recent):  Temp: 98.1 °F (36.7 °C) (02/13/18 1115)  Pulse: 95 (02/13/18 1300)  Resp: 18 (02/13/18 1115)  BP: (!) 110/54  (02/13/18 1115)  SpO2: 97 % (02/13/18 1115) Vital Signs (24h Range):  Temp:  [97.3 °F (36.3 °C)-98.8 °F (37.1 °C)] 98.1 °F (36.7 °C)  Pulse:  [86-99] 95  Resp:  [15-20] 18  SpO2:  [97 %-99 %] 97 %  BP: ()/(50-64) 110/54     Weight: 65.3 kg (143 lb 15.4 oz)  Body mass index is 24.71 kg/m².     SpO2: 97 %  O2 Device (Oxygen Therapy): nasal cannula      Intake/Output Summary (Last 24 hours) at 02/13/18 1517  Last data filed at 02/13/18 0435   Gross per 24 hour   Intake              650 ml   Output             4150 ml   Net            -3500 ml       Lines/Drains/Airways     Peripherally Inserted Central Catheter Line                 PICC Double Lumen 02/10/18 1524 right basilic 2 days          Drain                 Urethral Catheter 02/09/18 1045 Latex 16 Fr. 4 days                Physical Exam   Constitutional: She is oriented to person, place, and time. No distress.   Disheveled     HENT:   Head: Normocephalic and atraumatic.   Eyes: Pupils are equal, round, and reactive to light. Right eye exhibits no discharge. Left eye exhibits no discharge.   Neck: Neck supple. No JVD present.   Cardiovascular: S1 normal and S2 normal.  Exam reveals no S3 and no S4.    Murmur heard.  High-pitched blowing holosystolic murmur is present  at the apex  Pulmonary/Chest: No respiratory distress. She has no wheezes.   Diminished BS RML, RLL  Left-sided rales, diminished at base   Abdominal: Soft. She exhibits no distension. There is no tenderness. There is no rebound.   +truncal edema       Musculoskeletal: She exhibits edema (BLE - improved).   Neurological: She is alert and oriented to person, place, and time.   Skin: Skin is warm and dry. She is not diaphoretic.   Cellulitis BLE  Yeast appearing rash on stomach and near breast folds  Dressing noted to BLE    Psychiatric: She has a normal mood and affect. Her behavior is normal. Thought content normal.   Nursing note and vitals reviewed.      Significant Labs:   All pertinent lab  results from the last 24 hours have been reviewed. and   Recent Lab Results       02/13/18  0419 02/13/18  0342 02/12/18  1801      Albumin 2.5(L)       Alkaline Phosphatase 68       ALT 9(L)       Anion Gap 10       AST 24       Baso # 0.01       Basophil% 0.1       Total Bilirubin 0.7  Comment:  For infants and newborns, interpretation of results should be based  on gestational age, weight and in agreement with clinical  observations.  Premature Infant recommended reference ranges:  Up to 24 hours.............<8.0 mg/dL  Up to 48 hours............<12.0 mg/dL  3-5 days..................<15.0 mg/dL  6-29 days.................<15.0 mg/dL         Blood Culture, Routine   No Growth to date[P]     BUN, Bld 12       Calcium 8.0(L)       Chloride 88(L)       CO2 43  Comment:  CO2 critical result(s) called and verbal readback obtained from   Maciel Jamison RN, 02/13/2018 07:57  (HH)       Creatinine 0.5       Differential Method Automated       eGFR if  >60       eGFR if non  >60  Comment:  Calculation used to obtain the estimated glomerular filtration  rate (eGFR) is the CKD-EPI equation.          Eos # 0.0       Eosinophil% 0.1       Glucose 74       Gran # (ANC) 6.0       Gran% 86.4(H)       Hematocrit 43.5       Hemoglobin 12.9       Lymph # 0.5(L)       Lymph% 6.9(L)       MCH 28.4       MCHC 29.7(L)       MCV 96       Mono # 0.5       Mono% 6.5       MPV 11.2       Platelets 105(L)       Potassium 3.6       Total Protein 5.8(L)       RBC 4.55       RDW 17.0(H)       Sodium 141       Vancomycin-Trough  14.2      WBC 6.96             Significant Imaging: Echocardiogram:   2D echo with color flow doppler:   Results for orders placed or performed during the hospital encounter of 02/09/18   2D echo with color flow doppler   Result Value Ref Range    EF 10 (A) 55 - 65    Mitral Valve Regurgitation MILD TO MODERATE     Diastolic Dysfunction Yes (A)     Est. PA Systolic Pressure 24.81      Tricuspid Valve Regurgitation MILD TO MODERATE     and X-Ray: CXR: X-Ray Chest 1 View (CXR):   Results for orders placed or performed during the hospital encounter of 02/09/18   X-Ray Chest 1 View    Narrative    One-view chest x-ray    Clinical History: Rectal effusion; recent thoracentesis    Finding: Comparison was made to prior examination performed at 5:25 AM on 2/11/2018. There's been interval development of a tiny right apical pneumothorax. There is moderate cardiomegaly. There is a mild amount of interstitial and alveolar opacities seen in both lungs. There is opacification of the costophrenic angles bilaterally. A right PICC remains in place. There is partial visualization of anterior spinal fusion hardware in the cervical spine.    Impression        1. There's been interval development of a tiny right apical pneumothorax. This is consistent with the patient's recent thoracentesis.  2. The findings are characteristic of moderate cardiomegaly with mild bilateral pulmonary edema.  3. There is opacification of the costophrenic angles bilaterally. These are characteristic of tiny pleural effusions.  4. Surgical changes      Electronically signed by: ASHWIN RAYMOND MD  Date:     02/11/18  Time:    16:28      Assessment and Plan:     * Bilateral cellulitis of lower leg    -Mgmt as per primary team  -Continue abx        Elevated troponin    -Likely secondary to demand ischemia from bacteremia, cellulitis, and volume overload  -Add ASA 81 mg daily  -See plan under CHF        Acute combined systolic and diastolic heart failure    -Unclear etiology  -Cont Torpol XL , titrate  -Continue IV diuresis and ACEI   -Will need ischemic workup-LHC vs stress test once infection issues improve and she is euvolemic        Pleural effusion on right    S/p thoracentesis on 2/11/18- 1500 cc  -Continue IV diuresis              VTE Risk Mitigation         Ordered     heparin (porcine) injection 5,000 Units  Every 8 hours     Route:   Subcutaneous        02/09/18 1749     Medium Risk of VTE  Once      02/09/18 1300      Chart reviewed. Patient examined by Dr. Martinez and agrees with plan that has been outlined.       PEDRO Muller  Cardiology  [facility] -

## 2018-02-13 NOTE — PLAN OF CARE
Problem: Patient Care Overview  Goal: Plan of Care Review  Outcome: Ongoing (interventions implemented as appropriate)  Plan of care discussed with patient AAOX4 vital signs stable afebrile turn q 2hrs  Free from falls bed alarm call bell in reach polanco care q 4hrs tolerated well abx cont patient rested quietly throughout the night will cont to monitor.

## 2018-02-13 NOTE — ASSESSMENT & PLAN NOTE
-Unclear etiology  -Cont Torpol XL , titrate  -Continue IV diuresis and ACEI   -Will need ischemic workup-LHC vs stress test once infection issues improve and she is euvolemic

## 2018-02-13 NOTE — ASSESSMENT & PLAN NOTE
2/11 - oncology recommending biopsy of breast mass  2/12 - path specimen sent for eval of breast mass, and for ER/CA/[id number]. Case discussed with gen. Surgery and hem/onc. Will consider any additional imaging for staging while inpt.

## 2018-02-13 NOTE — PROGRESS NOTES
[facility] -   Hematology/Oncology  Progress Note    Patient Name: Kelly Mariano  Admission Date: 2/9/2018  Hospital Length of Stay: 4 days  Code Status: Full Code     Subjective:     HPI:  67-year-old female admitted to the hospital for cellulitis was found have a large mass involving her right breast also asked see the patient for further evaluation    Interval History: Patient is resting in sleeping awaiting results of thoracentesis also would recommend consideration of needle biopsy of breast    Oncology Treatment Plan:   [No treatment plan]    Medications:  Continuous Infusions:  Scheduled Meds:   famotidine  20 mg Oral BID    folic acid-vit B6-vit B12 2.5-25-2 mg  1 tablet Oral Daily    furosemide  40 mg Intravenous BID    heparin (porcine)  5,000 Units Subcutaneous Q8H    lisinopril  2.5 mg Oral Daily    meropenem (MERREM) IVPB  500 mg Intravenous Q6H    metoprolol succinate  25 mg Oral Daily    multivitamin  1 tablet Oral Daily    vancomycin 1 g in 0.9% sodium chloride 250 mL IVPB (ready to mix system)  1 g Intravenous Q18H     PRN Meds:acetaminophen, aluminum-magnesium hydroxide-simethicone, diphenhydrAMINE, ondansetron     Review of Systems   Constitutional: Positive for activity change and fatigue. Negative for appetite change, chills, diaphoresis, fever and unexpected weight change.   HENT: Negative for congestion, dental problem, drooling, ear discharge, ear pain, facial swelling, hearing loss, mouth sores, nosebleeds, postnasal drip, rhinorrhea, sinus pressure, sneezing, sore throat, tinnitus, trouble swallowing and voice change.    Eyes: Negative for photophobia, pain, discharge, redness, itching and visual disturbance.   Respiratory: Negative for cough, choking, chest tightness, shortness of breath, wheezing and stridor.    Cardiovascular: Negative for chest pain, palpitations and leg swelling.   Gastrointestinal: Negative for abdominal distention, abdominal pain, anal bleeding,  blood in stool, constipation, diarrhea, nausea, rectal pain and vomiting.   Endocrine: Negative for cold intolerance, heat intolerance, polydipsia, polyphagia and polyuria.   Genitourinary: Negative for decreased urine volume, difficulty urinating, dyspareunia, dysuria, enuresis, flank pain, frequency, genital sores, hematuria, menstrual problem, pelvic pain, urgency, vaginal bleeding, vaginal discharge and vaginal pain.   Musculoskeletal: Negative for arthralgias, back pain, gait problem, joint swelling, myalgias, neck pain and neck stiffness.   Skin: Negative for color change, pallor and rash.   Allergic/Immunologic: Negative for environmental allergies, food allergies and immunocompromised state.   Neurological: Positive for weakness. Negative for dizziness, tremors, seizures, syncope, facial asymmetry, speech difficulty, light-headedness, numbness and headaches.   Hematological: Negative for adenopathy. Does not bruise/bleed easily.   Psychiatric/Behavioral: Positive for dysphoric mood. Negative for agitation, behavioral problems, confusion, decreased concentration, hallucinations, self-injury, sleep disturbance and suicidal ideas. The patient is nervous/anxious. The patient is not hyperactive.      Objective:     Vital Signs (Most Recent):  Temp: 97.6 °F (36.4 °C) (02/13/18 0732)  Pulse: 93 (02/13/18 0732)  Resp: 18 (02/13/18 0732)  BP: 118/64 (02/13/18 0732)  SpO2: 97 % (02/13/18 0732) Vital Signs (24h Range):  Temp:  [97.3 °F (36.3 °C)-98.8 °F (37.1 °C)] 97.6 °F (36.4 °C)  Pulse:  [86-99] 93  Resp:  [15-20] 18  SpO2:  [93 %-99 %] 97 %  BP: ()/(50-64) 118/64     Weight: 65.3 kg (143 lb 15.4 oz)  Body mass index is 24.71 kg/m².  Body surface area is 1.72 meters squared.      Intake/Output Summary (Last 24 hours) at 02/13/18 0815  Last data filed at 02/13/18 0435   Gross per 24 hour   Intake              890 ml   Output             4150 ml   Net            -3260 ml       Physical Exam   Constitutional: She  is oriented to person, place, and time. She appears cachectic. She has a sickly appearance. She appears ill. She appears distressed.   HENT:   Head: Normocephalic and atraumatic.   Right Ear: External ear normal.   Left Ear: External ear normal.   Nose: Nose normal. Right sinus exhibits no maxillary sinus tenderness and no frontal sinus tenderness. Left sinus exhibits no maxillary sinus tenderness and no frontal sinus tenderness.   Mouth/Throat: Oropharynx is clear and moist. No oropharyngeal exudate.   Eyes: Conjunctivae, EOM and lids are normal. Pupils are equal, round, and reactive to light. Right eye exhibits no discharge. Left eye exhibits no discharge. Right conjunctiva is not injected. Right conjunctiva has no hemorrhage. Left conjunctiva is not injected. Left conjunctiva has no hemorrhage. No scleral icterus.   Neck: Normal range of motion. Neck supple. No JVD present. No tracheal deviation present. No thyromegaly present.   Cardiovascular: Normal rate and regular rhythm.    Pulmonary/Chest: Effort normal. No stridor. No respiratory distress. She exhibits no tenderness.   Abdominal: Soft. She exhibits no distension and no mass. There is no splenomegaly or hepatomegaly. There is no tenderness. There is no rebound.   Musculoskeletal: Normal range of motion. She exhibits no edema or tenderness.   Lymphadenopathy:     She has no cervical adenopathy.     She has no axillary adenopathy.        Right: No supraclavicular adenopathy present.        Left: No supraclavicular adenopathy present.   Neurological: She is alert and oriented to person, place, and time. No cranial nerve deficit. Coordination normal.   Skin: Skin is dry. No rash noted. She is not diaphoretic. No erythema.   Psychiatric: Her behavior is normal. Judgment and thought content normal. Her mood appears anxious. She exhibits a depressed mood.   Vitals reviewed.      Significant Labs:   BMP:   Recent Labs  Lab 02/11/18  1512 02/12/18  0620  02/13/18  0419    94 74   NA  --  143 141   K  --  3.8 3.6   CL  --  95 88*   CO2  --  40* 43*   BUN  --  13 12   CREATININE  --  0.6 0.5   CALCIUM  --  7.7* 8.0*   , CBC:   Recent Labs  Lab 02/12/18  0620 02/13/18 0419   WBC 6.66 6.96   HGB 12.7 12.9   HCT 42.4 43.5   * 105*    and CMP:   Recent Labs  Lab 02/11/18  1512 02/12/18  0620 02/13/18 0419   NA  --  143 141   K  --  3.8 3.6   CL  --  95 88*   CO2  --  40* 43*    94 74   BUN  --  13 12   CREATININE  --  0.6 0.5   CALCIUM  --  7.7* 8.0*   PROT 5.8* 5.4* 5.8*   ALBUMIN  --  2.4* 2.5*   BILITOT  --  0.6 0.7   ALKPHOS  --  72 68   AST  --  22 24   ALT  --  9* 9*   ANIONGAP  --  8 10   EGFRNONAA  --  >60 >60       Diagnostic Results:  I have reviewed and interpreted all pertinent imaging results/findings within the past 24 hours.    Assessment/Plan:     Mass of breast, right    Patient has a large mass involving her right breast highly suggestive of malignancy with his cramping right arm swelling and cellulitis on anterior chest wall possible secondary to metastatic disease would recommend a biopsy of her right breast to obtain adequate tissue for diagnosis including ER/UT and HER-2/jaclyn status which should direct therapy.  2/12/18 awaiting tissue diagnosis either through general surgery or interventional radiology to determine diagnosis for treatment recommendations for ER/UT and HER-2/jaclyn status discussed with hospital medicine course of action.  2/13/18 awaiting results of thoracentesis will need ER/UT HER-2/jaclyn status would recommend needle biopsy of breast with is a higher likelihood of making diagnosis as well as obtaining ER/UT and HER-2 status if she is ER/UT positive kidney treated with hormonal therapy either by mouth or by an injection on a monthly basis with 50-60% response rates in a non-curative setting            Thank you for your consult. I will follow-up with patient. Please contact us if you have any additional questions.      Alberto Purvis MD  Hematology/Oncology  [facility] [place]

## 2018-02-14 LAB
ALBUMIN SERPL BCP-MCNC: 2.4 G/DL
ALLENS TEST: ABNORMAL
ALP SERPL-CCNC: 60 U/L
ALT SERPL W/O P-5'-P-CCNC: 9 U/L
ANION GAP SERPL CALC-SCNC: 8 MMOL/L
AST SERPL-CCNC: 20 U/L
BACTERIA BLD CULT: NORMAL
BACTERIA FLD AEROBE CULT: NO GROWTH
BASOPHILS # BLD AUTO: 0.01 K/UL
BASOPHILS NFR BLD: 0.1 %
BILIRUB SERPL-MCNC: 0.9 MG/DL
BNP SERPL-MCNC: 4225 PG/ML
BUN SERPL-MCNC: 11 MG/DL
CALCIUM SERPL-MCNC: 8.3 MG/DL
CHLORIDE SERPL-SCNC: 88 MMOL/L
CHOLEST FLD-MCNC: 31 MG/DL
CO2 SERPL-SCNC: 45 MMOL/L
CREAT SERPL-MCNC: 0.5 MG/DL
DELSYS: ABNORMAL
DIFFERENTIAL METHOD: ABNORMAL
EOSINOPHIL # BLD AUTO: 0 K/UL
EOSINOPHIL NFR BLD: 0.1 %
ERYTHROCYTE [DISTWIDTH] IN BLOOD BY AUTOMATED COUNT: 17.2 %
EST. GFR  (AFRICAN AMERICAN): >60 ML/MIN/1.73 M^2
EST. GFR  (NON AFRICAN AMERICAN): >60 ML/MIN/1.73 M^2
FIO2: 28
FLOW: 2
GLUCOSE SERPL-MCNC: 70 MG/DL
GRAM STN SPEC: NORMAL
GRAM STN SPEC: NORMAL
HCO3 UR-SCNC: 46.3 MMOL/L (ref 24–28)
HCT VFR BLD AUTO: 41.5 %
HGB BLD-MCNC: 12.5 G/DL
LYMPHOCYTES # BLD AUTO: 0.6 K/UL
LYMPHOCYTES NFR BLD: 6.8 %
MCH RBC QN AUTO: 28.6 PG
MCHC RBC AUTO-ENTMCNC: 30.1 G/DL
MCV RBC AUTO: 95 FL
MODE: ABNORMAL
MONOCYTES # BLD AUTO: 0.4 K/UL
MONOCYTES NFR BLD: 5 %
NEUTROPHILS # BLD AUTO: 7.5 K/UL
NEUTROPHILS NFR BLD: 88 %
PCO2 BLDA: 71.1 MMHG (ref 35–45)
PH SMN: 7.42 [PH] (ref 7.35–7.45)
PLATELET # BLD AUTO: 103 K/UL
PMV BLD AUTO: 11.5 FL
PO2 BLDA: 70 MMHG (ref 80–100)
POC BE: 22 MMOL/L
POC SATURATED O2: 93 % (ref 95–100)
POCT GLUCOSE: 103 MG/DL (ref 70–110)
POCT GLUCOSE: 119 MG/DL (ref 70–110)
POTASSIUM SERPL-SCNC: 3.4 MMOL/L
PROT SERPL-MCNC: 5.6 G/DL
RBC # BLD AUTO: 4.37 M/UL
SAMPLE: ABNORMAL
SITE: ABNORMAL
SODIUM SERPL-SCNC: 141 MMOL/L
SPECIMEN SOURCE: NORMAL
VANCOMYCIN TROUGH SERPL-MCNC: 13.9 UG/ML
WBC # BLD AUTO: 8.48 K/UL

## 2018-02-14 PROCEDURE — 99232 SBSQ HOSP IP/OBS MODERATE 35: CPT | Mod: ,,, | Performed by: INTERNAL MEDICINE

## 2018-02-14 PROCEDURE — 63600175 PHARM REV CODE 636 W HCPCS: Performed by: NURSE PRACTITIONER

## 2018-02-14 PROCEDURE — 85025 COMPLETE CBC W/AUTO DIFF WBC: CPT

## 2018-02-14 PROCEDURE — 25500020 PHARM REV CODE 255: Performed by: FAMILY MEDICINE

## 2018-02-14 PROCEDURE — 83880 ASSAY OF NATRIURETIC PEPTIDE: CPT

## 2018-02-14 PROCEDURE — 27000221 HC OXYGEN, UP TO 24 HOURS

## 2018-02-14 PROCEDURE — 63600175 PHARM REV CODE 636 W HCPCS: Performed by: FAMILY MEDICINE

## 2018-02-14 PROCEDURE — 25000003 PHARM REV CODE 250: Performed by: INTERNAL MEDICINE

## 2018-02-14 PROCEDURE — 99900035 HC TECH TIME PER 15 MIN (STAT)

## 2018-02-14 PROCEDURE — 99233 SBSQ HOSP IP/OBS HIGH 50: CPT | Mod: ,,, | Performed by: INTERNAL MEDICINE

## 2018-02-14 PROCEDURE — 25000003 PHARM REV CODE 250: Performed by: NURSE PRACTITIONER

## 2018-02-14 PROCEDURE — 25000003 PHARM REV CODE 250: Performed by: FAMILY MEDICINE

## 2018-02-14 PROCEDURE — 36600 WITHDRAWAL OF ARTERIAL BLOOD: CPT

## 2018-02-14 PROCEDURE — 82803 BLOOD GASES ANY COMBINATION: CPT

## 2018-02-14 PROCEDURE — 92526 ORAL FUNCTION THERAPY: CPT

## 2018-02-14 PROCEDURE — 80202 ASSAY OF VANCOMYCIN: CPT

## 2018-02-14 PROCEDURE — 94761 N-INVAS EAR/PLS OXIMETRY MLT: CPT

## 2018-02-14 PROCEDURE — 80053 COMPREHEN METABOLIC PANEL: CPT

## 2018-02-14 PROCEDURE — 21400001 HC TELEMETRY ROOM

## 2018-02-14 RX ORDER — LISINOPRIL 5 MG/1
5 TABLET ORAL DAILY
Status: DISCONTINUED | OUTPATIENT
Start: 2018-02-14 | End: 2018-02-26 | Stop reason: HOSPADM

## 2018-02-14 RX ORDER — PANTOPRAZOLE SODIUM 40 MG/10ML
40 INJECTION, POWDER, LYOPHILIZED, FOR SOLUTION INTRAVENOUS DAILY
Status: DISCONTINUED | OUTPATIENT
Start: 2018-02-15 | End: 2018-02-14

## 2018-02-14 RX ORDER — MICONAZOLE NITRATE 2 %
POWDER (GRAM) TOPICAL 2 TIMES DAILY
Status: DISCONTINUED | OUTPATIENT
Start: 2018-02-14 | End: 2018-02-14

## 2018-02-14 RX ORDER — MAGNESIUM SULFATE HEPTAHYDRATE 40 MG/ML
2 INJECTION, SOLUTION INTRAVENOUS ONCE
Status: COMPLETED | OUTPATIENT
Start: 2018-02-14 | End: 2018-02-14

## 2018-02-14 RX ORDER — MICONAZOLE NITRATE 2 %
POWDER (GRAM) TOPICAL 2 TIMES DAILY
Status: DISCONTINUED | OUTPATIENT
Start: 2018-02-14 | End: 2018-02-26 | Stop reason: HOSPADM

## 2018-02-14 RX ORDER — FLUCONAZOLE 2 MG/ML
200 INJECTION, SOLUTION INTRAVENOUS
Status: DISCONTINUED | OUTPATIENT
Start: 2018-02-14 | End: 2018-02-17

## 2018-02-14 RX ORDER — POTASSIUM CHLORIDE 7.45 MG/ML
10 INJECTION INTRAVENOUS
Status: COMPLETED | OUTPATIENT
Start: 2018-02-14 | End: 2018-02-14

## 2018-02-14 RX ORDER — METOPROLOL SUCCINATE 50 MG/1
50 TABLET, EXTENDED RELEASE ORAL DAILY
Status: DISCONTINUED | OUTPATIENT
Start: 2018-02-14 | End: 2018-02-26 | Stop reason: HOSPADM

## 2018-02-14 RX ADMIN — METOPROLOL SUCCINATE 50 MG: 50 TABLET, EXTENDED RELEASE ORAL at 10:02

## 2018-02-14 RX ADMIN — MAGNESIUM SULFATE HEPTAHYDRATE 2 G: 40 INJECTION, SOLUTION INTRAVENOUS at 07:02

## 2018-02-14 RX ADMIN — VANCOMYCIN HYDROCHLORIDE 1 G: 1 INJECTION, POWDER, LYOPHILIZED, FOR SOLUTION INTRAVENOUS at 02:02

## 2018-02-14 RX ADMIN — Medication 1 TABLET: at 10:02

## 2018-02-14 RX ADMIN — POTASSIUM CHLORIDE 10 MEQ: 7.46 INJECTION, SOLUTION INTRAVENOUS at 10:02

## 2018-02-14 RX ADMIN — LISINOPRIL 5 MG: 5 TABLET ORAL at 10:02

## 2018-02-14 RX ADMIN — ANTI-FUNGAL POWDER MICONAZOLE NITRATE TALC FREE: 1.42 POWDER TOPICAL at 03:02

## 2018-02-14 RX ADMIN — IOHEXOL 75 ML: 350 INJECTION, SOLUTION INTRAVENOUS at 06:02

## 2018-02-14 RX ADMIN — HEPARIN SODIUM 5000 UNITS: 5000 INJECTION, SOLUTION INTRAVENOUS; SUBCUTANEOUS at 03:02

## 2018-02-14 RX ADMIN — THIAMINE HYDROCHLORIDE 100 MG: 100 INJECTION, SOLUTION INTRAMUSCULAR; INTRAVENOUS at 10:02

## 2018-02-14 RX ADMIN — FAMOTIDINE 20 MG: 20 TABLET, FILM COATED ORAL at 10:02

## 2018-02-14 RX ADMIN — VANCOMYCIN HYDROCHLORIDE 1 G: 1 INJECTION, POWDER, LYOPHILIZED, FOR SOLUTION INTRAVENOUS at 09:02

## 2018-02-14 RX ADMIN — HEPARIN SODIUM 5000 UNITS: 5000 INJECTION, SOLUTION INTRAVENOUS; SUBCUTANEOUS at 05:02

## 2018-02-14 RX ADMIN — POTASSIUM CHLORIDE 10 MEQ: 7.46 INJECTION, SOLUTION INTRAVENOUS at 11:02

## 2018-02-14 RX ADMIN — ACETAZOLAMIDE 250 MG: 250 TABLET ORAL at 05:02

## 2018-02-14 RX ADMIN — ACETAZOLAMIDE 250 MG: 250 TABLET ORAL at 10:02

## 2018-02-14 RX ADMIN — HEPARIN SODIUM 5000 UNITS: 5000 INJECTION, SOLUTION INTRAVENOUS; SUBCUTANEOUS at 10:02

## 2018-02-14 RX ADMIN — FLUCONAZOLE 200 MG: 2 INJECTION INTRAVENOUS at 06:02

## 2018-02-14 RX ADMIN — ACETAZOLAMIDE 250 MG: 250 TABLET ORAL at 03:02

## 2018-02-14 NOTE — PLAN OF CARE
Problem: Patient Care Overview  Goal: Plan of Care Review  Outcome: Ongoing (interventions implemented as appropriate)  Plan of care discussed with patient oriented to self only patient states yes to everything but when I gave her glasses she said her name hui and put them on correctly free from falls bed alarm in use contact isolation place turn q 2hrs with wedge will cont to monitor vital signs stable sat 96% on 2l on tele slight ST mostly in the 90's patient refuses to waer bipap. Will cont to monitor order change to prn

## 2018-02-14 NOTE — PROGRESS NOTES
02/14/18 1045   Handoff Report   Given To [facility]   Pain/Comfort Assessments   Pain Assessment Performed Yes       Number Scale   Presence of Pain non-verbal indicators absent   Skin   Skin WDL ex   Skin Color Pale   Skin Temperature warm   Skin Moisture Dry   Skin Elasticity Sluggish   Harjit Risk Assessment   Sensory Perception 4-->no impairment   Moisture 3-->occasionally moist   Activity 1-->bedfast   Mobility 2-->very limited   Nutrition 2-->probably inadequate   Friction and Shear 1-->problem   Harjit Score 13       Wound 02/09/18 Ulceration lower Leg   Date First Assessed: 02/09/18   Pre-existing: Yes  Wound Type: Ulceration  Side: Left  Orientation: lower  Location: Leg   Wound Image    Wound WDL ex   Dressing Appearance Dry;Intact   Drainage Amount None   Appearance Dry;Eschar   Periwound Area Dry;Redness   Wound Edges Callused   Care Cleansed with:;Soap and water;Applied:;Skin Barrier;Moisturizing agent   Dressing Hydrogel;Hydrofiber;Foam;Compression wrap   Periwound Care Absorptive dressing applied;Cleansed with pH balanced cleanser;Dry periwound area maintained;Moisturizer applied;Skin barrier film applied   Dressing Change Due 02/16/18       Wound 02/09/18 Ulceration lower Leg   Date First Assessed: 02/09/18   Pre-existing: Yes  Wound Type: Ulceration  Side: Right  Orientation: lower  Location: Leg   Wound WDL ex   Dressing Appearance Dry;Intact   Drainage Amount None   Appearance Dry;Pink;Eschar   Periwound Area Dry;Redness   Wound Edges Callused   Care Cleansed with:;Soap and water;Applied:;Skin Barrier   Dressing Hydrogel;Hydrofiber;Foam;Compression wrap   Periwound Care Absorptive dressing applied;Cleansed with pH balanced cleanser;Dry periwound area maintained;Moisturizer applied;Skin barrier film applied   Dressing Change Due 02/16/18     Follow up visit with Ms. Mariano for compression wraps to BLE.  Patient is less verbal and conversant than last visit, and only moans to answer questions.   "BLE compression wraps removed and legs cleansed with easi cleanse foam wipes.  Gentle debridement of callused skin and crusting performed with DebriSoft pads with good result to bilateral dorsal feet. BLE no edema noted. Mild redness.  Multiple eschar covered round wounds again noted, remain fixed.  Open ulcer to right lateral lower shin.  Covered with aquacel AG and nonbordered foam.    normlgel AG applied to dry wound beds and covered with aquacel extra, and topped with mepilex non bordered foam.  AttracTain cream applied to BLE dry flaky scaly skin.  Dressings secured with Tubigrip compression stocking. BLE then wrapped with cast padding and 4" Coban for compression.  She will need Wound care follow up, either SNF or HH to continue to treat these wounds. Will continue with wound care per orders.                "

## 2018-02-14 NOTE — PROGRESS NOTES
Moris SEVERINO notified of pt sats on 2L 88-92%, MD stated okay and monitor.  Md also stated okay to take pt off tele monitor for CT head.

## 2018-02-14 NOTE — PROGRESS NOTES
Moris SEVERINO notified of crackles noted in pt posterior bases.  MD stated to collect BNP. Will collect and report results.

## 2018-02-14 NOTE — PROGRESS NOTES
Spoke to Moris SEVERINO regarding abg repeat. Pt not on machine unable to tolerate at this time, pulling. ABG cancelled at this time. Bipap continues to be at bedside stby.
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
## 2018-02-14 NOTE — PLAN OF CARE
Problem: Fall Risk (Adult)  Intervention: Safety Promotion/Fall Prevention  Bed alarm set due to increase confusion room clutter free

## 2018-02-14 NOTE — ASSESSMENT & PLAN NOTE
Patient has a large mass involving her right breast highly suggestive of malignancy with his cramping right arm swelling and cellulitis on anterior chest wall possible secondary to metastatic disease would recommend a biopsy of her right breast to obtain adequate tissue for diagnosis including ER/DE and HER-2/jaclyn status which should direct therapy.  2/12/18 awaiting tissue diagnosis either through general surgery or interventional radiology to determine diagnosis for treatment recommendations for ER/DE and HER-2/jaclyn status discussed with hospital medicine course of action.  2/13/18 awaiting results of thoracentesis will need ER/DE HER-2/jaclyn status would recommend needle biopsy of breast with is a higher likelihood of making diagnosis as well as obtaining ER/DE and HER-2 status if she is ER/DE positive kidney treated with hormonal therapy either by mouth or by an injection on a monthly basis with 50-60% response rates in a non-curative setting; awaiting results of thoracentesis and/or breast biopsy review with her diagnostic approach as well as therapeutic options

## 2018-02-14 NOTE — PLAN OF CARE
"Problem: Patient Care Overview  Goal: Plan of Care Review  Outcome: Ongoing (interventions implemented as appropriate)  Pt stable. Pt is NSR/ST on the heart monitor.  Pt confused, pt only states name and date of birth at times, she say "yes", "okay", and "right" repeatedly; MD made aware decrease in ability to follow commands and hold conversation which was present yesterday per fellow nurse Rosalind who had her yesterday.  ABG completed and attempted to place pt on bipap but pt unable to tolerate, see resp progress note.  Pt sats decreased to 70s on RA, pt placed back on 2L NC and very slowly increased back to the low 90s.  Charge nurse Odette unable to reach any family from numbers listed throughout pt chart.  Pt was free from falls or injuries during duration of shift.  Pt attempted to be turned and repositioned frequently but pt didn't want to turn at times or wiggled self off wedge.  PICC intact with no redness, swelling or drainage.  Bed low, wheels locked, bed alarm on, call light in reach.  Pt instructed to call for assistance.   Plan of care reviewed.  Pt unable to truly verbalize understanding.       "

## 2018-02-14 NOTE — PROGRESS NOTES
Moris SEVERINO notified of pt yeast infection in vaginal area that extends to groin area.  Grabiel BHARDWAJ placed orders for antifungal.  Will administer and monitor.

## 2018-02-14 NOTE — PT/OT/SLP PROGRESS
Speech Language Pathology Treatment    Patient Name:  Kelly Mariano   MRN:  [id number]  Admitting Diagnosis: Bilateral cellulitis of lower leg    Recommendations:                 General Recommendations:  Dysphagia therapy  Diet recommendations:  Mechanical soft, No Bread, No Rice, Liquid Diet Level: Nectar Thick   Aspiration Precautions: 1 bite/sip at a time, Assistance with meals, Assistance with thickening liquids, HOB to 90 degrees and Small bites/sips   General Precautions: Standard, aspiration  Communication strategies:  none    Subjective     Pt remains confused  Patient goals: unable to state    Pain/Comfort:  · Pain Rating 1: 0/10  · Pain Rating Post-Intervention 2: 0/10    Objective:     Has the patient been evaluated by SLP for swallowing?   Yes  Keep patient NPO? No   Current Respiratory Status: nasal cannula      Pt unable to follow commands again today and therefore, unable to complete oral motor ex. She ate a bowl of peaches chopped into bite size pieces without overt s/s of aspiration or any difficulty. She did require extra time.    Assessment:     Kelly Mariano is a 67 y.o. female with an SLP diagnosis of Dysphagia.  She presents with risk of aspiration.    Goals:    SLP Goals        Problem: SLP Goal    Goal Priority Disciplines Outcome   SLP Goal     SLP Ongoing (interventions implemented as appropriate)   Description:  Long Term Goal:  Pt to tolerate least restrictive diet consistency safely and efficiently.    Short Term Goals:  1. Tongue Base Retraction and Laryngeal Elevation tasks x20 with min cues  2. Oral Motor tasks x20 with min cues  3. Recommend education on swallow precautions and diet recommendations.                    Plan:     · Patient to be seen:  3 x/week   · Plan of Care expires:     · Plan of Care reviewed with:  patient   · SLP Follow-Up:  Yes       Discharge recommendations:      Barriers to Discharge:      Time Tracking:     SLP Treatment Date:   02/14/18  Speech Start Time:   1035  Speech Stop Time:  1054     Speech Total Time (min):  19 min    Billable Minutes: Treatment Swallowing Dysfunction 19    Anni Xie CCC-SLP  02/14/2018

## 2018-02-14 NOTE — ASSESSMENT & PLAN NOTE
2/13 - pt w/hypercapnic, respiratory distress. 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 Respiratory consulted for bipap, however pt not tolerating, would prefer to avoid sedation as this will worsen resp. Status. Attempted to reach out to family contacts multiple times, no response.

## 2018-02-14 NOTE — PROGRESS NOTES
[facility] -   Hematology/Oncology  Progress Note    Patient Name: Kelly Mariano  Admission Date: 2/9/2018  Hospital Length of Stay: 5 days  Code Status: Full Code     Subjective:     HPI:  67-year-old female admitted to the hospital for cellulitis was found have a large mass involving her right breast also asked see the patient for further evaluation    Interval History: Patient is very somnolent difficult to wake up I'm waiting for results of pathology to discuss with her    Oncology Treatment Plan:   [No treatment plan]    Medications:  Continuous Infusions:  Scheduled Meds:   acetaZOLAMIDE  250 mg Oral TID    famotidine  20 mg Oral BID    folic acid-vit B6-vit B12 2.5-25-2 mg  1 tablet Oral Daily    heparin (porcine)  5,000 Units Subcutaneous Q8H    lisinopril  2.5 mg Oral Daily    magnesium sulfate IVPB  2 g Intravenous Once    metoprolol succinate  25 mg Oral Daily    multivitamin  1 tablet Oral Daily    potassium chloride  10 mEq Intravenous Q1H    vancomycin 1 g in 0.9% sodium chloride 250 mL IVPB (ready to mix system)  1 g Intravenous Q18H     PRN Meds:acetaminophen, aluminum-magnesium hydroxide-simethicone     Review of Systems   Constitutional: Positive for activity change, appetite change, fatigue and unexpected weight change.   Neurological: Positive for weakness.   Psychiatric/Behavioral: Positive for decreased concentration and dysphoric mood. The patient is nervous/anxious.      Objective:     Vital Signs (Most Recent):  Temp: 98.3 °F (36.8 °C) (02/14/18 0750)  Pulse: 95 (02/14/18 0750)  Resp: (!) 22 (02/14/18 0750)  BP: 136/73 (02/14/18 0750)  SpO2: 96 % (02/14/18 0750) Vital Signs (24h Range):  Temp:  [97.5 °F (36.4 °C)-98.4 °F (36.9 °C)] 98.3 °F (36.8 °C)  Pulse:  [] 95  Resp:  [16-22] 22  SpO2:  [75 %-98 %] 96 %  BP: (110-136)/(54-73) 136/73     Weight: 65.3 kg (143 lb 15.4 oz)  Body mass index is 24.71 kg/m².  Body surface area is 1.72 meters  squared.      Intake/Output Summary (Last 24 hours) at 02/14/18 0821  Last data filed at 02/14/18 0400   Gross per 24 hour   Intake             1150 ml   Output             2850 ml   Net            -1700 ml       Physical Exam   Constitutional: She appears cachectic. She has a sickly appearance. She appears ill. She appears distressed.   Cardiovascular: Normal rate and regular rhythm.    Pulmonary/Chest: Effort normal.   Abdominal: Soft.   Psychiatric: Cognition and memory are impaired. She exhibits abnormal recent memory.       Significant Labs:   BMP:   Recent Labs  Lab 02/13/18  0419 02/13/18  1610 02/14/18  0443   GLU 74  --  70     --  141   K 3.6  --  3.4*   CL 88*  --  88*   CO2 43*  --  45*   BUN 12  --  11   CREATININE 0.5  --  0.5   CALCIUM 8.0*  --  8.3*   MG  --  1.4*  --    , CBC:   Recent Labs  Lab 02/13/18  0419 02/14/18  0443   WBC 6.96 8.48   HGB 12.9 12.5   HCT 43.5 41.5   * 103*    and CMP:   Recent Labs  Lab 02/13/18  0419 02/14/18  0443    141   K 3.6 3.4*   CL 88* 88*   CO2 43* 45*   GLU 74 70   BUN 12 11   CREATININE 0.5 0.5   CALCIUM 8.0* 8.3*   PROT 5.8* 5.6*   ALBUMIN 2.5* 2.4*   BILITOT 0.7 0.9   ALKPHOS 68 60   AST 24 20   ALT 9* 9*   ANIONGAP 10 8   EGFRNONAA >60 >60       Diagnostic Results:  I have reviewed and interpreted all pertinent imaging results/findings within the past 24 hours.    Assessment/Plan:     Mass of breast, right    Patient has a large mass involving her right breast highly suggestive of malignancy with his cramping right arm swelling and cellulitis on anterior chest wall possible secondary to metastatic disease would recommend a biopsy of her right breast to obtain adequate tissue for diagnosis including ER/AZ and HER-2/jaclyn status which should direct therapy.  2/12/18 awaiting tissue diagnosis either through general surgery or interventional radiology to determine diagnosis for treatment recommendations for ER/AZ and HER-2/jaclyn status discussed  with hospital medicine course of action.  2/13/18 awaiting results of thoracentesis will need ER/FL HER-2/jaclyn status would recommend needle biopsy of breast with is a higher likelihood of making diagnosis as well as obtaining ER/FL and HER-2 status if she is ER/FL positive kidney treated with hormonal therapy either by mouth or by an injection on a monthly basis with 50-60% response rates in a non-curative setting; awaiting results of thoracentesis and/or breast biopsy review with her diagnostic approach as well as therapeutic options            Thank you for your consult. I will follow-up with patient. Please contact us if you have any additional questions.     Alberto Purvis MD  Hematology/Oncology  [facility]

## 2018-02-14 NOTE — PROGRESS NOTES
[facility] -   Hematology/Oncology  Progress Note    Patient Name: Kelly Mariano  Admission Date: 2/9/2018  Hospital Length of Stay: 5 days  Code Status: Full Code     Subjective:     HPI:  67-year-old female admitted to the hospital for cellulitis was found have a large mass involving her right breast also asked see the patient for further evaluation    No new subjective & objective note has been filed under this hospital service since the last note was generated.    Assessment/Plan:     Mass of breast, right    Patient has a large mass involving her right breast highly suggestive of malignancy with his cramping right arm swelling and cellulitis on anterior chest wall possible secondary to metastatic disease would recommend a biopsy of her right breast to obtain adequate tissue for diagnosis including ER/MO and HER-2/jaclyn status which should direct therapy.  2/12/18 awaiting tissue diagnosis either through general surgery or interventional radiology to determine diagnosis for treatment recommendations for ER/MO and HER-2/jaclyn status discussed with hospital medicine course of action.  2/13/18 awaiting results of thoracentesis will need ER/MO HER-2/jaclyn status would recommend needle biopsy of breast with is a higher likelihood of making diagnosis as well as obtaining ER/MO and HER-2 status if she is ER/MO positive kidney treated with hormonal therapy either by mouth or by an injection on a monthly basis with 50-60% response rates in a non-curative setting; awaiting results of thoracentesis and/or breast biopsy review with her diagnostic approach as well as therapeutic options            Thank you for your consult. I will follow-up with patient. Please contact us if you have any additional questions.     Alberto Purvis MD  Hematology/Oncology  [facility]

## 2018-02-14 NOTE — NURSING
Patient alert not oriented to time situation or place says yes to everything attempted to place cpap on patient refuses will notifiy md
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
## 2018-02-14 NOTE — PROGRESS NOTES
Moris SEVERINO notified of critical labs this morning of CO2 45 and other labs mag 1.4 and potassium 3.4.  MD stated to order IVPB Mag 2g, potassium 20mEq, and ABGs.  Will order and continue to monitor.

## 2018-02-14 NOTE — SUBJECTIVE & OBJECTIVE
Interval History:     Review of Systems   Constitutional: Positive for activity change, appetite change and fatigue. Negative for chills, diaphoresis and fever.   HENT: Negative.    Eyes: Negative for pain and redness.   Respiratory: Positive for shortness of breath. Negative for cough and wheezing.    Cardiovascular: Positive for leg swelling. Negative for chest pain and palpitations.   Gastrointestinal: Negative for abdominal pain, constipation, diarrhea, nausea and vomiting.   Genitourinary: Negative for decreased urine volume, difficulty urinating, dysuria and hematuria.   Musculoskeletal: Negative for arthralgias and myalgias.        Falls   Skin: Positive for rash and wound.   Neurological: Positive for weakness. Negative for dizziness and light-headedness.   Psychiatric/Behavioral: Negative for confusion. The patient is not nervous/anxious.      Objective:     Vital Signs (Most Recent):  Temp: 98.4 °F (36.9 °C) (02/13/18 2028)  Pulse: 100 (02/13/18 2028)  Resp: 16 (02/13/18 2028)  BP: 123/64 (02/13/18 2028)  SpO2: (!) 94 % (02/13/18 2028) Vital Signs (24h Range):  Temp:  [97.3 °F (36.3 °C)-98.4 °F (36.9 °C)] 98.4 °F (36.9 °C)  Pulse:  [] 100  Resp:  [16-20] 16  SpO2:  [75 %-99 %] 94 %  BP: (100-123)/(53-64) 123/64     Weight: 65.3 kg (143 lb 15.4 oz)  Body mass index is 24.71 kg/m².    Intake/Output Summary (Last 24 hours) at 02/13/18 2149  Last data filed at 02/13/18 1825   Gross per 24 hour   Intake             1000 ml   Output             4350 ml   Net            -3350 ml      Physical Exam   Constitutional: She is oriented to person, place, and time. She appears well-developed.  Non-toxic appearance.   Unkempt  female - appears stated age   HENT:   Head: Normocephalic and atraumatic.       Mouth/Throat: Mucous membranes are dry.   Dry tongue and oral MM   Eyes: Conjunctivae are normal.   Neck: Normal range of motion. Neck supple.   Cardiovascular: Regular rhythm.  Tachycardia present.     Pulmonary/Chest: Effort normal. She has rales.   Abdominal: Soft. Bowel sounds are normal. She exhibits distension. There is no tenderness. There is no rebound and no guarding.   Musculoskeletal: She exhibits edema (+2/+3 pitting edema to BLE).   Generalized weakness   Neurological: She is alert and oriented to person, place, and time.   Skin:   Abrasion to forehead and left chin area  Candidal appearing rash underneath breasts and abdominal fold  Ulcerations and cellulitis to feet   Psychiatric: She has a normal mood and affect. Her behavior is normal.   Nursing note and vitals reviewed.      Significant Labs: All pertinent labs within the past 24 hours have been reviewed.    Significant Imaging: I have reviewed all pertinent imaging results/findings within the past 24 hours.

## 2018-02-14 NOTE — PLAN OF CARE
Problem: SLP Goal  Goal: SLP Goal  Long Term Goal:  Pt to tolerate least restrictive diet consistency safely and efficiently.    Short Term Goals:  1. Tongue Base Retraction and Laryngeal Elevation tasks x20 with min cues  2. Oral Motor tasks x20 with min cues  3. Recommend education on swallow precautions and diet recommendations.   Outcome: Ongoing (interventions implemented as appropriate)  Pt continues to tolerate mech soft foods without overt s/s of aspiration.

## 2018-02-14 NOTE — PROGRESS NOTES
[facility] -   Cardiology  Progress Note    Patient Name: Kelly Mariano  MRN: [id number]  Admission Date: 2/9/2018  Hospital Length of Stay: 5 days  Code Status: Full Code   Attending Physician: Odilon Subramanian MD   Primary Care Physician: Primary Doctor No  Expected Discharge Date:   Principal Problem:Bilateral cellulitis of lower leg    Subjective:   Brief HPI:  Patient is a 67 y.o. female presents with LE cellulitis and CHF.  68 y/o fmelae with no significant Pmhx with significant cellulitis of BLE. Pt also with mild CHF and echo shwoing EF10-15%. EKG is wnl limits.    Hospital Course:   2/11/18-Patient seen and examined today, resting in bed. States she feels ok, still weak and fatigued. Complains of SOB. No chest pain or tightness. BC +Staph. Labs reviewed. K low at 3.1. Creatinine stable.     2/12/18 Pt hemodynamically stable on Toprol and Lisinopril. Will need biopsy of breast mass. Will discuss stress once infection treated and pt is diuresed.     2/13/18- Patient remains hemodynamically stable. No reports of chest pain or SOB. Patient is right post thoracentesis on 2/11/18. 1500cc drained.  Noted to have EF of 10-15% on echo. Has right breast mass that needs biopsy. Remains of IV abx for extensive lower extremity cellulitis.     2/14/18- Patient with noted to have Bibasilar infiltrates and effusions on repeat CXR. Pulmonology started Diamox and stopped Lasix. Still awaiting breast biopsy results. BP stable . Had hypoxia this morning and is confused. No family at bedside         Review of Systems   Constitution: Positive for weakness and malaise/fatigue.   HENT: Negative.    Eyes: Negative.    Cardiovascular: Positive for dyspnea on exertion (improved).   Respiratory: Negative for shortness of breath (improved).    Endocrine: Negative.    Hematologic/Lymphatic: Negative.    Skin: Positive for poor wound healing and rash.   Musculoskeletal: Positive for arthritis.   Gastrointestinal: Negative.     Genitourinary: Negative.    Psychiatric/Behavioral: Negative.    Allergic/Immunologic: Negative.      Objective:     Vital Signs (Most Recent):  Temp: 97.8 °F (36.6 °C) (02/14/18 1147)  Pulse: 95 (02/14/18 1147)  Resp: 18 (02/14/18 1147)  BP: (!) 106/56 (02/14/18 1147)  SpO2: (!) 91 % (02/14/18 1147) Vital Signs (24h Range):  Temp:  [97.5 °F (36.4 °C)-98.4 °F (36.9 °C)] 97.8 °F (36.6 °C)  Pulse:  [] 95  Resp:  [16-22] 18  SpO2:  [75 %-97 %] 91 %  BP: (106-136)/(56-73) 106/56     Weight: 65.3 kg (143 lb 15.4 oz)  Body mass index is 24.71 kg/m².     SpO2: (!) 91 %  O2 Device (Oxygen Therapy): nasal cannula      Intake/Output Summary (Last 24 hours) at 02/14/18 1406  Last data filed at 02/14/18 0400   Gross per 24 hour   Intake              610 ml   Output             2850 ml   Net            -2240 ml       Lines/Drains/Airways     Peripherally Inserted Central Catheter Line                 PICC Double Lumen 02/10/18 1524 right basilic 3 days          Drain                 Urethral Catheter 02/09/18 1045 Latex 16 Fr. 5 days                Physical Exam   Constitutional: She is oriented to person, place, and time. No distress.   Disheveled     HENT:   Head: Normocephalic and atraumatic.   Eyes: Pupils are equal, round, and reactive to light. Right eye exhibits no discharge. Left eye exhibits no discharge.   Neck: Neck supple. No JVD present.   Cardiovascular: S1 normal and S2 normal.  Exam reveals no S3 and no S4.    Murmur heard.  High-pitched blowing holosystolic murmur is present  at the apex  Pulmonary/Chest: No respiratory distress. She has no wheezes.   Diminished BS RML, RLL  Left-sided rales, diminished at base   Abdominal: Soft. She exhibits no distension. There is no tenderness. There is no rebound.   +truncal edema       Musculoskeletal: She exhibits edema (BLE - improved).   Neurological: She is alert and oriented to person, place, and time.   Skin: Skin is warm and dry. She is not diaphoretic.    Cellulitis BLE  Yeast appearing rash on stomach and near breast folds  Dressing noted to BLE    Psychiatric: She has a normal mood and affect. Her behavior is normal. Thought content normal.   Nursing note and vitals reviewed.      Significant Labs:   All pertinent lab results from the last 24 hours have been reviewed. and   Recent Lab Results       02/14/18  1141 02/14/18  0827 02/14/18  0728 02/14/18  0443 02/13/18  1610      Albumin    2.4(L)      Alkaline Phosphatase    60      Allens Test   Pass       ALT    9(L)      Anion Gap    8      AST    20      Baso #    0.01      Basophil%    0.1      Total Bilirubin    0.9  Comment:  For infants and newborns, interpretation of results should be based  on gestational age, weight and in agreement with clinical  observations.  Premature Infant recommended reference ranges:  Up to 24 hours.............<8.0 mg/dL  Up to 48 hours............<12.0 mg/dL  3-5 days..................<15.0 mg/dL  6-29 days.................<15.0 mg/dL        BNP  4,225  Comment:  Values of less than 100 pg/ml are consistent with non-CHF populations.(H)        Site   RR       BUN, Bld    11      Calcium    8.3(L)      Chloride    88(L)      CO2    45  Comment:  CO2 critical result(s) called and verbal readback obtained from   ROQUE MISHRA RN, 02/14/2018 06:43  (HH)      Creatinine    0.5      DelSys   Nasal Can       Differential Method    Automated      eGFR if     >60      eGFR if non     >60  Comment:  Calculation used to obtain the estimated glomerular filtration  rate (eGFR) is the CKD-EPI equation.         Eos #    0.0      Eosinophil%    0.1      FiO2   28       Flow   2       Glucose    70      Gran # (ANC)    7.5      Gran%    88.0(H)      Hematocrit    41.5      Hemoglobin    12.5      Lymph #    0.6(L)      Lymph%    6.8(L)      Magnesium     1.4(L)     MCH    28.6      MCHC    30.1(L)      MCV    95      Mode   SPONT       Mono #    0.4      Mono%     5.0      MPV    11.5      Platelets    103(L)      POC BE   22       POC HCO3   46.3(H)       POC PCO2   71.1(HH)       POC PH   7.421       POC PO2   70(L)       POC SATURATED O2   93(L)       POCT Glucose 103         Potassium    3.4(L)      Total Protein    5.6(L)      RBC    4.37      RDW    17.2(H)      Sample   ARTERIAL       Sodium    141      WBC    8.48                  02/13/18  1535 02/13/18  1521      Albumin       Alkaline Phosphatase       Allens Test Pass      ALT       Anion Gap       AST       Baso #       Basophil%       Total Bilirubin       BNP       Site LR      BUN, Bld       Calcium       Chloride       CO2       Creatinine       DelSys Nasal Can      Differential Method       eGFR if        eGFR if non        Eos #       Eosinophil%       FiO2 28      Flow       Glucose       Gran # (ANC)       Gran%       Hematocrit       Hemoglobin       Lymph #       Lymph%       Magnesium       MCH       MCHC       MCV       Mode SPONT      Mono #       Mono%       MPV       Platelets       POC BE >30(H)      POC HCO3 57.7(H)      POC PCO2 84.5(HH)      POC PH 7.442      POC PO2 98      POC SATURATED O2 97      POCT Glucose  104     Potassium       Total Protein       RBC       RDW       Sample ARTERIAL      Sodium       WBC             Significant Imaging: Echocardiogram:   2D echo with color flow doppler:   Results for orders placed or performed during the hospital encounter of 02/09/18   2D echo with color flow doppler   Result Value Ref Range    EF 10 (A) 55 - 65    Mitral Valve Regurgitation MILD TO MODERATE     Diastolic Dysfunction Yes (A)     Est. PA Systolic Pressure 24.81     Tricuspid Valve Regurgitation MILD TO MODERATE     and X-Ray: CXR: X-Ray Chest 1 View (CXR):   Results for orders placed or performed during the hospital encounter of 02/09/18   X-Ray Chest 1 View    Narrative    One-view chest x-ray    Clinical History: Rectal effusion; recent  thoracentesis    Finding: Comparison was made to prior examination performed at 5:25 AM on 2/11/2018. There's been interval development of a tiny right apical pneumothorax. There is moderate cardiomegaly. There is a mild amount of interstitial and alveolar opacities seen in both lungs. There is opacification of the costophrenic angles bilaterally. A right PICC remains in place. There is partial visualization of anterior spinal fusion hardware in the cervical spine.    Impression        1. There's been interval development of a tiny right apical pneumothorax. This is consistent with the patient's recent thoracentesis.  2. The findings are characteristic of moderate cardiomegaly with mild bilateral pulmonary edema.  3. There is opacification of the costophrenic angles bilaterally. These are characteristic of tiny pleural effusions.  4. Surgical changes      Electronically signed by: ASHWIN RAYMOND MD  Date:     02/11/18  Time:    16:28      Assessment and Plan:     * Bilateral cellulitis of lower leg    -Mgmt as per primary team  -Continue abx        Acute on chronic respiratory failure with hypoxia and hypercapnia    Pulmonology on board         Elevated troponin    -Likely secondary to demand ischemia from bacteremia, cellulitis, and volume overload  -Add ASA 81 mg daily  -See plan under CHF        Acute combined systolic and diastolic heart failure    -Unclear etiology  -Cont Torpol XL.   -Dose increaed to 50mg   - Lasix on hold at this time and started on diamox   - continue  ACEI   -Will need ischemic workup-LHC vs stress test once infection issues improve and she is euvolemic        Pleural effusion on right    S/p thoracentesis on 2/11/18- 1500 cc  Diuretics on hold and started on Diamox               VTE Risk Mitigation         Ordered     heparin (porcine) injection 5,000 Units  Every 8 hours     Route:  Subcutaneous        02/09/18 1749     Medium Risk of VTE  Once      02/09/18 1300      Chart reviewed. Patient  examined by Dr. Martinez and agrees with plan that has been outlined.     PEDRO Muller  Cardiology  [facility]

## 2018-02-14 NOTE — ASSESSMENT & PLAN NOTE
-Unclear etiology  -Cont Torpol XL.   -Dose increaed to 50mg   - Lasix on hold at this time and started on diamox   - continue  ACEI   -Will need ischemic workup-LHC vs stress test once infection issues improve and she is euvolemic

## 2018-02-14 NOTE — SUBJECTIVE & OBJECTIVE
Interval History: Patient is very somnolent difficult to wake up I'm waiting for results of pathology to discuss with her    Oncology Treatment Plan:   [No treatment plan]    Medications:  Continuous Infusions:  Scheduled Meds:   acetaZOLAMIDE  250 mg Oral TID    famotidine  20 mg Oral BID    folic acid-vit B6-vit B12 2.5-25-2 mg  1 tablet Oral Daily    heparin (porcine)  5,000 Units Subcutaneous Q8H    lisinopril  2.5 mg Oral Daily    magnesium sulfate IVPB  2 g Intravenous Once    metoprolol succinate  25 mg Oral Daily    multivitamin  1 tablet Oral Daily    potassium chloride  10 mEq Intravenous Q1H    vancomycin 1 g in 0.9% sodium chloride 250 mL IVPB (ready to mix system)  1 g Intravenous Q18H     PRN Meds:acetaminophen, aluminum-magnesium hydroxide-simethicone     Review of Systems   Constitutional: Positive for activity change, appetite change, fatigue and unexpected weight change.   Neurological: Positive for weakness.   Psychiatric/Behavioral: Positive for decreased concentration and dysphoric mood. The patient is nervous/anxious.      Objective:     Vital Signs (Most Recent):  Temp: 98.3 °F (36.8 °C) (02/14/18 0750)  Pulse: 95 (02/14/18 0750)  Resp: (!) 22 (02/14/18 0750)  BP: 136/73 (02/14/18 0750)  SpO2: 96 % (02/14/18 0750) Vital Signs (24h Range):  Temp:  [97.5 °F (36.4 °C)-98.4 °F (36.9 °C)] 98.3 °F (36.8 °C)  Pulse:  [] 95  Resp:  [16-22] 22  SpO2:  [75 %-98 %] 96 %  BP: (110-136)/(54-73) 136/73     Weight: 65.3 kg (143 lb 15.4 oz)  Body mass index is 24.71 kg/m².  Body surface area is 1.72 meters squared.      Intake/Output Summary (Last 24 hours) at 02/14/18 0821  Last data filed at 02/14/18 0400   Gross per 24 hour   Intake             1150 ml   Output             2850 ml   Net            -1700 ml       Physical Exam   Constitutional: She appears cachectic. She has a sickly appearance. She appears ill. She appears distressed.   Cardiovascular: Normal rate and regular rhythm.     Pulmonary/Chest: Effort normal.   Abdominal: Soft.   Psychiatric: Cognition and memory are impaired. She exhibits abnormal recent memory.       Significant Labs:   BMP:   Recent Labs  Lab 02/13/18  0419 02/13/18  1610 02/14/18  0443   GLU 74  --  70     --  141   K 3.6  --  3.4*   CL 88*  --  88*   CO2 43*  --  45*   BUN 12  --  11   CREATININE 0.5  --  0.5   CALCIUM 8.0*  --  8.3*   MG  --  1.4*  --    , CBC:   Recent Labs  Lab 02/13/18 0419 02/14/18  0443   WBC 6.96 8.48   HGB 12.9 12.5   HCT 43.5 41.5   * 103*    and CMP:   Recent Labs  Lab 02/13/18  0419 02/14/18  0443    141   K 3.6 3.4*   CL 88* 88*   CO2 43* 45*   GLU 74 70   BUN 12 11   CREATININE 0.5 0.5   CALCIUM 8.0* 8.3*   PROT 5.8* 5.6*   ALBUMIN 2.5* 2.4*   BILITOT 0.7 0.9   ALKPHOS 68 60   AST 24 20   ALT 9* 9*   ANIONGAP 10 8   EGFRNONAA >60 >60       Diagnostic Results:  I have reviewed and interpreted all pertinent imaging results/findings within the past 24 hours.

## 2018-02-14 NOTE — SUBJECTIVE & OBJECTIVE
Review of Systems   Constitution: Positive for weakness and malaise/fatigue.   HENT: Negative.    Eyes: Negative.    Cardiovascular: Positive for dyspnea on exertion (improved).   Respiratory: Negative for shortness of breath (improved).    Endocrine: Negative.    Hematologic/Lymphatic: Negative.    Skin: Positive for poor wound healing and rash.   Musculoskeletal: Positive for arthritis.   Gastrointestinal: Negative.    Genitourinary: Negative.    Psychiatric/Behavioral: Negative.    Allergic/Immunologic: Negative.      Objective:     Vital Signs (Most Recent):  Temp: 97.8 °F (36.6 °C) (02/14/18 1147)  Pulse: 95 (02/14/18 1147)  Resp: 18 (02/14/18 1147)  BP: (!) 106/56 (02/14/18 1147)  SpO2: (!) 91 % (02/14/18 1147) Vital Signs (24h Range):  Temp:  [97.5 °F (36.4 °C)-98.4 °F (36.9 °C)] 97.8 °F (36.6 °C)  Pulse:  [] 95  Resp:  [16-22] 18  SpO2:  [75 %-97 %] 91 %  BP: (106-136)/(56-73) 106/56     Weight: 65.3 kg (143 lb 15.4 oz)  Body mass index is 24.71 kg/m².     SpO2: (!) 91 %  O2 Device (Oxygen Therapy): nasal cannula      Intake/Output Summary (Last 24 hours) at 02/14/18 1406  Last data filed at 02/14/18 0400   Gross per 24 hour   Intake              610 ml   Output             2850 ml   Net            -2240 ml       Lines/Drains/Airways     Peripherally Inserted Central Catheter Line                 PICC Double Lumen 02/10/18 1524 right basilic 3 days          Drain                 Urethral Catheter 02/09/18 1045 Latex 16 Fr. 5 days                Physical Exam   Constitutional: She is oriented to person, place, and time. No distress.   Disheveled     HENT:   Head: Normocephalic and atraumatic.   Eyes: Pupils are equal, round, and reactive to light. Right eye exhibits no discharge. Left eye exhibits no discharge.   Neck: Neck supple. No JVD present.   Cardiovascular: S1 normal and S2 normal.  Exam reveals no S3 and no S4.    Murmur heard.  High-pitched blowing holosystolic murmur is present  at the  apex  Pulmonary/Chest: No respiratory distress. She has no wheezes.   Diminished BS RML, RLL  Left-sided rales, diminished at base   Abdominal: Soft. She exhibits no distension. There is no tenderness. There is no rebound.   +truncal edema       Musculoskeletal: She exhibits edema (BLE - improved).   Neurological: She is alert and oriented to person, place, and time.   Skin: Skin is warm and dry. She is not diaphoretic.   Cellulitis BLE  Yeast appearing rash on stomach and near breast folds  Dressing noted to BLE    Psychiatric: She has a normal mood and affect. Her behavior is normal. Thought content normal.   Nursing note and vitals reviewed.      Significant Labs:   All pertinent lab results from the last 24 hours have been reviewed. and   Recent Lab Results       02/14/18  1141 02/14/18  0827 02/14/18  0728 02/14/18  0443 02/13/18  1610      Albumin    2.4(L)      Alkaline Phosphatase    60      Allens Test   Pass       ALT    9(L)      Anion Gap    8      AST    20      Baso #    0.01      Basophil%    0.1      Total Bilirubin    0.9  Comment:  For infants and newborns, interpretation of results should be based  on gestational age, weight and in agreement with clinical  observations.  Premature Infant recommended reference ranges:  Up to 24 hours.............<8.0 mg/dL  Up to 48 hours............<12.0 mg/dL  3-5 days..................<15.0 mg/dL  6-29 days.................<15.0 mg/dL        BNP  4,225  Comment:  Values of less than 100 pg/ml are consistent with non-CHF populations.(H)        Site   RR       BUN, Bld    11      Calcium    8.3(L)      Chloride    88(L)      CO2    45  Comment:  CO2 critical result(s) called and verbal readback obtained from   ROQUE MISHRA RN, 02/14/2018 06:43  (HH)      Creatinine    0.5      DelSys   Nasal Can       Differential Method    Automated      eGFR if     >60      eGFR if non     >60  Comment:  Calculation used to obtain the estimated  glomerular filtration  rate (eGFR) is the CKD-EPI equation.         Eos #    0.0      Eosinophil%    0.1      FiO2   28       Flow   2       Glucose    70      Gran # (ANC)    7.5      Gran%    88.0(H)      Hematocrit    41.5      Hemoglobin    12.5      Lymph #    0.6(L)      Lymph%    6.8(L)      Magnesium     1.4(L)     MCH    28.6      MCHC    30.1(L)      MCV    95      Mode   SPONT       Mono #    0.4      Mono%    5.0      MPV    11.5      Platelets    103(L)      POC BE   22       POC HCO3   46.3(H)       POC PCO2   71.1(HH)       POC PH   7.421       POC PO2   70(L)       POC SATURATED O2   93(L)       POCT Glucose 103         Potassium    3.4(L)      Total Protein    5.6(L)      RBC    4.37      RDW    17.2(H)      Sample   ARTERIAL       Sodium    141      WBC    8.48                  02/13/18  1535 02/13/18  1521      Albumin       Alkaline Phosphatase       Allens Test Pass      ALT       Anion Gap       AST       Baso #       Basophil%       Total Bilirubin       BNP       Site LR      BUN, Bld       Calcium       Chloride       CO2       Creatinine       DelSys Nasal Can      Differential Method       eGFR if        eGFR if non        Eos #       Eosinophil%       FiO2 28      Flow       Glucose       Gran # (ANC)       Gran%       Hematocrit       Hemoglobin       Lymph #       Lymph%       Magnesium       MCH       MCHC       MCV       Mode SPONT      Mono #       Mono%       MPV       Platelets       POC BE >30(H)      POC HCO3 57.7(H)      POC PCO2 84.5(HH)      POC PH 7.442      POC PO2 98      POC SATURATED O2 97      POCT Glucose  104     Potassium       Total Protein       RBC       RDW       Sample ARTERIAL      Sodium       WBC             Significant Imaging: Echocardiogram:   2D echo with color flow doppler:   Results for orders placed or performed during the hospital encounter of 02/09/18   2D echo with color flow doppler   Result Value Ref Range    EF 10  (A) 55 - 65    Mitral Valve Regurgitation MILD TO MODERATE     Diastolic Dysfunction Yes (A)     Est. PA Systolic Pressure 24.81     Tricuspid Valve Regurgitation MILD TO MODERATE     and X-Ray: CXR: X-Ray Chest 1 View (CXR):   Results for orders placed or performed during the hospital encounter of 02/09/18   X-Ray Chest 1 View    Narrative    One-view chest x-ray    Clinical History: Rectal effusion; recent thoracentesis    Finding: Comparison was made to prior examination performed at 5:25 AM on 2/11/2018. There's been interval development of a tiny right apical pneumothorax. There is moderate cardiomegaly. There is a mild amount of interstitial and alveolar opacities seen in both lungs. There is opacification of the costophrenic angles bilaterally. A right PICC remains in place. There is partial visualization of anterior spinal fusion hardware in the cervical spine.    Impression        1. There's been interval development of a tiny right apical pneumothorax. This is consistent with the patient's recent thoracentesis.  2. The findings are characteristic of moderate cardiomegaly with mild bilateral pulmonary edema.  3. There is opacification of the costophrenic angles bilaterally. These are characteristic of tiny pleural effusions.  4. Surgical changes      Electronically signed by: ASHWIN RAYMOND MD  Date:     02/11/18  Time:    16:28

## 2018-02-14 NOTE — PROGRESS NOTES
Spoke with Dr. Vance about patients respiratory status. After 10 minutes of being on the bipap machine she started to pull the mask off of her face multiple times. Dr. Vance notified and said it was ok to leave patient off of bipap machine.

## 2018-02-15 LAB
ALBUMIN SERPL BCP-MCNC: 2.6 G/DL
ALP SERPL-CCNC: 68 U/L
ALT SERPL W/O P-5'-P-CCNC: 10 U/L
ANION GAP SERPL CALC-SCNC: 8 MMOL/L
AST SERPL-CCNC: 20 U/L
BASOPHILS # BLD AUTO: 0.01 K/UL
BASOPHILS NFR BLD: 0.1 %
BILIRUB SERPL-MCNC: 0.9 MG/DL
BUN SERPL-MCNC: 14 MG/DL
CALCIUM SERPL-MCNC: 8.8 MG/DL
CHLORIDE SERPL-SCNC: 91 MMOL/L
CO2 SERPL-SCNC: 39 MMOL/L
CORTIS SERPL-MCNC: 19 UG/DL
CREAT SERPL-MCNC: 0.6 MG/DL
DIFFERENTIAL METHOD: ABNORMAL
EOSINOPHIL # BLD AUTO: 0 K/UL
EOSINOPHIL NFR BLD: 0.2 %
ERYTHROCYTE [DISTWIDTH] IN BLOOD BY AUTOMATED COUNT: 17.4 %
EST. GFR  (AFRICAN AMERICAN): >60 ML/MIN/1.73 M^2
EST. GFR  (NON AFRICAN AMERICAN): >60 ML/MIN/1.73 M^2
GLUCOSE SERPL-MCNC: 85 MG/DL
HCT VFR BLD AUTO: 41.6 %
HGB BLD-MCNC: 13 G/DL
LYMPHOCYTES # BLD AUTO: 0.7 K/UL
LYMPHOCYTES NFR BLD: 6.5 %
MAGNESIUM SERPL-MCNC: 1.9 MG/DL
MCH RBC QN AUTO: 28.6 PG
MCHC RBC AUTO-ENTMCNC: 31.3 G/DL
MCV RBC AUTO: 92 FL
MONOCYTES # BLD AUTO: 0.6 K/UL
MONOCYTES NFR BLD: 5.6 %
NEUTROPHILS # BLD AUTO: 9.1 K/UL
NEUTROPHILS NFR BLD: 87.6 %
PLATELET # BLD AUTO: 122 K/UL
PMV BLD AUTO: 11.8 FL
POCT GLUCOSE: 86 MG/DL (ref 70–110)
POCT GLUCOSE: 88 MG/DL (ref 70–110)
POTASSIUM SERPL-SCNC: 3.4 MMOL/L
PROT SERPL-MCNC: 6 G/DL
RBC # BLD AUTO: 4.54 M/UL
SODIUM SERPL-SCNC: 138 MMOL/L
WBC # BLD AUTO: 10.36 K/UL

## 2018-02-15 PROCEDURE — 82533 TOTAL CORTISOL: CPT

## 2018-02-15 PROCEDURE — 25000003 PHARM REV CODE 250: Performed by: INTERNAL MEDICINE

## 2018-02-15 PROCEDURE — 99231 SBSQ HOSP IP/OBS SF/LOW 25: CPT | Mod: ,,, | Performed by: INTERNAL MEDICINE

## 2018-02-15 PROCEDURE — 63600175 PHARM REV CODE 636 W HCPCS: Performed by: FAMILY MEDICINE

## 2018-02-15 PROCEDURE — 25000003 PHARM REV CODE 250: Performed by: FAMILY MEDICINE

## 2018-02-15 PROCEDURE — 25000003 PHARM REV CODE 250: Performed by: NURSE PRACTITIONER

## 2018-02-15 PROCEDURE — 99233 SBSQ HOSP IP/OBS HIGH 50: CPT | Mod: ,,, | Performed by: INTERNAL MEDICINE

## 2018-02-15 PROCEDURE — 80053 COMPREHEN METABOLIC PANEL: CPT

## 2018-02-15 PROCEDURE — 94761 N-INVAS EAR/PLS OXIMETRY MLT: CPT

## 2018-02-15 PROCEDURE — 63600175 PHARM REV CODE 636 W HCPCS: Performed by: NURSE PRACTITIONER

## 2018-02-15 PROCEDURE — 83735 ASSAY OF MAGNESIUM: CPT

## 2018-02-15 PROCEDURE — 21400001 HC TELEMETRY ROOM

## 2018-02-15 PROCEDURE — 99900035 HC TECH TIME PER 15 MIN (STAT)

## 2018-02-15 PROCEDURE — 27000221 HC OXYGEN, UP TO 24 HOURS

## 2018-02-15 PROCEDURE — 85025 COMPLETE CBC W/AUTO DIFF WBC: CPT

## 2018-02-15 RX ORDER — POTASSIUM CHLORIDE 7.45 MG/ML
10 INJECTION INTRAVENOUS ONCE
Status: COMPLETED | OUTPATIENT
Start: 2018-02-15 | End: 2018-02-15

## 2018-02-15 RX ORDER — POTASSIUM CHLORIDE 7.45 MG/ML
10 INJECTION INTRAVENOUS
Status: DISCONTINUED | OUTPATIENT
Start: 2018-02-15 | End: 2018-02-15 | Stop reason: RX

## 2018-02-15 RX ORDER — POTASSIUM CHLORIDE 14.9 MG/ML
20 INJECTION INTRAVENOUS ONCE
Status: COMPLETED | OUTPATIENT
Start: 2018-02-15 | End: 2018-02-15

## 2018-02-15 RX ADMIN — ANTI-FUNGAL POWDER MICONAZOLE NITRATE TALC FREE: 1.42 POWDER TOPICAL at 08:02

## 2018-02-15 RX ADMIN — ACETAZOLAMIDE 250 MG: 250 TABLET ORAL at 10:02

## 2018-02-15 RX ADMIN — Medication 1 TABLET: at 08:02

## 2018-02-15 RX ADMIN — POTASSIUM CHLORIDE 20 MEQ: 200 INJECTION, SOLUTION INTRAVENOUS at 04:02

## 2018-02-15 RX ADMIN — HEPARIN SODIUM 5000 UNITS: 5000 INJECTION, SOLUTION INTRAVENOUS; SUBCUTANEOUS at 10:02

## 2018-02-15 RX ADMIN — ACETAZOLAMIDE 250 MG: 250 TABLET ORAL at 06:02

## 2018-02-15 RX ADMIN — THIAMINE HYDROCHLORIDE 100 MG: 100 INJECTION, SOLUTION INTRAMUSCULAR; INTRAVENOUS at 08:02

## 2018-02-15 RX ADMIN — VANCOMYCIN HYDROCHLORIDE 1 G: 1 INJECTION, POWDER, LYOPHILIZED, FOR SOLUTION INTRAVENOUS at 03:02

## 2018-02-15 RX ADMIN — ANTI-FUNGAL POWDER MICONAZOLE NITRATE TALC FREE: 1.42 POWDER TOPICAL at 10:02

## 2018-02-15 RX ADMIN — FLUCONAZOLE 200 MG: 2 INJECTION INTRAVENOUS at 02:02

## 2018-02-15 RX ADMIN — LISINOPRIL 5 MG: 5 TABLET ORAL at 08:02

## 2018-02-15 RX ADMIN — HEPARIN SODIUM 5000 UNITS: 5000 INJECTION, SOLUTION INTRAVENOUS; SUBCUTANEOUS at 02:02

## 2018-02-15 RX ADMIN — POTASSIUM CHLORIDE 10 MEQ: 7.46 INJECTION, SOLUTION INTRAVENOUS at 03:02

## 2018-02-15 RX ADMIN — METOPROLOL SUCCINATE 50 MG: 50 TABLET, EXTENDED RELEASE ORAL at 08:02

## 2018-02-15 RX ADMIN — THERA TABS 1 TABLET: TAB at 08:02

## 2018-02-15 RX ADMIN — HEPARIN SODIUM 5000 UNITS: 5000 INJECTION, SOLUTION INTRAVENOUS; SUBCUTANEOUS at 06:02

## 2018-02-15 RX ADMIN — ACETAZOLAMIDE 250 MG: 250 TABLET ORAL at 02:02

## 2018-02-15 NOTE — ASSESSMENT & PLAN NOTE
-Unclear etiology  -Continue Metoprololmg   - Lasix on hold at this time and started on diamox   - Continue  ACEI   -Will need ischemic workup-LHC vs stress test once infection issues improve and she is euvolemic

## 2018-02-15 NOTE — ASSESSMENT & PLAN NOTE
Diuresis with IV lasix  Supplemental oxygen to maintain sat > 92 %  Will likely need thoracentesis, consult Pulmonology when needed  2/10 - moderate size, concerning for malignancy due to left breast deformity (metastic disease?)  2/12 - will follow path report analysis of pleural fluid  2/14 - will discuss w/pulm if repeat thoracentesis needed and follow up studies from previous

## 2018-02-15 NOTE — ASSESSMENT & PLAN NOTE
2/13 - pt w/hypercapnic, respiratory distress. 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 Respiratory consulted for bipap, however pt not tolerating, would prefer to avoid sedation as this will worsen resp. Status. Attempted to reach out to family contacts multiple times, no response.   2/14 - anti-cholinergic drugs d/cd, (was on famotidine), no narcotics or sedatives prescribed, thyroid function normal, will follow cortisol level, no hx of alchol use, b12, folate and thiamine given, no indications of seizure, meningitis or encephalitis
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
## 2018-02-15 NOTE — SUBJECTIVE & OBJECTIVE
Interval History:     Review of Systems   Constitutional: Positive for activity change, appetite change and fatigue. Negative for chills, diaphoresis and fever.   HENT: Negative.    Eyes: Negative for pain and redness.   Respiratory: Positive for shortness of breath. Negative for cough and wheezing.    Cardiovascular: Positive for leg swelling. Negative for chest pain and palpitations.   Gastrointestinal: Negative for abdominal pain, constipation, diarrhea, nausea and vomiting.   Genitourinary: Negative for decreased urine volume, difficulty urinating, dysuria and hematuria.   Musculoskeletal: Negative for arthralgias and myalgias.        Falls   Skin: Positive for rash and wound.   Neurological: Positive for weakness. Negative for dizziness and light-headedness.   Psychiatric/Behavioral: Negative for confusion. The patient is not nervous/anxious.      Objective:     Vital Signs (Most Recent):  Temp: 98.7 °F (37.1 °C) (02/14/18 2106)  Pulse: 100 (02/14/18 2106)  Resp: 19 (02/14/18 2106)  BP: (!) 113/55 (02/14/18 2106)  SpO2: (!) 92 % (02/14/18 2106) Vital Signs (24h Range):  Temp:  [97.5 °F (36.4 °C)-98.7 °F (37.1 °C)] 98.7 °F (37.1 °C)  Pulse:  [] 100  Resp:  [16-22] 19  SpO2:  [91 %-97 %] 92 %  BP: (106-136)/(55-73) 113/55     Weight: 65.3 kg (143 lb 15.4 oz)  Body mass index is 24.71 kg/m².    Intake/Output Summary (Last 24 hours) at 02/14/18 2220  Last data filed at 02/14/18 1700   Gross per 24 hour   Intake              250 ml   Output             2150 ml   Net            -1900 ml      Physical Exam   Constitutional: She is oriented to person, place, and time. She appears well-developed.  Non-toxic appearance.   Unkempt  female - appears stated age   HENT:   Head: Normocephalic and atraumatic.       Mouth/Throat: Mucous membranes are dry.   Dry tongue and oral MM   Eyes: Conjunctivae are normal.   Neck: Normal range of motion. Neck supple.   Cardiovascular: Regular rhythm.  Tachycardia present.     Pulmonary/Chest: Effort normal. She has rales.   Abdominal: Soft. Bowel sounds are normal. She exhibits distension. There is no tenderness. There is no rebound and no guarding.   Musculoskeletal: She exhibits edema (+2/+3 pitting edema to BLE).   Generalized weakness   Neurological: She is alert and oriented to person, place, and time.   Skin:   Abrasion to forehead and left chin area  Candidal appearing rash underneath breasts and abdominal fold  Ulcerations and cellulitis to feet   Psychiatric: She has a normal mood and affect. Her behavior is normal.   Nursing note and vitals reviewed.      Significant Labs: All pertinent labs within the past 24 hours have been reviewed.    Significant Imaging: I have reviewed all pertinent imaging results/findings within the past 24 hours.

## 2018-02-15 NOTE — ED NOTES
REAL spoke with Vee Sheldon (EPS), Mrs Sheldon informed SW that if it is deemed necessary for EPS to obtain a protective order, patient must first be seen by psychiatry first.

## 2018-02-15 NOTE — PROGRESS NOTES
[facility] -   Cardiology  Progress Note    Patient Name: Kelly Mariano  MRN: [id number]  Admission Date: 2/9/2018  Hospital Length of Stay: 6 days  Code Status: Full Code   Attending Physician: Odilon Subramanian MD   Primary Care Physician: Primary Doctor No  Expected Discharge Date:   Principal Problem:Bilateral cellulitis of lower leg    Subjective:   HPI:  Patient is a 67 y.o. female presents with LE cellulitis and CHF.  66 y/o fmelae with no significant Pmhx with significant cellulitis of BLE. Pt also with mild CHF and echo shwoing EF10-15%. EKG is wnl limits.    Hospital Course:   2/11/18-Patient seen and examined today, resting in bed. States she feels ok, still weak and fatigued. Complains of SOB. No chest pain or tightness. BC +Staph. Labs reviewed. K low at 3.1. Creatinine stable.     2/12/18 Pt hemodynamically stable on Toprol and Lisinopril. Will need biopsy of breast mass. Will discuss stress once infection treated and pt is diuresed.     2/13/18- Patient remains hemodynamically stable. No reports of chest pain or SOB. Patient is right post thoracentesis on 2/11/18. 1500cc drained.  Noted to have EF of 10-15% on echo. Has right breast mass that needs biopsy. Remains of IV abx for extensive lower extremity cellulitis.     2/14/18- Patient with noted to have Bibasilar infiltrates and effusions on repeat CXR. Pulmonology started Diamox and stopped Lasix. Still awaiting breast biopsy results. BP stable . Had hypoxia this morning and is confused. No family at bedside     2/15/18-Patient seen and examined today, sitting up in bed. Still confused. BP remains stable. Labs reviewed, K slightly low at 3.4. Biopsy results still pending.         Review of Systems   Unable to perform ROS: mental status change     Objective:     Vital Signs (Most Recent):  Temp: 98.4 °F (36.9 °C) (02/15/18 0322)  Pulse: 96 (02/15/18 0750)  Resp: 16 (02/15/18 0750)  BP: 124/64 (02/15/18 0750)  SpO2: (!) 92 % (02/15/18 0806)  Vital Signs (24h Range):  Temp:  [98.4 °F (36.9 °C)-99 °F (37.2 °C)] 98.4 °F (36.9 °C)  Pulse:  [] 96  Resp:  [16-22] 16  SpO2:  [84 %-95 %] 92 %  BP: (112-143)/(55-92) 124/64     Weight: 63.1 kg (139 lb 1.8 oz)  Body mass index is 23.88 kg/m².     SpO2: (!) 92 %  O2 Device (Oxygen Therapy): nasal cannula      Intake/Output Summary (Last 24 hours) at 02/15/18 1313  Last data filed at 02/15/18 0200   Gross per 24 hour   Intake                0 ml   Output             1950 ml   Net            -1950 ml       Lines/Drains/Airways     Peripherally Inserted Central Catheter Line                 PICC Double Lumen 02/10/18 1524 right basilic 4 days          Drain                 Urethral Catheter 02/09/18 1045 Latex 16 Fr. 6 days                Physical Exam   Constitutional: No distress.   Appears disheveled, frail   HENT:   Head: Normocephalic and atraumatic.   Eyes: Pupils are equal, round, and reactive to light. Right eye exhibits no discharge.   Neck: Neck supple. JVD present.   Cardiovascular: Normal rate, regular rhythm, S2 normal and normal heart sounds.  Exam reveals no gallop and no friction rub.    No murmur heard.  Pulmonary/Chest: Effort normal. No respiratory distress. She has no wheezes.   Diminished at bases   Abdominal: Soft. Bowel sounds are normal. She exhibits distension. There is no tenderness. There is no rebound and no guarding.   +Truncal edema   Neurological:   Confused   Skin: Skin is warm. She is not diaphoretic.   BLE wrapped and dressed   Nursing note and vitals reviewed.      Significant Labs:   CMP   Recent Labs  Lab 02/14/18  0443 02/15/18  0524    138   K 3.4* 3.4*   CL 88* 91*   CO2 45* 39*   GLU 70 85   BUN 11 14   CREATININE 0.5 0.6   CALCIUM 8.3* 8.8   PROT 5.6* 6.0   ALBUMIN 2.4* 2.6*   BILITOT 0.9 0.9   ALKPHOS 60 68   AST 20 20   ALT 9* 10   ANIONGAP 8 8   ESTGFRAFRICA >60 >60   EGFRNONAA >60 >60   , CBC   Recent Labs  Lab 02/14/18  0443 02/15/18  0524   WBC 8.48 10.36    HGB 12.5 13.0   HCT 41.5 41.6   * 122*   , Troponin No results for input(s): TROPONINI in the last 48 hours. and All pertinent lab results from the last 24 hours have been reviewed.    Significant Imaging: Echocardiogram:   2D echo with color flow doppler:   Results for orders placed or performed during the hospital encounter of 02/09/18   2D echo with color flow doppler   Result Value Ref Range    EF 10 (A) 55 - 65    Mitral Valve Regurgitation MILD TO MODERATE     Diastolic Dysfunction Yes (A)     Est. PA Systolic Pressure 24.81     Tricuspid Valve Regurgitation MILD TO MODERATE     and X-Ray: CXR: X-Ray Chest 1 View (CXR):   Results for orders placed or performed during the hospital encounter of 02/09/18   X-Ray Chest 1 View    Narrative    One-view chest x-ray    Clinical History: Rectal effusion; recent thoracentesis    Finding: Comparison was made to prior examination performed at 5:25 AM on 2/11/2018. There's been interval development of a tiny right apical pneumothorax. There is moderate cardiomegaly. There is a mild amount of interstitial and alveolar opacities seen in both lungs. There is opacification of the costophrenic angles bilaterally. A right PICC remains in place. There is partial visualization of anterior spinal fusion hardware in the cervical spine.    Impression        1. There's been interval development of a tiny right apical pneumothorax. This is consistent with the patient's recent thoracentesis.  2. The findings are characteristic of moderate cardiomegaly with mild bilateral pulmonary edema.  3. There is opacification of the costophrenic angles bilaterally. These are characteristic of tiny pleural effusions.  4. Surgical changes      Electronically signed by: ASHWIN RAYMOND MD  Date:     02/11/18  Time:    16:28     and X-Ray Chest PA and Lateral (CXR): No results found for this visit on 02/09/18.    Assessment and Plan:   Patient who presents with cardiomyopathy and decompensated CHF  as well as bacteremia and elevated troponin. Remains stable CV wise. Continue current mgmt. Remains confused, mgmt as per primary team.     * Bilateral cellulitis of lower leg    -Mgmt as per primary team  -Continue abx        Altered mental status    -Remains confused   -Mgmt as per primary team, unclear etiology        Acute on chronic respiratory failure with hypoxia and hypercapnia    Pulmonology on board         Elevated troponin    -Likely secondary to demand ischemia from bacteremia, cellulitis, and volume overload  -Add ASA 81 mg daily  -See plan under CHF        Acute combined systolic and diastolic heart failure    -Unclear etiology  -Continue Metoprololmg   - Lasix on hold at this time and started on diamox   - Continue  ACEI   -Will need ischemic workup-LHC vs stress test once infection issues improve and she is euvolemic        Pleural effusion on right    -S/p thoracentesis on 2/11/18- 1500 cc  -Diuretics on hold and started on Diamox               VTE Risk Mitigation         Ordered     heparin (porcine) injection 5,000 Units  Every 8 hours     Route:  Subcutaneous        02/09/18 1749     Medium Risk of VTE  Once      02/09/18 1300          Lorraine Chung PA-C  Cardiology  [facility]    Chart reviewed. Dr. Martinez examined patient and agrees with plan as outlined above.

## 2018-02-15 NOTE — NURSING
Went to give patient morning meds, patient was Awake and alert. Neuro checks done. Pt was able to state name and date of birth clearly. Still unaware of situation. Will continue to monitor

## 2018-02-15 NOTE — PLAN OF CARE
Problem: Fall Risk (Adult)  Goal: Identify Related Risk Factors and Signs and Symptoms  Related risk factors and signs and symptoms are identified upon initiation of Human Response Clinical Practice Guideline (CPG)   Reviewed plan of care with patient, said ok. Pt responds ok, yeah, no but nothing else. Patient turned every 2 hours. Meds crushed and placed in applesauce, pt wouldn't swallow meds. Refused BiPap. No falls, injuries this shift. Bed in lowest position, call light within reach, will continue to monitor.

## 2018-02-15 NOTE — ASSESSMENT & PLAN NOTE
Patient has a large mass involving her right breast highly suggestive of malignancy with his cramping right arm swelling and cellulitis on anterior chest wall possible secondary to metastatic disease would recommend a biopsy of her right breast to obtain adequate tissue for diagnosis including ER/[place] and HER-2/jaclyn status which should direct therapy.  2/12/18 awaiting tissue diagnosis either through general surgery or interventional radiology to determine diagnosis for treatment recommendations for ER/W[place] and HER-2/jaclyn status discussed with hospital medicine course of action.  2/13/18 awaiting results of thoracentesis will need ER/WY HER-2/jaclyn status would recommend needle biopsy of breast with is a higher likelihood of making diagnosis as well as obtaining ER/WY and HER-2 status if she is ER/WY positive kidney treated with hormonal therapy either by mouth or by an injection on a monthly basis with 50-60% response rates in a non-curative setting; awaiting results of thoracentesis and/or breast biopsy review with her diagnostic approach as well as therapeutic options.  2/15/18 still awaiting pathology report to discuss treatment recommendations with patient and any family present

## 2018-02-15 NOTE — PLAN OF CARE
"Problem: Patient Care Overview  Goal: Plan of Care Review  Outcome: Ongoing (interventions implemented as appropriate)  Pt stable. Pt is NSR on the heart monitor.  Pt remains confused, only saying "yea", "right" and "okay" repeatedly; pt unable to swallow morning meds, pt kept playing with pill in mouth, meds crushed and mixed with food/water.  Pt ate meals with assistance from staff with no problem.  IV mag and potassium given this shift.  Pt very excoriated and yeasty in vaginal/groin area, powder and IVPB fluconazole ordered.  Labia noted to be red, swollen and hard  Moris SEVERINO notified.  Blood sugar monitored and stable.  Turcios intact draining yellow urine.  Pt had CT head, chest and pelvis this shift.  Pt was free from falls or injuries during duration of shift.  Pt turned and repositioned with use of wedge, mepilex applied to sacrum.  PICC intact with no redness, swelling or drainage.  Bed low, wheels locked, bed alarm on, call light in reach.  Pt instructed to call for assistance.   Plan of care reviewed.  Pt verbalizes understanding, but true understanding is doubtful. EPS arrived today and spoke with this RN and SW.      "

## 2018-02-15 NOTE — SUBJECTIVE & OBJECTIVE
Review of Systems   Unable to perform ROS: mental status change     Objective:     Vital Signs (Most Recent):  Temp: 98.4 °F (36.9 °C) (02/15/18 0322)  Pulse: 96 (02/15/18 0750)  Resp: 16 (02/15/18 0750)  BP: 124/64 (02/15/18 0750)  SpO2: (!) 92 % (02/15/18 0806) Vital Signs (24h Range):  Temp:  [98.4 °F (36.9 °C)-99 °F (37.2 °C)] 98.4 °F (36.9 °C)  Pulse:  [] 96  Resp:  [16-22] 16  SpO2:  [84 %-95 %] 92 %  BP: (112-143)/(55-92) 124/64     Weight: 63.1 kg (139 lb 1.8 oz)  Body mass index is 23.88 kg/m².     SpO2: (!) 92 %  O2 Device (Oxygen Therapy): nasal cannula      Intake/Output Summary (Last 24 hours) at 02/15/18 1313  Last data filed at 02/15/18 0200   Gross per 24 hour   Intake                0 ml   Output             1950 ml   Net            -1950 ml       Lines/Drains/Airways     Peripherally Inserted Central Catheter Line                 PICC Double Lumen 02/10/18 1524 right basilic 4 days          Drain                 Urethral Catheter 02/09/18 1045 Latex 16 Fr. 6 days                Physical Exam   Constitutional: No distress.   Appears disheveled, frail   HENT:   Head: Normocephalic and atraumatic.   Eyes: Pupils are equal, round, and reactive to light. Right eye exhibits no discharge.   Neck: Neck supple. JVD present.   Cardiovascular: Normal rate, regular rhythm, S2 normal and normal heart sounds.  Exam reveals no gallop and no friction rub.    No murmur heard.  Pulmonary/Chest: Effort normal. No respiratory distress. She has no wheezes.   Diminished at bases   Abdominal: Soft. Bowel sounds are normal. She exhibits distension. There is no tenderness. There is no rebound and no guarding.   +Truncal edema   Neurological:   Confused   Skin: Skin is warm. She is not diaphoretic.   BLE wrapped and dressed   Nursing note and vitals reviewed.      Significant Labs:   CMP   Recent Labs  Lab 02/14/18  0443 02/15/18  0524    138   K 3.4* 3.4*   CL 88* 91*   CO2 45* 39*   GLU 70 85   BUN 11 14    CREATININE 0.5 0.6   CALCIUM 8.3* 8.8   PROT 5.6* 6.0   ALBUMIN 2.4* 2.6*   BILITOT 0.9 0.9   ALKPHOS 60 68   AST 20 20   ALT 9* 10   ANIONGAP 8 8   ESTGFRAFRICA >60 >60   EGFRNONAA >60 >60   , CBC   Recent Labs  Lab 02/14/18  0443 02/15/18  0524   WBC 8.48 10.36   HGB 12.5 13.0   HCT 41.5 41.6   * 122*   , Troponin No results for input(s): TROPONINI in the last 48 hours. and All pertinent lab results from the last 24 hours have been reviewed.    Significant Imaging: Echocardiogram:   2D echo with color flow doppler:   Results for orders placed or performed during the hospital encounter of 02/09/18   2D echo with color flow doppler   Result Value Ref Range    EF 10 (A) 55 - 65    Mitral Valve Regurgitation MILD TO MODERATE     Diastolic Dysfunction Yes (A)     Est. PA Systolic Pressure 24.81     Tricuspid Valve Regurgitation MILD TO MODERATE     and X-Ray: CXR: X-Ray Chest 1 View (CXR):   Results for orders placed or performed during the hospital encounter of 02/09/18   X-Ray Chest 1 View    Narrative    One-view chest x-ray    Clinical History: Rectal effusion; recent thoracentesis    Finding: Comparison was made to prior examination performed at 5:25 AM on 2/11/2018. There's been interval development of a tiny right apical pneumothorax. There is moderate cardiomegaly. There is a mild amount of interstitial and alveolar opacities seen in both lungs. There is opacification of the costophrenic angles bilaterally. A right PICC remains in place. There is partial visualization of anterior spinal fusion hardware in the cervical spine.    Impression        1. There's been interval development of a tiny right apical pneumothorax. This is consistent with the patient's recent thoracentesis.  2. The findings are characteristic of moderate cardiomegaly with mild bilateral pulmonary edema.  3. There is opacification of the costophrenic angles bilaterally. These are characteristic of tiny pleural effusions.  4. Surgical  changes      Electronically signed by: ASHWIN RAYMOND MD  Date:     02/11/18  Time:    16:28     and X-Ray Chest PA and Lateral (CXR): No results found for this visit on 02/09/18.

## 2018-02-15 NOTE — PLAN OF CARE
Problem: Patient Care Overview  Goal: Plan of Care Review  Outcome: Ongoing (interventions implemented as appropriate)  Patient remains free of falls and safety precautions maintained. Afebrile. Pain controlled. Dressings to bilateral legs intact. Turning every 2 hours with the use of wedge. Will continue to monitor. 12 hour chart.

## 2018-02-15 NOTE — SUBJECTIVE & OBJECTIVE
Interval History: Still awaiting pathology report to discuss treatment recommendations Oncology Treatment Plan:   [No treatment plan]    Medications:  Continuous Infusions:  Scheduled Meds:   acetaZOLAMIDE  250 mg Oral TID    fluconazole (DIFLUCAN) IVPB  200 mg Intravenous Q24H    folic acid-vit B6-vit B12 2.5-25-2 mg  1 tablet Oral Daily    heparin (porcine)  5,000 Units Subcutaneous Q8H    lisinopril  5 mg Oral Daily    metoprolol succinate  50 mg Oral Daily    miconazole NITRATE 2 %   Topical (Top) BID    multivitamin  1 tablet Oral Daily    thiamine (VITAMIN B1) IVPB  100 mg Intravenous Daily    vancomycin 1 g in 0.9% sodium chloride 250 mL IVPB (ready to mix system)  1 g Intravenous Q18H     PRN Meds:     Review of Systems   Constitutional: Positive for activity change and fatigue.   Neurological: Positive for weakness.   Psychiatric/Behavioral: Positive for dysphoric mood. The patient is nervous/anxious.      Objective:     Vital Signs (Most Recent):  Temp: 98.4 °F (36.9 °C) (02/15/18 0322)  Pulse: 96 (02/15/18 0322)  Resp: 18 (02/15/18 0322)  BP: (!) 143/68 (02/15/18 0322)  SpO2: (!) 84 % (02/15/18 0322) Vital Signs (24h Range):  Temp:  [97.8 °F (36.6 °C)-99 °F (37.2 °C)] 98.4 °F (36.9 °C)  Pulse:  [] 96  Resp:  [18-22] 18  SpO2:  [84 %-97 %] 84 %  BP: (106-143)/(55-92) 143/68     Weight: 63.1 kg (139 lb 1.8 oz)  Body mass index is 23.88 kg/m².  Body surface area is 1.69 meters squared.      Intake/Output Summary (Last 24 hours) at 02/15/18 0637  Last data filed at 02/15/18 0200   Gross per 24 hour   Intake                0 ml   Output             1950 ml   Net            -1950 ml       Physical Exam   Constitutional: She has a sickly appearance. She appears ill. She appears distressed.       Significant Labs:   BMP:   Recent Labs  Lab 02/13/18  1610 02/14/18  0443   GLU  --  70   NA  --  141   K  --  3.4*   CL  --  88*   CO2  --  45*   BUN  --  11   CREATININE  --  0.5   CALCIUM  --  8.3*    MG 1.4*  --    , CBC:   Recent Labs  Lab 02/14/18  0443   WBC 8.48   HGB 12.5   HCT 41.5   *    and CMP:   Recent Labs  Lab 02/14/18  0443      K 3.4*   CL 88*   CO2 45*   GLU 70   BUN 11   CREATININE 0.5   CALCIUM 8.3*   PROT 5.6*   ALBUMIN 2.4*   BILITOT 0.9   ALKPHOS 60   AST 20   ALT 9*   ANIONGAP 8   EGFRNONAA >60       Diagnostic Results:  I have reviewed and interpreted all pertinent imaging results/findings within the past 24 hours.

## 2018-02-15 NOTE — PROGRESS NOTES
[facility] -   Hematology/Oncology  Progress Note    Patient Name: Kelly Mariano  Admission Date: 2/9/2018  Hospital Length of Stay: 6 days  Code Status: Full Code     Subjective:     HPI:  67-year-old female admitted to the hospital for cellulitis was found have a large mass involving her right breast also asked see the patient for further evaluation    Interval History: Still awaiting pathology report to discuss treatment recommendations Oncology Treatment Plan:   [No treatment plan]    Medications:  Continuous Infusions:  Scheduled Meds:   acetaZOLAMIDE  250 mg Oral TID    fluconazole (DIFLUCAN) IVPB  200 mg Intravenous Q24H    folic acid-vit B6-vit B12 2.5-25-2 mg  1 tablet Oral Daily    heparin (porcine)  5,000 Units Subcutaneous Q8H    lisinopril  5 mg Oral Daily    metoprolol succinate  50 mg Oral Daily    miconazole NITRATE 2 %   Topical (Top) BID    multivitamin  1 tablet Oral Daily    thiamine (VITAMIN B1) IVPB  100 mg Intravenous Daily    vancomycin 1 g in 0.9% sodium chloride 250 mL IVPB (ready to mix system)  1 g Intravenous Q18H     PRN Meds:     Review of Systems   Constitutional: Positive for activity change and fatigue.   Neurological: Positive for weakness.   Psychiatric/Behavioral: Positive for dysphoric mood. The patient is nervous/anxious.      Objective:     Vital Signs (Most Recent):  Temp: 98.4 °F (36.9 °C) (02/15/18 0322)  Pulse: 96 (02/15/18 0322)  Resp: 18 (02/15/18 0322)  BP: (!) 143/68 (02/15/18 0322)  SpO2: (!) 84 % (02/15/18 0322) Vital Signs (24h Range):  Temp:  [97.8 °F (36.6 °C)-99 °F (37.2 °C)] 98.4 °F (36.9 °C)  Pulse:  [] 96  Resp:  [18-22] 18  SpO2:  [84 %-97 %] 84 %  BP: (106-143)/(55-92) 143/68     Weight: 63.1 kg (139 lb 1.8 oz)  Body mass index is 23.88 kg/m².  Body surface area is 1.69 meters squared.      Intake/Output Summary (Last 24 hours) at 02/15/18 0637  Last data filed at 02/15/18 0200   Gross per 24 hour   Intake                0 ml    Output             1950 ml   Net            -1950 ml       Physical Exam   Constitutional: She has a sickly appearance. She appears ill. She appears distressed.       Significant Labs:   BMP:   Recent Labs  Lab 02/13/18  1610 02/14/18  0443   GLU  --  70   NA  --  141   K  --  3.4*   CL  --  88*   CO2  --  45*   BUN  --  11   CREATININE  --  0.5   CALCIUM  --  8.3*   MG 1.4*  --    , CBC:   Recent Labs  Lab 02/14/18  0443   WBC 8.48   HGB 12.5   HCT 41.5   *    and CMP:   Recent Labs  Lab 02/14/18  0443      K 3.4*   CL 88*   CO2 45*   GLU 70   BUN 11   CREATININE 0.5   CALCIUM 8.3*   PROT 5.6*   ALBUMIN 2.4*   BILITOT 0.9   ALKPHOS 60   AST 20   ALT 9*   ANIONGAP 8   EGFRNONAA >60       Diagnostic Results:  I have reviewed and interpreted all pertinent imaging results/findings within the past 24 hours.    Assessment/Plan:     Mass of breast, right    Patient has a large mass involving her right breast highly suggestive of malignancy with his cramping right arm swelling and cellulitis on anterior chest wall possible secondary to metastatic disease would recommend a biopsy of her right breast to obtain adequate tissue for diagnosis including ER/NM and HER-2/jaclyn status which should direct therapy.  2/12/18 awaiting tissue diagnosis either through general surgery or interventional radiology to determine diagnosis for treatment recommendations for ER/NM and HER-2/jaclyn status discussed with hospital medicine course of action.  2/13/18 awaiting results of thoracentesis will need ER/NM HER-2/jaclyn status would recommend needle biopsy of breast with is a higher likelihood of making diagnosis as well as obtaining ER/NM and HER-2 status if she is ER/NM positive kidney treated with hormonal therapy either by mouth or by an injection on a monthly basis with 50-60% response rates in a non-curative setting; awaiting results of thoracentesis and/or breast biopsy review with her diagnostic approach as well as  therapeutic options.  2/15/18 still awaiting pathology report to discuss treatment recommendations with patient and any family present            Thank you for your consult. I will follow-up with patient. Please contact us if you have any additional questions.     Alberto Purvis MD  Hematology/Oncology  [facility]

## 2018-02-16 LAB
ALBUMIN SERPL BCP-MCNC: 2.8 G/DL
ALLENS TEST: ABNORMAL
ALP SERPL-CCNC: 71 U/L
ALT SERPL W/O P-5'-P-CCNC: 11 U/L
ANION GAP SERPL CALC-SCNC: 8 MMOL/L
AST SERPL-CCNC: 24 U/L
BASOPHILS # BLD AUTO: 0.01 K/UL
BASOPHILS NFR BLD: 0.1 %
BILIRUB SERPL-MCNC: 1.3 MG/DL
BUN SERPL-MCNC: 15 MG/DL
CALCIUM SERPL-MCNC: 9.1 MG/DL
CHLORIDE SERPL-SCNC: 94 MMOL/L
CO2 SERPL-SCNC: 36 MMOL/L
CREAT SERPL-MCNC: 0.6 MG/DL
DELSYS: ABNORMAL
DIFFERENTIAL METHOD: ABNORMAL
EOSINOPHIL # BLD AUTO: 0 K/UL
EOSINOPHIL NFR BLD: 0.4 %
ERYTHROCYTE [DISTWIDTH] IN BLOOD BY AUTOMATED COUNT: 17.6 %
ERYTHROCYTE [SEDIMENTATION RATE] IN BLOOD BY WESTERGREN METHOD: 28 MM/HR
EST. GFR  (AFRICAN AMERICAN): >60 ML/MIN/1.73 M^2
EST. GFR  (NON AFRICAN AMERICAN): >60 ML/MIN/1.73 M^2
FLOW: 2
GLUCOSE SERPL-MCNC: 81 MG/DL
HCO3 UR-SCNC: 37 MMOL/L (ref 24–28)
HCT VFR BLD AUTO: 45.3 %
HGB BLD-MCNC: 14 G/DL
LYMPHOCYTES # BLD AUTO: 0.9 K/UL
LYMPHOCYTES NFR BLD: 9.2 %
MAGNESIUM SERPL-MCNC: 1.8 MG/DL
MCH RBC QN AUTO: 28.4 PG
MCHC RBC AUTO-ENTMCNC: 30.9 G/DL
MCV RBC AUTO: 92 FL
MODE: ABNORMAL
MONOCYTES # BLD AUTO: 0.5 K/UL
MONOCYTES NFR BLD: 5.2 %
NEUTROPHILS # BLD AUTO: 8.1 K/UL
NEUTROPHILS NFR BLD: 85.1 %
PCO2 BLDA: 60.5 MMHG (ref 35–45)
PH SMN: 7.39 [PH] (ref 7.35–7.45)
PLATELET # BLD AUTO: 128 K/UL
PMV BLD AUTO: 11.5 FL
PO2 BLDA: 85 MMHG (ref 80–100)
POC BE: 12 MMOL/L
POC SATURATED O2: 96 % (ref 95–100)
POCT GLUCOSE: 117 MG/DL (ref 70–110)
POCT GLUCOSE: 170 MG/DL (ref 70–110)
POTASSIUM SERPL-SCNC: 4 MMOL/L
PROT SERPL-MCNC: 6.4 G/DL
RBC # BLD AUTO: 4.93 M/UL
RHEUMATOID FACT SERPL-ACNC: <10 IU/ML
SAMPLE: ABNORMAL
SITE: ABNORMAL
SODIUM SERPL-SCNC: 138 MMOL/L
T3 SERPL-MCNC: 40 NG/DL
T4 SERPL-MCNC: 4.8 UG/DL
TSH SERPL DL<=0.005 MIU/L-ACNC: 2.18 UIU/ML
VANCOMYCIN TROUGH SERPL-MCNC: 16.8 UG/ML
WBC # BLD AUTO: 9.54 K/UL

## 2018-02-16 PROCEDURE — 99233 SBSQ HOSP IP/OBS HIGH 50: CPT | Mod: ,,, | Performed by: INTERNAL MEDICINE

## 2018-02-16 PROCEDURE — 96372 THER/PROPH/DIAG INJ SC/IM: CPT

## 2018-02-16 PROCEDURE — 86038 ANTINUCLEAR ANTIBODIES: CPT

## 2018-02-16 PROCEDURE — 82607 VITAMIN B-12: CPT

## 2018-02-16 PROCEDURE — 85025 COMPLETE CBC W/AUTO DIFF WBC: CPT

## 2018-02-16 PROCEDURE — 84436 ASSAY OF TOTAL THYROXINE: CPT

## 2018-02-16 PROCEDURE — 82803 BLOOD GASES ANY COMBINATION: CPT

## 2018-02-16 PROCEDURE — 99231 SBSQ HOSP IP/OBS SF/LOW 25: CPT | Mod: ,,, | Performed by: INTERNAL MEDICINE

## 2018-02-16 PROCEDURE — 86431 RHEUMATOID FACTOR QUANT: CPT

## 2018-02-16 PROCEDURE — 25000003 PHARM REV CODE 250: Performed by: NURSE PRACTITIONER

## 2018-02-16 PROCEDURE — 80053 COMPREHEN METABOLIC PANEL: CPT

## 2018-02-16 PROCEDURE — 25000003 PHARM REV CODE 250: Performed by: FAMILY MEDICINE

## 2018-02-16 PROCEDURE — S0171 BUMETANIDE 0.5 MG: HCPCS | Performed by: INTERNAL MEDICINE

## 2018-02-16 PROCEDURE — 27000221 HC OXYGEN, UP TO 24 HOURS

## 2018-02-16 PROCEDURE — 63600175 PHARM REV CODE 636 W HCPCS: Performed by: FAMILY MEDICINE

## 2018-02-16 PROCEDURE — 63600175 PHARM REV CODE 636 W HCPCS: Performed by: NURSE PRACTITIONER

## 2018-02-16 PROCEDURE — 84425 ASSAY OF VITAMIN B-1: CPT

## 2018-02-16 PROCEDURE — 83735 ASSAY OF MAGNESIUM: CPT

## 2018-02-16 PROCEDURE — 36600 WITHDRAWAL OF ARTERIAL BLOOD: CPT

## 2018-02-16 PROCEDURE — 84480 ASSAY TRIIODOTHYRONINE (T3): CPT

## 2018-02-16 PROCEDURE — 25000003 PHARM REV CODE 250: Performed by: INTERNAL MEDICINE

## 2018-02-16 PROCEDURE — 84443 ASSAY THYROID STIM HORMONE: CPT

## 2018-02-16 PROCEDURE — 99900035 HC TECH TIME PER 15 MIN (STAT)

## 2018-02-16 PROCEDURE — 85651 RBC SED RATE NONAUTOMATED: CPT

## 2018-02-16 PROCEDURE — 80202 ASSAY OF VANCOMYCIN: CPT

## 2018-02-16 PROCEDURE — 82164 ANGIOTENSIN I ENZYME TEST: CPT

## 2018-02-16 PROCEDURE — 21400001 HC TELEMETRY ROOM

## 2018-02-16 RX ORDER — ACETAMINOPHEN 325 MG/1
650 TABLET ORAL EVERY 6 HOURS PRN
Status: DISCONTINUED | OUTPATIENT
Start: 2018-02-16 | End: 2018-02-26 | Stop reason: HOSPADM

## 2018-02-16 RX ORDER — BUMETANIDE 0.25 MG/ML
1 INJECTION INTRAMUSCULAR; INTRAVENOUS 2 TIMES DAILY
Status: DISCONTINUED | OUTPATIENT
Start: 2018-02-16 | End: 2018-02-18

## 2018-02-16 RX ADMIN — THERA TABS 1 TABLET: TAB at 08:02

## 2018-02-16 RX ADMIN — ACETAZOLAMIDE 250 MG: 250 TABLET ORAL at 01:02

## 2018-02-16 RX ADMIN — FLUCONAZOLE 200 MG: 2 INJECTION INTRAVENOUS at 02:02

## 2018-02-16 RX ADMIN — HEPARIN SODIUM 5000 UNITS: 5000 INJECTION, SOLUTION INTRAVENOUS; SUBCUTANEOUS at 09:02

## 2018-02-16 RX ADMIN — LISINOPRIL 5 MG: 5 TABLET ORAL at 08:02

## 2018-02-16 RX ADMIN — VANCOMYCIN HYDROCHLORIDE 1 G: 1 INJECTION, POWDER, LYOPHILIZED, FOR SOLUTION INTRAVENOUS at 08:02

## 2018-02-16 RX ADMIN — ANTI-FUNGAL POWDER MICONAZOLE NITRATE TALC FREE: 1.42 POWDER TOPICAL at 09:02

## 2018-02-16 RX ADMIN — BUMETANIDE 1 MG: 0.25 INJECTION INTRAMUSCULAR; INTRAVENOUS at 09:02

## 2018-02-16 RX ADMIN — Medication 1 TABLET: at 08:02

## 2018-02-16 RX ADMIN — ACETAMINOPHEN 650 MG: 325 TABLET, FILM COATED ORAL at 03:02

## 2018-02-16 RX ADMIN — THIAMINE HYDROCHLORIDE 100 MG: 100 INJECTION, SOLUTION INTRAMUSCULAR; INTRAVENOUS at 08:02

## 2018-02-16 RX ADMIN — METOPROLOL SUCCINATE 50 MG: 50 TABLET, EXTENDED RELEASE ORAL at 08:02

## 2018-02-16 RX ADMIN — ANTI-FUNGAL POWDER MICONAZOLE NITRATE TALC FREE: 1.42 POWDER TOPICAL at 08:02

## 2018-02-16 RX ADMIN — HEPARIN SODIUM 5000 UNITS: 5000 INJECTION, SOLUTION INTRAVENOUS; SUBCUTANEOUS at 01:02

## 2018-02-16 RX ADMIN — ACETAZOLAMIDE 250 MG: 250 TABLET ORAL at 06:02

## 2018-02-16 RX ADMIN — HEPARIN SODIUM 5000 UNITS: 5000 INJECTION, SOLUTION INTRAVENOUS; SUBCUTANEOUS at 06:02

## 2018-02-16 NOTE — PROGRESS NOTES
"Follow up visit with Ms. Mariano for compression wraps to BLE. BLE compression wraps removed and legs cleansed with easi cleanse foam wipes.  BLE no edema noted. Mild redness.  Multiple eschar covered round wounds again noted, remain fixed. Some now appear to be moles after softening and removal of crust.   Open ulcer to right lateral lower shin.  Covered with aquacel AG and nonbordered foam.  AttracTain cream applied to BLE dry flaky scaly skin.  Dressings secured with Tubigrip compression stocking. BLE then wrapped with cast padding and 4" Coban for compression.  Will continue with wound care per orders.  "

## 2018-02-16 NOTE — ASSESSMENT & PLAN NOTE
Patient has a large mass involving her right breast highly suggestive of malignancy with his cramping right arm swelling and cellulitis on anterior chest wall possible secondary to metastatic disease would recommend a biopsy of her right breast to obtain adequate tissue for diagnosis including ER/ME and HER-2/jaclyn status which should direct therapy.  2/12/18 awaiting tissue diagnosis either through general surgery or interventional radiology to determine diagnosis for treatment recommendations for ER/ME and HER-2/jaclyn status discussed with hospital medicine course of action.  2/13/18 awaiting results of thoracentesis will need ER/ME HER-2/jaclyn status would recommend needle biopsy of breast with is a higher likelihood of making diagnosis as well as obtaining ER/ME and HER-2 status if she is ER/ME positive kidney treated with hormonal therapy either by mouth or by an injection on a monthly basis with 50-60% response rates in a non-curative setting; awaiting results of thoracentesis and/or breast biopsy review with her diagnostic approach as well as therapeutic options.  2/15/18 still awaiting pathology report to discuss treatment recommendations with patient and any family present.  2/16/18 results of thoracentesis negative for malignancy awaiting results of biopsy of breast discussed with her treatment recommendations and options patient notified of this at bedside is morning
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
## 2018-02-16 NOTE — SUBJECTIVE & OBJECTIVE
Review of Systems   Unable to perform ROS: Acuity of condition     Objective:     Vital Signs (Most Recent):  Temp: 98.5 °F (36.9 °C) (02/15/18 1400)  Pulse: 92 (02/15/18 1400)  Resp: 18 (02/15/18 1400)  BP: 122/67 (02/15/18 1400)  SpO2: (!) 94 % (02/15/18 1400) Vital Signs (24h Range):  Temp:  [98.4 °F (36.9 °C)-99 °F (37.2 °C)] 98.5 °F (36.9 °C)  Pulse:  [] 92  Resp:  [16-20] 18  SpO2:  [84 %-94 %] 94 %  BP: (122-143)/(64-92) 122/67     Weight: 63.1 kg (139 lb 1.8 oz)  Body mass index is 23.88 kg/m².    Intake/Output Summary (Last 24 hours) at 02/15/18 2157  Last data filed at 02/15/18 1900   Gross per 24 hour   Intake                0 ml   Output             1600 ml   Net            -1600 ml      Physical Exam   Constitutional: She appears well-developed.  Non-toxic appearance.   Unkempt  female - appears stated age   HENT:   Head: Normocephalic and atraumatic.       Mouth/Throat: Mucous membranes are dry.   Dry tongue and oral MM   Eyes: Conjunctivae are normal.   Neck: Normal range of motion. Neck supple.   Cardiovascular: Regular rhythm.  Tachycardia present.    Pulmonary/Chest: Effort normal. She has rales.   Abdominal: Soft. Bowel sounds are normal. She exhibits distension and mass. There is no tenderness. There is no rebound and no guarding.   Silvana-umbilical 2cm   Musculoskeletal: She exhibits edema (+2/+3 pitting edema to BLE).   Generalized weakness   Neurological:   Patient able to answer some questions with yes/no , not always appropriate, able to name banana and say thank you   Skin: Capillary refill takes less than 2 seconds.   Abrasion to forehead and left chin area  Candidal appearing rash underneath breasts and abdominal folds and peroneum, chaperon present,   Ulcerations and cellulitis to feet  Breast mass - right, 12 cm   Nursing note and vitals reviewed.      Significant Labs: All pertinent labs within the past 24 hours have been reviewed.    Significant Imaging: I have  reviewed and interpreted all pertinent imaging results/findings within the past 24 hours.

## 2018-02-16 NOTE — SUBJECTIVE & OBJECTIVE
Interval History: Patient is more alert this morning I was able to converse with her told her that the results of the thoracentesis did not reveal cancer awaiting results of biopsy of breast ncology Treatment Plan:   [No treatment plan]    Medications:  Continuous Infusions:  Scheduled Meds:   acetaZOLAMIDE  250 mg Oral TID    fluconazole (DIFLUCAN) IVPB  200 mg Intravenous Q24H    folic acid-vit B6-vit B12 2.5-25-2 mg  1 tablet Oral Daily    heparin (porcine)  5,000 Units Subcutaneous Q8H    lisinopril  5 mg Oral Daily    metoprolol succinate  50 mg Oral Daily    miconazole NITRATE 2 %   Topical (Top) BID    multivitamin  1 tablet Oral Daily    thiamine (VITAMIN B1) IVPB  100 mg Intravenous Daily    vancomycin 1 g in 0.9% sodium chloride 250 mL IVPB (ready to mix system)  1 g Intravenous Q18H     PRN Meds:     Review of Systems   Constitutional: Positive for activity change, appetite change and fatigue.   Neurological: Positive for weakness.   Psychiatric/Behavioral: Positive for dysphoric mood. The patient is nervous/anxious.      Objective:     Vital Signs (Most Recent):  Temp: 97.7 °F (36.5 °C) (02/16/18 0459)  Pulse: 96 (02/16/18 0459)  Resp: 18 (02/16/18 0459)  BP: 122/81 (02/16/18 0459)  SpO2: (!) 94 % (02/16/18 0459) Vital Signs (24h Range):  Temp:  [97.7 °F (36.5 °C)-98.5 °F (36.9 °C)] 97.7 °F (36.5 °C)  Pulse:  [88-96] 96  Resp:  [16-18] 18  SpO2:  [92 %-94 %] 94 %  BP: (110-130)/(62-81) 122/81     Weight: 63.1 kg (139 lb 1.8 oz)  Body mass index is 23.88 kg/m².  Body surface area is 1.69 meters squared.      Intake/Output Summary (Last 24 hours) at 02/16/18 0602  Last data filed at 02/16/18 0500   Gross per 24 hour   Intake                0 ml   Output             1275 ml   Net            -1275 ml       Physical Exam   Constitutional: She has a sickly appearance. She appears ill. She appears distressed.   Cardiovascular: Normal rate.    Pulmonary/Chest: Effort normal.   Neurological: She is  alert.       Significant Labs:   BMP:   Recent Labs  Lab 02/15/18  0524 02/15/18  1556   GLU 85  --      --    K 3.4*  --    CL 91*  --    CO2 39*  --    BUN 14  --    CREATININE 0.6  --    CALCIUM 8.8  --    MG  --  1.9   , CBC:   Recent Labs  Lab 02/15/18  0524   WBC 10.36   HGB 13.0   HCT 41.6   *    and CMP:   Recent Labs  Lab 02/15/18  0524      K 3.4*   CL 91*   CO2 39*   GLU 85   BUN 14   CREATININE 0.6   CALCIUM 8.8   PROT 6.0   ALBUMIN 2.6*   BILITOT 0.9   ALKPHOS 68   AST 20   ALT 10   ANIONGAP 8   EGFRNONAA >60       Diagnostic Results:  I have reviewed and interpreted all pertinent imaging results/findings within the past 24 hours.

## 2018-02-16 NOTE — ASSESSMENT & PLAN NOTE
2/11 - oncology recommending biopsy of breast mass  2/12 - path specimen sent for eval of breast mass, and for ER/WI/[id number]. Case discussed with gen. Surgery and hem/onc. Will consider any additional imaging for staging while inpt.

## 2018-02-16 NOTE — PROGRESS NOTES
Spoke with Dr. Subramanian about abg results, next set cancelled. Pt to remain on nc with bipap on sb at bedside. Pt alert and tolerating nc well. Will continue to monitor.

## 2018-02-16 NOTE — PROGRESS NOTES
[facility]  Hematology/Oncology  Progress Note    Patient Name: Kelly Mariano  Admission Date: 2/9/2018  Hospital Length of Stay: 7 days  Code Status: Full Code     Subjective:     HPI:  67-year-old female admitted to the hospital for cellulitis was found have a large mass involving her right breast also asked see the patient for further evaluation    Interval History: Patient is more alert this morning I was able to converse with her told her that the results of the thoracentesis did not reveal cancer awaiting results of biopsy of breast ncology Treatment Plan:   [No treatment plan]    Medications:  Continuous Infusions:  Scheduled Meds:   acetaZOLAMIDE  250 mg Oral TID    fluconazole (DIFLUCAN) IVPB  200 mg Intravenous Q24H    folic acid-vit B6-vit B12 2.5-25-2 mg  1 tablet Oral Daily    heparin (porcine)  5,000 Units Subcutaneous Q8H    lisinopril  5 mg Oral Daily    metoprolol succinate  50 mg Oral Daily    miconazole NITRATE 2 %   Topical (Top) BID    multivitamin  1 tablet Oral Daily    thiamine (VITAMIN B1) IVPB  100 mg Intravenous Daily    vancomycin 1 g in 0.9% sodium chloride 250 mL IVPB (ready to mix system)  1 g Intravenous Q18H     PRN Meds:     Review of Systems   Constitutional: Positive for activity change, appetite change and fatigue.   Neurological: Positive for weakness.   Psychiatric/Behavioral: Positive for dysphoric mood. The patient is nervous/anxious.      Objective:     Vital Signs (Most Recent):  Temp: 97.7 °F (36.5 °C) (02/16/18 0459)  Pulse: 96 (02/16/18 0459)  Resp: 18 (02/16/18 0459)  BP: 122/81 (02/16/18 0459)  SpO2: (!) 94 % (02/16/18 0459) Vital Signs (24h Range):  Temp:  [97.7 °F (36.5 °C)-98.5 °F (36.9 °C)] 97.7 °F (36.5 °C)  Pulse:  [88-96] 96  Resp:  [16-18] 18  SpO2:  [92 %-94 %] 94 %  BP: (110-130)/(62-81) 122/81     Weight: 63.1 kg (139 lb 1.8 oz)  Body mass index is 23.88 kg/m².  Body surface area is 1.69 meters squared.      Intake/Output Summary  (Last 24 hours) at 02/16/18 0602  Last data filed at 02/16/18 0500   Gross per 24 hour   Intake                0 ml   Output             1275 ml   Net            -1275 ml       Physical Exam   Constitutional: She has a sickly appearance. She appears ill. She appears distressed.   Cardiovascular: Normal rate.    Pulmonary/Chest: Effort normal.   Neurological: She is alert.       Significant Labs:   BMP:   Recent Labs  Lab 02/15/18  0524 02/15/18  1556   GLU 85  --      --    K 3.4*  --    CL 91*  --    CO2 39*  --    BUN 14  --    CREATININE 0.6  --    CALCIUM 8.8  --    MG  --  1.9   , CBC:   Recent Labs  Lab 02/15/18  0524   WBC 10.36   HGB 13.0   HCT 41.6   *    and CMP:   Recent Labs  Lab 02/15/18  0524      K 3.4*   CL 91*   CO2 39*   GLU 85   BUN 14   CREATININE 0.6   CALCIUM 8.8   PROT 6.0   ALBUMIN 2.6*   BILITOT 0.9   ALKPHOS 68   AST 20   ALT 10   ANIONGAP 8   EGFRNONAA >60       Diagnostic Results:  I have reviewed and interpreted all pertinent imaging results/findings within the past 24 hours.    Assessment/Plan:     Mass of breast, right    Patient has a large mass involving her right breast highly suggestive of malignancy with his cramping right arm swelling and cellulitis on anterior chest wall possible secondary to metastatic disease would recommend a biopsy of her right breast to obtain adequate tissue for diagnosis including ER/MD and HER-2/jaclyn status which should direct therapy.  2/12/18 awaiting tissue diagnosis either through general surgery or interventional radiology to determine diagnosis for treatment recommendations for ER/MD and HER-2/jaclyn status discussed with hospital medicine course of action.  2/13/18 awaiting results of thoracentesis will need ER/MD HER-2/jaclyn status would recommend needle biopsy of breast with is a higher likelihood of making diagnosis as well as obtaining ER/MD and HER-2 status if she is ER/MD positive kidney treated with hormonal therapy either by  mouth or by an injection on a monthly basis with 50-60% response rates in a non-curative setting; awaiting results of thoracentesis and/or breast biopsy review with her diagnostic approach as well as therapeutic options.  2/15/18 still awaiting pathology report to discuss treatment recommendations with patient and any family present.  2/16/18 results of thoracentesis negative for malignancy awaiting results of biopsy of breast discussed with her treatment recommendations and options patient notified of this at bedside is morning             Thank you for your consult. I will follow-up with patient. Please contact us if you have any additional questions.     Alberto Purvis MD  Hematology/Oncology  [facility]

## 2018-02-16 NOTE — SUBJECTIVE & OBJECTIVE
Interval History: still very weak , unable to get out of bed    Objective:     Vital Signs (Most Recent):  Temp: 98 °F (36.7 °C) (02/16/18 1158)  Pulse: 91 (02/16/18 1158)  Resp: 18 (02/16/18 1158)  BP: 112/67 (02/16/18 1158)  SpO2: 95 % (02/16/18 1158) Vital Signs (24h Range):  Temp:  [97.7 °F (36.5 °C)-98.4 °F (36.9 °C)] 98 °F (36.7 °C)  Pulse:  [85-96] 91  Resp:  [18-20] 18  SpO2:  [94 %-96 %] 95 %  BP: (110-140)/(62-81) 112/67     Weight: 63.1 kg (139 lb 1.8 oz)  Body mass index is 23.88 kg/m².      Intake/Output Summary (Last 24 hours) at 02/16/18 1715  Last data filed at 02/16/18 1422   Gross per 24 hour   Intake              700 ml   Output             1275 ml   Net             -575 ml       Physical Exam   Constitutional: She is oriented to person, place, and time. She appears distressed.   Chronically ill appearing     HENT:   Head: Normocephalic and atraumatic.   Mouth/Throat: Oropharyngeal exudate present.   Eyes: Conjunctivae are normal. Pupils are equal, round, and reactive to light.   Neck: Neck supple. No JVD present. No tracheal deviation present. No thyromegaly present.   Cardiovascular: Normal rate.  An irregularly irregular rhythm present. PMI is displaced.  Exam reveals decreased pulses.    Murmur heard.   Systolic murmur is present with a grade of 2/6   Pulmonary/Chest: Effort normal. No respiratory distress. She has decreased breath sounds in the right lower field and the left lower field. She has no wheezes. She has rales in the right middle field, the right lower field, the left middle field and the left lower field. She exhibits no tenderness.   Abdominal: Soft. Bowel sounds are normal.   Musculoskeletal: Normal range of motion. She exhibits edema.   Lymphadenopathy:     She has no cervical adenopathy.   Neurological: She is alert and oriented to person, place, and time.   Skin: Skin is warm and dry.   Nursing note and vitals reviewed.      Vents:  Oxygen Concentration (%): 28 (02/16/18  0842)    Lines/Drains/Airways     Peripherally Inserted Central Catheter Line                 PICC Double Lumen 02/10/18 1524 right basilic 6 days          Drain                 Urethral Catheter 02/09/18 1045 Latex 16 Fr. 7 days                Significant Labs:    CBC/Anemia Profile:    Recent Labs  Lab 02/15/18  0524 02/16/18  0519   WBC 10.36 9.54   HGB 13.0 14.0   HCT 41.6 45.3   * 128*   MCV 92 92   RDW 17.4* 17.6*        Chemistries:    Recent Labs  Lab 02/15/18  0524 02/15/18  1556 02/16/18  0519     --  138   K 3.4*  --  4.0   CL 91*  --  94*   CO2 39*  --  36*   BUN 14  --  15   CREATININE 0.6  --  0.6   CALCIUM 8.8  --  9.1   ALBUMIN 2.6*  --  2.8*   PROT 6.0  --  6.4   BILITOT 0.9  --  1.3*   ALKPHOS 68  --  71   ALT 10  --  11   AST 20  --  24   MG  --  1.9 1.8       ABGs:     Recent Labs  Lab 02/16/18  0836   PH 7.394   PCO2 60.5*   HCO3 37.0*   POCSATURATED 96   BE 12     Blood Culture: No results for input(s): LABBLOO in the last 48 hours.  BMP:     Recent Labs  Lab 02/16/18  0519   GLU 81      K 4.0   CL 94*   CO2 36*   BUN 15   CREATININE 0.6   CALCIUM 9.1   MG 1.8     CMP:     Recent Labs  Lab 02/15/18  0524 02/16/18  0519    138   K 3.4* 4.0   CL 91* 94*   CO2 39* 36*   GLU 85 81   BUN 14 15   CREATININE 0.6 0.6   CALCIUM 8.8 9.1   PROT 6.0 6.4   ALBUMIN 2.6* 2.8*   BILITOT 0.9 1.3*   ALKPHOS 68 71   AST 20 24   ALT 10 11   ANIONGAP 8 8   EGFRNONAA >60 >60     Cardiac Markers: No results for input(s): CKMB, TROPONINT, MYOGLOBIN in the last 48 hours.  Coagulation: No results for input(s): PT, INR, APTT in the last 48 hours.  Lactic Acid: No results for input(s): LACTATE in the last 48 hours.  Respiratory Culture: No results for input(s): GSRESP, RESPIRATORYC in the last 48 hours.    Significant Imaging:  I have reviewed all pertinent imaging results/findings within the past 24 hours.  I have reviewed and interpreted all pertinent imaging results/findings within the past 24  hours.

## 2018-02-16 NOTE — SUBJECTIVE & OBJECTIVE
Interval History: still very weak , unable to get out of bed    Objective:     Vital Signs (Most Recent):  Temp: 98.4 °F (36.9 °C) (02/15/18 2000)  Pulse: 88 (02/15/18 2000)  Resp: 18 (02/15/18 2000)  BP: 130/78 (02/15/18 2000)  SpO2: (!) 94 % (02/15/18 2000) Vital Signs (24h Range):  Temp:  [98.4 °F (36.9 °C)-99 °F (37.2 °C)] 98.4 °F (36.9 °C)  Pulse:  [] 88  Resp:  [16-20] 18  SpO2:  [84 %-94 %] 94 %  BP: (122-143)/(64-92) 130/78     Weight: 63.1 kg (139 lb 1.8 oz)  Body mass index is 23.88 kg/m².      Intake/Output Summary (Last 24 hours) at 02/15/18 2255  Last data filed at 02/15/18 1900   Gross per 24 hour   Intake                0 ml   Output             1600 ml   Net            -1600 ml       Physical Exam   Constitutional: She is oriented to person, place, and time. She appears distressed.   Chronically ill appearing     HENT:   Head: Normocephalic and atraumatic.   Mouth/Throat: Oropharyngeal exudate present.   Eyes: Conjunctivae are normal. Pupils are equal, round, and reactive to light.   Neck: Neck supple. No JVD present. No tracheal deviation present. No thyromegaly present.   Cardiovascular: Normal rate.  An irregularly irregular rhythm present. PMI is displaced.  Exam reveals decreased pulses.    Murmur heard.   Systolic murmur is present with a grade of 2/6   Pulmonary/Chest: Effort normal. No respiratory distress. She has decreased breath sounds in the right lower field and the left lower field. She has no wheezes. She has rales in the right middle field, the right lower field, the left middle field and the left lower field. She exhibits no tenderness.   Abdominal: Soft. Bowel sounds are normal.   Musculoskeletal: Normal range of motion. She exhibits edema.   Lymphadenopathy:     She has no cervical adenopathy.   Neurological: She is alert and oriented to person, place, and time.   Skin: Skin is warm and dry.   Nursing note and vitals reviewed.      Vents:  Oxygen Concentration (%): 28  (02/15/18 0010)    Lines/Drains/Airways     Peripherally Inserted Central Catheter Line                 PICC Double Lumen 02/10/18 1524 right basilic 5 days          Drain                 Urethral Catheter 02/09/18 1045 Latex 16 Fr. 6 days                Significant Labs:    CBC/Anemia Profile:    Recent Labs  Lab 02/14/18  0443 02/15/18  0524   WBC 8.48 10.36   HGB 12.5 13.0   HCT 41.5 41.6   * 122*   MCV 95 92   RDW 17.2* 17.4*        Chemistries:    Recent Labs  Lab 02/14/18  0443 02/15/18  0524 02/15/18  1556    138  --    K 3.4* 3.4*  --    CL 88* 91*  --    CO2 45* 39*  --    BUN 11 14  --    CREATININE 0.5 0.6  --    CALCIUM 8.3* 8.8  --    ALBUMIN 2.4* 2.6*  --    PROT 5.6* 6.0  --    BILITOT 0.9 0.9  --    ALKPHOS 60 68  --    ALT 9* 10  --    AST 20 20  --    MG  --   --  1.9       ABGs:   Recent Labs  Lab 02/14/18  0728   PH 7.421   PCO2 71.1*   HCO3 46.3*   POCSATURATED 93*   BE 22     Blood Culture: No results for input(s): LABBLOO in the last 48 hours.  BMP:   Recent Labs  Lab 02/15/18  0524 02/15/18  1556   GLU 85  --      --    K 3.4*  --    CL 91*  --    CO2 39*  --    BUN 14  --    CREATININE 0.6  --    CALCIUM 8.8  --    MG  --  1.9     CMP:   Recent Labs  Lab 02/14/18 0443 02/15/18  0524    138   K 3.4* 3.4*   CL 88* 91*   CO2 45* 39*   GLU 70 85   BUN 11 14   CREATININE 0.5 0.6   CALCIUM 8.3* 8.8   PROT 5.6* 6.0   ALBUMIN 2.4* 2.6*   BILITOT 0.9 0.9   ALKPHOS 60 68   AST 20 20   ALT 9* 10   ANIONGAP 8 8   EGFRNONAA >60 >60     Cardiac Markers: No results for input(s): CKMB, TROPONINT, MYOGLOBIN in the last 48 hours.  Coagulation: No results for input(s): PT, INR, APTT in the last 48 hours.  Lactic Acid: No results for input(s): LACTATE in the last 48 hours.  Respiratory Culture: No results for input(s): GSRESP, RESPIRATORYC in the last 48 hours.    Significant Imaging:  I have reviewed all pertinent imaging results/findings within the past 24 hours.  I have reviewed and  interpreted all pertinent imaging results/findings within the past 24 hours.

## 2018-02-16 NOTE — ASSESSMENT & PLAN NOTE
Optimize CHF regimen.  Cardiology consult  Preload and afterload reduction.  Daily weighing.  Dietary salt restriction.  Alcohol and tobacco avoidance.  2/16 Restart diuretic

## 2018-02-16 NOTE — PROGRESS NOTES
[facility]  Pulmonology  Progress Note    Patient Name: Kelly Mariano  MRN: [id number]  Admission Date: 2/9/2018  Hospital Length of Stay: 7 days  Code Status: Full Code  Attending Provider: Odilon Subramanian MD  Primary Care Provider: Primary Doctor No   Principal Problem: Bilateral cellulitis of lower leg    Subjective: still weak and tired     No new subjective & objective note has been filed under this hospital service since the last note was generated.    Assessment/Plan:     Acute combined systolic and diastolic heart failure    Optimize CHF regimen.  Cardiology consult  Preload and afterload reduction.  Daily weighing.  Dietary salt restriction.  Alcohol and tobacco avoidance.  2/16 Restart diuretic               Abimael Vance MD  Pulmonology  [facility]

## 2018-02-16 NOTE — PLAN OF CARE
Problem: Patient Care Overview  Goal: Plan of Care Review  Outcome: Ongoing (interventions implemented as appropriate)  Pt remains free from injury/falls. Fall precautions in place. Bed alarm intact and functioning when pt in bed. Pt tolerating diet. Pt denies any pain, nausea, shortness of breath. Pt sinus rhythm on monitor. IV antibioitcs given as scheduled. Pt turned Q2 hours while in bed. Dressing to sacral pressure wound clean, dry, and, intact. Wound care nurse completed bilateral lower leg dressing changes. Pt oriented to self. Pt able to verbalize needs/wants. Bed in low, locked position. Call light and personal items within reach of pt. VSS. Will cont to monitor.

## 2018-02-16 NOTE — PLAN OF CARE
Pt remains free from injury/falls. Fall precautions in place. Bed alarm intact and functioning when pt in bed. Pt tolerating diet. PRN pain med given for back pain. Pt denies nausea or shortness of breath. Pt sinus rhythm on monitor. IV antibioitcs given as scheduled. Pt turned Q2 hours while in bed. Dressing to sacral pressure wound clean, dry, and, intact. Bilateral lower leg dressings clean dry and intact. Pt oriented to self. Pt able to verbalize needs/wants. Bed in low, locked position. Call light and personal items within reach of pt. Visitor at bedside. VSS. Will cont to monitor.

## 2018-02-16 NOTE — PROGRESS NOTES
[facility] -   Pulmonology  Progress Note    Patient Name: Kelly Mariano  MRN: [id number]  Admission Date: 2/9/2018  Hospital Length of Stay: 7 days  Code Status: Full Code  Attending Provider: Odilon Subramanian MD  Primary Care Provider: Primary Doctor No   Principal Problem: Bilateral cellulitis of lower leg    Subjective: stillweak     Interval History: still very weak , unable to get out of bed    Objective:     Vital Signs (Most Recent):  Temp: 98 °F (36.7 °C) (02/16/18 1158)  Pulse: 91 (02/16/18 1158)  Resp: 18 (02/16/18 1158)  BP: 112/67 (02/16/18 1158)  SpO2: 95 % (02/16/18 1158) Vital Signs (24h Range):  Temp:  [97.7 °F (36.5 °C)-98.4 °F (36.9 °C)] 98 °F (36.7 °C)  Pulse:  [85-96] 91  Resp:  [18-20] 18  SpO2:  [94 %-96 %] 95 %  BP: (110-140)/(62-81) 112/67     Weight: 63.1 kg (139 lb 1.8 oz)  Body mass index is 23.88 kg/m².      Intake/Output Summary (Last 24 hours) at 02/16/18 1715  Last data filed at 02/16/18 1422   Gross per 24 hour   Intake              700 ml   Output             1275 ml   Net             -575 ml       Physical Exam   Constitutional: She is oriented to person, place, and time. She appears distressed.   Chronically ill appearing     HENT:   Head: Normocephalic and atraumatic.   Mouth/Throat: Oropharyngeal exudate present.   Eyes: Conjunctivae are normal. Pupils are equal, round, and reactive to light.   Neck: Neck supple. No JVD present. No tracheal deviation present. No thyromegaly present.   Cardiovascular: Normal rate.  An irregularly irregular rhythm present. PMI is displaced.  Exam reveals decreased pulses.    Murmur heard.   Systolic murmur is present with a grade of 2/6   Pulmonary/Chest: Effort normal. No respiratory distress. She has decreased breath sounds in the right lower field and the left lower field. She has no wheezes. She has rales in the right middle field, the right lower field, the left middle field and the left lower field. She exhibits no tenderness.    Abdominal: Soft. Bowel sounds are normal.   Musculoskeletal: Normal range of motion. She exhibits edema.   Lymphadenopathy:     She has no cervical adenopathy.   Neurological: She is alert and oriented to person, place, and time.   Skin: Skin is warm and dry.   Nursing note and vitals reviewed.      Vents:  Oxygen Concentration (%): 28 (02/16/18 0842)    Lines/Drains/Airways     Peripherally Inserted Central Catheter Line                 PICC Double Lumen 02/10/18 1524 right basilic 6 days          Drain                 Urethral Catheter 02/09/18 1045 Latex 16 Fr. 7 days                Significant Labs:    CBC/Anemia Profile:    Recent Labs  Lab 02/15/18  0524 02/16/18  0519   WBC 10.36 9.54   HGB 13.0 14.0   HCT 41.6 45.3   * 128*   MCV 92 92   RDW 17.4* 17.6*        Chemistries:    Recent Labs  Lab 02/15/18  0524 02/15/18  1556 02/16/18  0519     --  138   K 3.4*  --  4.0   CL 91*  --  94*   CO2 39*  --  36*   BUN 14  --  15   CREATININE 0.6  --  0.6   CALCIUM 8.8  --  9.1   ALBUMIN 2.6*  --  2.8*   PROT 6.0  --  6.4   BILITOT 0.9  --  1.3*   ALKPHOS 68  --  71   ALT 10  --  11   AST 20  --  24   MG  --  1.9 1.8       ABGs:     Recent Labs  Lab 02/16/18  0836   PH 7.394   PCO2 60.5*   HCO3 37.0*   POCSATURATED 96   BE 12     Blood Culture: No results for input(s): LABBLOO in the last 48 hours.  BMP:     Recent Labs  Lab 02/16/18 0519   GLU 81      K 4.0   CL 94*   CO2 36*   BUN 15   CREATININE 0.6   CALCIUM 9.1   MG 1.8     CMP:     Recent Labs  Lab 02/15/18  0524 02/16/18  0519    138   K 3.4* 4.0   CL 91* 94*   CO2 39* 36*   GLU 85 81   BUN 14 15   CREATININE 0.6 0.6   CALCIUM 8.8 9.1   PROT 6.0 6.4   ALBUMIN 2.6* 2.8*   BILITOT 0.9 1.3*   ALKPHOS 68 71   AST 20 24   ALT 10 11   ANIONGAP 8 8   EGFRNONAA >60 >60     Cardiac Markers: No results for input(s): CKMB, TROPONINT, MYOGLOBIN in the last 48 hours.  Coagulation: No results for input(s): PT, INR, APTT in the last 48  hours.  Lactic Acid: No results for input(s): LACTATE in the last 48 hours.  Respiratory Culture: No results for input(s): GSRESP, RESPIRATORYC in the last 48 hours.    Significant Imaging:  I have reviewed all pertinent imaging results/findings within the past 24 hours.  I have reviewed and interpreted all pertinent imaging results/findings within the past 24 hours.    Assessment/Plan:     Acute combined systolic and diastolic heart failure    Optimize CHF regimen.  Cardiology consult  Preload and afterload reduction.  Daily weighing.  Dietary salt restriction.  Alcohol and tobacco avoidance.  2/16 Restart diuretic        Pleural effusion on right    21/6 Monitor.               Abimael Vance MD  Pulmonology  [facility]

## 2018-02-16 NOTE — PLAN OF CARE
Problem: Fall Risk (Adult)  Goal: Absence of Falls  Patient will demonstrate the desired outcomes by discharge/transition of care.   Outcome: Ongoing (interventions implemented as appropriate)  Reviewed plan of care with patient, said ok. Pt was talking in complete sentences. Stated name and date of birth. Patient turned every 2 hours. Meds crushed and placed in applesauce, pt wouldn't swallow meds. Refused BiPap. No falls, injuries this shift. Bed in lowest position, call light within reach, will continue to monitor.

## 2018-02-16 NOTE — SIGNIFICANT EVENT
"Pt demonstrated declining mental status.       On Tuesday she was answering only "yes", to all questions, even when inappropriate.  She was alert and cooperative.  On Wednesday, she was more somnolent, groaned while in the fetal position throughout the day, could be awoken, yet would not answer questions.  Yesterday, her mentation improved.  She attempted to answer about half my questions and gave appropriate answers to those.  Today she is much more alert.  She could describe the conversation she had with the cancer doc. I asked her if she wanted to be treated in the context of additional testing and treatment (with side effects) which may or may not extend her life, she said she "absolutley wanted treatment".  I asked her how she came to that decision, she says "cause you're on the hook for it", she would not offer further clarification of the statement.  She said she's "tired and I know you have grandparents".  I asked her to explain.  She didn't answer.  I asked her if she meant she wanted me to treat her like I'd treat my own grandparents.  Not providing more info on the line of thought.  I said we're trying to figure out where you need to go, she said nursing home.  I said do you want to go to one, she said yes.  I asked if she had ever been to one, she replied, a long time ago, and when I asked for how long she was there, she said a long time.  She said she was tired and it appeared she might be getting aggrevated.  At that point I left.     On this bases, I've ordered case management to arrange nursing home placement  "

## 2018-02-16 NOTE — NURSING
Critical CO2 relayed to TONY Guzmán. Stated she spoke to Dr. Subramanian and they would like pt to be placed on bipap at this time. Resp Therapy made aware.

## 2018-02-16 NOTE — ASSESSMENT & PLAN NOTE
2/13 Switch to diamox, continue AVAPS  2/15 Noninvasive Positive Pressure Ventilation at night, check ABG in AM

## 2018-02-16 NOTE — PROGRESS NOTES
[facility] Hospital Medicine  Progress Note    Patient Name: Kelly Mariano  MRN: [id number]  Patient Class: IP- Inpatient   Admission Date: 2/9/2018  Length of Stay: 6 days  Attending Physician: Odilon Subramanian MD  Primary Care Provider: Primary Doctor No        Subjective:     Principal Problem:Bilateral cellulitis of lower leg    HPI:  Kelly Mariano is a 68 yo female who states she has not seen a physician since 2009. EMS brought her to the ED today as the patient reported generalized weakness that has worsened over the last 2 days. She had trouble getting off the toilet and when she di,  she had fallen. EMS stated living conditions were deplorable with cat feces on the ibeth. (Elderly Protective Services contacted). Pt is noted with cellulitis and wounds to the bilateral feet and lower legs. Pt describes anxiety attacks, SOB, leg swelling and difficulty walking. Vital signs showed tachycardia = 112, pt afebrile with controlled blood pressure. Labs indicate thrombocytopenia, metabolic alkalosis, troponin 0.114, BNP > 4900, initial lactic acid 2.3;  CXR shows pulmonary edema and a moderate right sided pleural effusion.    Hospital Course:  2/10- no fever, other vss. Pt feels weakness, leg swelling and sob improving.Blood cultures positive for staph, on vanc. and guillermo. MRI negative for osteo. ID consulted. CTA BLE to determine if adequate circulation. Per nurse, concerned for left breast deformity, concerning for malignancy, hemonc consulted.   2/11 - Legs swelling and weakness improved, no longer sob. Echo shows ejection fraction 10%, diuresing well on lasix. Per cards left heart cath or stress test when better. Seen by hem/onc this am, concern for breast ca, right pleural effusion drained today w/o complications, swallow study positive for aspiration, counseled patient. Continued on antibx for lower extremity cellulitis. CT legs with no obvious arterial occlusion, however CTA lower extremeties  describes Ulcerative plaque versus small focal dissection measuring 8 mm in the left common iliac artery which causes a mild to moderate grade focal stenosis just above the bifurcation into the left external and left internal iliac arteries.  Contacted Brett Puga, vascular surgeon, no intervention needed now. Recommending lower extremity leg wraps and follow up w/vascular in outpt.   2/12 - Patient feeling better, platelets trending down, crp elevated at 28, sed rate normal. Blood cultures positive for s. Aeureus, awaiting sensitivity.  Xray with tiny right pneumothorax after thoracentesis. General sx biopsied breast mass.   2/13 - Pt with alt mental status this am, alkalotic, possibly secondary to over-diuresis and thoracentesis, xray shows pleural effusion, returned possibly due to chf and/or met. Breast ca. Case discussed w/oncology and no additional imaging needed at this point. Blood cultures positive for MRSA, vanc continued  2/14 - increased lethargy, bicarb improved,chest xray unchanged,  awaiting breast biopsy path report, being diuresed w/acetozolamide, attempted to contact Xavier Rosenbaum, left vmail. Meeting with case management and elderly protection services to discuss contacting any family member of friend, ordered CT head/chest/abd/pelvis   2/15 - vss, including pulse ox on 2 Liters, mentation slightly improved, CT head/chest/abd/pelvis - head no acute processes, chest/abd/pelvis - possible pelvic mass cannot exclude bone mets        Review of Systems   Unable to perform ROS: Acuity of condition     Objective:     Vital Signs (Most Recent):  Temp: 98.5 °F (36.9 °C) (02/15/18 1400)  Pulse: 92 (02/15/18 1400)  Resp: 18 (02/15/18 1400)  BP: 122/67 (02/15/18 1400)  SpO2: (!) 94 % (02/15/18 1400) Vital Signs (24h Range):  Temp:  [98.4 °F (36.9 °C)-99 °F (37.2 °C)] 98.5 °F (36.9 °C)  Pulse:  [] 92  Resp:  [16-20] 18  SpO2:  [84 %-94 %] 94 %  BP: (122-143)/(64-92) 122/67     Weight: 63.1 kg (139 lb 1.8  oz)  Body mass index is 23.88 kg/m².    Intake/Output Summary (Last 24 hours) at 02/15/18 2157  Last data filed at 02/15/18 1900   Gross per 24 hour   Intake                0 ml   Output             1600 ml   Net            -1600 ml      Physical Exam   Constitutional: She appears well-developed.  Non-toxic appearance.   Unkempt  female - appears stated age   HENT:   Head: Normocephalic and atraumatic.       Mouth/Throat: Mucous membranes are dry.   Dry tongue and oral MM   Eyes: Conjunctivae are normal.   Neck: Normal range of motion. Neck supple.   Cardiovascular: Regular rhythm.  Tachycardia present.    Pulmonary/Chest: Effort normal. She has rales.   Abdominal: Soft. Bowel sounds are normal. She exhibits distension and mass. There is no tenderness. There is no rebound and no guarding.   Silvana-umbilical 2cm   Musculoskeletal: She exhibits edema (+2/+3 pitting edema to BLE).   Generalized weakness   Neurological:   Patient able to answer some questions with yes/no , not always appropriate, able to name banana and say thank you   Skin: Capillary refill takes less than 2 seconds.   Abrasion to forehead and left chin area  Candidal appearing rash underneath breasts and abdominal folds and peroneum, chaperon present,   Ulcerations and cellulitis to feet  Breast mass - right, 12 cm   Nursing note and vitals reviewed.      Significant Labs: All pertinent labs within the past 24 hours have been reviewed.    Significant Imaging: I have reviewed and interpreted all pertinent imaging results/findings within the past 24 hours.    Assessment/Plan:      * Bilateral cellulitis of lower leg      IV Merrem and Vanco  Wound Care  Arterial US to evaluate blood flow for proper healing  2/12 - s.a. In blood, awaiting sensitivities, continued on vanc and repeat blood cultures    2/15 - wound care following, continued on antibiotics for MRSA bacteremia  On diflucan for tinea to peroneum        Altered mental status    2/13 - pt  w/hypercapnic, respiratory distress. 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 Respiratory consulted for bipap, however pt not tolerating, would prefer to avoid sedation as this will worsen resp. Status. Attempted to reach out to family contacts multiple times, no response.   2/14 - anti-cholinergic drugs d/cd, (was on famotidine), no narcotics or sedatives prescribed, thyroid function normal, will follow cortisol level, no hx of alchol use, b12, folate and thiamine given, no indications of seizure, meningitis or encephalitis  2/15 - more alert today and increasingly appropriate answers.  Will contact pharmacy in AM to search [facility] [place] for psychiatric drugs that pt may not have reported at admit.          Bacteremia due to Staphylococcus aureus      2/13 - cultures positive MRSA  2/14 - mrsa, on vanc, id following        Mass of breast, right    2/11 - oncology recommending biopsy of breast mass  2/12 - path specimen sent for eval of breast mass, and for ER/TN/Xos2Eyo. Case discussed with gen. Surgery and hem/onc. Will consider any additional imaging for staging while inpt.           Subacute osteomyelitis of multiple sites    Bilateral foot plain films note some questionable osteo to bilateral great toes  MRI pending  Evaluate arterial blood flow with U/S and ABIs          Protein-calorie malnutrition, moderate    Will start Folbic, MVI  Nutrition consult          Acute combined systolic and diastolic heart failure    Telemetry monitoring, daily weights, low sodium diet, fluid restriction,    Lasix 20 mg IV twice  Cardiology consult  Lisinopril 2.5 mg  Elevated troponin likely due to congestive heart failure - trend  2/10 -   1 - Moderate left atrial enlargement.     2 - Eccentric hypertrophy.     3 - No wall motion abnormalities.     4 - Severely depressed left ventricular systolic function (EF 10-15%).     5 - Impaired LV relaxation, normal LAP (grade 1 diastolic dysfunction).     6 - Low normal to  mildly depressed right ventricular systolic function .     7 - The estimated PA systolic pressure is 25 mmHg.     8 - Mild to moderate mitral regurgitation.     9 - Mild to moderate tricuspid regurgitation.     10 - Intermediate central venous pressure.     11 - Right pleural effusion.    Cards following            Pleural effusion on right    Diuresis with IV lasix  Supplemental oxygen to maintain sat > 92 %  Will likely need thoracentesis, consult Pulmonology when needed  2/10 - moderate size, concerning for malignancy due to left breast deformity (metastic disease?)  2/12 - will follow path report analysis of pleural fluid  2/14 - will discuss w/pulm if repeat thoracentesis needed and follow up studies from previous          VTE Risk Mitigation         Ordered     heparin (porcine) injection 5,000 Units  Every 8 hours     Route:  Subcutaneous        02/09/18 1749     Medium Risk of VTE  Once      02/09/18 1300              Odilon Subramanian MD  Department of Hospital Medicine   [facility]

## 2018-02-16 NOTE — ASSESSMENT & PLAN NOTE
2/13 - pt w/hypercapnic, respiratory distress. 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 Respiratory consulted for bipap, however pt not tolerating, would prefer to avoid sedation as this will worsen resp. Status. Attempted to reach out to family contacts multiple times, no response.   2/14 - anti-cholinergic drugs d/cd, (was on famotidine), no narcotics or sedatives prescribed, thyroid function normal, will follow cortisol level, no hx of alchol use, b12, folate and thiamine given, no indications of seizure, meningitis or encephalitis  2/15 - more alert today and increasingly appropriate answers.  Will contact pharmacy in AM to search [facility] for psychiatric drugs that pt may not have reported at admit.

## 2018-02-16 NOTE — CONSULTS
Pharmacy Vancomycin Consult Note    WBC=9.54  Tmax=98.5  CrCl=79.6ml/min Scr=0.6    Cultures: blood-MRSA-Vancomycin sensitive  Dx. Cellulitis/bacteremia  Goal trough=15-20mcg/ml    Vancomycin trough=16.8mcg/ml  Continue current dose: Vancomycin 1 gram Q18  Trough due 2/17 @2000 before 3rd dose.    Thank you for allowing us to participate in this patient's care.  Afsaneh Hollingsworth, Pharm D 2/16/2018 8:44 AM

## 2018-02-16 NOTE — ASSESSMENT & PLAN NOTE
IV Merrem and Vanco  Wound Care  Arterial US to evaluate blood flow for proper healing  2/12 - s.a. In blood, awaiting sensitivities, continued on vanc and repeat blood cultures    2/15 - wound care following, continued on antibiotics for MRSA bacteremia  On diflucan for tinea to peroneum

## 2018-02-16 NOTE — PROGRESS NOTES
[facility] -   Pulmonology  Progress Note    Patient Name: Kelly Mariano  MRN: [id number]  Admission Date: 2/9/2018  Hospital Length of Stay: 6 days  Code Status: Full Code  Attending Provider: Odilon Subramanian MD  Primary Care Provider: Primary Doctor No   Principal Problem: Bilateral cellulitis of lower leg    Subjective: still very weak     Interval History: still very weak , unable to get out of bed    Objective:     Vital Signs (Most Recent):  Temp: 98.4 °F (36.9 °C) (02/15/18 2000)  Pulse: 88 (02/15/18 2000)  Resp: 18 (02/15/18 2000)  BP: 130/78 (02/15/18 2000)  SpO2: (!) 94 % (02/15/18 2000) Vital Signs (24h Range):  Temp:  [98.4 °F (36.9 °C)-99 °F (37.2 °C)] 98.4 °F (36.9 °C)  Pulse:  [] 88  Resp:  [16-20] 18  SpO2:  [84 %-94 %] 94 %  BP: (122-143)/(64-92) 130/78     Weight: 63.1 kg (139 lb 1.8 oz)  Body mass index is 23.88 kg/m².      Intake/Output Summary (Last 24 hours) at 02/15/18 2255  Last data filed at 02/15/18 1900   Gross per 24 hour   Intake                0 ml   Output             1600 ml   Net            -1600 ml       Physical Exam   Constitutional: She is oriented to person, place, and time. She appears distressed.   Chronically ill appearing     HENT:   Head: Normocephalic and atraumatic.   Mouth/Throat: Oropharyngeal exudate present.   Eyes: Conjunctivae are normal. Pupils are equal, round, and reactive to light.   Neck: Neck supple. No JVD present. No tracheal deviation present. No thyromegaly present.   Cardiovascular: Normal rate.  An irregularly irregular rhythm present. PMI is displaced.  Exam reveals decreased pulses.    Murmur heard.   Systolic murmur is present with a grade of 2/6   Pulmonary/Chest: Effort normal. No respiratory distress. She has decreased breath sounds in the right lower field and the left lower field. She has no wheezes. She has rales in the right middle field, the right lower field, the left middle field and the left lower field. She exhibits no  tenderness.   Abdominal: Soft. Bowel sounds are normal.   Musculoskeletal: Normal range of motion. She exhibits edema.   Lymphadenopathy:     She has no cervical adenopathy.   Neurological: She is alert and oriented to person, place, and time.   Skin: Skin is warm and dry.   Nursing note and vitals reviewed.      Vents:  Oxygen Concentration (%): 28 (02/15/18 0010)    Lines/Drains/Airways     Peripherally Inserted Central Catheter Line                 PICC Double Lumen 02/10/18 1524 right basilic 5 days          Drain                 Urethral Catheter 02/09/18 1045 Latex 16 Fr. 6 days                Significant Labs:    CBC/Anemia Profile:    Recent Labs  Lab 02/14/18  0443 02/15/18  0524   WBC 8.48 10.36   HGB 12.5 13.0   HCT 41.5 41.6   * 122*   MCV 95 92   RDW 17.2* 17.4*        Chemistries:    Recent Labs  Lab 02/14/18  0443 02/15/18  0524 02/15/18  1556    138  --    K 3.4* 3.4*  --    CL 88* 91*  --    CO2 45* 39*  --    BUN 11 14  --    CREATININE 0.5 0.6  --    CALCIUM 8.3* 8.8  --    ALBUMIN 2.4* 2.6*  --    PROT 5.6* 6.0  --    BILITOT 0.9 0.9  --    ALKPHOS 60 68  --    ALT 9* 10  --    AST 20 20  --    MG  --   --  1.9       ABGs:   Recent Labs  Lab 02/14/18  0728   PH 7.421   PCO2 71.1*   HCO3 46.3*   POCSATURATED 93*   BE 22     Blood Culture: No results for input(s): LABBLOO in the last 48 hours.  BMP:   Recent Labs  Lab 02/15/18  0524 02/15/18  1556   GLU 85  --      --    K 3.4*  --    CL 91*  --    CO2 39*  --    BUN 14  --    CREATININE 0.6  --    CALCIUM 8.8  --    MG  --  1.9     CMP:   Recent Labs  Lab 02/14/18  0443 02/15/18  0524    138   K 3.4* 3.4*   CL 88* 91*   CO2 45* 39*   GLU 70 85   BUN 11 14   CREATININE 0.5 0.6   CALCIUM 8.3* 8.8   PROT 5.6* 6.0   ALBUMIN 2.4* 2.6*   BILITOT 0.9 0.9   ALKPHOS 60 68   AST 20 20   ALT 9* 10   ANIONGAP 8 8   EGFRNONAA >60 >60     Cardiac Markers: No results for input(s): CKMB, TROPONINT, MYOGLOBIN in the last 48  hours.  Coagulation: No results for input(s): PT, INR, APTT in the last 48 hours.  Lactic Acid: No results for input(s): LACTATE in the last 48 hours.  Respiratory Culture: No results for input(s): GSRESP, RESPIRATORYC in the last 48 hours.    Significant Imaging:  I have reviewed all pertinent imaging results/findings within the past 24 hours.  I have reviewed and interpreted all pertinent imaging results/findings within the past 24 hours.    Assessment/Plan:     Acute on chronic respiratory failure with hypoxia and hypercapnia    2/13 Switch to diamox, continue AVAPS  2/15 Noninvasive Positive Pressure Ventilation at night, check ABG in AM        Bacteremia due to Staphylococcus aureus    2/15 continue antibiotics        Mass of breast, right    2/15 Pleural fluid cytology is negative for malignancy          Pleural effusion on right    Diagnostic thoracentesis:  Complications  of the procedure discussed in detail with patient. Complications including but not limited to infection that may require hospital  admission, bleeding that may require blood transfusion and or hospital  admission, perforation of the lung which may require surgery. Patient expressed and verbalized understanding.  Alternate treatments and material risks associated with such alternatives were  discussed with pateint. These include radiologic surveillance  with  minimal risk and sugery with an indeterminate risk. The material risks  of refusing the procedure was discussed in detail. This includes no  diagnosis or confirmation of diagnisis and rendering of appropriate treatment the risk of which depends on the nature of the diagnosed illness. Patient expressed and verbalized understanding. Pleural fluid will be sent for chemistry, microbiology and cytology.          Pleural fluid cytology is negative for malignancy     Abimael Vance MD  Pulmonology  [facility]

## 2018-02-17 PROBLEM — M86.29: Status: RESOLVED | Noted: 2018-02-09 | Resolved: 2018-02-17

## 2018-02-17 LAB
ALBUMIN SERPL BCP-MCNC: 2.5 G/DL
ALP SERPL-CCNC: 71 U/L
ALT SERPL W/O P-5'-P-CCNC: 19 U/L
ANION GAP SERPL CALC-SCNC: 6 MMOL/L
AST SERPL-CCNC: 28 U/L
BASOPHILS # BLD AUTO: 0.02 K/UL
BASOPHILS NFR BLD: 0.3 %
BILIRUB SERPL-MCNC: 0.6 MG/DL
BUN SERPL-MCNC: 18 MG/DL
CALCIUM SERPL-MCNC: 8.6 MG/DL
CHLORIDE SERPL-SCNC: 96 MMOL/L
CO2 SERPL-SCNC: 37 MMOL/L
CREAT SERPL-MCNC: 0.5 MG/DL
DIFFERENTIAL METHOD: ABNORMAL
EOSINOPHIL # BLD AUTO: 0.1 K/UL
EOSINOPHIL NFR BLD: 0.8 %
ERYTHROCYTE [DISTWIDTH] IN BLOOD BY AUTOMATED COUNT: 17.3 %
EST. GFR  (AFRICAN AMERICAN): >60 ML/MIN/1.73 M^2
EST. GFR  (NON AFRICAN AMERICAN): >60 ML/MIN/1.73 M^2
GLUCOSE SERPL-MCNC: 81 MG/DL
HCT VFR BLD AUTO: 41.1 %
HGB BLD-MCNC: 12.6 G/DL
LYMPHOCYTES # BLD AUTO: 0.8 K/UL
LYMPHOCYTES NFR BLD: 12.7 %
MAGNESIUM SERPL-MCNC: 1.9 MG/DL
MCH RBC QN AUTO: 28.6 PG
MCHC RBC AUTO-ENTMCNC: 30.7 G/DL
MCV RBC AUTO: 93 FL
MONOCYTES # BLD AUTO: 0.4 K/UL
MONOCYTES NFR BLD: 7.1 %
NEUTROPHILS # BLD AUTO: 4.8 K/UL
NEUTROPHILS NFR BLD: 79.1 %
PLATELET # BLD AUTO: 115 K/UL
PMV BLD AUTO: 11.1 FL
POCT GLUCOSE: 113 MG/DL (ref 70–110)
POCT GLUCOSE: 118 MG/DL (ref 70–110)
POCT GLUCOSE: 142 MG/DL (ref 70–110)
POCT GLUCOSE: 82 MG/DL (ref 70–110)
POCT GLUCOSE: 91 MG/DL (ref 70–110)
POTASSIUM SERPL-SCNC: 3.7 MMOL/L
PROT SERPL-MCNC: 5.9 G/DL
RBC # BLD AUTO: 4.41 M/UL
SODIUM SERPL-SCNC: 139 MMOL/L
WBC # BLD AUTO: 6.05 K/UL

## 2018-02-17 PROCEDURE — S0171 BUMETANIDE 0.5 MG: HCPCS | Performed by: INTERNAL MEDICINE

## 2018-02-17 PROCEDURE — G8987 SELF CARE CURRENT STATUS: HCPCS | Mod: CM

## 2018-02-17 PROCEDURE — 97530 THERAPEUTIC ACTIVITIES: CPT

## 2018-02-17 PROCEDURE — 25000003 PHARM REV CODE 250: Performed by: EMERGENCY MEDICINE

## 2018-02-17 PROCEDURE — 27000221 HC OXYGEN, UP TO 24 HOURS

## 2018-02-17 PROCEDURE — 99233 SBSQ HOSP IP/OBS HIGH 50: CPT | Mod: ,,, | Performed by: INTERNAL MEDICINE

## 2018-02-17 PROCEDURE — 63600175 PHARM REV CODE 636 W HCPCS: Performed by: NURSE PRACTITIONER

## 2018-02-17 PROCEDURE — 97167 OT EVAL HIGH COMPLEX 60 MIN: CPT

## 2018-02-17 PROCEDURE — 85025 COMPLETE CBC W/AUTO DIFF WBC: CPT

## 2018-02-17 PROCEDURE — 83735 ASSAY OF MAGNESIUM: CPT

## 2018-02-17 PROCEDURE — 63600175 PHARM REV CODE 636 W HCPCS: Performed by: FAMILY MEDICINE

## 2018-02-17 PROCEDURE — 21400001 HC TELEMETRY ROOM

## 2018-02-17 PROCEDURE — 97535 SELF CARE MNGMENT TRAINING: CPT

## 2018-02-17 PROCEDURE — 97162 PT EVAL MOD COMPLEX 30 MIN: CPT

## 2018-02-17 PROCEDURE — 25000003 PHARM REV CODE 250: Performed by: NURSE PRACTITIONER

## 2018-02-17 PROCEDURE — G8982 BODY POS GOAL STATUS: HCPCS | Mod: CL

## 2018-02-17 PROCEDURE — G8981 BODY POS CURRENT STATUS: HCPCS | Mod: CM

## 2018-02-17 PROCEDURE — 25000003 PHARM REV CODE 250: Performed by: INTERNAL MEDICINE

## 2018-02-17 PROCEDURE — G8988 SELF CARE GOAL STATUS: HCPCS | Mod: CL

## 2018-02-17 PROCEDURE — 25000003 PHARM REV CODE 250: Performed by: FAMILY MEDICINE

## 2018-02-17 PROCEDURE — 80053 COMPREHEN METABOLIC PANEL: CPT

## 2018-02-17 PROCEDURE — 94761 N-INVAS EAR/PLS OXIMETRY MLT: CPT

## 2018-02-17 RX ORDER — THIAMINE HCL 100 MG
100 TABLET ORAL DAILY
Status: DISCONTINUED | OUTPATIENT
Start: 2018-02-18 | End: 2018-02-26 | Stop reason: HOSPADM

## 2018-02-17 RX ORDER — CLINDAMYCIN HYDROCHLORIDE 150 MG/1
150 CAPSULE ORAL EVERY 8 HOURS
Status: DISCONTINUED | OUTPATIENT
Start: 2018-02-17 | End: 2018-02-17

## 2018-02-17 RX ORDER — FLUCONAZOLE 100 MG/1
100 TABLET ORAL DAILY
Status: DISCONTINUED | OUTPATIENT
Start: 2018-02-18 | End: 2018-02-22

## 2018-02-17 RX ORDER — CLINDAMYCIN HYDROCHLORIDE 150 MG/1
300 CAPSULE ORAL EVERY 8 HOURS
Status: DISCONTINUED | OUTPATIENT
Start: 2018-02-17 | End: 2018-02-22

## 2018-02-17 RX ADMIN — ANTI-FUNGAL POWDER MICONAZOLE NITRATE TALC FREE: 1.42 POWDER TOPICAL at 09:02

## 2018-02-17 RX ADMIN — BUMETANIDE 1 MG: 0.25 INJECTION INTRAMUSCULAR; INTRAVENOUS at 10:02

## 2018-02-17 RX ADMIN — LISINOPRIL 5 MG: 5 TABLET ORAL at 09:02

## 2018-02-17 RX ADMIN — BUMETANIDE 1 MG: 0.25 INJECTION INTRAMUSCULAR; INTRAVENOUS at 09:02

## 2018-02-17 RX ADMIN — HEPARIN SODIUM 5000 UNITS: 5000 INJECTION, SOLUTION INTRAVENOUS; SUBCUTANEOUS at 03:02

## 2018-02-17 RX ADMIN — VANCOMYCIN HYDROCHLORIDE 1 G: 1 INJECTION, POWDER, LYOPHILIZED, FOR SOLUTION INTRAVENOUS at 03:02

## 2018-02-17 RX ADMIN — HEPARIN SODIUM 5000 UNITS: 5000 INJECTION, SOLUTION INTRAVENOUS; SUBCUTANEOUS at 05:02

## 2018-02-17 RX ADMIN — METOPROLOL SUCCINATE 50 MG: 50 TABLET, EXTENDED RELEASE ORAL at 09:02

## 2018-02-17 RX ADMIN — HEPARIN SODIUM 5000 UNITS: 5000 INJECTION, SOLUTION INTRAVENOUS; SUBCUTANEOUS at 09:02

## 2018-02-17 RX ADMIN — CLINDAMYCIN HYDROCHLORIDE 300 MG: 150 CAPSULE ORAL at 09:02

## 2018-02-17 RX ADMIN — THIAMINE HYDROCHLORIDE 100 MG: 100 INJECTION, SOLUTION INTRAMUSCULAR; INTRAVENOUS at 09:02

## 2018-02-17 RX ADMIN — Medication 1 TABLET: at 09:02

## 2018-02-17 RX ADMIN — CLINDAMYCIN HYDROCHLORIDE 300 MG: 150 CAPSULE ORAL at 04:02

## 2018-02-17 NOTE — ASSESSMENT & PLAN NOTE
-Unclear etiology  -Continue Metoprolol  - Cont diuresis  - Continue  ACEI   -Will need ischemic workup-LHC vs stress test once infection issues improve and she is euvolemic

## 2018-02-17 NOTE — SUBJECTIVE & OBJECTIVE
Interval History: very weak    Objective:     Vital Signs (Most Recent):  Temp: 97.8 °F (36.6 °C) (02/17/18 1245)  Pulse: 88 (02/17/18 1245)  Resp: 18 (02/17/18 1245)  BP: 108/64 (02/17/18 1255)  SpO2: (!) 94 % (02/17/18 1245) Vital Signs (24h Range):  Temp:  [97.1 °F (36.2 °C)-98.9 °F (37.2 °C)] 97.8 °F (36.6 °C)  Pulse:  [82-91] 88  Resp:  [18] 18  SpO2:  [90 %-100 %] 94 %  BP: ()/(50-73) 108/64     Weight: 64.6 kg (142 lb 6.7 oz)  Body mass index is 24.45 kg/m².      Intake/Output Summary (Last 24 hours) at 02/17/18 1513  Last data filed at 02/17/18 1200   Gross per 24 hour   Intake              670 ml   Output             2650 ml   Net            -1980 ml       Physical Exam   Constitutional: She appears distressed.   Chronically ill appearing     HENT:   Head: Normocephalic and atraumatic.   Mouth/Throat: No oropharyngeal exudate.   Eyes: Conjunctivae are normal. Pupils are equal, round, and reactive to light.   Neck: Neck supple. No JVD present. No tracheal deviation present. No thyromegaly present.   Cardiovascular: Normal rate.  An irregular rhythm present. PMI is displaced.  Exam reveals distant heart sounds.    Murmur heard.   Systolic murmur is present with a grade of 2/6   Pulmonary/Chest: Effort normal. No respiratory distress. She has decreased breath sounds. She has wheezes. She has rales in the right lower field and the left lower field. She exhibits no tenderness.   Abdominal: Soft. Bowel sounds are normal.   Musculoskeletal: She exhibits edema.   Lymphadenopathy:     She has no cervical adenopathy.   Neurological: She is alert.   Skin: Skin is warm and dry.   Nursing note and vitals reviewed.      Vents:  Oxygen Concentration (%): 28 (02/17/18 0930)    Lines/Drains/Airways     Peripherally Inserted Central Catheter Line                 PICC Double Lumen 02/10/18 1524 right basilic 6 days                Significant Labs:    CBC/Anemia Profile:    Recent Labs  Lab 02/16/18  0519 02/17/18  0518    WBC 9.54 6.05   HGB 14.0 12.6   HCT 45.3 41.1   * 115*   MCV 92 93   RDW 17.6* 17.3*        Chemistries:    Recent Labs  Lab 02/15/18  1556 02/16/18  0519 02/17/18  0530   NA  --  138 139   K  --  4.0 3.7   CL  --  94* 96   CO2  --  36* 37*   BUN  --  15 18   CREATININE  --  0.6 0.5   CALCIUM  --  9.1 8.6*   ALBUMIN  --  2.8* 2.5*   PROT  --  6.4 5.9*   BILITOT  --  1.3* 0.6   ALKPHOS  --  71 71   ALT  --  11 19   AST  --  24 28   MG 1.9 1.8 1.9       BMP:   Recent Labs  Lab 02/17/18  0530   GLU 81      K 3.7   CL 96   CO2 37*   BUN 18   CREATININE 0.5   CALCIUM 8.6*   MG 1.9     CMP:   Recent Labs  Lab 02/16/18  0519 02/17/18  0530    139   K 4.0 3.7   CL 94* 96   CO2 36* 37*   GLU 81 81   BUN 15 18   CREATININE 0.6 0.5   CALCIUM 9.1 8.6*   PROT 6.4 5.9*   ALBUMIN 2.8* 2.5*   BILITOT 1.3* 0.6   ALKPHOS 71 71   AST 24 28   ALT 11 19   ANIONGAP 8 6*   EGFRNONAA >60 >60     All pertinent labs within the past 24 hours have been reviewed.    Significant Imaging:  I have reviewed and interpreted all pertinent imaging results/findings within the past 24 hours.

## 2018-02-17 NOTE — PROGRESS NOTES
[facility] -   Hematology/Oncology  Progress Note    Patient Name: Kelly Mariano  Admission Date: 2/9/2018  Hospital Length of Stay: 8 days  Code Status: Full Code     Subjective:     HPI:  67-year-old female admitted to the hospital for cellulitis was found have a large mass involving her right breast also asked see the patient for further evaluation    Interval History: Today the patient reports that she feels well.  She is able to carry on normal conversation and is oriented to time and person.  She did not recall having had her breast mass biopsy done.  I have reviewed all of the patient's interval history.    Oncology Treatment Plan:   [No treatment plan]    Medications:  Continuous Infusions:  Scheduled Meds:   bumetanide  1 mg Intravenous BID    fluconazole (DIFLUCAN) IVPB  200 mg Intravenous Q24H    folic acid-vit B6-vit B12 2.5-25-2 mg  1 tablet Oral Daily    heparin (porcine)  5,000 Units Subcutaneous Q8H    lisinopril  5 mg Oral Daily    metoprolol succinate  50 mg Oral Daily    miconazole NITRATE 2 %   Topical (Top) BID    multivitamin  1 tablet Oral Daily    thiamine (VITAMIN B1) IVPB  100 mg Intravenous Daily    vancomycin 1 g in 0.9% sodium chloride 250 mL IVPB (ready to mix system)  1 g Intravenous Q18H     PRN Meds:acetaminophen     Review of Systems   Constitutional: Positive for activity change, appetite change and fatigue. Negative for chills, diaphoresis, fever and unexpected weight change.   HENT: Negative for congestion, dental problem, ear discharge, ear pain, hearing loss, mouth sores, postnasal drip, sinus pressure, sore throat, tinnitus, trouble swallowing and voice change.    Eyes: Negative for photophobia, pain and visual disturbance.   Respiratory: Positive for shortness of breath. Negative for cough, chest tightness and wheezing.    Cardiovascular: Negative for chest pain, palpitations and leg swelling.   Gastrointestinal: Negative for abdominal distention,  abdominal pain, blood in stool, constipation, diarrhea, nausea and vomiting.   Endocrine: Negative for cold intolerance, heat intolerance, polydipsia, polyphagia and polyuria.   Genitourinary: Negative for difficulty urinating, dysuria, flank pain, frequency, hematuria, urgency, vaginal bleeding and vaginal discharge.   Musculoskeletal: Negative for arthralgias, back pain, gait problem, joint swelling and myalgias.   Skin: Negative for pallor and rash.   Allergic/Immunologic: Negative for immunocompromised state.   Neurological: Negative for dizziness, syncope, weakness, light-headedness, numbness and headaches.   Hematological: Negative for adenopathy. Does not bruise/bleed easily.   Psychiatric/Behavioral: Positive for confusion. Negative for agitation and dysphoric mood. The patient is not nervous/anxious.      Objective:     Vital Signs (Most Recent):  Temp: 97.8 °F (36.6 °C) (02/17/18 1245)  Pulse: 88 (02/17/18 1245)  Resp: 18 (02/17/18 1245)  BP: 108/64 (02/17/18 1255)  SpO2: (!) 94 % (02/17/18 1245) Vital Signs (24h Range):  Temp:  [97.1 °F (36.2 °C)-98.9 °F (37.2 °C)] 97.8 °F (36.6 °C)  Pulse:  [82-91] 88  Resp:  [18] 18  SpO2:  [90 %-100 %] 94 %  BP: ()/(50-73) 108/64     Weight: 64.6 kg (142 lb 6.7 oz)  Body mass index is 24.45 kg/m².  Body surface area is 1.71 meters squared.      Intake/Output Summary (Last 24 hours) at 02/17/18 1324  Last data filed at 02/17/18 0700   Gross per 24 hour   Intake              530 ml   Output             2650 ml   Net            -2120 ml       Physical Exam   Constitutional: She is oriented to person, place, and time. She appears well-developed and well-nourished. She is active and cooperative.   HENT:   Head: Normocephalic and atraumatic.   Nose: Nose normal.   Mouth/Throat: Oropharynx is clear and moist. No oropharyngeal exudate.   Eyes: Pupils are equal, round, and reactive to light. No scleral icterus.   Neck: Neck supple. No thyromegaly present.    Cardiovascular: Normal rate, regular rhythm, normal heart sounds and intact distal pulses.    No murmur heard.  Pulmonary/Chest: Effort normal. No stridor. No respiratory distress. She has no wheezes. She has rales. Right breast exhibits mass.   Abdominal: Soft. Bowel sounds are normal. She exhibits no distension, no ascites and no mass. There is no hepatosplenomegaly. There is no tenderness. There is no rigidity, no rebound and no guarding.   Musculoskeletal: She exhibits no edema or tenderness.   Lymphadenopathy:     She has no cervical adenopathy.   Neurological: She is alert and oriented to person, place, and time. No cranial nerve deficit. She exhibits normal muscle tone. Coordination normal.   Skin: Skin is warm and dry. No rash noted. No pallor.   Psychiatric: She has a normal mood and affect. Her behavior is normal. Judgment and thought content normal. She exhibits abnormal recent memory.   Nursing note and vitals reviewed.      Significant Labs:   I have reviewed all of the patient's relevant lab work available in the medical record and have utilized this in my evaluation and management recommendations today    Diagnostic Results:  I have reviewed all of the patient's diagnostic/imaging results available in the medical record and have utilized this in my evaluation and management recommendations today.    Assessment/Plan:     Mass of breast, right    I had a detailed discussion with the patient today with regard to her current clinical situation.  We are currently awaiting pathology results for confirmation of breast malignancy.  The patient is not a candidate for any systemic chemotherapy based on her overall clinical situation and associated comorbidities.  She might be a candidate for endocrine therapy if her tumor is ER/[place] positive.            Thank you for your consult.      Rodo Matson MD  Hematology/Oncology  [facility]

## 2018-02-17 NOTE — SUBJECTIVE & OBJECTIVE
Review of Systems   Constitutional: Positive for unexpected weight change. Negative for activity change, appetite change and fever.   HENT: Negative for congestion, ear discharge and sore throat.    Eyes: Negative for visual disturbance.   Respiratory: Positive for cough and shortness of breath.    Cardiovascular: Positive for leg swelling. Negative for chest pain.   Gastrointestinal: Negative for abdominal pain, diarrhea, nausea and vomiting.   Endocrine: Negative for polyuria.   Genitourinary: Negative for dysuria.   Musculoskeletal: Positive for arthralgias and myalgias.   Skin: Positive for rash.   Allergic/Immunologic: Negative for immunocompromised state.   Neurological: Positive for speech difficulty and weakness. Negative for dizziness, numbness and headaches.   Hematological: Negative for adenopathy.   Psychiatric/Behavioral: Positive for confusion and decreased concentration.   All other systems reviewed and are negative.    Objective:     Vital Signs (Most Recent):  Temp: 98.9 °F (37.2 °C) (02/16/18 1940)  Pulse: 91 (02/16/18 1940)  Resp: 18 (02/16/18 1940)  BP: 114/61 (02/16/18 1940)  SpO2: (!) 90 % (02/16/18 1940) Vital Signs (24h Range):  Temp:  [97.7 °F (36.5 °C)-98.9 °F (37.2 °C)] 98.9 °F (37.2 °C)  Pulse:  [85-96] 91  Resp:  [18-20] 18  SpO2:  [90 %-97 %] 90 %  BP: (110-140)/(61-81) 114/61     Weight: 63.1 kg (139 lb 1.8 oz)  Body mass index is 23.88 kg/m².    Intake/Output Summary (Last 24 hours) at 02/16/18 0366  Last data filed at 02/16/18 1812   Gross per 24 hour   Intake              880 ml   Output             1125 ml   Net             -245 ml      Physical Exam   Constitutional: She appears well-developed and well-nourished.  Non-toxic appearance.   Unkempt  female - appears stated age   HENT:   Head: Normocephalic and atraumatic.       Mouth/Throat: Mucous membranes are dry.   Dry tongue and oral MM   Eyes: Conjunctivae are normal.   Neck: Normal range of motion. Neck supple.    Cardiovascular: Regular rhythm.  Tachycardia present.    Pulmonary/Chest: Effort normal. No respiratory distress. She has rales.   Abdominal: Soft. Bowel sounds are normal. She exhibits distension and mass. There is no tenderness. There is no rebound and no guarding.   Silvana-umbilical 2cm   Musculoskeletal: She exhibits edema (+2/+3 pitting edema to BLE).   Generalized weakness   Neurological: No cranial nerve deficit. Coordination normal.   Patient able to answer some questions with yes/no , not always appropriate, able to name banana and say thank you   Skin: Capillary refill takes less than 2 seconds. Rash noted.   Abrasion to forehead and left chin area  Candidal appearing rash underneath breasts and abdominal folds and peroneum, chaperon present,   Ulcerations and cellulitis to feet  Breast mass - right, 12 cm   Psychiatric:   AAOx4, NAD  Concern for malingering   Nursing note and vitals reviewed.      Significant Labs: All pertinent labs within the past 24 hours have been reviewed.    Significant Imaging: I have reviewed and interpreted all pertinent imaging results/findings within the past 24 hours.

## 2018-02-17 NOTE — ASSESSMENT & PLAN NOTE
2/13 Switch to diamox, continue AVAPS  2/15 Noninvasive Positive Pressure Ventilation at night, check ABG in AM  2/17 continue diuretics , bronchodilators

## 2018-02-17 NOTE — PROGRESS NOTES
[facility] -   Pulmonology  Progress Note    Patient Name: Kelly Mariano  MRN: [id number]  Admission Date: 2/9/2018  Hospital Length of Stay: 8 days  Code Status: Full Code  Attending Provider: Brittni Lagos MD  Primary Care Provider: Primary Doctor No   Principal Problem: Bilateral cellulitis of lower leg    Subjective: No new complaints , more alert     Interval History: very weak    Objective:     Vital Signs (Most Recent):  Temp: 97.8 °F (36.6 °C) (02/17/18 1245)  Pulse: 88 (02/17/18 1245)  Resp: 18 (02/17/18 1245)  BP: 108/64 (02/17/18 1255)  SpO2: (!) 94 % (02/17/18 1245) Vital Signs (24h Range):  Temp:  [97.1 °F (36.2 °C)-98.9 °F (37.2 °C)] 97.8 °F (36.6 °C)  Pulse:  [82-91] 88  Resp:  [18] 18  SpO2:  [90 %-100 %] 94 %  BP: ()/(50-73) 108/64     Weight: 64.6 kg (142 lb 6.7 oz)  Body mass index is 24.45 kg/m².      Intake/Output Summary (Last 24 hours) at 02/17/18 1513  Last data filed at 02/17/18 1200   Gross per 24 hour   Intake              670 ml   Output             2650 ml   Net            -1980 ml       Physical Exam   Constitutional: She appears distressed.   Chronically ill appearing     HENT:   Head: Normocephalic and atraumatic.   Mouth/Throat: No oropharyngeal exudate.   Eyes: Conjunctivae are normal. Pupils are equal, round, and reactive to light.   Neck: Neck supple. No JVD present. No tracheal deviation present. No thyromegaly present.   Cardiovascular: Normal rate.  An irregular rhythm present. PMI is displaced.  Exam reveals distant heart sounds.    Murmur heard.   Systolic murmur is present with a grade of 2/6   Pulmonary/Chest: Effort normal. No respiratory distress. She has decreased breath sounds. She has wheezes. She has rales in the right lower field and the left lower field. She exhibits no tenderness.   Abdominal: Soft. Bowel sounds are normal.   Musculoskeletal: She exhibits edema.   Lymphadenopathy:     She has no cervical adenopathy.   Neurological: She is alert.    Skin: Skin is warm and dry.   Nursing note and vitals reviewed.      Vents:  Oxygen Concentration (%): 28 (02/17/18 0930)    Lines/Drains/Airways     Peripherally Inserted Central Catheter Line                 PICC Double Lumen 02/10/18 1524 right basilic 6 days                Significant Labs:    CBC/Anemia Profile:    Recent Labs  Lab 02/16/18  0519 02/17/18  0530   WBC 9.54 6.05   HGB 14.0 12.6   HCT 45.3 41.1   * 115*   MCV 92 93   RDW 17.6* 17.3*        Chemistries:    Recent Labs  Lab 02/15/18  1556 02/16/18  0519 02/17/18  0530   NA  --  138 139   K  --  4.0 3.7   CL  --  94* 96   CO2  --  36* 37*   BUN  --  15 18   CREATININE  --  0.6 0.5   CALCIUM  --  9.1 8.6*   ALBUMIN  --  2.8* 2.5*   PROT  --  6.4 5.9*   BILITOT  --  1.3* 0.6   ALKPHOS  --  71 71   ALT  --  11 19   AST  --  24 28   MG 1.9 1.8 1.9       BMP:   Recent Labs  Lab 02/17/18  0530   GLU 81      K 3.7   CL 96   CO2 37*   BUN 18   CREATININE 0.5   CALCIUM 8.6*   MG 1.9     CMP:   Recent Labs  Lab 02/16/18  0519 02/17/18  0530    139   K 4.0 3.7   CL 94* 96   CO2 36* 37*   GLU 81 81   BUN 15 18   CREATININE 0.6 0.5   CALCIUM 9.1 8.6*   PROT 6.4 5.9*   ALBUMIN 2.8* 2.5*   BILITOT 1.3* 0.6   ALKPHOS 71 71   AST 24 28   ALT 11 19   ANIONGAP 8 6*   EGFRNONAA >60 >60     All pertinent labs within the past 24 hours have been reviewed.    Significant Imaging:  I have reviewed and interpreted all pertinent imaging results/findings within the past 24 hours.    Assessment/Plan:     Acute on chronic respiratory failure with hypoxia and hypercapnia    2/13 Switch to diamox, continue AVAPS  2/15 Noninvasive Positive Pressure Ventilation at night, check ABG in AM  2/17 continue diuretics , bronchodilators        Mass of breast, right    2/15 Pleural fluid cytology is negative for malignancy  2/17 awaiting biopsy report          Pleural effusion on right    21/6 Monitor.  2/17 jerry , check Chest X Ray in AM               Abimael Vance,  MD  Pulmonology  [facility]

## 2018-02-17 NOTE — PLAN OF CARE
Problem: Occupational Therapy Goal  Goal: Occupational Therapy Goal  PT PRESENTS WITH DECREASED ENDURANCE, DECREASED BALANCE AND PAIN WHICH DECREASES SAFETY/(I) WITH ALL AREAS OF SELF CARE.      STG'S:    1) PT WILL PERFORM X3 SETS X10 REPS OF BUE RESISTANCE THERAPEUTIC EXERCISE FOR INCREASED FUNCTION STRENGTH/ENDURANCE.  2) PT WILL BE MAX (A) WITH UB DRESSING.  3) PT WILL BE MAX (A) WITH LB DRESSING.

## 2018-02-17 NOTE — PROGRESS NOTES
[facility] Hospital Medicine  Progress Note    Patient Name: Kelly Mariano  MRN: [id number]  Patient Class: IP- Inpatient   Admission Date: 2/9/2018  Length of Stay: 7 days  Attending Physician: Odilon Subramanian MD  Primary Care Provider: Primary Doctor No        Subjective:     Principal Problem:Bilateral cellulitis of lower leg    HPI:  Kelly Mariano is a 66 yo female who states she has not seen a physician since 2009. EMS brought her to the ED today as the patient reported generalized weakness that has worsened over the last 2 days. She had trouble getting off the toilet and when she di,  she had fallen. EMS stated living conditions were deplorable with cat feces on the ibeth. (Elderly Protective Services contacted). Pt is noted with cellulitis and wounds to the bilateral feet and lower legs. Pt describes anxiety attacks, SOB, leg swelling and difficulty walking. Vital signs showed tachycardia = 112, pt afebrile with controlled blood pressure. Labs indicate thrombocytopenia, metabolic alkalosis, troponin 0.114, BNP > 4900, initial lactic acid 2.3;  CXR shows pulmonary edema and a moderate right sided pleural effusion.    Hospital Course:  2/10- no fever, other vss. Pt feels weakness, leg swelling and sob improving.Blood cultures positive for staph, on vanc. and guillermo. MRI negative for osteo. ID consulted. CTA BLE to determine if adequate circulation. Per nurse, concerned for left breast deformity, concerning for malignancy, hemonc consulted.   2/11 - Legs swelling and weakness improved, no longer sob. Echo shows ejection fraction 10%, diuresing well on lasix. Per cards left heart cath or stress test when better. Seen by hem/onc this am, concern for breast ca, right pleural effusion drained today w/o complications, swallow study positive for aspiration, counseled patient. Continued on antibx for lower extremity cellulitis. CT legs with no obvious arterial occlusion, however CTA lower extremeties  describes Ulcerative plaque versus small focal dissection measuring 8 mm in the left common iliac artery which causes a mild to moderate grade focal stenosis just above the bifurcation into the left external and left internal iliac arteries.  Contacted Brett Puga, vascular surgeon, no intervention needed now. Recommending lower extremity leg wraps and follow up w/vascular in outpt.   2/12 - Patient feeling better, platelets trending down, crp elevated at 28, sed rate normal. Blood cultures positive for s. Aeureus, awaiting sensitivity.  Xray with tiny right pneumothorax after thoracentesis. General sx biopsied breast mass.   2/13 - Pt with alt mental status this am, alkalotic, possibly secondary to over-diuresis and thoracentesis, xray shows pleural effusion, returned possibly due to chf and/or met. Breast ca. Case discussed w/oncology and no additional imaging needed at this point. Blood cultures positive for MRSA, vanc continued  2/14 - increased lethargy, bicarb improved,chest xray unchanged,  awaiting breast biopsy path report, being diuresed w/acetozolamide, attempted to contact Xavier Rosenbaum, left vmail. Meeting with case management and elderly protection services to discuss contacting any family member of friend, ordered CT head/chest/abd/pelvis   2/15 - vss, including pulse ox on 2 Liters, mentation slightly improved, CT head/chest/abd/pelvis - head no acute processes, chest/abd/pelvis - possible pelvic mass cannot exclude bone mets  2/16 - ABG and platelets improved , cxray unchanged, cytology from thoracentesis negative for malignancy.   mentation drastically improved, pt able to re-state conversation w/hemonc this am. Pt wanting full work up and tx for breast ca at this point. Pt requesting nursing home placement, is demonstrating competency in decision making.However concern for grave disability due to such severe self-neglect in home environment, that she is better suited to live in a supervised  setting.         Review of Systems   Constitutional: Positive for unexpected weight change. Negative for activity change, appetite change and fever.   HENT: Negative for congestion, ear discharge and sore throat.    Eyes: Negative for visual disturbance.   Respiratory: Positive for cough and shortness of breath.    Cardiovascular: Positive for leg swelling. Negative for chest pain.   Gastrointestinal: Negative for abdominal pain, diarrhea, nausea and vomiting.   Endocrine: Negative for polyuria.   Genitourinary: Negative for dysuria.   Musculoskeletal: Positive for arthralgias and myalgias.   Skin: Positive for rash.   Allergic/Immunologic: Negative for immunocompromised state.   Neurological: Positive for speech difficulty and weakness. Negative for dizziness, numbness and headaches.   Hematological: Negative for adenopathy.   Psychiatric/Behavioral: Positive for confusion and decreased concentration.   All other systems reviewed and are negative.    Objective:     Vital Signs (Most Recent):  Temp: 98.9 °F (37.2 °C) (02/16/18 1940)  Pulse: 91 (02/16/18 1940)  Resp: 18 (02/16/18 1940)  BP: 114/61 (02/16/18 1940)  SpO2: (!) 90 % (02/16/18 1940) Vital Signs (24h Range):  Temp:  [97.7 °F (36.5 °C)-98.9 °F (37.2 °C)] 98.9 °F (37.2 °C)  Pulse:  [85-96] 91  Resp:  [18-20] 18  SpO2:  [90 %-97 %] 90 %  BP: (110-140)/(61-81) 114/61     Weight: 63.1 kg (139 lb 1.8 oz)  Body mass index is 23.88 kg/m².    Intake/Output Summary (Last 24 hours) at 02/16/18 0066  Last data filed at 02/16/18 1812   Gross per 24 hour   Intake              880 ml   Output             1125 ml   Net             -245 ml      Physical Exam   Constitutional: She appears well-developed and well-nourished.  Non-toxic appearance.   Unkempt  female - appears stated age   HENT:   Head: Normocephalic and atraumatic.       Mouth/Throat: Mucous membranes are dry.   Dry tongue and oral MM   Eyes: Conjunctivae are normal.   Neck: Normal range of motion.  Neck supple.   Cardiovascular: Regular rhythm.  Tachycardia present.    Pulmonary/Chest: Effort normal. No respiratory distress. She has rales.   Abdominal: Soft. Bowel sounds are normal. She exhibits distension and mass. There is no tenderness. There is no rebound and no guarding.   Silvana-umbilical 2cm   Musculoskeletal: She exhibits edema (+2/+3 pitting edema to BLE).   Generalized weakness   Neurological: No cranial nerve deficit. Coordination normal.   Patient able to answer some questions with yes/no , not always appropriate, able to name banana and say thank you   Skin: Capillary refill takes less than 2 seconds. Rash noted.   Abrasion to forehead and left chin area  Candidal appearing rash underneath breasts and abdominal folds and peroneum, chaperon present,   Ulcerations and cellulitis to feet  Breast mass - right, 12 cm   Psychiatric:   AAOx4, NAD  Concern for malingering   Nursing note and vitals reviewed.      Significant Labs: All pertinent labs within the past 24 hours have been reviewed.    Significant Imaging: I have reviewed and interpreted all pertinent imaging results/findings within the past 24 hours.    Assessment/Plan:      * Bilateral cellulitis of lower leg      IV Merrem and Vanco  Wound Care  Arterial US to evaluate blood flow for proper healing  2/12 - s.a. In blood, awaiting sensitivities, continued on vanc and repeat blood cultures    2/15 - wound care following, continued on antibiotics for MRSA bacteremia  On diflucan for tinea to peroneum  2/16 - on vanc,will follow cultures and discuss duration of tx with ID prior to d/c,         Altered mental status    2/13 - pt w/hypercapnic, respiratory distress. 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 Respiratory consulted for bipap, however pt not tolerating, would prefer to avoid sedation as this will worsen resp. Status. Attempted to reach out to family contacts multiple times, no response.   2/14 - anti-cholinergic drugs d/cd,  (was on famotidine), no narcotics or sedatives prescribed, thyroid function normal, will follow cortisol level, no hx of alchol use, b12, folate and thiamine given, no indications of seizure, meningitis or encephalitis  2/15 - more alert today and increasingly appropriate answers.  Will contact pharmacy in AM to search med mind for psychiatric drugs that pt may not have reported at admit.    2/16 - condition much improved, lengthy discussions on numerous occasions w/ case management and other physicians, attempting to provide appropriate level of care and placement based on pt's ability to care for herself.        Bacteremia due to Staphylococcus aureus      2/13 - cultures positive MRSA  2/14 - mrsa, on vanc, id following        Mass of breast, right    2/11 - oncology recommending biopsy of breast mass  2/12 - path specimen sent for eval of breast mass, and for ER/MT/[id number]. Case discussed with gen. Surgery and hem/onc. Will consider any additional imaging for staging while inpt.   2/16 - pt will need path results and unclear where she will need follow up for breast ca         Subacute osteomyelitis of multiple sites    Bilateral foot plain films note some questionable osteo to bilateral great toes  MRI pending  Evaluate arterial blood flow with U/S and ABIs          Protein-calorie malnutrition, moderate    Will start Folbic, MVI  Nutrition consult          Acute combined systolic and diastolic heart failure    Telemetry monitoring, daily weights, low sodium diet, fluid restriction,    Lasix 20 mg IV twice  Cardiology consult  Lisinopril 2.5 mg  Elevated troponin likely due to congestive heart failure - trend  2/10 -   1 - Moderate left atrial enlargement.     2 - Eccentric hypertrophy.     3 - No wall motion abnormalities.     4 - Severely depressed left ventricular systolic function (EF 10-15%).     5 - Impaired LV relaxation, normal LAP (grade 1 diastolic dysfunction).     6 - Low normal to mildly depressed  right ventricular systolic function .     7 - The estimated PA systolic pressure is 25 mmHg.     8 - Mild to moderate mitral regurgitation.     9 - Mild to moderate tricuspid regurgitation.     10 - Intermediate central venous pressure.     11 - Right pleural effusion.  12 - cards following  2/16 - case discussed w/cards, pt will need stress test and possibly ischemic work up prior to defibrillator placement. In meantime will optimize med management. Unclear if pulm effusion is due to heart failure as cytology showed no malignancy.              Pleural effusion on right    Diuresis with IV lasix  Supplemental oxygen to maintain sat > 92 %  Will likely need thoracentesis, consult Pulmonology when needed  2/10 - moderate size, concerning for malignancy due to left breast deformity (metastic disease?)  2/12 - will follow path report analysis of pleural fluid  2/14 - will discuss w/pulm if repeat thoracentesis needed and follow up studies from previous          VTE Risk Mitigation         Ordered     heparin (porcine) injection 5,000 Units  Every 8 hours     Route:  Subcutaneous        02/09/18 1749     Medium Risk of VTE  Once      02/09/18 1300              Odilon Subramanian MD  Department of Hospital Medicine   [facility]

## 2018-02-17 NOTE — ASSESSMENT & PLAN NOTE
-Remains confused   -Mgmt as per primary team, unclear etiology  - Pt cannot go back to her home, will need alternate housing situation - Nursing home or SNF/LTAC?

## 2018-02-17 NOTE — PT/OT/SLP EVAL
Occupational Therapy   Evaluation    Name: Kelly Mariano  MRN: [id number]  Admitting Diagnosis:  Bilateral cellulitis of lower leg      Recommendations:     Discharge Recommendations: nursing facility, skilled  Discharge Equipment Recommendations:  none  Barriers to discharge:  Inaccessible home environment, Decreased caregiver support    History:     Occupational Profile:  Living Environment: APARTMENT WITH POOR MAINTENANCE/UPKEEP.  Previous level of function: PT WAS LIVING ALONE.  Roles and Routines: N/A  Equipment Owned:  none  Assistance upon Discharge: PT STATED SHE HAS NO FAMILY WHO CAN (A) HER.    History reviewed. No pertinent past medical history.    Past Surgical History:   Procedure Laterality Date    SPINAL FUSION         Subjective     Chief Complaint: FEAR OF SITTING EOB AND DIFFICULTY WITH LONG SITTING IN BED DUE TO PAIN.  Patient/Family stated goals: NONE STATED.  Communicated with: NURSE & PATIENT & PHYSICAL THERAPIST prior to session.  Pain/Comfort:  · Pain Rating 1: 8/10  · Location - Side 1: Right  · Location - Orientation 1: upper  · Location 1: abdomen  · Pain Addressed 1: Reposition, Distraction    Patients cultural, spiritual, Religion conflicts given the current situation:      Objective:     Patient found with: peripheral IV, telemetry, oxygen    General Precautions: Standard, fall, aspiration   Orthopedic Precautions:N/A   Braces: N/A     Occupational Performance:    Bed Mobility:    · PT TOTAL (A) WITH BED MOBILITY    Functional Mobility/Transfers:  PT FUNC MOBILITY/TRANSFERS NT AT PRESENT DUE TO DECREASE SAFETY.    Activities of Daily Living:  · PT (D) WITH UB/LB DRESSING AND MAX (A) TO COMB HAIR.  SET UP WITH FEEDING.    Cognitive/Visual Perceptual:  PT STATED PRESENT LOCATION AS [place].  PT ABLE TO STATE HER NAME & ABLE TO STATE NIEVES AS PRESIDENT.    Physical Exam:  BUE AROM WFL.      Patient left HOB elevated with all lines intact, call button in reach and CNA present    [facility] 6  "Click:  [facility] Total Score: 9    Treatment & Education:  PT EDUCATED RE: PURPOSE OF OT.  PT COMBED HAIR WITH USE OF HANDS.  PT ATTEMPTED UB & LB DRESSING WITHOUT SUCCESS.  PT SET UP WITH MEAL.  PT ATE USING FINGERS--SHE DID NOT WANT TO EAT CHOPPED MEAT WITH HER FORK.  Education:    Assessment:     Kelly Mariano is a 67 y.o. female with a medical diagnosis of Bilateral cellulitis of lower leg.  She presents with SELF CARE DEBILITY.  Performance deficits affecting function are weakness, impaired endurance, impaired self care skills, impaired functional mobilty, gait instability, impaired balance, impaired cognition, decreased lower extremity function, decreased safety awareness, pain.      Rehab Prognosis:  GOOD; patient would benefit from acute skilled OT services to address these deficits and reach maximum level of function.         Clinical Decision Making:     3.  OT High:  "Pt evaluation falls under high complexity for evaluation category due to 5+ performance deficits identified with comprehensive assessments and significant modifications/assistance required. An expanded review of history and occupational profile completed in addition to expanded review of physical, cognitive and psychosocial history. Several treatment options considered in care."     Plan:     Patient to be seen 3 x/week to address the above listed problems via self-care/home management, therapeutic activities, therapeutic exercises  · Plan of Care Expires: 02/24/18  · Plan of Care Reviewed with: patient    This Plan of care has been discussed with the patient who was involved in its development and understands and is in agreement with the identified goals and treatment plan    GOALS:    Occupational Therapy Goals        Problem: Occupational Therapy Goal    Goal Priority Disciplines Outcome Interventions   Occupational Therapy Goal     OT, PT/OT                     Time Tracking:     OT Date of Treatment: 02/17/18  OT Start Time: 1129  OT Stop " Time: 1145  OT Total Time (min): 16 min    Billable Minutes:Evaluation 10  Self Care/Home Management 8    Alicia Avalos OT  2/17/2018

## 2018-02-17 NOTE — ASSESSMENT & PLAN NOTE
2/13 - pt w/hypercapnic, respiratory distress. 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 Respiratory consulted for bipap, however pt not tolerating, would prefer to avoid sedation as this will worsen resp. Status. Attempted to reach out to family contacts multiple times, no response.   2/14 - anti-cholinergic drugs d/cd, (was on famotidine), no narcotics or sedatives prescribed, thyroid function normal, will follow cortisol level, no hx of alchol use, b12, folate and thiamine given, no indications of seizure, meningitis or encephalitis  2/15 - more alert today and increasingly appropriate answers.  Will contact pharmacy in AM to search med mind for psychiatric drugs that pt may not have reported at admit.    2/16 - condition much improved, lengthy discussions on numerous occasions w/ case management and other physicians, attempting to provide appropriate level of care and placement based on pt's ability to care for herself.

## 2018-02-17 NOTE — SUBJECTIVE & OBJECTIVE
Interval History: Today the patient reports that she feels well.  She is able to carry on normal conversation and is oriented to time and person.  She did not recall having had her breast mass biopsy done.  I have reviewed all of the patient's interval history.    Oncology Treatment Plan:   [No treatment plan]    Medications:  Continuous Infusions:  Scheduled Meds:   bumetanide  1 mg Intravenous BID    fluconazole (DIFLUCAN) IVPB  200 mg Intravenous Q24H    folic acid-vit B6-vit B12 2.5-25-2 mg  1 tablet Oral Daily    heparin (porcine)  5,000 Units Subcutaneous Q8H    lisinopril  5 mg Oral Daily    metoprolol succinate  50 mg Oral Daily    miconazole NITRATE 2 %   Topical (Top) BID    multivitamin  1 tablet Oral Daily    thiamine (VITAMIN B1) IVPB  100 mg Intravenous Daily    vancomycin 1 g in 0.9% sodium chloride 250 mL IVPB (ready to mix system)  1 g Intravenous Q18H     PRN Meds:acetaminophen     Review of Systems   Constitutional: Positive for activity change, appetite change and fatigue. Negative for chills, diaphoresis, fever and unexpected weight change.   HENT: Negative for congestion, dental problem, ear discharge, ear pain, hearing loss, mouth sores, postnasal drip, sinus pressure, sore throat, tinnitus, trouble swallowing and voice change.    Eyes: Negative for photophobia, pain and visual disturbance.   Respiratory: Positive for shortness of breath. Negative for cough, chest tightness and wheezing.    Cardiovascular: Negative for chest pain, palpitations and leg swelling.   Gastrointestinal: Negative for abdominal distention, abdominal pain, blood in stool, constipation, diarrhea, nausea and vomiting.   Endocrine: Negative for cold intolerance, heat intolerance, polydipsia, polyphagia and polyuria.   Genitourinary: Negative for difficulty urinating, dysuria, flank pain, frequency, hematuria, urgency, vaginal bleeding and vaginal discharge.   Musculoskeletal: Negative for arthralgias, back pain,  gait problem, joint swelling and myalgias.   Skin: Negative for pallor and rash.   Allergic/Immunologic: Negative for immunocompromised state.   Neurological: Negative for dizziness, syncope, weakness, light-headedness, numbness and headaches.   Hematological: Negative for adenopathy. Does not bruise/bleed easily.   Psychiatric/Behavioral: Positive for confusion. Negative for agitation and dysphoric mood. The patient is not nervous/anxious.      Objective:     Vital Signs (Most Recent):  Temp: 97.8 °F (36.6 °C) (02/17/18 1245)  Pulse: 88 (02/17/18 1245)  Resp: 18 (02/17/18 1245)  BP: 108/64 (02/17/18 1255)  SpO2: (!) 94 % (02/17/18 1245) Vital Signs (24h Range):  Temp:  [97.1 °F (36.2 °C)-98.9 °F (37.2 °C)] 97.8 °F (36.6 °C)  Pulse:  [82-91] 88  Resp:  [18] 18  SpO2:  [90 %-100 %] 94 %  BP: ()/(50-73) 108/64     Weight: 64.6 kg (142 lb 6.7 oz)  Body mass index is 24.45 kg/m².  Body surface area is 1.71 meters squared.      Intake/Output Summary (Last 24 hours) at 02/17/18 1324  Last data filed at 02/17/18 0700   Gross per 24 hour   Intake              530 ml   Output             2650 ml   Net            -2120 ml       Physical Exam   Constitutional: She is oriented to person, place, and time. She appears well-developed and well-nourished. She is active and cooperative.   HENT:   Head: Normocephalic and atraumatic.   Nose: Nose normal.   Mouth/Throat: Oropharynx is clear and moist. No oropharyngeal exudate.   Eyes: Pupils are equal, round, and reactive to light. No scleral icterus.   Neck: Neck supple. No thyromegaly present.   Cardiovascular: Normal rate, regular rhythm, normal heart sounds and intact distal pulses.    No murmur heard.  Pulmonary/Chest: Effort normal. No stridor. No respiratory distress. She has no wheezes. She has rales. Right breast exhibits mass.   Abdominal: Soft. Bowel sounds are normal. She exhibits no distension, no ascites and no mass. There is no hepatosplenomegaly. There is no  tenderness. There is no rigidity, no rebound and no guarding.   Musculoskeletal: She exhibits no edema or tenderness.   Lymphadenopathy:     She has no cervical adenopathy.   Neurological: She is alert and oriented to person, place, and time. No cranial nerve deficit. She exhibits normal muscle tone. Coordination normal.   Skin: Skin is warm and dry. No rash noted. No pallor.   Psychiatric: She has a normal mood and affect. Her behavior is normal. Judgment and thought content normal. She exhibits abnormal recent memory.   Nursing note and vitals reviewed.      Significant Labs:   I have reviewed all of the patient's relevant lab work available in the medical record and have utilized this in my evaluation and management recommendations today    Diagnostic Results:  I have reviewed all of the patient's diagnostic/imaging results available in the medical record and have utilized this in my evaluation and management recommendations today.

## 2018-02-17 NOTE — PT/OT/SLP EVAL
"Physical Therapy Evaluation    Patient Name:  Kelly Mariano   MRN:  [id number]    Recommendations:     Discharge Recommendations:  nursing facility, skilled   Discharge Equipment Recommendations: none   Barriers to discharge: Decreased caregiver support    Assessment:     Kelly Mariano is a 67 y.o. female admitted with a medical diagnosis of Bilateral cellulitis of lower leg.  She presents with the following impairments/functional limitations:  weakness, impaired endurance, impaired self care skills, impaired functional mobilty, gait instability, impaired balance, impaired cognition, decreased coordination, decreased lower extremity function, decreased safety awareness, pain, impaired cardiopulmonary response to activity .    Rehab Prognosis:  FAIR (DEPENDENT UPON PATIENT WILLINGNESS TO PARTICIPATE); patient would benefit from acute skilled PT services to address these deficits and reach maximum level of function.      Recent Surgery: * No surgery found *      Plan:     During this hospitalization, patient to be seen 5 x/week to address the above listed problems via gait training, therapeutic activities, therapeutic exercises  · Plan of Care Expires:  02/24/18   Plan of Care Reviewed with: patient    Subjective     Communicated with [facility] and nurse (Maciel) prior to session.  Patient found supine in bed upon PT entry to room, agreeable to evaluation after max encouragement and education on benefits of physical activity.    Chief Complaint: Weakness  Patient comments/goals: "I'm going to a nursing home."  Pain/Comfort:  · Pain Rating 1: 8/10  · Location 1: back  · Pain Addressed 1: Reposition, Distraction, Other (see comments) (Patient stated that she has chronic back pain; therapist explained that this may be exacerbated from immobility/lying in bed and that participating with therapy may help give her some relief)    Patients cultural, spiritual, Restoration conflicts given the current situation:  none     Living " "Environment:  Patient lived home alone with no family or friends for support.  Per [facility], she was living in less-than-adequate living conditions.  Patient stated that prior to admit her level of function was "mostly sitting and watching TV".  When asked how she moved about her home or how she got from the chair to the bathroom, patient was unable to answer.  Therefore, unclear of patient's mobility status prior to admit.  No family available to question.  Patient has the following equipment: none.  DME owned (not currently used): none.  Upon discharge, patient will have assistance from ???.    Objective:     Patient found with: peripheral IV, telemetry, oxygen     General Precautions: Standard, fall, aspiration   Orthopedic Precautions:N/A   Braces: N/A     Exams:  · Cognitive Exam:  Patient is oriented to Person and follows 50% of one-step commands   · Gross Motor Coordination:  Decreased  · Postural Exam:  Patient presented with the following abnormalities:    · -       Rounded shoulders  · -       Forward head  · -       Posterior pelvic tilt  · -       Kyphosis  · Skin Integrity/Edema:      · -       Skin integrity: Wound B lower leg/ankle/foot area; covered with bandage at this time  · RLE ROM: min decreased AROM  · RLE Strength: Grossly 3/5  · LLE ROM: min decreased AROM  · LLE Strength: grossly 3/5    Functional Mobility:  · Bed Mobility:     · Rolling Right: maximal assistance  · Scooting: maximal assistance and of 2 persons  · Supine to Sit: maximal assistance and of 2 persons  · Sit to Supine: maximal assistance and of 2 persons  · Transfers:     · Sit to Stand:  maximal assistance and of 2 persons with hand-held assist bilaterally and knees blocked to prevent buckling  · Gait: Patient unable/unwilling to progress to gait at this time    AM-PAC 6 CLICK MOBILITY  Total Score:9       Therapeutic Activities and Exercises:   Patient very reluctant to participate with therapy.  Required max encouragement and " education.  Patient with anxiety and fear of falling.  Patient eventually agreed to participate in transfer training.  Only able/willing to tolerate sitting edge of bed for approx 4 mins and static standing with max assist x 2 for approx 30 seconds.  Patient insisted upon return to bed.    Patient left right sidelying with all lines intact, call button in reach and bed alarm on.    GOALS:    Physical Therapy Goals        Problem: Physical Therapy Goal    Goal Priority Disciplines Outcome Goal Variances Interventions   Physical Therapy Goal     PT/OT, PT      Description:  LTGs to be met within 7 days (2/24/18):  1.  Patient will perform bed mobility with mod assist  2.  Patient will perform functional transfers with mod assist  3.  Patient will sit EOB x 10 mins with good static sitting balance and no c/o fatigue  4.  Patient will participate in gait assessment  5.  Patient will perform BLE therapeutic exercises 10 x 2 in all planes                    History:     History reviewed. No pertinent past medical history.    Past Surgical History:   Procedure Laterality Date    SPINAL FUSION         Clinical Decision Making:     History  Co-morbidities and personal factors that may impact the plan of care Examination  Body Structures and Functions, activity limitations and participation restrictions that may impact the plan of care Clinical Presentation   Decision Making/ Complexity Score   Co-morbidities:   [] Time since onset of injury / illness / exacerbation  [] Status of current condition  []Patient's cognitive status and safety concerns    [] Multiple Medical Problems (see med hx)  Personal Factors:   [] Patient's age  [] Prior Level of function   [] Patient's home situation (environment and family support)  [] Patient's level of motivation  [] Expected progression of patient      HISTORY:(criteria)    [] 03781 - no personal factors/history    [] 91076 - has 1-2 personal factor/comorbidity     [] 40830 - has >3  personal factor/comorbidity     Body Regions:  [] Objective examination findings  [] Head     []  Neck  [] Trunk   [] Upper Extremity  [] Lower Extremity    Body Systems:  [] For communication ability, affect, cognition, language, and learning style: the assessment of the ability to make needs known, consciousness, orientation (person, place, and time), expected emotional /behavioral responses, and learning preferences (eg, learning barriers, education  needs)  [] For the neuromuscular system: a general assessment of gross coordinated movement (eg, balance, gait, locomotion, transfers, and transitions) and motor function  (motor control and motor learning)  [] For the musculoskeletal system: the assessment of gross symmetry, gross range of motion, gross strength, height, and weight  [] For the integumentary system: the assessment of pliability(texture), presence of scar formation, skin color, and skin integrity  [] For cardiovascular/pulmonary system: the assessment of heart rate, respiratory rate, blood pressure, and edema     Activity limitations:    [] Patient's cognitive status and saf ety concerns          [] Status of current condition      [] Weight bearing restriction  [] Cardiopulmunary Restriction    Participation Restrictions:   [] Goals and goal agreement with the patient     [] Rehab potential (prognosis) and probable outcome      Examination of Body System: (criteria)    [] 94310 - addressing 1-2 elements    [] 44156 - addressing a total of 3 or more elements     [] 48492 -  Addressing a total of 4 or more elements         Clinical Presentation: (criteria)  Choose one     On examination of body system using standardized tests and measures patient presents with (CHOOSE ONE) elements from any of the following: body structures and functions, activity limitations, and/or participation restrictions.  Leading to a clinical presentation that is considered (CHOOSE ONE)                              Clinical  Decision Making  (Eval Complexity):  Choose One     Time Tracking:     PT Received On: 02/17/18  PT Start Time: 0843     PT Stop Time: 0908  PT Total Time (min): 25 min     Billable Minutes: Evaluation 16 mins and Therapeutic Activity 9 mins      Adelaide Jj, PT, DPT  02/17/2018

## 2018-02-17 NOTE — ASSESSMENT & PLAN NOTE
[facility] and Vanco  Wound Care  Arterial US to evaluate blood flow for proper healing  2/12 - s.a. In blood, awaiting sensitivities, continued on vanc and repeat blood cultures    2/15 - wound care following, continued on antibiotics for MRSA bacteremia  On diflucan for tinea to peroneum  2/16 - on vanc,will follow cultures and discuss duration of tx with ID prior to d/c,

## 2018-02-17 NOTE — ASSESSMENT & PLAN NOTE
I had a detailed discussion with the patient today with regard to her current clinical situation.  We are currently awaiting pathology results for confirmation of breast malignancy.  The patient is not a candidate for any systemic chemotherapy based on her overall clinical situation and associated comorbidities.  She might be a candidate for endocrine therapy if her tumor is ER/OH positive.

## 2018-02-17 NOTE — PROGRESS NOTES
[facility]  Cardiology  Progress Note    Patient Name: Kelly Mariano  MRN: [id number]  Admission Date: 2/9/2018  Hospital Length of Stay: 8 days  Code Status: Full Code   Attending Physician: Brittni Lagos MD   Primary Care Physician: Primary Doctor No  Expected Discharge Date:   Principal Problem:Bilateral cellulitis of lower leg    Subjective:     Hospital Course:   2/11/18-Patient seen and examined today, resting in bed. States she feels ok, still weak and fatigued. Complains of SOB. No chest pain or tightness. BC +Staph. Labs reviewed. K low at 3.1. Creatinine stable.     2/12/18 Pt hemodynamically stable on Toprol and Lisinopril. Will need biopsy of breast mass. Will discuss stress once infection treated and pt is diuresed.     2/13/18- Patient remains hemodynamically stable. No reports of chest pain or SOB. Patient is right post thoracentesis on 2/11/18. 1500cc drained.  Noted to have EF of 10-15% on echo. Has right breast mass that needs biopsy. Remains of IV abx for extensive lower extremity cellulitis.     2/14/18- Patient with noted to have Bibasilar infiltrates and effusions on repeat CXR. Pulmonology started Diamox and stopped Lasix. Still awaiting breast biopsy results. BP stable . Had hypoxia this morning and is confused. No family at bedside     2/15/18-Patient seen and examined today, sitting up in bed. Still confused. BP remains stable. Labs reviewed, K slightly low at 3.4. Biopsy results still pending.     2/16/18 - no acute events overnight. Pt seems more coherent but distant/angry. Diuresed well    2/17/18 - Mental status improved, continued on diuretics, seems improved     Interval History: Improved function    Review of Systems   All other systems reviewed and are negative.    Objective:     Vital Signs (Most Recent):  Temp: 97.8 °F (36.6 °C) (02/17/18 1245)  Pulse: 88 (02/17/18 1245)  Resp: 18 (02/17/18 1245)  BP: 108/64 (02/17/18 1255)  SpO2: (!) 94 % (02/17/18 1245) Vital Signs  (24h Range):  Temp:  [97.1 °F (36.2 °C)-98.9 °F (37.2 °C)] 97.8 °F (36.6 °C)  Pulse:  [82-91] 88  Resp:  [18] 18  SpO2:  [90 %-100 %] 94 %  BP: ()/(50-73) 108/64     Weight: 64.6 kg (142 lb 6.7 oz)  Body mass index is 24.45 kg/m².     SpO2: (!) 94 %  O2 Device (Oxygen Therapy): nasal cannula      Intake/Output Summary (Last 24 hours) at 02/17/18 1521  Last data filed at 02/17/18 1200   Gross per 24 hour   Intake              670 ml   Output             2650 ml   Net            -1980 ml       Lines/Drains/Airways     Peripherally Inserted Central Catheter Line                 PICC Double Lumen 02/10/18 1524 right basilic 6 days                Physical Exam   Constitutional: No distress.   Appears disheveled, frail   HENT:   Head: Normocephalic and atraumatic.   Eyes: Pupils are equal, round, and reactive to light. Right eye exhibits no discharge.   Neck: Neck supple. No JVD present.   Cardiovascular: Normal rate, regular rhythm, S2 normal and normal heart sounds.  Exam reveals no gallop and no friction rub.    No murmur heard.  Pulmonary/Chest: Effort normal. No respiratory distress. She has no wheezes.   Diminished at bases   Abdominal: Soft. Bowel sounds are normal. She exhibits no distension. There is no tenderness. There is no rebound and no guarding.   +Truncal edema   Neurological:   Confused   Skin: Skin is warm. She is not diaphoretic.   BLE wrapped and dressed   Nursing note and vitals reviewed.      Significant Labs:   All pertinent lab results from the last 24 hours have been reviewed. and   Recent Lab Results       02/17/18  1136 02/17/18  0533 02/17/18  0530 02/16/18  2105 02/16/18  1617      Albumin   2.5(L)       Alkaline Phosphatase   71       ALT   19       Anion Gap   6(L)       AST   28       Baso #   0.02       Basophil%   0.3       Total Bilirubin   0.6  Comment:  For infants and newborns, interpretation of results should be based  on gestational age, weight and in agreement with  clinical  observations.  Premature Infant recommended reference ranges:  Up to 24 hours.............<8.0 mg/dL  Up to 48 hours............<12.0 mg/dL  3-5 days..................<15.0 mg/dL  6-29 days.................<15.0 mg/dL         BUN, Bld   18       Calcium   8.6(L)       Chloride   96       CO2   37(H)       Creatinine   0.5       Differential Method   Automated       eGFR if    >60       eGFR if non    >60  Comment:  Calculation used to obtain the estimated glomerular filtration  rate (eGFR) is the CKD-EPI equation.          Eos #   0.1       Eosinophil%   0.8       Glucose   81       Gran # (ANC)   4.8       Gran%   79.1(H)       Hematocrit   41.1       Hemoglobin   12.6       Lymph #   0.8(L)       Lymph%   12.7(L)       Magnesium   1.9       MCH   28.6       MCHC   30.7(L)       MCV   93       Mono #   0.4       Mono%   7.1       MPV   11.1       Platelets   115(L)       POCT Glucose 113(H) 82  118(H) 117(H)     Potassium   3.7       Total Protein   5.9(L)       RBC   4.41       RDW   17.3(H)       Sodium   139       WBC   6.05                       Significant Imaging: Echocardiogram:   2D echo with color flow doppler:   Results for orders placed or performed during the hospital encounter of 02/09/18   2D echo with color flow doppler   Result Value Ref Range    EF 10 (A) 55 - 65    Mitral Valve Regurgitation MILD TO MODERATE     Diastolic Dysfunction Yes (A)     Est. PA Systolic Pressure 24.81     Tricuspid Valve Regurgitation MILD TO MODERATE     and X-Ray: CXR: X-Ray Chest 1 View (CXR):   Results for orders placed or performed during the hospital encounter of 02/09/18   X-Ray Chest 1 View    Narrative    One-view chest x-ray    Clinical History: Rectal effusion; recent thoracentesis    Finding: Comparison was made to prior examination performed at 5:25 AM on 2/11/2018. There's been interval development of a tiny right apical pneumothorax. There is moderate cardiomegaly.  There is a mild amount of interstitial and alveolar opacities seen in both lungs. There is opacification of the costophrenic angles bilaterally. A right PICC remains in place. There is partial visualization of anterior spinal fusion hardware in the cervical spine.    Impression        1. There's been interval development of a tiny right apical pneumothorax. This is consistent with the patient's recent thoracentesis.  2. The findings are characteristic of moderate cardiomegaly with mild bilateral pulmonary edema.  3. There is opacification of the costophrenic angles bilaterally. These are characteristic of tiny pleural effusions.  4. Surgical changes      Electronically signed by: ASHWIN RAYMOND MD  Date:     02/11/18  Time:    16:28      Assessment and Plan:         * Bilateral cellulitis of lower leg    -Mgmt as per primary team  -Continue abx        Altered mental status    Improved  -Mgmt as per primary team, unclear etiology        Acute on chronic respiratory failure with hypoxia and hypercapnia    Resolved        Elevated troponin    -Likely secondary to demand ischemia from bacteremia, cellulitis, and volume overload  -Add ASA 81 mg daily  -See plan under CHF        Acute combined systolic and diastolic heart failure    -Unclear etiology  -Continue Metoprolol  - Cont diuresis  - Continue  ACEI   -Will need ischemic workup-LHC vs stress test once infection issues improve and she is euvolemic        Pleural effusion on right    -S/p thoracentesis on 2/11/18- 1500 cc  -Diuretics on hold and started on Diamox               VTE Risk Mitigation         Ordered     heparin (porcine) injection 5,000 Units  Every 8 hours     Route:  Subcutaneous        02/09/18 7739     Medium Risk of VTE  Once      02/09/18 1300          Ramu Martinez Md, MD  Cardiology  [facility]

## 2018-02-17 NOTE — PLAN OF CARE
Problem: Patient Care Overview  Goal: Plan of Care Review  Patient is resting in bed comfortably. Patient is oriented to self and partially situation. She was not aware of time, year, or place. She does know that she wants to go to a nursing home. Fall precautions are implemented at this  Time. Pressure injury preventions are in place as well. Patient is being turned every two hours. Bed alarm is set. Call light within reach. Turcios is to be discontinued in the Am. Will continue to monitor.

## 2018-02-17 NOTE — PROGRESS NOTES
[facility] -   Cardiology  Progress Note    Patient Name: Kelly Mariano  MRN: [id number]  Admission Date: 2/9/2018  Hospital Length of Stay: 7 days  Code Status: Full Code   Attending Physician: Odilon Subramanian MD   Primary Care Physician: Primary Doctor No  Expected Discharge Date:   Principal Problem:Bilateral cellulitis of lower leg    Subjective:     Hospital Course:   2/11/18-Patient seen and examined today, resting in bed. States she feels ok, still weak and fatigued. Complains of SOB. No chest pain or tightness. BC +Staph. Labs reviewed. K low at 3.1. Creatinine stable.     2/12/18 Pt hemodynamically stable on Toprol and Lisinopril. Will need biopsy of breast mass. Will discuss stress once infection treated and pt is diuresed.     2/13/18- Patient remains hemodynamically stable. No reports of chest pain or SOB. Patient is right post thoracentesis on 2/11/18. 1500cc drained.  Noted to have EF of 10-15% on echo. Has right breast mass that needs biopsy. Remains of IV abx for extensive lower extremity cellulitis.     2/14/18- Patient with noted to have Bibasilar infiltrates and effusions on repeat CXR. Pulmonology started Diamox and stopped Lasix. Still awaiting breast biopsy results. BP stable . Had hypoxia this morning and is confused. No family at bedside     2/15/18-Patient seen and examined today, sitting up in bed. Still confused. BP remains stable. Labs reviewed, K slightly low at 3.4. Biopsy results still pending.     2/16/18 - no acute events overnight. Pt seems more coherent but distant/angry. Diuresed well        Review of Systems   Unable to perform ROS: mental status change     Objective:     Vital Signs (Most Recent):  Temp: 98.2 °F (36.8 °C) (02/16/18 1623)  Pulse: 91 (02/16/18 1623)  Resp: 18 (02/16/18 1623)  BP: 118/66 (02/16/18 1623)  SpO2: 97 % (02/16/18 1623) Vital Signs (24h Range):  Temp:  [97.7 °F (36.5 °C)-98.4 °F (36.9 °C)] 98.2 °F (36.8 °C)  Pulse:  [85-96] 91  Resp:  [18-20]  18  SpO2:  [94 %-97 %] 97 %  BP: (110-140)/(62-81) 118/66     Weight: 63.1 kg (139 lb 1.8 oz)  Body mass index is 23.88 kg/m².     SpO2: 97 %  O2 Device (Oxygen Therapy): nasal cannula      Intake/Output Summary (Last 24 hours) at 02/16/18 1929  Last data filed at 02/16/18 1812   Gross per 24 hour   Intake              880 ml   Output             1125 ml   Net             -245 ml       Lines/Drains/Airways     Peripherally Inserted Central Catheter Line                 PICC Double Lumen 02/10/18 1524 right basilic 6 days          Drain                 Urethral Catheter 02/09/18 1045 Latex 16 Fr. 7 days                Physical Exam   Constitutional: No distress.   Appears disheveled, frail   HENT:   Head: Normocephalic and atraumatic.   Eyes: Pupils are equal, round, and reactive to light. Right eye exhibits no discharge.   Neck: Neck supple. No JVD present.   Cardiovascular: Normal rate, regular rhythm, S2 normal and normal heart sounds.  Exam reveals no gallop and no friction rub.    No murmur heard.  Pulmonary/Chest: Effort normal. No respiratory distress. She has no wheezes.   Diminished at bases   Abdominal: Soft. Bowel sounds are normal. She exhibits distension. There is no tenderness. There is no rebound and no guarding.   +Truncal edema   Neurological:   Confused   Skin: Skin is warm. She is not diaphoretic.   BLE wrapped and dressed   Nursing note and vitals reviewed.      Significant Labs:   All pertinent lab results from the last 24 hours have been reviewed. and   Recent Lab Results       02/16/18  1617 02/16/18  1115 02/16/18  0836 02/16/18  0735 02/16/18  0519      Albumin     2.8(L)     Alkaline Phosphatase     71     Allens Test   Pass       ALT     11     Anion Gap     8     AST     24     Baso #     0.01     Basophil%     0.1     Total Bilirubin     1.3  Comment:  For infants and newborns, interpretation of results should be based  on gestational age, weight and in agreement with  clinical  observations.  Premature Infant recommended reference ranges:  Up to 24 hours.............<8.0 mg/dL  Up to 48 hours............<12.0 mg/dL  3-5 days..................<15.0 mg/dL  6-29 days.................<15.0 mg/dL  (H)     Site   LR       BUN, Bld     15     Calcium     9.1     Chloride     94(L)     CO2     36(H)     Creatinine     0.6     DelSys   Nasal Can       Differential Method     Automated     eGFR if      >60     eGFR if non      >60  Comment:  Calculation used to obtain the estimated glomerular filtration  rate (eGFR) is the CKD-EPI equation.        Eos #     0.0     Eosinophil%     0.4     Flow   2       Glucose     81     Gran # (ANC)     8.1(H)     Gran%     85.1(H)     Hematocrit     45.3     Hemoglobin     14.0     Lymph #     0.9(L)     Lymph%     9.2(L)     Magnesium     1.8     MCH     28.4     MCHC     30.9(L)     MCV     92     Mode   SPONT       Mono #     0.5     Mono%     5.2     MPV     11.5     Platelets     128(L)     POC BE   12       POC HCO3   37.0(H)       POC PCO2   60.5(HH)       POC PH   7.394       POC PO2   85       POC SATURATED O2   96       POCT Glucose 117(H) 170(H)        Potassium     4.0     Total Protein     6.4     RBC     4.93     RDW     17.6(H)     Sample   ARTERIAL       Sed Rate     28(H)     Sodium     138     TSH     2.176     Vancomycin-Trough    16.8      WBC     9.54                     Significant Imaging: Echocardiogram:   2D echo with color flow doppler:   Results for orders placed or performed during the hospital encounter of 02/09/18   2D echo with color flow doppler   Result Value Ref Range    EF 10 (A) 55 - 65    Mitral Valve Regurgitation MILD TO MODERATE     Diastolic Dysfunction Yes (A)     Est. PA Systolic Pressure 24.81     Tricuspid Valve Regurgitation MILD TO MODERATE     and X-Ray: CXR: X-Ray Chest 1 View (CXR):   Results for orders placed or performed during the hospital encounter of 02/09/18   X-Ray  Chest 1 View    Narrative    One-view chest x-ray    Clinical History: Rectal effusion; recent thoracentesis    Finding: Comparison was made to prior examination performed at 5:25 AM on 2/11/2018. There's been interval development of a tiny right apical pneumothorax. There is moderate cardiomegaly. There is a mild amount of interstitial and alveolar opacities seen in both lungs. There is opacification of the costophrenic angles bilaterally. A right PICC remains in place. There is partial visualization of anterior spinal fusion hardware in the cervical spine.    Impression        1. There's been interval development of a tiny right apical pneumothorax. This is consistent with the patient's recent thoracentesis.  2. The findings are characteristic of moderate cardiomegaly with mild bilateral pulmonary edema.  3. There is opacification of the costophrenic angles bilaterally. These are characteristic of tiny pleural effusions.  4. Surgical changes      Electronically signed by: ASHWIN RAYMOND MD  Date:     02/11/18  Time:    16:28      Assessment and Plan:         * Bilateral cellulitis of lower leg    -Mgmt as per primary team  -Continue abx        Altered mental status    -Remains confused   -Mgmt as per primary team, unclear etiology  - Pt cannot go back to her home, will need alternate housing situation - Nursing home or SNF/LTAC?        Acute on chronic respiratory failure with hypoxia and hypercapnia    Improved        Elevated troponin    -Likely secondary to demand ischemia from bacteremia, cellulitis, and volume overload  -Add ASA 81 mg daily  -See plan under CHF        Acute combined systolic and diastolic heart failure    -Unclear etiology  -Continue Metoprololmg   - Lasix on hold at this time and started on diamox   - Continue  ACEI   -Will need ischemic workup-LHC vs stress test once infection issues improve and she is euvolemic        Pleural effusion on right    -S/p thoracentesis on 2/11/18- 1500  cc  -Diuretics on hold and started on Diamox               VTE Risk Mitigation         Ordered     heparin (porcine) injection 5,000 Units  Every 8 hours     Route:  Subcutaneous        02/09/18 9429     Medium Risk of VTE  Once      02/09/18 1300          Ramu Martinez Md, MD  Cardiology  [facility] -

## 2018-02-17 NOTE — ASSESSMENT & PLAN NOTE
2/11 - oncology recommending biopsy of breast mass  2/12 - path specimen sent for eval of breast mass, and for ER/MD/[id number]. Case discussed with gen. Surgery and hem/onc. Will consider any additional imaging for staging while inpt.   2/16 - pt will need path results and unclear where she will need follow up for breast ca

## 2018-02-17 NOTE — SUBJECTIVE & OBJECTIVE
Review of Systems   Unable to perform ROS: mental status change     Objective:     Vital Signs (Most Recent):  Temp: 98.2 °F (36.8 °C) (02/16/18 1623)  Pulse: 91 (02/16/18 1623)  Resp: 18 (02/16/18 1623)  BP: 118/66 (02/16/18 1623)  SpO2: 97 % (02/16/18 1623) Vital Signs (24h Range):  Temp:  [97.7 °F (36.5 °C)-98.4 °F (36.9 °C)] 98.2 °F (36.8 °C)  Pulse:  [85-96] 91  Resp:  [18-20] 18  SpO2:  [94 %-97 %] 97 %  BP: (110-140)/(62-81) 118/66     Weight: 63.1 kg (139 lb 1.8 oz)  Body mass index is 23.88 kg/m².     SpO2: 97 %  O2 Device (Oxygen Therapy): nasal cannula      Intake/Output Summary (Last 24 hours) at 02/16/18 1929  Last data filed at 02/16/18 1812   Gross per 24 hour   Intake              880 ml   Output             1125 ml   Net             -245 ml       Lines/Drains/Airways     Peripherally Inserted Central Catheter Line                 PICC Double Lumen 02/10/18 1524 right basilic 6 days          Drain                 Urethral Catheter 02/09/18 1045 Latex 16 Fr. 7 days                Physical Exam   Constitutional: No distress.   Appears disheveled, frail   HENT:   Head: Normocephalic and atraumatic.   Eyes: Pupils are equal, round, and reactive to light. Right eye exhibits no discharge.   Neck: Neck supple. No JVD present.   Cardiovascular: Normal rate, regular rhythm, S2 normal and normal heart sounds.  Exam reveals no gallop and no friction rub.    No murmur heard.  Pulmonary/Chest: Effort normal. No respiratory distress. She has no wheezes.   Diminished at bases   Abdominal: Soft. Bowel sounds are normal. She exhibits distension. There is no tenderness. There is no rebound and no guarding.   +Truncal edema   Neurological:   Confused   Skin: Skin is warm. She is not diaphoretic.   BLE wrapped and dressed   Nursing note and vitals reviewed.      Significant Labs:   All pertinent lab results from the last 24 hours have been reviewed. and   Recent Lab Results       02/16/18  1617 02/16/18  1115  02/16/18  0836 02/16/18  0735 02/16/18  0519      Albumin     2.8(L)     Alkaline Phosphatase     71     Allens Test   Pass       ALT     11     Anion Gap     8     AST     24     Baso #     0.01     Basophil%     0.1     Total Bilirubin     1.3  Comment:  For infants and newborns, interpretation of results should be based  on gestational age, weight and in agreement with clinical  observations.  Premature Infant recommended reference ranges:  Up to 24 hours.............<8.0 mg/dL  Up to 48 hours............<12.0 mg/dL  3-5 days..................<15.0 mg/dL  6-29 days.................<15.0 mg/dL  (H)     Site   LR       BUN, Bld     15     Calcium     9.1     Chloride     94(L)     CO2     36(H)     Creatinine     0.6     DelSys   Nasal Can       Differential Method     Automated     eGFR if      >60     eGFR if non      >60  Comment:  Calculation used to obtain the estimated glomerular filtration  rate (eGFR) is the CKD-EPI equation.        Eos #     0.0     Eosinophil%     0.4     Flow   2       Glucose     81     Gran # (ANC)     8.1(H)     Gran%     85.1(H)     Hematocrit     45.3     Hemoglobin     14.0     Lymph #     0.9(L)     Lymph%     9.2(L)     Magnesium     1.8     MCH     28.4     MCHC     30.9(L)     MCV     92     Mode   SPONT       Mono #     0.5     Mono%     5.2     MPV     11.5     Platelets     128(L)     POC BE   12       POC HCO3   37.0(H)       POC PCO2   60.5(HH)       POC PH   7.394       POC PO2   85       POC SATURATED O2   96       POCT Glucose 117(H) 170(H)        Potassium     4.0     Total Protein     6.4     RBC     4.93     RDW     17.6(H)     Sample   ARTERIAL       Sed Rate     28(H)     Sodium     138     TSH     2.176     Vancomycin-Trough    16.8      WBC     9.54                     Significant Imaging: Echocardiogram:   2D echo with color flow doppler:   Results for orders placed or performed during the hospital encounter of 02/09/18   2D echo  with color flow doppler   Result Value Ref Range    EF 10 (A) 55 - 65    Mitral Valve Regurgitation MILD TO MODERATE     Diastolic Dysfunction Yes (A)     Est. PA Systolic Pressure 24.81     Tricuspid Valve Regurgitation MILD TO MODERATE     and X-Ray: CXR: X-Ray Chest 1 View (CXR):   Results for orders placed or performed during the hospital encounter of 02/09/18   X-Ray Chest 1 View    Narrative    One-view chest x-ray    Clinical History: Rectal effusion; recent thoracentesis    Finding: Comparison was made to prior examination performed at 5:25 AM on 2/11/2018. There's been interval development of a tiny right apical pneumothorax. There is moderate cardiomegaly. There is a mild amount of interstitial and alveolar opacities seen in both lungs. There is opacification of the costophrenic angles bilaterally. A right PICC remains in place. There is partial visualization of anterior spinal fusion hardware in the cervical spine.    Impression        1. There's been interval development of a tiny right apical pneumothorax. This is consistent with the patient's recent thoracentesis.  2. The findings are characteristic of moderate cardiomegaly with mild bilateral pulmonary edema.  3. There is opacification of the costophrenic angles bilaterally. These are characteristic of tiny pleural effusions.  4. Surgical changes      Electronically signed by: ASHWIN RAYMOND MD  Date:     02/11/18  Time:    16:28

## 2018-02-17 NOTE — ASSESSMENT & PLAN NOTE
Telemetry monitoring, daily weights, low sodium diet, fluid restriction,    Lasix 20 mg IV twice  Cardiology consult  Lisinopril 2.5 mg  Elevated troponin likely due to congestive heart failure - trend  2/10 -   1 - Moderate left atrial enlargement.     2 - Eccentric hypertrophy.     3 - No wall motion abnormalities.     4 - Severely depressed left ventricular systolic function (EF 10-15%).     5 - Impaired LV relaxation, normal LAP (grade 1 diastolic dysfunction).     6 - Low normal to mildly depressed right ventricular systolic function .     7 - The estimated PA systolic pressure is 25 mmHg.     8 - Mild to moderate mitral regurgitation.     9 - Mild to moderate tricuspid regurgitation.     10 - Intermediate central venous pressure.     11 - Right pleural effusion.  12 - cards following  2/16 - case discussed w/cards, pt will need stress test and possibly ischemic work up prior to defibrillator placement. In meantime will optimize med management. Unclear if pulm effusion is due to heart failure as cytology showed no malignancy.

## 2018-02-18 PROBLEM — B95.61 BACTEREMIA DUE TO STAPHYLOCOCCUS AUREUS: Status: RESOLVED | Noted: 2018-02-11 | Resolved: 2018-02-18

## 2018-02-18 PROBLEM — R79.89 ELEVATED TROPONIN: Status: RESOLVED | Noted: 2018-02-11 | Resolved: 2018-02-18

## 2018-02-18 PROBLEM — R78.81 BACTEREMIA DUE TO STAPHYLOCOCCUS AUREUS: Status: RESOLVED | Noted: 2018-02-11 | Resolved: 2018-02-18

## 2018-02-18 LAB
ALBUMIN SERPL BCP-MCNC: 2.4 G/DL
ALP SERPL-CCNC: 69 U/L
ALT SERPL W/O P-5'-P-CCNC: 23 U/L
ANION GAP SERPL CALC-SCNC: 6 MMOL/L
AST SERPL-CCNC: 32 U/L
BACTERIA BLD CULT: NORMAL
BASOPHILS # BLD AUTO: 0.02 K/UL
BASOPHILS NFR BLD: 0.4 %
BILIRUB SERPL-MCNC: 0.6 MG/DL
BUN SERPL-MCNC: 18 MG/DL
CALCIUM SERPL-MCNC: 8.5 MG/DL
CHLORIDE SERPL-SCNC: 94 MMOL/L
CO2 SERPL-SCNC: 43 MMOL/L
CREAT SERPL-MCNC: 0.5 MG/DL
DIFFERENTIAL METHOD: ABNORMAL
EOSINOPHIL # BLD AUTO: 0.1 K/UL
EOSINOPHIL NFR BLD: 1.5 %
ERYTHROCYTE [DISTWIDTH] IN BLOOD BY AUTOMATED COUNT: 17.1 %
EST. GFR  (AFRICAN AMERICAN): >60 ML/MIN/1.73 M^2
EST. GFR  (NON AFRICAN AMERICAN): >60 ML/MIN/1.73 M^2
GLUCOSE SERPL-MCNC: 72 MG/DL
HCT VFR BLD AUTO: 38.9 %
HGB BLD-MCNC: 12.1 G/DL
LYMPHOCYTES # BLD AUTO: 0.8 K/UL
LYMPHOCYTES NFR BLD: 15 %
MAGNESIUM SERPL-MCNC: 1.7 MG/DL
MCH RBC QN AUTO: 28.9 PG
MCHC RBC AUTO-ENTMCNC: 31.1 G/DL
MCV RBC AUTO: 93 FL
MONOCYTES # BLD AUTO: 0.5 K/UL
MONOCYTES NFR BLD: 9.1 %
NEUTROPHILS # BLD AUTO: 4 K/UL
NEUTROPHILS NFR BLD: 74 %
PLATELET # BLD AUTO: 108 K/UL
PMV BLD AUTO: 11.5 FL
POCT GLUCOSE: 104 MG/DL (ref 70–110)
POCT GLUCOSE: 117 MG/DL (ref 70–110)
POCT GLUCOSE: 146 MG/DL (ref 70–110)
POCT GLUCOSE: 154 MG/DL (ref 70–110)
POTASSIUM SERPL-SCNC: 3.5 MMOL/L
PROT SERPL-MCNC: 5.6 G/DL
RBC # BLD AUTO: 4.18 M/UL
SODIUM SERPL-SCNC: 143 MMOL/L
WBC # BLD AUTO: 5.4 K/UL

## 2018-02-18 PROCEDURE — 99233 SBSQ HOSP IP/OBS HIGH 50: CPT | Mod: ,,, | Performed by: INTERNAL MEDICINE

## 2018-02-18 PROCEDURE — 97530 THERAPEUTIC ACTIVITIES: CPT

## 2018-02-18 PROCEDURE — 97110 THERAPEUTIC EXERCISES: CPT

## 2018-02-18 PROCEDURE — 25000003 PHARM REV CODE 250: Performed by: NURSE PRACTITIONER

## 2018-02-18 PROCEDURE — 63600175 PHARM REV CODE 636 W HCPCS: Performed by: NURSE PRACTITIONER

## 2018-02-18 PROCEDURE — 25000003 PHARM REV CODE 250: Performed by: EMERGENCY MEDICINE

## 2018-02-18 PROCEDURE — 80053 COMPREHEN METABOLIC PANEL: CPT

## 2018-02-18 PROCEDURE — 63600175 PHARM REV CODE 636 W HCPCS: Performed by: EMERGENCY MEDICINE

## 2018-02-18 PROCEDURE — 25000003 PHARM REV CODE 250: Performed by: INTERNAL MEDICINE

## 2018-02-18 PROCEDURE — 21400001 HC TELEMETRY ROOM

## 2018-02-18 PROCEDURE — 94761 N-INVAS EAR/PLS OXIMETRY MLT: CPT

## 2018-02-18 PROCEDURE — 83735 ASSAY OF MAGNESIUM: CPT

## 2018-02-18 PROCEDURE — 27000221 HC OXYGEN, UP TO 24 HOURS

## 2018-02-18 PROCEDURE — 85025 COMPLETE CBC W/AUTO DIFF WBC: CPT

## 2018-02-18 PROCEDURE — 25000003 PHARM REV CODE 250: Performed by: FAMILY MEDICINE

## 2018-02-18 RX ORDER — METOLAZONE 5 MG/1
5 TABLET ORAL DAILY
Status: COMPLETED | OUTPATIENT
Start: 2018-02-18 | End: 2018-02-18

## 2018-02-18 RX ORDER — ACETAZOLAMIDE 250 MG/1
250 TABLET ORAL 3 TIMES DAILY
Status: DISCONTINUED | OUTPATIENT
Start: 2018-02-18 | End: 2018-02-22

## 2018-02-18 RX ADMIN — ANTI-FUNGAL POWDER MICONAZOLE NITRATE TALC FREE: 1.42 POWDER TOPICAL at 10:02

## 2018-02-18 RX ADMIN — METOLAZONE 5 MG: 5 TABLET ORAL at 05:02

## 2018-02-18 RX ADMIN — METOPROLOL SUCCINATE 50 MG: 50 TABLET, EXTENDED RELEASE ORAL at 09:02

## 2018-02-18 RX ADMIN — ACETAMINOPHEN 650 MG: 325 TABLET, FILM COATED ORAL at 09:02

## 2018-02-18 RX ADMIN — Medication 100 MG: at 09:02

## 2018-02-18 RX ADMIN — ACETAZOLAMIDE 250 MG: 250 TABLET ORAL at 09:02

## 2018-02-18 RX ADMIN — HEPARIN SODIUM 5000 UNITS: 5000 INJECTION, SOLUTION INTRAVENOUS; SUBCUTANEOUS at 02:02

## 2018-02-18 RX ADMIN — CLINDAMYCIN HYDROCHLORIDE 300 MG: 150 CAPSULE ORAL at 02:02

## 2018-02-18 RX ADMIN — HEPARIN SODIUM 5000 UNITS: 5000 INJECTION, SOLUTION INTRAVENOUS; SUBCUTANEOUS at 05:02

## 2018-02-18 RX ADMIN — ACETAZOLAMIDE SODIUM 250 MG: 500 INJECTION, POWDER, LYOPHILIZED, FOR SOLUTION INTRAVENOUS at 09:02

## 2018-02-18 RX ADMIN — THERA TABS 1 TABLET: TAB at 09:02

## 2018-02-18 RX ADMIN — HEPARIN SODIUM 5000 UNITS: 5000 INJECTION, SOLUTION INTRAVENOUS; SUBCUTANEOUS at 09:02

## 2018-02-18 RX ADMIN — ANTI-FUNGAL POWDER MICONAZOLE NITRATE TALC FREE: 1.42 POWDER TOPICAL at 09:02

## 2018-02-18 RX ADMIN — CLINDAMYCIN HYDROCHLORIDE 300 MG: 150 CAPSULE ORAL at 05:02

## 2018-02-18 RX ADMIN — LISINOPRIL 5 MG: 5 TABLET ORAL at 09:02

## 2018-02-18 RX ADMIN — Medication 1 TABLET: at 09:02

## 2018-02-18 RX ADMIN — CLINDAMYCIN HYDROCHLORIDE 300 MG: 150 CAPSULE ORAL at 09:02

## 2018-02-18 RX ADMIN — FLUCONAZOLE 100 MG: 100 TABLET ORAL at 09:02

## 2018-02-18 NOTE — PLAN OF CARE
Problem: Patient Care Overview  Goal: Plan of Care Review      Outcome: Ongoing (interventions implemented as appropriate)  Patient on O2 tolerating well. No distress noted at this time.

## 2018-02-18 NOTE — ASSESSMENT & PLAN NOTE
Will start Folbic, MVI  Nutrition consult  Likely sec to severe CHf and Breast mass and poor oral intake PTA.  Needs Beneprotein and Boost supplements

## 2018-02-18 NOTE — SUBJECTIVE & OBJECTIVE
Interval History: looks better, eating drinking better.     Review of Systems   Constitutional: Positive for activity change, appetite change and fatigue. Negative for chills, diaphoresis, fever and unexpected weight change.   HENT: Negative for congestion, dental problem, ear discharge, ear pain, hearing loss, mouth sores, postnasal drip, sinus pressure, sore throat, tinnitus, trouble swallowing and voice change.    Eyes: Negative for photophobia, pain and visual disturbance.   Respiratory: Positive for shortness of breath. Negative for cough, chest tightness and wheezing.    Cardiovascular: Negative for chest pain, palpitations and leg swelling.   Gastrointestinal: Negative for abdominal distention, abdominal pain, blood in stool, constipation, diarrhea, nausea and vomiting.   Endocrine: Negative for cold intolerance, heat intolerance, polydipsia, polyphagia and polyuria.   Genitourinary: Negative for difficulty urinating, dysuria, flank pain, frequency, hematuria, urgency, vaginal bleeding and vaginal discharge.   Musculoskeletal: Negative for arthralgias, back pain, gait problem, joint swelling and myalgias.   Skin: Negative for pallor and rash.   Allergic/Immunologic: Negative for immunocompromised state.   Neurological: Negative for dizziness, syncope, weakness, light-headedness, numbness and headaches.   Hematological: Negative for adenopathy. Does not bruise/bleed easily.   Psychiatric/Behavioral: Positive for confusion. Negative for agitation and dysphoric mood. The patient is not nervous/anxious.      Objective:     Vital Signs (Most Recent):  Temp: 97.8 °F (36.6 °C) (02/18/18 1632)  Pulse: 88 (02/18/18 1632)  Resp: 18 (02/18/18 1632)  BP: 107/72 (02/18/18 1632)  SpO2: 97 % (02/18/18 1632) Vital Signs (24h Range):  Temp:  [97.4 °F (36.3 °C)-98.3 °F (36.8 °C)] 97.8 °F (36.6 °C)  Pulse:  [67-88] 88  Resp:  [18-20] 18  SpO2:  [92 %-100 %] 97 %  BP: ()/(50-72) 107/72     Weight: 59.5 kg (131 lb 2.8  oz)  Body mass index is 22.52 kg/m².    Intake/Output Summary (Last 24 hours) at 02/18/18 1710  Last data filed at 02/18/18 1400   Gross per 24 hour   Intake              360 ml   Output                0 ml   Net              360 ml      Physical Exam   Constitutional: She appears distressed.   Chronically ill appearing     HENT:   Head: Normocephalic and atraumatic.   Mouth/Throat: No oropharyngeal exudate.   Eyes: Conjunctivae are normal. Pupils are equal, round, and reactive to light.   Neck: Neck supple. No JVD present. No tracheal deviation present. No thyromegaly present.   Cardiovascular: Normal rate.  An irregular rhythm present. PMI is displaced.  Exam reveals distant heart sounds.    Murmur heard.   Systolic murmur is present with a grade of 2/6   Pulmonary/Chest: Effort normal. No respiratory distress. She has decreased breath sounds. She has wheezes. She has rales in the right lower field and the left lower field. She exhibits no tenderness.   Abdominal: Soft. Bowel sounds are normal.   Musculoskeletal: She exhibits edema.   Lymphadenopathy:     She has no cervical adenopathy.   Neurological: She is alert.   Skin: Skin is warm and dry.   Nursing note and vitals reviewed.      Significant Labs:   Blood Culture: No results for input(s): LABBLOO in the last 48 hours.  BMP:   Recent Labs  Lab 02/18/18  0542   GLU 72      K 3.5   CL 94*   CO2 43*   BUN 18   CREATININE 0.5   CALCIUM 8.5*   MG 1.7     CBC:   Recent Labs  Lab 02/17/18  0530 02/18/18  0542   WBC 6.05 5.40   HGB 12.6 12.1   HCT 41.1 38.9   * 108*     Urine Culture: No results for input(s): LABURIN in the last 48 hours.  All pertinent labs within the past 24 hours have been reviewed.    Significant Imaging: I have reviewed all pertinent imaging results/findings within the past 24 hours.

## 2018-02-18 NOTE — PROGRESS NOTES
Pt incontinent and saturated blue pad, pt voided with no complications following polanco removal.  Will continue to monitor and provide incontinence care.

## 2018-02-18 NOTE — PT/OT/SLP PROGRESS
Physical Therapy  Treatment    Kelly Mariano   MRN: [id number]   Admitting Diagnosis: Bilateral cellulitis of lower leg    PT Received On: 02/18/18  PT Start Time: 1030     PT Stop Time: 1045    PT Total Time (min): 15 min       Billable Minutes:  Therapeutic Activity 15    Treatment Type: Treatment  PT/PTA: PTA     PTA Visit Number: 1       General Precautions: Standard, fall, aspiration  Orthopedic Precautions: N/A   Braces: N/A         Subjective:  Communicated with NURSE, DAVID AND HITESH prior to session.  PATIENT AGREE TO TX NOW.    Pain/Comfort  Pain Rating 1: 0/10    Objective:   Patient found with: peripheral IV, oxygen, telemetry (MARKELL LE PRESSURE WRAPPED KNEES TO ANKLES)    Functional Mobility:  Bed Mobility:    SUPINE TO SIT, SIT TO SUPINE AT MAX ASSIST X1    Transfers:     SITTO STAND, STAND TO SIT AT MAX ASSIST X2 FOR SAFETY.  Gait:    UNABLE AT PRESENT TIME    Stairs:  N/A    Balance:   Static Sit: POOR: Needs MODERATE assist to maintain  Dynamic Sit: POOR: N/A  Static Stand: 0: Needs MAXIMAL assist to maintain   Dynamic stand: POOR: N/A     Therapeutic Activities and Exercises:  SHORT SEATED THERAPEUTIC EXERCISE MARKELL LE INSTRUCTION , SHORTSEATED EOB BALANCE ACTIVITY WITH SIT TO STAND AT MAX ASSIST X2 FOR SAFETY , MARKELL LE KNEES BLOCKED TO MAINTAIN FULL EXTENSION FOR STANDING ACTIVITY.    AM-PAC 6 CLICK MOBILITY  How much help from another person does this patient currently need?   1 = Unable, Total/Dependent Assistance  2 = A lot, Maximum/Moderate Assistance  3 = A little, Minimum/Contact Guard/Supervision  4 = None, Modified [place]/Independent    Turning over in bed (including adjusting bedclothes, sheets and blankets)?: 2  Sitting down on and standing up from a chair with arms (e.g., wheelchair, bedside commode, etc.): 2  Moving from lying on back to sitting on the side of the bed?: 2  Moving to and from a bed to a chair (including a wheelchair)?: 2  Need to walk in hospital room?: 1  Climbing 3-5  steps with a railing?: 1  Total Score: 10    AM-PAC Raw Score CMS G-Code Modifier Level of Impairment Assistance   6 % Total / Unable   7 - 9 CM 80 - 100% Maximal Assist   10 - 14 CL 60 - 80% Moderate Assist   15 - 19 CK 40 - 60% Moderate Assist   20 - 22 CJ 20 - 40% Minimal Assist   23 CI 1-20% SBA / CGA   24 CH 0% Independent/ Mod I     Patient left supine with all lines intact and call button in reach.    Assessment:  Kelly Mariano is a 67 y.o. female with a medical diagnosis of Bilateral cellulitis of lower leg .    Rehab identified problem list/impairments: Rehab identified problem list/impairments: weakness, impaired self care skills, impaired balance, decreased coordination, impaired functional mobilty, impaired endurance, impaired sensation, gait instability, decreased lower extremity function    Rehab potential is good.    Activity tolerance: Good    Discharge recommendations: Discharge Facility/Level Of Care Needs: nursing facility, skilled     Barriers to discharge:      Equipment recommendations: Equipment Needed After Discharge: none     GOALS:    Physical Therapy Goals        Problem: Physical Therapy Goal    Goal Priority Disciplines Outcome Goal Variances Interventions   Physical Therapy Goal     PT/OT, PT      Description:  LTGs to be met within 7 days (2/24/18):  1.  Patient will perform bed mobility with mod assist  2.  Patient will perform functional transfers with mod assist  3.  Patient will sit EOB x 10 mins with good static sitting balance and no c/o fatigue  4.  Patient will participate in gait assessment  5.  Patient will perform BLE therapeutic exercises 10 x 2 in all planes                    PLAN:    Patient to be seen 5 x/week  to address the above listed problems via gait training, therapeutic activities, therapeutic exercises  Plan of Care expires: 02/24/18  Plan of Care reviewed with: patient         Sully Malik, PTA  02/18/2018

## 2018-02-18 NOTE — PROGRESS NOTES
[facility] -   Pulmonology  Progress Note    Patient Name: Kelly Mariano  MRN: [id number]  Admission Date: 2/9/2018  Hospital Length of Stay: 9 days  Code Status: Full Code  Attending Provider: Brittni Lagos MD  Primary Care Provider: Primary Doctor No   Principal Problem: Bilateral cellulitis of lower leg    Subjective:     Interval History: very weak    Objective:     Vital Signs (Most Recent):  Temp: 98 °F (36.7 °C) (02/18/18 1221)  Pulse: 67 (02/18/18 1300)  Resp: 18 (02/18/18 1221)  BP: 116/60 (02/18/18 1221)  SpO2: 98 % (02/18/18 1221) Vital Signs (24h Range):  Temp:  [97.4 °F (36.3 °C)-98.6 °F (37 °C)] 98 °F (36.7 °C)  Pulse:  [67-90] 67  Resp:  [18-20] 18  SpO2:  [92 %-100 %] 98 %  BP: ()/(50-64) 116/60     Weight: 59.5 kg (131 lb 2.8 oz)  Body mass index is 22.52 kg/m².      Intake/Output Summary (Last 24 hours) at 02/18/18 1523  Last data filed at 02/18/18 1400   Gross per 24 hour   Intake              360 ml   Output                0 ml   Net              360 ml       Physical Exam   Constitutional: She appears distressed.   Chronically ill appearing     HENT:   Head: Normocephalic and atraumatic.   Mouth/Throat: No oropharyngeal exudate.   Eyes: Conjunctivae are normal. Pupils are equal, round, and reactive to light.   Neck: Neck supple. No JVD present. No tracheal deviation present. No thyromegaly present.   Cardiovascular: Normal rate.  An irregular rhythm present. PMI is displaced.  Exam reveals distant heart sounds.    Murmur heard.   Systolic murmur is present with a grade of 2/6   Pulmonary/Chest: Effort normal. No respiratory distress. She has decreased breath sounds. She has wheezes. She has rales in the right lower field and the left lower field. She exhibits no tenderness.   Abdominal: Soft. Bowel sounds are normal.   Musculoskeletal: She exhibits edema.   Lymphadenopathy:     She has no cervical adenopathy.   Neurological: She is alert.   Skin: Skin is warm and dry.   Nursing  note and vitals reviewed.      Vents:  Oxygen Concentration (%): 28 (02/18/18 0835)    Lines/Drains/Airways     Peripherally Inserted Central Catheter Line                 PICC Double Lumen 02/10/18 1524 right basilic 7 days                Significant Labs:    CBC/Anemia Profile:    Recent Labs  Lab 02/17/18  0530 02/18/18  0542   WBC 6.05 5.40   HGB 12.6 12.1   HCT 41.1 38.9   * 108*   MCV 93 93   RDW 17.3* 17.1*        Chemistries:    Recent Labs  Lab 02/17/18  0530 02/18/18  0542    143   K 3.7 3.5   CL 96 94*   CO2 37* 43*   BUN 18 18   CREATININE 0.5 0.5   CALCIUM 8.6* 8.5*   ALBUMIN 2.5* 2.4*   PROT 5.9* 5.6*   BILITOT 0.6 0.6   ALKPHOS 71 69   ALT 19 23   AST 28 32   MG 1.9 1.7       BMP:     Recent Labs  Lab 02/18/18  0542   GLU 72      K 3.5   CL 94*   CO2 43*   BUN 18   CREATININE 0.5   CALCIUM 8.5*   MG 1.7     CMP:     Recent Labs  Lab 02/17/18  0530 02/18/18  0542    143   K 3.7 3.5   CL 96 94*   CO2 37* 43*   GLU 81 72   BUN 18 18   CREATININE 0.5 0.5   CALCIUM 8.6* 8.5*   PROT 5.9* 5.6*   ALBUMIN 2.5* 2.4*   BILITOT 0.6 0.6   ALKPHOS 71 69   AST 28 32   ALT 19 23   ANIONGAP 6* 6*   EGFRNONAA >60 >60     All pertinent labs within the past 24 hours have been reviewed.    Significant Imaging:  I have reviewed and interpreted all pertinent imaging results/findings within the past 24 hours.    Assessment/Plan:     Acute combined systolic and diastolic heart failure    Optimize CHF regimen.  Cardiology consult  Preload and afterload reduction.  Daily weighing.  Dietary salt restriction.  Alcohol and tobacco avoidance.  2/16 Restart diuretic  2/18 Add additional doxe of zarolyxn  X 1 dose        Acute on chronic respiratory failure with hypoxia and hypercapnia    2/13 Switch to diamox, continue AVAPS  2/15 Noninvasive Positive Pressure Ventilation at night, check ABG in AM  2/17 continue diuretics , bronchodilators  2/18 Worsening pleural effusion                Abimael Vance,  MD  Pulmonology  [facility] [place]

## 2018-02-18 NOTE — ASSESSMENT & PLAN NOTE
Diuresis with IV lasix  Supplemental oxygen to maintain sat > 92 %  Will likely need thoracentesis, consult Pulmonology when needed  2/10 - moderate size, concerning for malignancy due to left breast deformity (metastic disease?)  2/12 - will follow path report analysis of pleural fluid  2/14 - will discuss w/pulm if repeat thoracentesis needed and follow up studies from previous    2/17- Transudative Effusion, neg for malignancy-- most likely related to CHF/ CMP.

## 2018-02-18 NOTE — ASSESSMENT & PLAN NOTE
2/11 - oncology recommending biopsy of breast mass  2/12 - path specimen sent for eval of breast mass, and for ER/MA/Hqn4Ism. Case discussed with gen. Surgery and hem/onc. Will consider any additional imaging for staging while inpt.   2/16 - pt will need path results and unclear where she will need follow up for breast ca     2/17-- not a candidate for any chemo except possibly Tamoxifen  Poor prognosis

## 2018-02-18 NOTE — SUBJECTIVE & OBJECTIVE
Interval History: no acute events    Review of Systems   Constitution: Negative.   HENT: Negative.    Eyes: Negative.    Cardiovascular: Negative.    Respiratory: Negative.    Endocrine: Negative.    Hematologic/Lymphatic: Negative.    Skin: Negative.    Musculoskeletal: Negative.    Gastrointestinal: Negative.    Genitourinary: Negative.    Neurological: Negative.    Psychiatric/Behavioral: Negative.    Allergic/Immunologic: Negative.      Objective:     Vital Signs (Most Recent):  Temp: 98 °F (36.7 °C) (02/18/18 1221)  Pulse: 67 (02/18/18 1300)  Resp: 18 (02/18/18 1221)  BP: 116/60 (02/18/18 1221)  SpO2: 98 % (02/18/18 1221) Vital Signs (24h Range):  Temp:  [97.4 °F (36.3 °C)-98.6 °F (37 °C)] 98 °F (36.7 °C)  Pulse:  [67-90] 67  Resp:  [18-20] 18  SpO2:  [92 %-100 %] 98 %  BP: ()/(50-64) 116/60     Weight: 59.5 kg (131 lb 2.8 oz)  Body mass index is 22.52 kg/m².     SpO2: 98 %  O2 Device (Oxygen Therapy): nasal cannula      Intake/Output Summary (Last 24 hours) at 02/18/18 1554  Last data filed at 02/18/18 1400   Gross per 24 hour   Intake              360 ml   Output                0 ml   Net              360 ml       Lines/Drains/Airways     Peripherally Inserted Central Catheter Line                 PICC Double Lumen 02/10/18 1524 right basilic 8 days                Physical Exam   Constitutional: She appears well-developed and well-nourished. No distress.   Appears disheveled, frail   HENT:   Head: Normocephalic and atraumatic.   Nose: Nose normal.   Mouth/Throat: Oropharynx is clear and moist.   Eyes: Conjunctivae and EOM are normal. Pupils are equal, round, and reactive to light. Right eye exhibits no discharge. No scleral icterus.   Neck: Normal range of motion. Neck supple. No JVD present. No thyromegaly present.   Cardiovascular: Normal rate, regular rhythm, S1 normal, S2 normal and normal heart sounds.  Exam reveals no gallop, no S3, no S4 and no friction rub.    No murmur heard.  Pulmonary/Chest:  Effort normal and breath sounds normal. No stridor. No respiratory distress. She has no wheezes. She has no rales. She exhibits no tenderness.   Diminished at bases   Abdominal: Soft. Bowel sounds are normal. She exhibits no distension and no mass. There is no tenderness. There is no rebound and no guarding.   +Truncal edema   Genitourinary:   Genitourinary Comments: Deferred   Musculoskeletal: Normal range of motion. She exhibits no edema, tenderness or deformity.   Lymphadenopathy:     She has no cervical adenopathy.   Neurological: She exhibits normal muscle tone. Coordination normal.   Confused   Skin: Skin is warm and dry. No rash noted. She is not diaphoretic. No erythema. No pallor.   BLE wrapped and dressed   Nursing note and vitals reviewed.      Significant Labs:   All pertinent lab results from the last 24 hours have been reviewed. and   Recent Lab Results       02/18/18  1157 02/18/18  0553 02/18/18  0542 02/17/18  2201 02/17/18  1656      Albumin   2.4(L)       Alkaline Phosphatase   69       ALT   23       Anion Gap   6(L)       AST   32       Baso #   0.02       Basophil%   0.4       Total Bilirubin   0.6  Comment:  For infants and newborns, interpretation of results should be based  on gestational age, weight and in agreement with clinical  observations.  Premature Infant recommended reference ranges:  Up to 24 hours.............<8.0 mg/dL  Up to 48 hours............<12.0 mg/dL  3-5 days..................<15.0 mg/dL  6-29 days.................<15.0 mg/dL         BUN, Bld   18       Calcium   8.5(L)       Chloride   94(L)       CO2   43  Comment:  CO2 critical result(s) called and verbal readback obtained from   Maciel Welch RN, 02/18/2018 06:58  (HH)       Creatinine   0.5       Differential Method   Automated       eGFR if    >60       eGFR if non    >60  Comment:  Calculation used to obtain the estimated glomerular filtration  rate (eGFR) is the CKD-EPI equation.           Eos #   0.1       Eosinophil%   1.5       Glucose   72       Gran # (ANC)   4.0       Gran%   74.0(H)       Hematocrit   38.9       Hemoglobin   12.1       Lymph #   0.8(L)       Lymph%   15.0(L)       Magnesium   1.7       MCH   28.9       MCHC   31.1(L)       MCV   93       Mono #   0.5       Mono%   9.1       MPV   11.5       Platelets   108(L)       POCT Glucose 146(H) 104  142(H) 91     Potassium   3.5       Total Protein   5.6(L)       RBC   4.18       RDW   17.1(H)       Sodium   143       WBC   5.40                       Significant Imaging: Echocardiogram:   2D echo with color flow doppler:   Results for orders placed or performed during the hospital encounter of 02/09/18   2D echo with color flow doppler   Result Value Ref Range    EF 10 (A) 55 - 65    Mitral Valve Regurgitation MILD TO MODERATE     Diastolic Dysfunction Yes (A)     Est. PA Systolic Pressure 24.81     Tricuspid Valve Regurgitation MILD TO MODERATE     and X-Ray: CXR: X-Ray Chest 1 View (CXR):   Results for orders placed or performed during the hospital encounter of 02/09/18   X-Ray Chest 1 View    Narrative    EXAM: XR CHEST 1 VIEW    CLINICAL HISTORY: SOB .    COMPARISON STUDIES: February 16, 2018    FINDINGS:  Stable cardiomediastinal silhouette with cardiomegaly.  Stable bilateral pleural effusions right greater than left with dense underlying opacification in the lower hemithoraces and diffuse haziness throughout the upper half of the right lung.    Impression    No significant change.      Electronically signed by: AMELIA LOPEZ MD  Date:     02/18/18  Time:    07:23

## 2018-02-18 NOTE — PLAN OF CARE
Problem: Patient Care Overview  Goal: Plan of Care Review      Outcome: Ongoing (interventions implemented as appropriate)  Pt stable. Pt is NSR on the heart monitor.  Pt more awake alert and oriented.  Pt had no complaints of pain.  Pt needs meds to be crushed with applesauce.  Pt was free from falls or injuries during duration of shift.  Pt turned and repositioned with use of wedge; stage 2 to sacrum mepilex dressing in place.  Pt frequently wet, incontinence care provided.  PICC intact with no redness, swelling or drainage; PICC dressing changed this AM by night shift nurse.  Bed low, wheels locked, bed alarm on, call light in reach.  Pt instructed to call for assistance.   Plan of care reviewed.  Pt verbalizes understanding.

## 2018-02-18 NOTE — PLAN OF CARE
Problem: Patient Care Overview  Goal: Plan of Care Review      Outcome: Ongoing (interventions implemented as appropriate)  PATINET WITH GREAT EFFORTS ALL THERAPEUTIC ACTIVITIES TODAY. PATIENT PERFORM SHORTSEATED BALANCE ACTIVITY AT EOB, SIT TO STAND AT MAX ASSIST X2 , BLOCKING PATIENT MARKELL KNEES FOR MAINTAINING FULL EXTENSION FOR STANDING BALANCE ACTIVITY.

## 2018-02-18 NOTE — ASSESSMENT & PLAN NOTE
2/13 Switch to diamox, continue AVAPS  2/15 Noninvasive Positive Pressure Ventilation at night, check ABG in AM  2/17 continue diuretics , bronchodilators  2/18 Worsening pleural effusion

## 2018-02-18 NOTE — PLAN OF CARE
Problem: Patient Care Overview  Goal: Plan of Care Review     Outcome: Ongoing (interventions implemented as appropriate)  Pt stable. Pt is NSR on the heart monitor.  Pt more awake alert and oriented.  Pt had no complaints of pain.  Pt needs meds to be crushed with applesauce.  Pt was free from falls or injuries during duration of shift.  Pt turned and repositioned with use of wedge; stage 2 to sacrum dressing changed this shift.  PICC intact with no redness, swelling or drainage; PICC dressing change due today, night nurse to change the dressing since unable to be changed by this RN this shift.  Bed low, wheels locked, bed alarm on, call light in reach.  Pt instructed to call for assistance.   Plan of care reviewed.  Pt verbalizes understanding.

## 2018-02-18 NOTE — ASSESSMENT & PLAN NOTE
Diuresis with IV lasix  Supplemental oxygen to maintain sat > 92 %  Will likely need thoracentesis, consult Pulmonology when needed  2/10 - moderate size, concerning for malignancy due to left breast deformity (metastic disease?)  2/12 - will follow path report analysis of pleural fluid  2/14 - will discuss w/pulm if repeat thoracentesis needed and follow up studies from previous    2/17- Transudative Effusion, neg for malignancy-- most likely related to CHF/ CMP.    2/18- continue Diuretics, now Diamox

## 2018-02-18 NOTE — ASSESSMENT & PLAN NOTE
2/17- Sec to CHF, Improving with diuresis and thoracentesis    2/18- improving, will check for Home O2 eval before discharge

## 2018-02-18 NOTE — ASSESSMENT & PLAN NOTE
Bilateral foot plain films note some questionable osteo to bilateral great toes  MRI pending  Evaluate arterial blood flow with U/S and ABIs    2/17-- No Osteo seen on B MRI feet.

## 2018-02-18 NOTE — ASSESSMENT & PLAN NOTE
2/13 - pt w/hypercapnic, respiratory distress. 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 Respiratory consulted for bipap, however pt not tolerating, would prefer to avoid sedation as this will worsen resp. Status. Attempted to reach out to family contacts multiple times, no response.   2/14 - anti-cholinergic drugs d/cd, (was on famotidine), no narcotics or sedatives prescribed, thyroid function normal, will follow cortisol level, no hx of alchol use, b12, folate and thiamine given, no indications of seizure, meningitis or encephalitis  2/15 - more alert today and increasingly appropriate answers.  Will contact pharmacy in AM to search med mind for psychiatric drugs that pt may not have reported at admit.    2/16 - condition much improved, lengthy discussions on numerous occasions w/ case management and other physicians, attempting to provide appropriate level of care and placement based on pt's ability to care for herself.    2/17- improved, more alert and responsive    2/18- appears at baseline

## 2018-02-18 NOTE — ASSESSMENT & PLAN NOTE
Will start Folbic, MVI  Nutrition consult  Likely sec to severe CHf and Breast mass and poor oral intake PTA.  Needs Beneprotein and Boost supplements    Expected to improve with improved intake

## 2018-02-18 NOTE — SUBJECTIVE & OBJECTIVE
Interval History: pt looks comfortable and more alert and responsive. No walking yet. Both legs covered in tight dressing which is CDI. CXR shows recurrence of R sided effusion, however no SOB and no malignancy seen on thoracentesis.    Review of Systems   Constitutional: Positive for activity change, appetite change and fatigue. Negative for chills, diaphoresis, fever and unexpected weight change.   HENT: Negative for congestion, dental problem, ear discharge, ear pain, hearing loss, mouth sores, postnasal drip, sinus pressure, sore throat, tinnitus, trouble swallowing and voice change.    Eyes: Negative for photophobia, pain and visual disturbance.   Respiratory: Positive for shortness of breath. Negative for cough, chest tightness and wheezing.    Cardiovascular: Negative for chest pain, palpitations and leg swelling.   Gastrointestinal: Negative for abdominal distention, abdominal pain, blood in stool, constipation, diarrhea, nausea and vomiting.   Endocrine: Negative for cold intolerance, heat intolerance, polydipsia, polyphagia and polyuria.   Genitourinary: Negative for difficulty urinating, dysuria, flank pain, frequency, hematuria, urgency, vaginal bleeding and vaginal discharge.   Musculoskeletal: Negative for arthralgias, back pain, gait problem, joint swelling and myalgias.   Skin: Negative for pallor and rash.   Allergic/Immunologic: Negative for immunocompromised state.   Neurological: Positive for weakness. Negative for dizziness, syncope, light-headedness, numbness and headaches.   Hematological: Negative for adenopathy. Does not bruise/bleed easily.   Psychiatric/Behavioral: Positive for confusion. Negative for agitation and dysphoric mood. The patient is not nervous/anxious.      Objective:     Vital Signs (Most Recent):  Temp: 97.7 °F (36.5 °C) (02/17/18 1958)  Pulse: 86 (02/17/18 1958)  Resp: 18 (02/17/18 1958)  BP: (!) 95/51 (02/17/18 1958)  SpO2: (!) 94 % (02/17/18 1958) Vital Signs (24h  Range):  Temp:  [97.1 °F (36.2 °C)-98.9 °F (37.2 °C)] 97.7 °F (36.5 °C)  Pulse:  [82-90] 86  Resp:  [18] 18  SpO2:  [94 %-100 %] 94 %  BP: ()/(50-73) 95/51     Weight: 64.6 kg (142 lb 6.7 oz)  Body mass index is 24.45 kg/m².    Intake/Output Summary (Last 24 hours) at 02/17/18 2224  Last data filed at 02/17/18 1200   Gross per 24 hour   Intake              490 ml   Output             1850 ml   Net            -1360 ml      Physical Exam   Constitutional: She is oriented to person, place, and time. She appears well-developed and well-nourished. She is active and cooperative.   HENT:   Head: Normocephalic and atraumatic.   Nose: Nose normal.   Mouth/Throat: Oropharynx is clear and moist. No oropharyngeal exudate.   Eyes: Pupils are equal, round, and reactive to light. No scleral icterus.   Neck: Neck supple. No thyromegaly present.   Cardiovascular: Normal rate, regular rhythm, normal heart sounds and intact distal pulses.    No murmur heard.  Pulmonary/Chest: Effort normal. No stridor. No respiratory distress. She has no wheezes. She has rales. Right breast exhibits mass.   Abdominal: Soft. Bowel sounds are normal. She exhibits no distension, no ascites and no mass. There is no hepatosplenomegaly. There is no tenderness. There is no rigidity, no rebound and no guarding.   Musculoskeletal: She exhibits no edema or tenderness.   Lymphadenopathy:     She has no cervical adenopathy.   Neurological: She is alert and oriented to person, place, and time. No cranial nerve deficit. She exhibits normal muscle tone. Coordination normal.   Skin: Skin is warm and dry. No rash noted. No pallor.   Psychiatric: She has a normal mood and affect. Her behavior is normal. Judgment and thought content normal. She exhibits abnormal recent memory.   Nursing note and vitals reviewed.      Significant Labs:   BMP:   Recent Labs  Lab 02/17/18  0530   GLU 81      K 3.7   CL 96   CO2 37*   BUN 18   CREATININE 0.5   CALCIUM 8.6*   MG  1.9     CBC:   Recent Labs  Lab 02/16/18  0519 02/17/18  0530   WBC 9.54 6.05   HGB 14.0 12.6   HCT 45.3 41.1   * 115*     All pertinent labs within the past 24 hours have been reviewed.    Significant Imaging: I have reviewed all pertinent imaging results/findings within the past 24 hours.

## 2018-02-18 NOTE — ASSESSMENT & PLAN NOTE
Telemetry monitoring, daily weights, low sodium diet, fluid restriction,    Lasix 20 mg IV twice  Cardiology consult  Lisinopril 2.5 mg  Elevated troponin likely due to congestive heart failure - trend  2/10 -   1 - Moderate left atrial enlargement.     2 - Eccentric hypertrophy.     3 - No wall motion abnormalities.     4 - Severely depressed left ventricular systolic function (EF 10-15%).     5 - Impaired LV relaxation, normal LAP (grade 1 diastolic dysfunction).     6 - Low normal to mildly depressed right ventricular systolic function .     7 - The estimated PA systolic pressure is 25 mmHg.     8 - Mild to moderate mitral regurgitation.     9 - Mild to moderate tricuspid regurgitation.     10 - Intermediate central venous pressure.     11 - Right pleural effusion.  12 - cards following  2/16 - case discussed w/cards, pt will need stress test and possibly ischemic work up prior to defibrillator placement. In meantime will optimize med management. Unclear if pulm effusion is due to heart failure as cytology showed no malignancy.    2/17- needs further diuresis but due to contraction alkalosis may start Diamox.    2/18- HCO3 now 43, will switch Bumex and Zaroxolyn to Diamox

## 2018-02-18 NOTE — SUBJECTIVE & OBJECTIVE
Interval History: Today the patient reports that she feels well.  She is able to carry on normal conversation and is oriented to time and person.  She did not recall having had her breast mass biopsy done.  I have reviewed all of the patient's interval history.    Oncology Treatment Plan:   [No treatment plan]    Medications:  Continuous Infusions:  Scheduled Meds:   acetaZOLAMIDE (DIAMOX) IVPB  250 mg Intravenous Q12H    bumetanide  1 mg Intravenous BID    clindamycin  300 mg Oral Q8H    fluconazole  100 mg Oral Daily    folic acid-vit B6-vit B12 2.5-25-2 mg  1 tablet Oral Daily    heparin (porcine)  5,000 Units Subcutaneous Q8H    lisinopril  5 mg Oral Daily    metoprolol succinate  50 mg Oral Daily    miconazole NITRATE 2 %   Topical (Top) BID    multivitamin  1 tablet Oral Daily    thiamine  100 mg Oral Daily     PRN Meds:acetaminophen     Review of Systems   Constitutional: Positive for activity change, appetite change and fatigue. Negative for chills, diaphoresis, fever and unexpected weight change.   HENT: Negative for congestion, dental problem, ear discharge, ear pain, hearing loss, mouth sores, postnasal drip, sinus pressure, sore throat, tinnitus, trouble swallowing and voice change.    Eyes: Negative for photophobia, pain and visual disturbance.   Respiratory: Positive for shortness of breath. Negative for cough, chest tightness and wheezing.    Cardiovascular: Negative for chest pain, palpitations and leg swelling.   Gastrointestinal: Negative for abdominal distention, abdominal pain, blood in stool, constipation, diarrhea, nausea and vomiting.   Endocrine: Negative for cold intolerance, heat intolerance, polydipsia, polyphagia and polyuria.   Genitourinary: Negative for difficulty urinating, dysuria, flank pain, frequency, hematuria, urgency, vaginal bleeding and vaginal discharge.   Musculoskeletal: Negative for arthralgias, back pain, gait problem, joint swelling and myalgias.   Skin:  Negative for pallor and rash.   Allergic/Immunologic: Negative for immunocompromised state.   Neurological: Negative for dizziness, syncope, weakness, light-headedness, numbness and headaches.   Hematological: Negative for adenopathy. Does not bruise/bleed easily.   Psychiatric/Behavioral: Positive for confusion. Negative for agitation and dysphoric mood. The patient is not nervous/anxious.      Objective:     Vital Signs (Most Recent):  Temp: 98 °F (36.7 °C) (02/18/18 1221)  Pulse: 67 (02/18/18 1300)  Resp: 18 (02/18/18 1221)  BP: 116/60 (02/18/18 1221)  SpO2: 98 % (02/18/18 1221) Vital Signs (24h Range):  Temp:  [97.4 °F (36.3 °C)-98.6 °F (37 °C)] 98 °F (36.7 °C)  Pulse:  [67-90] 67  Resp:  [18-20] 18  SpO2:  [92 %-100 %] 98 %  BP: ()/(50-64) 116/60     Weight: 59.5 kg (131 lb 2.8 oz)  Body mass index is 22.52 kg/m².  Body surface area is 1.64 meters squared.      Intake/Output Summary (Last 24 hours) at 02/18/18 1408  Last data filed at 02/18/18 1400   Gross per 24 hour   Intake              360 ml   Output                0 ml   Net              360 ml       Physical Exam   Constitutional: She is oriented to person, place, and time. She appears well-developed and well-nourished. She is active and cooperative.   HENT:   Head: Normocephalic and atraumatic.   Nose: Nose normal.   Mouth/Throat: Oropharynx is clear and moist. No oropharyngeal exudate.   Eyes: Pupils are equal, round, and reactive to light. No scleral icterus.   Neck: Neck supple. No thyromegaly present.   Cardiovascular: Normal rate, regular rhythm, normal heart sounds and intact distal pulses.    No murmur heard.  Pulmonary/Chest: Effort normal. No stridor. No respiratory distress. She has no wheezes. She has rales. Right breast exhibits mass.   Abdominal: Soft. Bowel sounds are normal. She exhibits no distension, no ascites and no mass. There is no hepatosplenomegaly. There is no tenderness. There is no rigidity, no rebound and no guarding.    Musculoskeletal: She exhibits no edema or tenderness.   Lymphadenopathy:     She has no cervical adenopathy.   Neurological: She is alert and oriented to person, place, and time. No cranial nerve deficit. She exhibits normal muscle tone. Coordination normal.   Skin: Skin is warm and dry. No rash noted. No pallor.   Psychiatric: She has a normal mood and affect. Her behavior is normal. Judgment and thought content normal. She exhibits abnormal recent memory.   Nursing note and vitals reviewed.      Significant Labs:   I have reviewed all of the patient's relevant lab work available in the medical record and have utilized this in my evaluation and management recommendations today    Diagnostic Results:  I have reviewed all of the patient's diagnostic/imaging results available in the medical record and have utilized this in my evaluation and management recommendations today.

## 2018-02-18 NOTE — ASSESSMENT & PLAN NOTE
2/13 - pt w/hypercapnic, respiratory distress. 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 Respiratory consulted for bipap, however pt not tolerating, would prefer to avoid sedation as this will worsen resp. Status. Attempted to reach out to family contacts multiple times, no response.   2/14 - anti-cholinergic drugs d/cd, (was on famotidine), no narcotics or sedatives prescribed, thyroid function normal, will follow cortisol level, no hx of alchol use, b12, folate and thiamine given, no indications of seizure, meningitis or encephalitis  2/15 - more alert today and increasingly appropriate answers.  Will contact pharmacy in AM to search med mind for psychiatric drugs that pt may not have reported at admit.    2/16 - condition much improved, lengthy discussions on numerous occasions w/ case management and other physicians, attempting to provide appropriate level of care and placement based on pt's ability to care for herself.    2/17- improved, more alert and responsive

## 2018-02-18 NOTE — ASSESSMENT & PLAN NOTE
IV Merrem and Vanco  Wound Care  Arterial US to evaluate blood flow for proper healing  2/12 - s.a. In blood, awaiting sensitivities, continued on vanc and repeat blood cultures    2/15 - wound care following, continued on antibiotics for MRSA bacteremia  On diflucan for tinea to peroneum  2/16 - on vanc,will follow cultures and discuss duration of tx with ID prior to d/c,     2/17- much better, Vanc changed to oral clindamycin.

## 2018-02-18 NOTE — ASSESSMENT & PLAN NOTE
I had a detailed discussion with the patient today with regard to her current clinical situation.  We are currently awaiting pathology results for confirmation of breast malignancy.  The patient is not a candidate for any systemic chemotherapy based on her overall clinical situation and associated comorbidities.  She might be a candidate for endocrine therapy if her tumor is ER/PA positive.

## 2018-02-18 NOTE — SUBJECTIVE & OBJECTIVE
Interval History: very weak    Objective:     Vital Signs (Most Recent):  Temp: 98 °F (36.7 °C) (02/18/18 1221)  Pulse: 67 (02/18/18 1300)  Resp: 18 (02/18/18 1221)  BP: 116/60 (02/18/18 1221)  SpO2: 98 % (02/18/18 1221) Vital Signs (24h Range):  Temp:  [97.4 °F (36.3 °C)-98.6 °F (37 °C)] 98 °F (36.7 °C)  Pulse:  [67-90] 67  Resp:  [18-20] 18  SpO2:  [92 %-100 %] 98 %  BP: ()/(50-64) 116/60     Weight: 59.5 kg (131 lb 2.8 oz)  Body mass index is 22.52 kg/m².      Intake/Output Summary (Last 24 hours) at 02/18/18 1523  Last data filed at 02/18/18 1400   Gross per 24 hour   Intake              360 ml   Output                0 ml   Net              360 ml       Physical Exam   Constitutional: She appears distressed.   Chronically ill appearing     HENT:   Head: Normocephalic and atraumatic.   Mouth/Throat: No oropharyngeal exudate.   Eyes: Conjunctivae are normal. Pupils are equal, round, and reactive to light.   Neck: Neck supple. No JVD present. No tracheal deviation present. No thyromegaly present.   Cardiovascular: Normal rate.  An irregular rhythm present. PMI is displaced.  Exam reveals distant heart sounds.    Murmur heard.   Systolic murmur is present with a grade of 2/6   Pulmonary/Chest: Effort normal. No respiratory distress. She has decreased breath sounds. She has wheezes. She has rales in the right lower field and the left lower field. She exhibits no tenderness.   Abdominal: Soft. Bowel sounds are normal.   Musculoskeletal: She exhibits edema.   Lymphadenopathy:     She has no cervical adenopathy.   Neurological: She is alert.   Skin: Skin is warm and dry.   Nursing note and vitals reviewed.      Vents:  Oxygen Concentration (%): 28 (02/18/18 0835)    Lines/Drains/Airways     Peripherally Inserted Central Catheter Line                 PICC Double Lumen 02/10/18 1524 right basilic 7 days                Significant Labs:    CBC/Anemia Profile:    Recent Labs  Lab 02/17/18  0530 02/18/18  0542   WBC  6.05 5.40   HGB 12.6 12.1   HCT 41.1 38.9   * 108*   MCV 93 93   RDW 17.3* 17.1*        Chemistries:    Recent Labs  Lab 02/17/18  0530 02/18/18  0542    143   K 3.7 3.5   CL 96 94*   CO2 37* 43*   BUN 18 18   CREATININE 0.5 0.5   CALCIUM 8.6* 8.5*   ALBUMIN 2.5* 2.4*   PROT 5.9* 5.6*   BILITOT 0.6 0.6   ALKPHOS 71 69   ALT 19 23   AST 28 32   MG 1.9 1.7       BMP:     Recent Labs  Lab 02/18/18  0542   GLU 72      K 3.5   CL 94*   CO2 43*   BUN 18   CREATININE 0.5   CALCIUM 8.5*   MG 1.7     CMP:     Recent Labs  Lab 02/17/18  0530 02/18/18  0542    143   K 3.7 3.5   CL 96 94*   CO2 37* 43*   GLU 81 72   BUN 18 18   CREATININE 0.5 0.5   CALCIUM 8.6* 8.5*   PROT 5.9* 5.6*   ALBUMIN 2.5* 2.4*   BILITOT 0.6 0.6   ALKPHOS 71 69   AST 28 32   ALT 19 23   ANIONGAP 6* 6*   EGFRNONAA >60 >60     All pertinent labs within the past 24 hours have been reviewed.    Significant Imaging:  I have reviewed and interpreted all pertinent imaging results/findings within the past 24 hours.

## 2018-02-18 NOTE — ASSESSMENT & PLAN NOTE
2/13 - cultures positive MRSA  2/14 - mrsa, on vanc, id following    2/17- Improved, repeat Blood Cx x 2 neg, Vanco d/reid and started on Clindamycin

## 2018-02-18 NOTE — ASSESSMENT & PLAN NOTE
Telemetry monitoring, daily weights, low sodium diet, fluid restriction,    Lasix 20 mg IV twice  Cardiology consult  Lisinopril 2.5 mg  Elevated troponin likely due to congestive heart failure - trend  2/10 -   1 - Moderate left atrial enlargement.     2 - Eccentric hypertrophy.     3 - No wall motion abnormalities.     4 - Severely depressed left ventricular systolic function (EF 10-15%).     5 - Impaired LV relaxation, normal LAP (grade 1 diastolic dysfunction).     6 - Low normal to mildly depressed right ventricular systolic function .     7 - The estimated PA systolic pressure is 25 mmHg.     8 - Mild to moderate mitral regurgitation.     9 - Mild to moderate tricuspid regurgitation.     10 - Intermediate central venous pressure.     11 - Right pleural effusion.  12 - cards following  2/16 - case discussed w/cards, pt will need stress test and possibly ischemic work up prior to defibrillator placement. In meantime will optimize med management. Unclear if pulm effusion is due to heart failure as cytology showed no malignancy.    2/17- needs further diuresis but due to contraction alkalosis, currently on Diamox.

## 2018-02-18 NOTE — PROGRESS NOTES
[facility] Hospital Medicine  Progress Note    Patient Name: Kelly Mariano  MRN: [id number]  Patient Class: IP- Inpatient   Admission Date: 2/9/2018  Length of Stay: 9 days  Attending Physician: Brittni Lagos MD  Primary Care Provider: Primary Doctor No        Subjective:     Principal Problem:Bilateral cellulitis of lower leg    HPI:  Kelly Mariano is a 66 yo female who states she has not seen a physician since 2009. EMS brought her to the ED today as the patient reported generalized weakness that has worsened over the last 2 days. She had trouble getting off the toilet and when she di,  she had fallen. EMS stated living conditions were deplorable with cat feces on the ibeth. (Elderly Protective Services contacted). Pt is noted with cellulitis and wounds to the bilateral feet and lower legs. Pt describes anxiety attacks, SOB, leg swelling and difficulty walking. Vital signs showed tachycardia = 112, pt afebrile with controlled blood pressure. Labs indicate thrombocytopenia, metabolic alkalosis, troponin 0.114, BNP > 4900, initial lactic acid 2.3;  CXR shows pulmonary edema and a moderate right sided pleural effusion.    Hospital Course:  2/10- no fever, other vss. Pt feels weakness, leg swelling and sob improving.Blood cultures positive for staph, on vanc. and guillermo. MRI negative for osteo. ID consulted. CTA BLE to determine if adequate circulation. Per nurse, concerned for left breast deformity, concerning for malignancy, hemonc consulted.   2/11 - Legs swelling and weakness improved, no longer sob. Echo shows ejection fraction 10%, diuresing well on lasix. Per cards left heart cath or stress test when better. Seen by hem/onc this am, concern for breast ca, right pleural effusion drained today w/o complications, swallow study positive for aspiration, counseled patient. Continued on antibx for lower extremity cellulitis. CT legs with no obvious arterial occlusion, however CTA lower extremeties  describes Ulcerative plaque versus small focal dissection measuring 8 mm in the left common iliac artery which causes a mild to moderate grade focal stenosis just above the bifurcation into the left external and left internal iliac arteries.  Contacted Brett Puga, vascular surgeon, no intervention needed now. Recommending lower extremity leg wraps and follow up w/vascular in outpt.   2/12 - Patient feeling better, platelets trending down, crp elevated at 28, sed rate normal. Blood cultures positive for s. Aeureus, awaiting sensitivity.  Xray with tiny right pneumothorax after thoracentesis. General sx biopsied breast mass.   2/13 - Pt with alt mental status this am, alkalotic, possibly secondary to over-diuresis and thoracentesis, xray shows pleural effusion, returned possibly due to chf and/or met. Breast ca. Case discussed w/oncology and no additional imaging needed at this point. Blood cultures positive for MRSA, vanc continued  2/14 - increased lethargy, bicarb improved,chest xray unchanged,  awaiting breast biopsy path report, being diuresed w/acetozolamide, attempted to contact Xavier Rosenbaum, left vmail. Meeting with case management and elderly protection services to discuss contacting any family member of friend, ordered CT head/chest/abd/pelvis   2/15 - vss, including pulse ox on 2 Liters, mentation slightly improved, CT head/chest/abd/pelvis - head no acute processes, chest/abd/pelvis - possible pelvic mass cannot exclude bone mets  2/16 - ABG and platelets improved , cxray unchanged, cytology from thoracentesis negative for malignancy.   mentation drastically improved, pt able to re-state conversation w/hemonc this am. Pt wanting full work up and tx for breast ca at this point. Pt requesting nursing home placement, is demonstrating competency in decision making. However concern for grave disability due to such severe self-neglect in home environment, that she is better suited to live in a supervised  setting.     2/17- pt looks comfortable and more alert and responsive. No walking yet. Both legs covered in tight dressing which is CDI. CXR shows recurrence of R sided effusion, however no SOB and no malignancy seen on thoracentesis.    2/18- continues to improve slowly, repeat Blood Cx x 2 remain NGTD. Dressing is clean, eating drinking better, now more open to placement. Await Breast Biopsy    Interval History: looks better, eating drinking better.     Review of Systems   Constitutional: Positive for activity change, appetite change and fatigue. Negative for chills, diaphoresis, fever and unexpected weight change.   HENT: Negative for congestion, dental problem, ear discharge, ear pain, hearing loss, mouth sores, postnasal drip, sinus pressure, sore throat, tinnitus, trouble swallowing and voice change.    Eyes: Negative for photophobia, pain and visual disturbance.   Respiratory: Positive for shortness of breath. Negative for cough, chest tightness and wheezing.    Cardiovascular: Negative for chest pain, palpitations and leg swelling.   Gastrointestinal: Negative for abdominal distention, abdominal pain, blood in stool, constipation, diarrhea, nausea and vomiting.   Endocrine: Negative for cold intolerance, heat intolerance, polydipsia, polyphagia and polyuria.   Genitourinary: Negative for difficulty urinating, dysuria, flank pain, frequency, hematuria, urgency, vaginal bleeding and vaginal discharge.   Musculoskeletal: Negative for arthralgias, back pain, gait problem, joint swelling and myalgias.   Skin: Negative for pallor and rash.   Allergic/Immunologic: Negative for immunocompromised state.   Neurological: Negative for dizziness, syncope, weakness, light-headedness, numbness and headaches.   Hematological: Negative for adenopathy. Does not bruise/bleed easily.   Psychiatric/Behavioral: Positive for confusion. Negative for agitation and dysphoric mood. The patient is not nervous/anxious.      Objective:      Vital Signs (Most Recent):  Temp: 97.8 °F (36.6 °C) (02/18/18 1632)  Pulse: 88 (02/18/18 1632)  Resp: 18 (02/18/18 1632)  BP: 107/72 (02/18/18 1632)  SpO2: 97 % (02/18/18 1632) Vital Signs (24h Range):  Temp:  [97.4 °F (36.3 °C)-98.3 °F (36.8 °C)] 97.8 °F (36.6 °C)  Pulse:  [67-88] 88  Resp:  [18-20] 18  SpO2:  [92 %-100 %] 97 %  BP: ()/(50-72) 107/72     Weight: 59.5 kg (131 lb 2.8 oz)  Body mass index is 22.52 kg/m².    Intake/Output Summary (Last 24 hours) at 02/18/18 1710  Last data filed at 02/18/18 1400   Gross per 24 hour   Intake              360 ml   Output                0 ml   Net              360 ml      Physical Exam   Constitutional: She appears distressed.   Chronically ill appearing     HENT:   Head: Normocephalic and atraumatic.   Mouth/Throat: No oropharyngeal exudate.   Eyes: Conjunctivae are normal. Pupils are equal, round, and reactive to light.   Neck: Neck supple. No JVD present. No tracheal deviation present. No thyromegaly present.   Cardiovascular: Normal rate.  An irregular rhythm present. PMI is displaced.  Exam reveals distant heart sounds.    Murmur heard.   Systolic murmur is present with a grade of 2/6   Pulmonary/Chest: Effort normal. No respiratory distress. She has decreased breath sounds. She has wheezes. She has rales in the right lower field and the left lower field. She exhibits no tenderness.   Abdominal: Soft. Bowel sounds are normal.   Musculoskeletal: She exhibits edema.   Lymphadenopathy:     She has no cervical adenopathy.   Neurological: She is alert.   Skin: Skin is warm and dry.   Nursing note and vitals reviewed.      Significant Labs:   Blood Culture: No results for input(s): LABBLOO in the last 48 hours.  BMP:   Recent Labs  Lab 02/18/18  0542   GLU 72      K 3.5   CL 94*   CO2 43*   BUN 18   CREATININE 0.5   CALCIUM 8.5*   MG 1.7     CBC:   Recent Labs  Lab 02/17/18  0530 02/18/18  0542   WBC 6.05 5.40   HGB 12.6 12.1   HCT 41.1 38.9   * 108*      Urine Culture: No results for input(s): LABURIN in the last 48 hours.  All pertinent labs within the past 24 hours have been reviewed.    Significant Imaging: I have reviewed all pertinent imaging results/findings within the past 24 hours.    Assessment/Plan:      * Bilateral cellulitis of lower leg      IV Merrem and Vanco  Wound Care  Arterial US to evaluate blood flow for proper healing  2/12 - s.a. In blood, awaiting sensitivities, continued on vanc and repeat blood cultures    2/15 - wound care following, continued on antibiotics for MRSA bacteremia  On diflucan for tinea to peroneum  2/16 - on vanc,will follow cultures and discuss duration of tx with ID prior to d/c,     2/17- much better, Vanc changed to oral clindamycin.     2/18- improving, continue Clinda        Acute combined systolic and diastolic heart failure    Telemetry monitoring, daily weights, low sodium diet, fluid restriction,    Lasix 20 mg IV twice  Cardiology consult  Lisinopril 2.5 mg  Elevated troponin likely due to congestive heart failure - trend  2/10 -   1 - Moderate left atrial enlargement.     2 - Eccentric hypertrophy.     3 - No wall motion abnormalities.     4 - Severely depressed left ventricular systolic function (EF 10-15%).     5 - Impaired LV relaxation, normal LAP (grade 1 diastolic dysfunction).     6 - Low normal to mildly depressed right ventricular systolic function .     7 - The estimated PA systolic pressure is 25 mmHg.     8 - Mild to moderate mitral regurgitation.     9 - Mild to moderate tricuspid regurgitation.     10 - Intermediate central venous pressure.     11 - Right pleural effusion.  12 - cards following  2/16 - case discussed w/cards, pt will need stress test and possibly ischemic work up prior to defibrillator placement. In meantime will optimize med management. Unclear if pulm effusion is due to heart failure as cytology showed no malignancy.    2/17- needs further diuresis but due to contraction  alkalosis may start Diamox.    2/18- HCO3 now 43, will switch Bumex and Zaroxolyn to Diamox              Pleural effusion on right    Diuresis with IV lasix  Supplemental oxygen to maintain sat > 92 %  Will likely need thoracentesis, consult Pulmonology when needed  2/10 - moderate size, concerning for malignancy due to left breast deformity (metastic disease?)  2/12 - will follow path report analysis of pleural fluid  2/14 - will discuss w/pulm if repeat thoracentesis needed and follow up studies from previous    2/17- Transudative Effusion, neg for malignancy-- most likely related to CHF/ CMP.    2/18- continue Diuretics, now Diamox        Altered mental status    2/13 - pt w/hypercapnic, respiratory distress. 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 Respiratory consulted for bipap, however pt not tolerating, would prefer to avoid sedation as this will worsen resp. Status. Attempted to reach out to family contacts multiple times, no response.   2/14 - anti-cholinergic drugs d/cd, (was on famotidine), no narcotics or sedatives prescribed, thyroid function normal, will follow cortisol level, no hx of alchol use, b12, folate and thiamine given, no indications of seizure, meningitis or encephalitis  2/15 - more alert today and increasingly appropriate answers.  Will contact pharmacy in AM to search med mind for psychiatric drugs that pt may not have reported at admit.    2/16 - condition much improved, lengthy discussions on numerous occasions w/ case management and other physicians, attempting to provide appropriate level of care and placement based on pt's ability to care for herself.    2/17- improved, more alert and responsive    2/18- appears at baseline        Acute on chronic respiratory failure with hypoxia and hypercapnia    2/17- Sec to CHF, Improving with diuresis and thoracentesis    2/18- improving, will check for Home O2 eval before discharge          Mass of breast, right    2/11 - oncology  recommending biopsy of breast mass  2/12 - path specimen sent for eval of breast mass, and for ER/CA/[id number]. Case discussed with gen. Surgery and hem/onc. Will consider any additional imaging for staging while inpt.   2/16 - pt will need path results and unclear where she will need follow up for breast ca     2/17-- not a candidate for any chemo except possibly Tamoxifen  Poor prognosis    2/18- await Biopsy result        Protein-calorie malnutrition, moderate    Will start Folbic, MVI  Nutrition consult  Likely sec to severe CHf and Breast mass and poor oral intake PTA.  Needs Beneprotein and Boost supplements    Expected to improve with improved intake            VTE Risk Mitigation         Ordered     heparin (porcine) injection 5,000 Units  Every 8 hours     Route:  Subcutaneous        02/09/18 1749     Medium Risk of VTE  Once      02/09/18 1300              Brittni Lagos MD  Department of Hospital Medicine   [facility]

## 2018-02-18 NOTE — PROGRESS NOTES
[facility] Hospital Medicine  Progress Note    Patient Name: Kelly Mariano  MRN: [id number]  Patient Class: IP- Inpatient   Admission Date: 2/9/2018  Length of Stay: 8 days  Attending Physician: Brittni Lagos MD  Primary Care Provider: Primary Doctor No        Subjective:     Principal Problem:Bilateral cellulitis of lower leg    HPI:  Kelly Mariano is a 68 yo female who states she has not seen a physician since 2009. EMS brought her to the ED today as the patient reported generalized weakness that has worsened over the last 2 days. She had trouble getting off the toilet and when she di,  she had fallen. EMS stated living conditions were deplorable with cat feces on the ibeth. (Elderly Protective Services contacted). Pt is noted with cellulitis and wounds to the bilateral feet and lower legs. Pt describes anxiety attacks, SOB, leg swelling and difficulty walking. Vital signs showed tachycardia = 112, pt afebrile with controlled blood pressure. Labs indicate thrombocytopenia, metabolic alkalosis, troponin 0.114, BNP > 4900, initial lactic acid 2.3;  CXR shows pulmonary edema and a moderate right sided pleural effusion.    Hospital Course:  2/10- no fever, other vss. Pt feels weakness, leg swelling and sob improving.Blood cultures positive for staph, on vanc. and guillermo. MRI negative for osteo. ID consulted. CTA BLE to determine if adequate circulation. Per nurse, concerned for left breast deformity, concerning for malignancy, hemonc consulted.   2/11 - Legs swelling and weakness improved, no longer sob. Echo shows ejection fraction 10%, diuresing well on lasix. Per cards left heart cath or stress test when better. Seen by hem/onc this am, concern for breast ca, right pleural effusion drained today w/o complications, swallow study positive for aspiration, counseled patient. Continued on antibx for lower extremity cellulitis. CT legs with no obvious arterial occlusion, however CTA lower extremeties  describes Ulcerative plaque versus small focal dissection measuring 8 mm in the left common iliac artery which causes a mild to moderate grade focal stenosis just above the bifurcation into the left external and left internal iliac arteries.  Contacted Brett Puga, vascular surgeon, no intervention needed now. Recommending lower extremity leg wraps and follow up w/vascular in outpt.   2/12 - Patient feeling better, platelets trending down, crp elevated at 28, sed rate normal. Blood cultures positive for s. Aeureus, awaiting sensitivity.  Xray with tiny right pneumothorax after thoracentesis. General sx biopsied breast mass.   2/13 - Pt with alt mental status this am, alkalotic, possibly secondary to over-diuresis and thoracentesis, xray shows pleural effusion, returned possibly due to chf and/or met. Breast ca. Case discussed w/oncology and no additional imaging needed at this point. Blood cultures positive for MRSA, vanc continued  2/14 - increased lethargy, bicarb improved,chest xray unchanged,  awaiting breast biopsy path report, being diuresed w/acetozolamide, attempted to contact Xavier Rosenbaum, left vmail. Meeting with case management and elderly protection services to discuss contacting any family member of friend, ordered CT head/chest/abd/pelvis   2/15 - vss, including pulse ox on 2 Liters, mentation slightly improved, CT head/chest/abd/pelvis - head no acute processes, chest/abd/pelvis - possible pelvic mass cannot exclude bone mets  2/16 - ABG and platelets improved , cxray unchanged, cytology from thoracentesis negative for malignancy.   mentation drastically improved, pt able to re-state conversation w/hemonc this am. Pt wanting full work up and tx for breast ca at this point. Pt requesting nursing home placement, is demonstrating competency in decision making. However concern for grave disability due to such severe self-neglect in home environment, that she is better suited to live in a supervised  setting.     2/17- pt looks comfortable and more alert and responsive. No walking yet. Both legs covered in tight dressing which is CDI. CXR shows recurrence of R sided effusion, however no SOB and no malignancy seen on thoracentesis.    Interval History: pt looks comfortable and more alert and responsive. No walking yet. Both legs covered in tight dressing which is CDI. CXR shows recurrence of R sided effusion, however no SOB and no malignancy seen on thoracentesis.    Review of Systems   Constitutional: Positive for activity change, appetite change and fatigue. Negative for chills, diaphoresis, fever and unexpected weight change.   HENT: Negative for congestion, dental problem, ear discharge, ear pain, hearing loss, mouth sores, postnasal drip, sinus pressure, sore throat, tinnitus, trouble swallowing and voice change.    Eyes: Negative for photophobia, pain and visual disturbance.   Respiratory: Positive for shortness of breath. Negative for cough, chest tightness and wheezing.    Cardiovascular: Negative for chest pain, palpitations and leg swelling.   Gastrointestinal: Negative for abdominal distention, abdominal pain, blood in stool, constipation, diarrhea, nausea and vomiting.   Endocrine: Negative for cold intolerance, heat intolerance, polydipsia, polyphagia and polyuria.   Genitourinary: Negative for difficulty urinating, dysuria, flank pain, frequency, hematuria, urgency, vaginal bleeding and vaginal discharge.   Musculoskeletal: Negative for arthralgias, back pain, gait problem, joint swelling and myalgias.   Skin: Negative for pallor and rash.   Allergic/Immunologic: Negative for immunocompromised state.   Neurological: Positive for weakness. Negative for dizziness, syncope, light-headedness, numbness and headaches.   Hematological: Negative for adenopathy. Does not bruise/bleed easily.   Psychiatric/Behavioral: Positive for confusion. Negative for agitation and dysphoric mood. The patient is not  nervous/anxious.      Objective:     Vital Signs (Most Recent):  Temp: 97.7 °F (36.5 °C) (02/17/18 1958)  Pulse: 86 (02/17/18 1958)  Resp: 18 (02/17/18 1958)  BP: (!) 95/51 (02/17/18 1958)  SpO2: (!) 94 % (02/17/18 1958) Vital Signs (24h Range):  Temp:  [97.1 °F (36.2 °C)-98.9 °F (37.2 °C)] 97.7 °F (36.5 °C)  Pulse:  [82-90] 86  Resp:  [18] 18  SpO2:  [94 %-100 %] 94 %  BP: ()/(50-73) 95/51     Weight: 64.6 kg (142 lb 6.7 oz)  Body mass index is 24.45 kg/m².    Intake/Output Summary (Last 24 hours) at 02/17/18 2224  Last data filed at 02/17/18 1200   Gross per 24 hour   Intake              490 ml   Output             1850 ml   Net            -1360 ml      Physical Exam   Constitutional: She is oriented to person, place, and time. She appears well-developed and well-nourished. She is active and cooperative.   HENT:   Head: Normocephalic and atraumatic.   Nose: Nose normal.   Mouth/Throat: Oropharynx is clear and moist. No oropharyngeal exudate.   Eyes: Pupils are equal, round, and reactive to light. No scleral icterus.   Neck: Neck supple. No thyromegaly present.   Cardiovascular: Normal rate, regular rhythm, normal heart sounds and intact distal pulses.    No murmur heard.  Pulmonary/Chest: Effort normal. No stridor. No respiratory distress. She has no wheezes. She has rales. Right breast exhibits mass.   Abdominal: Soft. Bowel sounds are normal. She exhibits no distension, no ascites and no mass. There is no hepatosplenomegaly. There is no tenderness. There is no rigidity, no rebound and no guarding.   Musculoskeletal: She exhibits no edema or tenderness.   Lymphadenopathy:     She has no cervical adenopathy.   Neurological: She is alert and oriented to person, place, and time. No cranial nerve deficit. She exhibits normal muscle tone. Coordination normal.   Skin: Skin is warm and dry. No rash noted. No pallor.   Psychiatric: She has a normal mood and affect. Her behavior is normal. Judgment and thought  content normal. She exhibits abnormal recent memory.   Nursing note and vitals reviewed.      Significant Labs:   BMP:   Recent Labs  Lab 02/17/18  0530   GLU 81      K 3.7   CL 96   CO2 37*   BUN 18   CREATININE 0.5   CALCIUM 8.6*   MG 1.9     CBC:   Recent Labs  Lab 02/16/18  0519 02/17/18  0530   WBC 9.54 6.05   HGB 14.0 12.6   HCT 45.3 41.1   * 115*     All pertinent labs within the past 24 hours have been reviewed.    Significant Imaging: I have reviewed all pertinent imaging results/findings within the past 24 hours.    Assessment/Plan:      * Bilateral cellulitis of lower leg      IV Merrem and Vanco  Wound Care  Arterial US to evaluate blood flow for proper healing  2/12 - s.a. In blood, awaiting sensitivities, continued on vanc and repeat blood cultures    2/15 - wound care following, continued on antibiotics for MRSA bacteremia  On diflucan for tinea to peroneum  2/16 - on vanc,will follow cultures and discuss duration of tx with ID prior to d/c,     2/17- much better, Vanc changed to oral clindamycin.         Acute combined systolic and diastolic heart failure    Telemetry monitoring, daily weights, low sodium diet, fluid restriction,    Lasix 20 mg IV twice  Cardiology consult  Lisinopril 2.5 mg  Elevated troponin likely due to congestive heart failure - trend  2/10 -   1 - Moderate left atrial enlargement.     2 - Eccentric hypertrophy.     3 - No wall motion abnormalities.     4 - Severely depressed left ventricular systolic function (EF 10-15%).     5 - Impaired LV relaxation, normal LAP (grade 1 diastolic dysfunction).     6 - Low normal to mildly depressed right ventricular systolic function .     7 - The estimated PA systolic pressure is 25 mmHg.     8 - Mild to moderate mitral regurgitation.     9 - Mild to moderate tricuspid regurgitation.     10 - Intermediate central venous pressure.     11 - Right pleural effusion.  12 - cards following  2/16 - case discussed w/cards, pt will need  stress test and possibly ischemic work up prior to defibrillator placement. In meantime will optimize med management. Unclear if pulm effusion is due to heart failure as cytology showed no malignancy.    2/17- needs further diuresis but due to contraction alkalosis, currently on Diamox.              Pleural effusion on right    Diuresis with IV lasix  Supplemental oxygen to maintain sat > 92 %  Will likely need thoracentesis, consult Pulmonology when needed  2/10 - moderate size, concerning for malignancy due to left breast deformity (metastic disease?)  2/12 - will follow path report analysis of pleural fluid  2/14 - will discuss w/pulm if repeat thoracentesis needed and follow up studies from previous    2/17- Transudative Effusion, neg for malignancy-- most likely related to CHF/ CMP.        Altered mental status    2/13 - pt w/hypercapnic, respiratory distress. 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 Respiratory consulted for bipap, however pt not tolerating, would prefer to avoid sedation as this will worsen resp. Status. Attempted to reach out to family contacts multiple times, no response.   2/14 - anti-cholinergic drugs d/cd, (was on famotidine), no narcotics or sedatives prescribed, thyroid function normal, will follow cortisol level, no hx of alchol use, b12, folate and thiamine given, no indications of seizure, meningitis or encephalitis  2/15 - more alert today and increasingly appropriate answers.  Will contact pharmacy in AM to search med mind for psychiatric drugs that pt may not have reported at admit.    2/16 - condition much improved, lengthy discussions on numerous occasions w/ case management and other physicians, attempting to provide appropriate level of care and placement based on pt's ability to care for herself.    2/17- improved, more alert and responsive        Acute on chronic respiratory failure with hypoxia and hypercapnia    2/17- Sec to CHF, Improving with diuresis and  thoracentesis          Bacteremia due to Staphylococcus aureus      2/13 - cultures positive MRSA  2/14 - mrsa, on vanc, id following    Improved, repeat Blood Cx x 2 neg, Vanco d/reid and started on Clindamycin        Elevated troponin      No ACS, likely demand ischemia        Mass of breast, right    2/11 - oncology recommending biopsy of breast mass  2/12 - path specimen sent for eval of breast mass, and for ER/UT/Bay8Rnr. Case discussed with gen. Surgery and hem/onc. Will consider any additional imaging for staging while inpt.   2/16 - pt will need path results and unclear where she will need follow up for breast ca     2/17-- not a candidate for any chemo except possibly Tamoxifen  Poor prognosis        Protein-calorie malnutrition, moderate    Will start Folbic, MVI  Nutrition consult  Likely sec to severe CHf and Breast mass and poor oral intake PTA.  Needs Beneprotein and Boost supplements            VTE Risk Mitigation         Ordered     heparin (porcine) injection 5,000 Units  Every 8 hours     Route:  Subcutaneous        02/09/18 1749     Medium Risk of VTE  Once      02/09/18 1300              Brittni Lagos MD  Department of Hospital Medicine   [facility]

## 2018-02-18 NOTE — ASSESSMENT & PLAN NOTE
Optimize CHF regimen.  Cardiology consult  Preload and afterload reduction.  Daily weighing.  Dietary salt restriction.  Alcohol and tobacco avoidance.  2/16 Restart diuretic  2/18 Add additional doxe of zarolyxn  X 1 dose

## 2018-02-18 NOTE — ASSESSMENT & PLAN NOTE
2/13 - cultures positive MRSA  2/14 - mrsa, on vanc, id following    Improved, repeat Blood Cx x 2 neg, Vanco d/reid and started on Clindamycin

## 2018-02-18 NOTE — ASSESSMENT & PLAN NOTE
IV Merrem and Vanco  Wound Care  Arterial US to evaluate blood flow for proper healing  2/12 - s.a. In blood, awaiting sensitivities, continued on vanc and repeat blood cultures    2/15 - wound care following, continued on antibiotics for MRSA bacteremia  On diflucan for tinea to peroneum  2/16 - on vanc,will follow cultures and discuss duration of tx with ID prior to d/c,     2/17- much better, Vanc changed to oral clindamycin.     2/18- improving, continue Clinda

## 2018-02-18 NOTE — ASSESSMENT & PLAN NOTE
-Unclear etiology  -Continue Metoprolol  - Cont diuresis  - Continue  ACEI   -Will need ischemic workup-LHC vs stress test once infection issues improve and she is euvolemic  - Replete K > 4, Mg > 2

## 2018-02-18 NOTE — PROGRESS NOTES
[facility]  Cardiology  Progress Note    Patient Name: Kelly Mariano  MRN: [id number]  Admission Date: 2/9/2018  Hospital Length of Stay: 9 days  Code Status: Full Code   Attending Physician: Brittni Lagos MD   Primary Care Physician: Primary Doctor No  Expected Discharge Date:   Principal Problem:Bilateral cellulitis of lower leg    Subjective:   HPI:    Patient is a 67 y.o. female presents with LE cellulitis and CHF.  68 y/o fmelae with no significant Pmhx with significant cellulitis of BLE. Pt also with mild CHF and echo shwoing EF10-15%. EKG is wnl limits.      Hospital Course:   2/11/18-Patient seen and examined today, resting in bed. States she feels ok, still weak and fatigued. Complains of SOB. No chest pain or tightness. BC +Staph. Labs reviewed. K low at 3.1. Creatinine stable.     2/12/18 Pt hemodynamically stable on Toprol and Lisinopril. Will need biopsy of breast mass. Will discuss stress once infection treated and pt is diuresed.     2/13/18- Patient remains hemodynamically stable. No reports of chest pain or SOB. Patient is right post thoracentesis on 2/11/18. 1500cc drained.  Noted to have EF of 10-15% on echo. Has right breast mass that needs biopsy. Remains of IV abx for extensive lower extremity cellulitis.     2/14/18- Patient with noted to have Bibasilar infiltrates and effusions on repeat CXR. Pulmonology started Diamox and stopped Lasix. Still awaiting breast biopsy results. BP stable . Had hypoxia this morning and is confused. No family at bedside     2/15/18-Patient seen and examined today, sitting up in bed. Still confused. BP remains stable. Labs reviewed, K slightly low at 3.4. Biopsy results still pending.     2/16/18 - no acute events overnight. Pt seems more coherent but distant/angry. Diuresed well    2/17/18 - Mental status improved, continued on diuretics, seems improved     2/18/18 - Improved mental status, no acute events o/n. Denies CP/SOB. Diuretics increased with  metolazone. HF meds titrated.     Interval History: no acute events    Review of Systems   Constitution: Negative.   HENT: Negative.    Eyes: Negative.    Cardiovascular: Negative.    Respiratory: Negative.    Endocrine: Negative.    Hematologic/Lymphatic: Negative.    Skin: Negative.    Musculoskeletal: Negative.    Gastrointestinal: Negative.    Genitourinary: Negative.    Neurological: Negative.    Psychiatric/Behavioral: Negative.    Allergic/Immunologic: Negative.      Objective:     Vital Signs (Most Recent):  Temp: 98 °F (36.7 °C) (02/18/18 1221)  Pulse: 67 (02/18/18 1300)  Resp: 18 (02/18/18 1221)  BP: 116/60 (02/18/18 1221)  SpO2: 98 % (02/18/18 1221) Vital Signs (24h Range):  Temp:  [97.4 °F (36.3 °C)-98.6 °F (37 °C)] 98 °F (36.7 °C)  Pulse:  [67-90] 67  Resp:  [18-20] 18  SpO2:  [92 %-100 %] 98 %  BP: ()/(50-64) 116/60     Weight: 59.5 kg (131 lb 2.8 oz)  Body mass index is 22.52 kg/m².     SpO2: 98 %  O2 Device (Oxygen Therapy): nasal cannula      Intake/Output Summary (Last 24 hours) at 02/18/18 1554  Last data filed at 02/18/18 1400   Gross per 24 hour   Intake              360 ml   Output                0 ml   Net              360 ml       Lines/Drains/Airways     Peripherally Inserted Central Catheter Line                 PICC Double Lumen 02/10/18 1524 right basilic 8 days                Physical Exam   Constitutional: She appears well-developed and well-nourished. No distress.   Appears disheveled, frail   HENT:   Head: Normocephalic and atraumatic.   Nose: Nose normal.   Mouth/Throat: Oropharynx is clear and moist.   Eyes: Conjunctivae and EOM are normal. Pupils are equal, round, and reactive to light. Right eye exhibits no discharge. No scleral icterus.   Neck: Normal range of motion. Neck supple. No JVD present. No thyromegaly present.   Cardiovascular: Normal rate, regular rhythm, S1 normal, S2 normal and normal heart sounds.  Exam reveals no gallop, no S3, no S4 and no friction rub.     No murmur heard.  Pulmonary/Chest: Effort normal and breath sounds normal. No stridor. No respiratory distress. She has no wheezes. She has no rales. She exhibits no tenderness.   Diminished at bases   Abdominal: Soft. Bowel sounds are normal. She exhibits no distension and no mass. There is no tenderness. There is no rebound and no guarding.   +Truncal edema   Genitourinary:   Genitourinary Comments: Deferred   Musculoskeletal: Normal range of motion. She exhibits no edema, tenderness or deformity.   Lymphadenopathy:     She has no cervical adenopathy.   Neurological: She exhibits normal muscle tone. Coordination normal.   Confused   Skin: Skin is warm and dry. No rash noted. She is not diaphoretic. No erythema. No pallor.   BLE wrapped and dressed   Nursing note and vitals reviewed.      Significant Labs:   All pertinent lab results from the last 24 hours have been reviewed. and   Recent Lab Results       02/18/18  1157 02/18/18  0553 02/18/18  0542 02/17/18  2201 02/17/18  1656      Albumin   2.4(L)       Alkaline Phosphatase   69       ALT   23       Anion Gap   6(L)       AST   32       Baso #   0.02       Basophil%   0.4       Total Bilirubin   0.6  Comment:  For infants and newborns, interpretation of results should be based  on gestational age, weight and in agreement with clinical  observations.  Premature Infant recommended reference ranges:  Up to 24 hours.............<8.0 mg/dL  Up to 48 hours............<12.0 mg/dL  3-5 days..................<15.0 mg/dL  6-29 days.................<15.0 mg/dL         BUN, Bld   18       Calcium   8.5(L)       Chloride   94(L)       CO2   43  Comment:  CO2 critical result(s) called and verbal readback obtained from   Maciel Welch RN, 02/18/2018 06:58  (HH)       Creatinine   0.5       Differential Method   Automated       eGFR if    >60       eGFR if non    >60  Comment:  Calculation used to obtain the estimated glomerular  filtration  rate (eGFR) is the CKD-EPI equation.          Eos #   0.1       Eosinophil%   1.5       Glucose   72       Gran # (ANC)   4.0       Gran%   74.0(H)       Hematocrit   38.9       Hemoglobin   12.1       Lymph #   0.8(L)       Lymph%   15.0(L)       Magnesium   1.7       MCH   28.9       MCHC   31.1(L)       MCV   93       Mono #   0.5       Mono%   9.1       MPV   11.5       Platelets   108(L)       POCT Glucose 146(H) 104  142(H) 91     Potassium   3.5       Total Protein   5.6(L)       RBC   4.18       RDW   17.1(H)       Sodium   143       WBC   5.40                       Significant Imaging: Echocardiogram:   2D echo with color flow doppler:   Results for orders placed or performed during the hospital encounter of 02/09/18   2D echo with color flow doppler   Result Value Ref Range    EF 10 (A) 55 - 65    Mitral Valve Regurgitation MILD TO MODERATE     Diastolic Dysfunction Yes (A)     Est. PA Systolic Pressure 24.81     Tricuspid Valve Regurgitation MILD TO MODERATE     and X-Ray: CXR: X-Ray Chest 1 View (CXR):   Results for orders placed or performed during the hospital encounter of 02/09/18   X-Ray Chest 1 View    Narrative    EXAM: XR CHEST 1 VIEW    CLINICAL HISTORY: SOB .    COMPARISON STUDIES: February 16, 2018    FINDINGS:  Stable cardiomediastinal silhouette with cardiomegaly.  Stable bilateral pleural effusions right greater than left with dense underlying opacification in the lower hemithoraces and diffuse haziness throughout the upper half of the right lung.    Impression    No significant change.      Electronically signed by: AMLEIA LOPEZ MD  Date:     02/18/18  Time:    07:23      Assessment and Plan:     Brief HPI: no acute events o/n    * Bilateral cellulitis of lower leg    -Mgmt as per primary team  -Continue abx        Altered mental status    -Improved  -Mgmt as per primary team, unclear etiology        Acute on chronic respiratory failure with hypoxia and hypercapnia    Resolved         Elevated troponin    -Likely secondary to demand ischemia from bacteremia, cellulitis, and volume overload  -Add ASA 81 mg daily  -See plan under CHF        Acute combined systolic and diastolic heart failure    -Unclear etiology  -Continue Metoprolol  - Cont diuresis  - Continue  ACEI   -Will need ischemic workup-[facility] vs stress test once infection issues improve and she is euvolemic  - Replete K > 4, Mg > 2        Pleural effusion on right    -S/p thoracentesis on 2/11/18- 1500 cc  -cont diuretics              VTE Risk Mitigation         Ordered     heparin (porcine) injection 5,000 Units  Every 8 hours     Route:  Subcutaneous        02/09/18 1749     Medium Risk of VTE  Once      02/09/18 1300        Continue diuresis as tolerated, please call when pt stable for ischemic workup or further management for Acute CHF, will follow CHANEL Martinez Md, MD  Cardiology  [facility]

## 2018-02-18 NOTE — ASSESSMENT & PLAN NOTE
2/11 - oncology recommending biopsy of breast mass  2/12 - path specimen sent for eval of breast mass, and for ER/SD/[id number]. Case discussed with gen. Surgery and hem/onc. Will consider any additional imaging for staging while inpt.   2/16 - pt will need path results and unclear where she will need follow up for breast ca     2/17-- not a candidate for any chemo except possibly Tamoxifen  Poor prognosis    2/18- await Biopsy result

## 2018-02-18 NOTE — PLAN OF CARE
Problem: Patient Care Overview  Goal: Plan of Care Review      No complaints throughout the night. Weight shift assistance provided to the patient every two hours. Normal sinus rhythm. Will continue to monitor.

## 2018-02-19 PROBLEM — L89.152 PRESSURE ULCER OF COCCYGEAL REGION, STAGE 2: Status: ACTIVE | Noted: 2018-02-19

## 2018-02-19 PROBLEM — R41.82 ALTERED MENTAL STATUS: Status: RESOLVED | Noted: 2018-02-13 | Resolved: 2018-02-19

## 2018-02-19 PROBLEM — L89.152 PRESSURE ULCER OF COCCYGEAL REGION, STAGE 2: Status: RESOLVED | Noted: 2018-02-19 | Resolved: 2018-02-19

## 2018-02-19 LAB
ACE SERPL-CCNC: <5 U/L
ALBUMIN SERPL BCP-MCNC: 2.5 G/DL
ALP SERPL-CCNC: 73 U/L
ALT SERPL W/O P-5'-P-CCNC: 22 U/L
ANA SER QL IF: NORMAL
ANION GAP SERPL CALC-SCNC: 5 MMOL/L
AST SERPL-CCNC: 30 U/L
BASOPHILS # BLD AUTO: 0.02 K/UL
BASOPHILS NFR BLD: 0.3 %
BILIRUB SERPL-MCNC: 0.6 MG/DL
BUN SERPL-MCNC: 20 MG/DL
CALCIUM SERPL-MCNC: 8.7 MG/DL
CHLORIDE SERPL-SCNC: 94 MMOL/L
CO2 SERPL-SCNC: 38 MMOL/L
CREAT SERPL-MCNC: 0.6 MG/DL
DIFFERENTIAL METHOD: ABNORMAL
EOSINOPHIL # BLD AUTO: 0.1 K/UL
EOSINOPHIL NFR BLD: 1 %
ERYTHROCYTE [DISTWIDTH] IN BLOOD BY AUTOMATED COUNT: 17.2 %
EST. GFR  (AFRICAN AMERICAN): >60 ML/MIN/1.73 M^2
EST. GFR  (NON AFRICAN AMERICAN): >60 ML/MIN/1.73 M^2
GLUCOSE SERPL-MCNC: 79 MG/DL
HCT VFR BLD AUTO: 39.7 %
HGB BLD-MCNC: 12.1 G/DL
INR PPP: 1.1
LYMPHOCYTES # BLD AUTO: 1.1 K/UL
LYMPHOCYTES NFR BLD: 18 %
MAGNESIUM SERPL-MCNC: 1.9 MG/DL
MCH RBC QN AUTO: 28.5 PG
MCHC RBC AUTO-ENTMCNC: 30.5 G/DL
MCV RBC AUTO: 94 FL
MONOCYTES # BLD AUTO: 0.7 K/UL
MONOCYTES NFR BLD: 11.6 %
NEUTROPHILS # BLD AUTO: 4.2 K/UL
NEUTROPHILS NFR BLD: 69.1 %
PLATELET # BLD AUTO: 97 K/UL
PMV BLD AUTO: 10.9 FL
POCT GLUCOSE: 101 MG/DL (ref 70–110)
POCT GLUCOSE: 107 MG/DL (ref 70–110)
POCT GLUCOSE: 87 MG/DL (ref 70–110)
POCT GLUCOSE: 96 MG/DL (ref 70–110)
POTASSIUM SERPL-SCNC: 3.7 MMOL/L
PROT SERPL-MCNC: 5.9 G/DL
PROTHROMBIN TIME: 10.9 SEC
RBC # BLD AUTO: 4.24 M/UL
SODIUM SERPL-SCNC: 137 MMOL/L
VIT B12 SERPL-MCNC: 1290 NG/L
WBC # BLD AUTO: 6.04 K/UL

## 2018-02-19 PROCEDURE — 27000221 HC OXYGEN, UP TO 24 HOURS

## 2018-02-19 PROCEDURE — 80053 COMPREHEN METABOLIC PANEL: CPT

## 2018-02-19 PROCEDURE — 99900035 HC TECH TIME PER 15 MIN (STAT)

## 2018-02-19 PROCEDURE — 21400001 HC TELEMETRY ROOM

## 2018-02-19 PROCEDURE — 83735 ASSAY OF MAGNESIUM: CPT

## 2018-02-19 PROCEDURE — 97530 THERAPEUTIC ACTIVITIES: CPT

## 2018-02-19 PROCEDURE — 94761 N-INVAS EAR/PLS OXIMETRY MLT: CPT

## 2018-02-19 PROCEDURE — 25000003 PHARM REV CODE 250: Performed by: INTERNAL MEDICINE

## 2018-02-19 PROCEDURE — 25000003 PHARM REV CODE 250: Performed by: FAMILY MEDICINE

## 2018-02-19 PROCEDURE — 92526 ORAL FUNCTION THERAPY: CPT

## 2018-02-19 PROCEDURE — 97535 SELF CARE MNGMENT TRAINING: CPT

## 2018-02-19 PROCEDURE — 97803 MED NUTRITION INDIV SUBSEQ: CPT

## 2018-02-19 PROCEDURE — 63600175 PHARM REV CODE 636 W HCPCS: Performed by: NURSE PRACTITIONER

## 2018-02-19 PROCEDURE — 25000003 PHARM REV CODE 250: Performed by: EMERGENCY MEDICINE

## 2018-02-19 PROCEDURE — 99232 SBSQ HOSP IP/OBS MODERATE 35: CPT | Mod: ,,, | Performed by: INTERNAL MEDICINE

## 2018-02-19 PROCEDURE — 99233 SBSQ HOSP IP/OBS HIGH 50: CPT | Mod: ,,, | Performed by: INTERNAL MEDICINE

## 2018-02-19 PROCEDURE — 25000003 PHARM REV CODE 250: Performed by: NURSE PRACTITIONER

## 2018-02-19 PROCEDURE — 85610 PROTHROMBIN TIME: CPT

## 2018-02-19 PROCEDURE — 85025 COMPLETE CBC W/AUTO DIFF WBC: CPT

## 2018-02-19 RX ADMIN — THERA TABS 1 TABLET: TAB at 08:02

## 2018-02-19 RX ADMIN — Medication 1 TABLET: at 08:02

## 2018-02-19 RX ADMIN — ANTI-FUNGAL POWDER MICONAZOLE NITRATE TALC FREE: 1.42 POWDER TOPICAL at 08:02

## 2018-02-19 RX ADMIN — CLINDAMYCIN HYDROCHLORIDE 300 MG: 150 CAPSULE ORAL at 09:02

## 2018-02-19 RX ADMIN — ACETAZOLAMIDE 250 MG: 250 TABLET ORAL at 09:02

## 2018-02-19 RX ADMIN — CLINDAMYCIN HYDROCHLORIDE 300 MG: 150 CAPSULE ORAL at 02:02

## 2018-02-19 RX ADMIN — CLINDAMYCIN HYDROCHLORIDE 300 MG: 150 CAPSULE ORAL at 05:02

## 2018-02-19 RX ADMIN — METOPROLOL SUCCINATE 50 MG: 50 TABLET, EXTENDED RELEASE ORAL at 08:02

## 2018-02-19 RX ADMIN — HEPARIN SODIUM 5000 UNITS: 5000 INJECTION, SOLUTION INTRAVENOUS; SUBCUTANEOUS at 05:02

## 2018-02-19 RX ADMIN — ACETAZOLAMIDE 250 MG: 250 TABLET ORAL at 02:02

## 2018-02-19 RX ADMIN — HEPARIN SODIUM 5000 UNITS: 5000 INJECTION, SOLUTION INTRAVENOUS; SUBCUTANEOUS at 09:02

## 2018-02-19 RX ADMIN — ANTI-FUNGAL POWDER MICONAZOLE NITRATE TALC FREE: 1.42 POWDER TOPICAL at 09:02

## 2018-02-19 RX ADMIN — FLUCONAZOLE 100 MG: 100 TABLET ORAL at 08:02

## 2018-02-19 RX ADMIN — Medication 100 MG: at 08:02

## 2018-02-19 RX ADMIN — LISINOPRIL 5 MG: 5 TABLET ORAL at 08:02

## 2018-02-19 RX ADMIN — ACETAZOLAMIDE 250 MG: 250 TABLET ORAL at 05:02

## 2018-02-19 RX ADMIN — ACETAMINOPHEN 650 MG: 325 TABLET, FILM COATED ORAL at 03:02

## 2018-02-19 RX ADMIN — HEPARIN SODIUM 5000 UNITS: 5000 INJECTION, SOLUTION INTRAVENOUS; SUBCUTANEOUS at 02:02

## 2018-02-19 NOTE — ASSESSMENT & PLAN NOTE
I had a detailed discussion with the patient today with regard to her current clinical situation. Pathology results show fibromyxoid stroma only; Ductal/lobular tissue is not identified; No evidence of malignancy; Interpretation is somewhat limited by tissue quantity. The differential diagnosis includes fibroadenoma, Phyllodes tumor, and stromal fibrosis among other items.   Considering the above she will need outpatient follow-up with general surgery.  No additional recommendations from my standpoint at this time.  Please arrange outpatient follow-up with Dr. Purvis.

## 2018-02-19 NOTE — SUBJECTIVE & OBJECTIVE
Interval History: Today the patient reports that she feels well.  She is able to carry on normal conversation and is oriented to time and person.  She did not recall having had her breast mass biopsy done.  I have reviewed all of the patient's interval history.    Oncology Treatment Plan:   [No treatment plan]    Medications:  Continuous Infusions:  Scheduled Meds:   acetaZOLAMIDE  250 mg Oral TID    clindamycin  300 mg Oral Q8H    fluconazole  100 mg Oral Daily    folic acid-vit B6-vit B12 2.5-25-2 mg  1 tablet Oral Daily    heparin (porcine)  5,000 Units Subcutaneous Q8H    lisinopril  5 mg Oral Daily    metoprolol succinate  50 mg Oral Daily    miconazole NITRATE 2 %   Topical (Top) BID    multivitamin  1 tablet Oral Daily    thiamine  100 mg Oral Daily     PRN Meds:acetaminophen     Review of Systems   Constitutional: Positive for activity change, appetite change and fatigue. Negative for chills, diaphoresis, fever and unexpected weight change.   HENT: Negative for congestion, dental problem, ear discharge, ear pain, hearing loss, mouth sores, postnasal drip, sinus pressure, sore throat, tinnitus, trouble swallowing and voice change.    Eyes: Negative for photophobia, pain and visual disturbance.   Respiratory: Positive for shortness of breath. Negative for cough, chest tightness and wheezing.    Cardiovascular: Negative for chest pain, palpitations and leg swelling.   Gastrointestinal: Negative for abdominal distention, abdominal pain, blood in stool, constipation, diarrhea, nausea and vomiting.   Endocrine: Negative for cold intolerance, heat intolerance, polydipsia, polyphagia and polyuria.   Genitourinary: Negative for difficulty urinating, dysuria, flank pain, frequency, hematuria, urgency, vaginal bleeding and vaginal discharge.   Musculoskeletal: Negative for arthralgias, back pain, gait problem, joint swelling and myalgias.   Skin: Negative for pallor and rash.   Allergic/Immunologic: Negative  for immunocompromised state.   Neurological: Negative for dizziness, syncope, weakness, light-headedness, numbness and headaches.   Hematological: Negative for adenopathy. Does not bruise/bleed easily.   Psychiatric/Behavioral: Positive for confusion. Negative for agitation and dysphoric mood. The patient is not nervous/anxious.      Objective:     Vital Signs (Most Recent):  Temp: 98.4 °F (36.9 °C) (02/19/18 1136)  Pulse: 84 (02/19/18 1507)  Resp: 20 (02/19/18 1136)  BP: (!) 109/57 (02/19/18 1136)  SpO2: 97 % (02/19/18 1136) Vital Signs (24h Range):  Temp:  [97.8 °F (36.6 °C)-98.4 °F (36.9 °C)] 98.4 °F (36.9 °C)  Pulse:  [73-93] 84  Resp:  [18-20] 20  SpO2:  [92 %-97 %] 97 %  BP: ()/(53-72) 109/57     Weight: 59.3 kg (130 lb 11.7 oz) (14L negative fluid balance since admission)  Body mass index is 22.44 kg/m².  Body surface area is 1.64 meters squared.    No intake or output data in the 24 hours ending 02/19/18 1525    Physical Exam   Constitutional: She is oriented to person, place, and time. She appears well-developed and well-nourished. She is active and cooperative.   HENT:   Head: Normocephalic and atraumatic.   Nose: Nose normal.   Mouth/Throat: Oropharynx is clear and moist. No oropharyngeal exudate.   Eyes: Pupils are equal, round, and reactive to light. No scleral icterus.   Neck: Neck supple. No thyromegaly present.   Cardiovascular: Normal rate, regular rhythm, normal heart sounds and intact distal pulses.    No murmur heard.  Pulmonary/Chest: Effort normal. No stridor. No respiratory distress. She has no wheezes. She has rales. Right breast exhibits mass.   Abdominal: Soft. Bowel sounds are normal. She exhibits no distension, no ascites and no mass. There is no hepatosplenomegaly. There is no tenderness. There is no rigidity, no rebound and no guarding.   Musculoskeletal: She exhibits no edema or tenderness.   Lymphadenopathy:     She has no cervical adenopathy.   Neurological: She is alert and  oriented to person, place, and time. No cranial nerve deficit. She exhibits normal muscle tone. Coordination normal.   Skin: Skin is warm and dry. No rash noted. No pallor.   Psychiatric: She has a normal mood and affect. Her behavior is normal. Judgment and thought content normal. She exhibits abnormal recent memory.   Nursing note and vitals reviewed.      Significant Labs:   I have reviewed all of the patient's relevant lab work available in the medical record and have utilized this in my evaluation and management recommendations today    Diagnostic Results:  I have reviewed all of the patient's diagnostic/imaging results available in the medical record and have utilized this in my evaluation and management recommendations today.

## 2018-02-19 NOTE — PROGRESS NOTES
[facility] -   Hematology/Oncology  Progress Note    Patient Name: Kelly Mariano  Admission Date: 2/9/2018  Hospital Length of Stay: 10 days  Code Status: Full Code     Subjective:     HPI:  67-year-old female admitted to the hospital for cellulitis was found have a large mass involving her right breast also asked see the patient for further evaluation    Interval History: Today the patient reports that she feels well.  She is able to carry on normal conversation and is oriented to time and person.  She did not recall having had her breast mass biopsy done.  I have reviewed all of the patient's interval history.    Oncology Treatment Plan:   [No treatment plan]    Medications:  Continuous Infusions:  Scheduled Meds:   acetaZOLAMIDE  250 mg Oral TID    clindamycin  300 mg Oral Q8H    fluconazole  100 mg Oral Daily    folic acid-vit B6-vit B12 2.5-25-2 mg  1 tablet Oral Daily    heparin (porcine)  5,000 Units Subcutaneous Q8H    lisinopril  5 mg Oral Daily    metoprolol succinate  50 mg Oral Daily    miconazole NITRATE 2 %   Topical (Top) BID    multivitamin  1 tablet Oral Daily    thiamine  100 mg Oral Daily     PRN Meds:acetaminophen     Review of Systems   Constitutional: Positive for activity change, appetite change and fatigue. Negative for chills, diaphoresis, fever and unexpected weight change.   HENT: Negative for congestion, dental problem, ear discharge, ear pain, hearing loss, mouth sores, postnasal drip, sinus pressure, sore throat, tinnitus, trouble swallowing and voice change.    Eyes: Negative for photophobia, pain and visual disturbance.   Respiratory: Positive for shortness of breath. Negative for cough, chest tightness and wheezing.    Cardiovascular: Negative for chest pain, palpitations and leg swelling.   Gastrointestinal: Negative for abdominal distention, abdominal pain, blood in stool, constipation, diarrhea, nausea and vomiting.   Endocrine: Negative for cold intolerance,  heat intolerance, polydipsia, polyphagia and polyuria.   Genitourinary: Negative for difficulty urinating, dysuria, flank pain, frequency, hematuria, urgency, vaginal bleeding and vaginal discharge.   Musculoskeletal: Negative for arthralgias, back pain, gait problem, joint swelling and myalgias.   Skin: Negative for pallor and rash.   Allergic/Immunologic: Negative for immunocompromised state.   Neurological: Negative for dizziness, syncope, weakness, light-headedness, numbness and headaches.   Hematological: Negative for adenopathy. Does not bruise/bleed easily.   Psychiatric/Behavioral: Positive for confusion. Negative for agitation and dysphoric mood. The patient is not nervous/anxious.      Objective:     Vital Signs (Most Recent):  Temp: 98.4 °F (36.9 °C) (02/19/18 1136)  Pulse: 84 (02/19/18 1507)  Resp: 20 (02/19/18 1136)  BP: (!) 109/57 (02/19/18 1136)  SpO2: 97 % (02/19/18 1136) Vital Signs (24h Range):  Temp:  [97.8 °F (36.6 °C)-98.4 °F (36.9 °C)] 98.4 °F (36.9 °C)  Pulse:  [73-93] 84  Resp:  [18-20] 20  SpO2:  [92 %-97 %] 97 %  BP: ()/(53-72) 109/57     Weight: 59.3 kg (130 lb 11.7 oz) (14L negative fluid balance since admission)  Body mass index is 22.44 kg/m².  Body surface area is 1.64 meters squared.    No intake or output data in the 24 hours ending 02/19/18 1525    Physical Exam   Constitutional: She is oriented to person, place, and time. She appears well-developed and well-nourished. She is active and cooperative.   HENT:   Head: Normocephalic and atraumatic.   Nose: Nose normal.   Mouth/Throat: Oropharynx is clear and moist. No oropharyngeal exudate.   Eyes: Pupils are equal, round, and reactive to light. No scleral icterus.   Neck: Neck supple. No thyromegaly present.   Cardiovascular: Normal rate, regular rhythm, normal heart sounds and intact distal pulses.    No murmur heard.  Pulmonary/Chest: Effort normal. No stridor. No respiratory distress. She has no wheezes. She has rales. Right  breast exhibits mass.   Abdominal: Soft. Bowel sounds are normal. She exhibits no distension, no ascites and no mass. There is no hepatosplenomegaly. There is no tenderness. There is no rigidity, no rebound and no guarding.   Musculoskeletal: She exhibits no edema or tenderness.   Lymphadenopathy:     She has no cervical adenopathy.   Neurological: She is alert and oriented to person, place, and time. No cranial nerve deficit. She exhibits normal muscle tone. Coordination normal.   Skin: Skin is warm and dry. No rash noted. No pallor.   Psychiatric: She has a normal mood and affect. Her behavior is normal. Judgment and thought content normal. She exhibits abnormal recent memory.   Nursing note and vitals reviewed.      Significant Labs:   I have reviewed all of the patient's relevant lab work available in the medical record and have utilized this in my evaluation and management recommendations today    Diagnostic Results:  I have reviewed all of the patient's diagnostic/imaging results available in the medical record and have utilized this in my evaluation and management recommendations today.    Assessment/Plan:     Mass of breast, right    I had a detailed discussion with the patient today with regard to her current clinical situation. Pathology results show fibromyxoid stroma only; Ductal/lobular tissue is not identified; No evidence of malignancy; Interpretation is somewhat limited by tissue quantity. The differential diagnosis includes fibroadenoma, Phyllodes tumor, and stromal fibrosis among other items.   Considering the above she will need outpatient follow-up with general surgery.  No additional recommendations from my standpoint at this time.  Please arrange outpatient follow-up with Dr. Purvis.            Thank you for your consult.      Rodo Matson MD  Hematology/Oncology  [facility] -

## 2018-02-19 NOTE — PLAN OF CARE
Problem: SLP Goal  Goal: SLP Goal  Long Term Goal:  Pt to tolerate least restrictive diet consistency safely and efficiently.    Short Term Goals:  1. Tongue Base Retraction and Laryngeal Elevation tasks x20 with min cues  2. Oral Motor tasks x20 with min cues  3. Recommend education on swallow precautions and diet recommendations.   Outcome: Ongoing (interventions implemented as appropriate)  Pt tolerating dental soft with nectar thick liquids at bedside with no overt signs of aspiration.

## 2018-02-19 NOTE — PT/OT/SLP PROGRESS
Physical Therapy  Treatment    Kelly Mraiano   MRN: [id number]   Admitting Diagnosis: Bilateral cellulitis of lower leg    PT Received On: 02/18/18  PT Start Time: 1030     PT Stop Time: 1045    PT Total Time (min): 15 min       Billable Minutes:  Therapeutic Activity 20    Treatment Type: Treatment  PT/PTA: PTA     PTA Visit Number: 2       General Precautions: Standard, aspiration, fall  Orthopedic Precautions: N/A   Braces: N/A         Subjective:  Communicated with NURSE, PHILIPPE AND EPIC  prior to session.  PATIENT AGREE TO TX NOW.    Pain/Comfort  Pain Rating 1: 0/10    Objective:   Patient found with: oxygen, telemetry, peripheral IV, REQUIRING LINEN CHANGE DUE TO SOILED LINEN, ASSISTED PATIENT FOR SAME.    Functional Mobility:  Bed Mobility:    SUPINE TO SIT, SIT TO SUPINE AT MAX ASSIST X1 , 2ND ASSIST FOR SAFETY.    Transfers:     SIT TO STAND , STAND TO SIT AT MAX ASSISTX1   Gait:    PREGAIT , SIDESTEPPING AT MAX ASSIST X2 WITH Rw, MAX CUES FOR BODY ALIGNMENT.    Stairs:  N/A    Balance:   Static Sit: FAIR-: Maintains without assist but inconsistent   Dynamic Sit: FAIR: Cannot move trunk without losing balance  Static Stand: 0: Needs MAXIMAL assist to maintain   Dynamic stand: POOR: N/A     Therapeutic Activities and Exercises:  T/FS SHORTSEATED BALANCE ACTIVITY, STANDING BALANCE ACTIVITY WITH PREGAIT SIDESTEPPING AT MAX ASSIST WITH RW.    AM-PAC 6 CLICK MOBILITY  How much help from another person does this patient currently need?   1 = Unable, Total/Dependent Assistance  2 = A lot, Maximum/Moderate Assistance  3 = A little, Minimum/Contact Guard/Supervision  4 = None, Modified [place]/Independent    Turning over in bed (including adjusting bedclothes, sheets and blankets)?: 2  Sitting down on and standing up from a chair with arms (e.g., wheelchair, bedside commode, etc.): 2  Moving from lying on back to sitting on the side of the bed?: 2  Moving to and from a bed to a chair (including a wheelchair)?:  2  Need to walk in hospital room?: 2  Climbing 3-5 steps with a railing?: 1  Total Score: 11    AM-PAC Raw Score CMS G-Code Modifier Level of Impairment Assistance   6 % Total / Unable   7 - 9 CM 80 - 100% Maximal Assist   10 - 14 CL 60 - 80% Moderate Assist   15 - 19 CK 40 - 60% Moderate Assist   20 - 22 CJ 20 - 40% Minimal Assist   23 CI 1-20% SBA / CGA   24 CH 0% Independent/ Mod I     Patient left supine with all lines intact, call button in reach and bed alarm on.    Assessment:  Kelly Mariano is a 67 y.o. female with a medical diagnosis of Bilateral cellulitis of lower leg .    Rehab identified problem list/impairments: Rehab identified problem list/impairments: weakness, impaired self care skills, impaired balance, impaired endurance, impaired sensation, gait instability, impaired functional mobilty    Rehab potential is good.    Activity tolerance: Fair    Discharge recommendations: Discharge Facility/Level Of Care Needs: nursing facility, skilled, LTACH (long term acute care hospital)     Barriers to discharge:      Equipment recommendations: Equipment Needed After Discharge: none     GOALS:    Physical Therapy Goals        Problem: Physical Therapy Goal    Goal Priority Disciplines Outcome Goal Variances Interventions   Physical Therapy Goal     PT/OT, PT      Description:  LTGs to be met within 7 days (2/24/18):  1.  Patient will perform bed mobility with mod assist  2.  Patient will perform functional transfers with mod assist  3.  Patient will sit EOB x 10 mins with good static sitting balance and no c/o fatigue  4.  Patient will participate in gait assessment  5.  Patient will perform BLE therapeutic exercises 10 x 2 in all planes                    PLAN:    Patient to be seen 5 x/week  to address the above listed problems via gait training, therapeutic activities, therapeutic exercises  Plan of Care expires: 02/24/18  Plan of Care reviewed with: patient         Sully Malik, PTA  02/19/2018

## 2018-02-19 NOTE — PROGRESS NOTES
[facility] -   Pulmonology  Progress Note    Patient Name: Kelly Mariano  MRN: [id number]  Admission Date: 2/9/2018  Hospital Length of Stay: 10 days  Code Status: Full Code  Attending Provider: Brittni Lagos MD  Primary Care Provider: Primary Doctor No   Principal Problem: Bilateral cellulitis of lower leg    Subjective:     Interval History: Seen and examined.  Comfortably in bed.  No distress.    Objective:     Vital Signs (Most Recent):  Temp: 96.5 °F (35.8 °C) (02/19/18 1601)  Pulse: 85 (02/19/18 1704)  Resp: 18 (02/19/18 1601)  BP: (!) 100/54 (02/19/18 1601)  SpO2: 99 % (02/19/18 1601) Vital Signs (24h Range):  Temp:  [96.5 °F (35.8 °C)-98.4 °F (36.9 °C)] 96.5 °F (35.8 °C)  Pulse:  [76-93] 85  Resp:  [18-20] 18  SpO2:  [92 %-99 %] 99 %  BP: ()/(53-57) 100/54     Weight: 59.3 kg (130 lb 11.7 oz) (14L negative fluid balance since admission)  Body mass index is 22.44 kg/m².      Intake/Output Summary (Last 24 hours) at 02/19/18 1739  Last data filed at 02/19/18 1300   Gross per 24 hour   Intake              480 ml   Output                0 ml   Net              480 ml       Physical Exam   Constitutional: She is oriented to person, place, and time. She appears well-developed.   Frail   HENT:   Head: Normocephalic and atraumatic.   Eyes: EOM are normal.   Neck: Neck supple.   Cardiovascular:   Murmur heard.  S1 and S2 irregular   Pulmonary/Chest: Effort normal. No respiratory distress. She has rales. She exhibits no tenderness.   Bilateral rales the bases.   Abdominal: Soft. There is no tenderness.   Musculoskeletal: She exhibits no tenderness.   Neurological: She is alert and oriented to person, place, and time.   Skin: Skin is warm. There is pallor.   Psychiatric: She has a normal mood and affect.       Vents:  Oxygen Concentration (%): 28 (02/18/18 0835)    Lines/Drains/Airways     Peripherally Inserted Central Catheter Line                 PICC Double Lumen 02/10/18 1524 right basilic 9 days           Pressure Ulcer                 Pressure Injury 02/19/18 1045 Coccyx Stage 2 less than 1 day                Significant Labs:    CBC/Anemia Profile:    Recent Labs  Lab 02/18/18  0542 02/19/18  0451   WBC 5.40 6.04   HGB 12.1 12.1   HCT 38.9 39.7   * 97*   MCV 93 94   RDW 17.1* 17.2*        Chemistries:    Recent Labs  Lab 02/18/18  0542 02/19/18  0451    137   K 3.5 3.7   CL 94* 94*   CO2 43* 38*   BUN 18 20   CREATININE 0.5 0.6   CALCIUM 8.5* 8.7   ALBUMIN 2.4* 2.5*   PROT 5.6* 5.9*   BILITOT 0.6 0.6   ALKPHOS 69 73   ALT 23 22   AST 32 30   MG 1.7 1.9         Significant Imaging:  CXR: I have reviewed all pertinent results/findings within the past 24 hours and my personal findings are:  Cardiomegaly bilateral effusion right greater than left on 18 February.  Personally reviewed.    Assessment/Plan:     Acute on chronic respiratory failure with hypoxia and hypercapnia    2/13 Switch to diamox, continue AVAPS  2/15 Noninvasive Positive Pressure Ventilation at night, check ABG in AM  2/17 continue diuretics , bronchodilators  2/18 Worsening pleural effusion   2/19 .  Chest x-ray in a.m. if pleural effusion continued to worsen might need a repeat thoracentesis.        Mass of breast, right    2/15 Pleural fluid cytology is negative for malignancy  2/19 awaiting biopsy report          Acute combined systolic and diastolic heart failure    Maximize cardiac treatment.        Pleural effusion on right    Repeat chest x-ray in a.m.               Elvis Vásquez MD  Pulmonology  [facility] [place]

## 2018-02-19 NOTE — PT/OT/SLP PROGRESS
"Occupational Therapy  Treatment    Kelly Mariano   MRN: [id number]   Admitting Diagnosis: Bilateral cellulitis of lower leg    OT Date of Treatment: 02/18/18   OT Start Time: 1045  OT Stop Time: 1100  OT Total Time (min): 15 min    Billable Minutes:  Therapeutic Exercise 15    General Precautions: Standard, fall, aspiration  Orthopedic Precautions: N/A  Braces:           Subjective:  Communicated with NURSE & PT prior to session.      Pain/Comfort  Pain Rating 1: 0/10    Objective:        Functional Mobility:  Bed Mobility:       Transfers:        Functional Ambulation: N/A    Activities of Daily Living:     Feeding adaptive equipment: N/A     UE adaptive equipment: N/A     LE adaptive equipment: N/A                    Bathing adaptive equipment: N/A    Balance:   Static Sit: N/A  Dynamic Sit: N/A  Static Stand: N/A  Dynamic stand: N/A    Therapeutic Activities and Exercises:  PT PERFORMED X2 SETS X10 REPS OF BUE RESISTANCE THERAPEUTIC EXERCISE WITH GOAL OF INCREASED FUNC STRENGTH AND INCREASED FUNC ENDURANCE.    AM-PAC 6 CLICK ADL   How much help from another person does this patient currently need?   1 = Unable, Total/Dependent Assistance  2 = A lot, Maximum/Moderate Assistance  3 = A little, Minimum/Contact Guard/Supervision  4 = None, Modified [place]/Independent          AM-PAC Raw Score CMS "G-Code Modifier Level of Impairment Assistance   6 % Total / Unable   7 - 8 CM 80 - 100% Maximal Assist   9-13 CL 60 - 80% Moderate Assist   14 - 19 CK 40 - 60% Moderate Assist   20 - 22 CJ 20 - 40% Minimal Assist   23 CI 1-20% SBA / CGA   24 CH 0% Independent/ Mod I       Patient left ALL LINES INTACT AND CALL LIGHT IN REACH.    ASSESSMENT:  Kelly Mariano is a 67 y.o. female with a medical diagnosis of Bilateral cellulitis of lower leg and presents with SELF CARE DEBILITY.    Rehab identified problem list/impairments: Rehab identified problem list/impairments: weakness, impaired endurance, impaired self care skills, " impaired functional mobilty, gait instability, impaired balance, impaired cognition, decreased lower extremity function, decreased safety awareness, pain    Rehab potential is good.    Activity tolerance: Good    Discharge recommendations: Discharge Facility/Level Of Care Needs: nursing facility, skilled     Barriers to discharge: Barriers to Discharge: Inaccessible home environment, Decreased caregiver support    Equipment recommendations: none     GOALS:    Occupational Therapy Goals        Problem: Occupational Therapy Goal    Goal Priority Disciplines Outcome Interventions   Occupational Therapy Goal     OT, PT/OT Ongoing (interventions implemented as appropriate)                    Plan:  Patient to be seen 3 x/week to address the above listed problems via self-care/home management, therapeutic activities, therapeutic exercises  Plan of Care expires: 02/24/18  Plan of Care reviewed with: patient         Alicia Avalos OT  02/18/2018

## 2018-02-19 NOTE — PROGRESS NOTES
02/19/18 1115   Handoff Report   Given To ENOC Virk   Pain/Comfort Assessments   Pain Assessment Performed Yes       Number Scale   Presence of Pain denies   Skin   Skin WDL ex   Skin Color Pale   Skin Temperature warm   Skin Moisture Dry   Skin Elasticity Sluggish   Harjit Risk Assessment   Sensory Perception 3-->slightly limited   Moisture 3-->occasionally moist   Activity 2-->chairfast   Mobility 2-->very limited   Nutrition 3-->adequate   Friction and Shear 2-->potential problem   Harjit Score 15       Wound 02/09/18 Ulceration lower Leg   Date First Assessed: 02/09/18   Pre-existing: Yes  Wound Type: Ulceration  Side: Left  Orientation: lower  Location: Leg   Wound WDL ex   Dressing Appearance Dry;Intact   Drainage Amount None   Appearance Kosse;Dry   Periwound Area Dry;Intact;Redness   Care Cleansed with:;Soap and water;Applied:;Moisturizing agent   Dressing Compression wrap;Tubular bandage   Periwound Care Moisturizer applied       Wound 02/09/18 Ulceration lower Leg   Date First Assessed: 02/09/18   Pre-existing: Yes  Wound Type: Ulceration  Side: Right  Orientation: lower  Location: Leg   Wound WDL ex   Dressing Appearance Dry;Clean   Drainage Amount Scant   Drainage Characteristics/Odor Serosanguineous   Appearance Red;Yellow;Moist   Tissue loss description Full thickness   Red (%), Wound Tissue Color 75 %   Yellow (%), Wound Tissue Color 25 %   Periwound Area Dry;Intact   Wound Edges Open   Wound Length (cm) 2.5   Wound Width (cm) 1   Care Cleansed with:;Soap and water;Applied:;Skin Barrier;Moisturizing agent   Dressing Hydrofiber;Silver;Foam;Compression wrap;Tubular bandage   Periwound Care Absorptive dressing applied;Cleansed with pH balanced cleanser;Moisturizer applied;Skin barrier film applied       Wound 02/12/18 2100 Other midline Buttocks   Date First Assessed/Time First Assessed: 02/12/18 2100   Pre-existing: Yes  Wound Type: (c) Other  Side: Left  Orientation: midline  Location: Buttocks   Wound  WDL ex   Dressing Appearance Open to air   Drainage Amount None   Appearance Red;Moist   Tissue loss description Partial thickness   Red (%), Wound Tissue Color 100 %   Wound Edges Open   Care Cleansed with:;Soap and water;Applied:;Skin Barrier       Pressure Injury 02/19/18 1045 Coccyx Stage 2   Date First Assessed/Time First Assessed: 02/19/18 1045   Location: Coccyx  Is this injury device related?: No  Staging: Stage 2  Wound Length (cm): 3  Wound Width (cm): 3   Wound Image    Staging 2   Dressing Appearance Open to air   Drainage Amount Scant   Drainage Characteristics/Odor Serosanguineous   Appearance Red;Moist   Tissue loss description Partial thickness   Red (%), Wound Tissue Color 100 %   Periwound Area Redness   Wound Edges Jagged   Wound Length (cm) 3   Wound Width (cm) 3   Care Cleansed with:;Soap and water;Skin Barrier;Applied:  (Critic Aid Paste barrier)   Skin Interventions   Pressure Reduction Devices Pressure-redistributing mattress utilized;Foam padding utilized   Pressure Reduction Techniques frequent weight shift encouraged;heels elevated off bed;positioned off wounds   Skin Protection Adhesive use limited;Incontinence pads;Skin sealant/moisture barrier       Follow up visit with Ms. Mariano for compression wraps to BLE. She c/o discomfort associated with compression wraps over the weekend.  She is awake and alert and Ox4 today. She states she does not remember me from last week, that it is all a blur. BLE compression wraps removed and legs cleansed with easi cleanse foam wipes.  BLE no edema noted. Mild redness. Open ulcer to right lateral lower shin.  Covered with aquacel AG and nonbordered foam.  Sween 24 cream applied to BLE dry flaky scaly skin.  Dressings secured with Tubigrip compression stocking. At patient request, cast padding and Coban were not reapplied.  Vast improvement in brandyn's BLE since admission.   Patient c/o being soiled at this time.  Brief noted to be super-saturated with  urine.  Patient turned to left side independently and brief removed and incontinence care provided using easi cleanse foam wipes and patted dry. 4x4 aquacel foam pro dressing noted to coccyx and removed at this time, saturated with urine.   IAD again noted to intergluteal fold and perineaum with partial thickness tissue loss and moist reddened blanching skin.  Coccyx now with stage 2 pressure injury noted. dark red nonblanchable redness and partial thickness tissue loss with moist red wound bed and scant serosanguinous drainage noted measuring 3x3cm.  Due to moist surrounding skin and IAD, duoderm would not adhere. Critic Aid Paste moisture barrier applied to entire IAD and coccyx wounds in thin layer.  Please see below for wound care recommendations:    Stage 2 pressure injury coccyx (and IAD intergluteal fold/perineum)  1. Cleanse with easi cleanse foam wipes  2. Pat dry  3. Apply thin layer critic aid paste moisture barrier (black top tube from ) BID and prn  4. Strict pressure offloading with foam wedge - turn every 2 hours

## 2018-02-19 NOTE — PLAN OF CARE
Problem: Patient Care Overview  Goal: Plan of Care Review      Outcome: Ongoing (interventions implemented as appropriate)  Patient stable. Plan of care reviewed. Patient verbalizes understanding. PICC in FELIX, Patient incontinent, brief changed. Bed low, wheels locked, bed alarm on, call light in reach. Patient instructed to call for assistance. Will continue to monitor.

## 2018-02-19 NOTE — PLAN OF CARE
"Problem: Patient Care Overview  Goal: Plan of Care Review      Outcome: Ongoing (interventions implemented as appropriate)  Pt remains free of falls and free of injury this shift. Pt moves about in bed, though is assisted with weight shift a minimum of [id number]. This afternoon, pt expressed appreciation for Wound Care RN who changed her bandages on her feet: "they feel so much better now."  Pt also expresses concern for her cat; a friend is caring for the cat, though pt verbalized that she has been unable to care for self, let alone a cat. Pt has been oriented to self, time, and situation, though has indicated twice that she is in [place]; agrees immediately that she is in [place] when redirected.   Bed in low position, side rails up x2, call light in reach, bed alarm on.       "

## 2018-02-19 NOTE — PLAN OF CARE
Met with patient. Patient was able to answer questions more appropriately. She stated that she does not remember much of last week due to her weakness and illness. She remembered our conversation regarding her nursing home placement. I reminded her that I have sent the referral to several places and that we are looking for a facility that is in network with her insurance.  Patient asked if I would keep her updated. I told her that once I have an accepting facility I will let her know.

## 2018-02-19 NOTE — PT/OT/SLP PROGRESS
Occupational Therapy   Treatment    Name: Kelly Mariano  MRN: [id number]  Admitting Diagnosis:  Bilateral cellulitis of lower leg       Recommendations:     Discharge Recommendations: nursing facility, skilled  Discharge Equipment Recommendations:  walker, rolling  Barriers to discharge:  Decreased caregiver support, Inaccessible home environment    Subjective     Communicated with: pt, and nurse- Moose prior to session.  Pain/Comfort:  · Pain Rating 1: 0/10    Patients cultural, spiritual, Confucianist conflicts given the current situation:      Objective:     Patient found with: oxygen, telemetry, peripheral IV    General Precautions: Standard, contact, fall   Orthopedic Precautions:N/A   Braces: N/A     Occupational Performance:    Bed Mobility:    · Patient completed Rolling/Turning to Left with  minimum assistance  · Patient completed Rolling/Turning to Right with minimum assistance  · Patient completed Scooting/Bridging with maximal assistance  · Patient completed Supine to Sit with maximal assistance  · Patient completed Sit to Supine with maximal assistance     Functional Mobility/Transfers:  · Patient completed Sit <> Stand Transfer with maximal assistance  with  rolling walker   · Functional Mobility: side step to HOB with RW with Max A with v/c for sequencing.     Activities of Daily Living:  · UB Dressing: maximal assistance -  · LB Dressing: total assistance -  · Toileting: total assistance -    Patient left supine with all lines intact, call button in reach and chair alarm on    AMPAC 6 Click:  AMPAC Total Score: 10    Treatment & Education:    Education:    Assessment:     Kelly Mariano is a 67 y.o. female with a medical diagnosis of Bilateral cellulitis of lower leg.  She presents with impaired self care and fx mobility.  Performance deficits affecting function are weakness, impaired endurance, impaired self care skills, impaired functional mobilty, gait instability, impaired balance, decreased coordination,  impaired cognition, decreased safety awareness.      Rehab Prognosis:  good; patient would benefit from acute skilled OT services to address these deficits and reach maximum level of function.       Plan:     Patient to be seen 3 x/week to address the above listed problems via self-care/home management, therapeutic activities, therapeutic exercises  · Plan of Care Expires: 02/24/18  · Plan of Care Reviewed with: patient    This Plan of care has been discussed with the patient who was involved in its development and understands and is in agreement with the identified goals and treatment plan    GOALS:    Occupational Therapy Goals        Problem: Occupational Therapy Goal    Goal Priority Disciplines Outcome Interventions   Occupational Therapy Goal     OT, PT/OT Ongoing (interventions implemented as appropriate)                    Time Tracking:     OT Date of Treatment: 02/19/18  OT Start Time: 1525  OT Stop Time: 1548  OT Total Time (min): 23 min    Billable Minutes:Self Care/Home Management  x 15 min  Therapeutic Activity  x 8 min    Serenity Hollis OT  2/19/2018

## 2018-02-19 NOTE — PT/OT/SLP PROGRESS
Speech Language Pathology Treatment    Patient Name:  Kelly Mariano   MRN:  [id number]  Admitting Diagnosis: Bilateral cellulitis of lower leg    Recommendations:                 General Recommendations:  Dysphagia therapy  Diet recommendations:  Mechanical soft, No Bread, No Rice, Liquid Diet Level: Nectar Thick   Aspiration Precautions: 1 bite/sip at a time, Alternating bites/sips, Assistance with thickening liquids, Double swallow with each bite/sip, HOB to 90 degrees and Remain upright 30 minutes post meal   General Precautions: Standard, aspiration, fall    Subjective     Pt was seen at bedside with no family present.  She was awake, alert, and cooperative.   Patient goals: none stated    Pain/Comfort:  · Pain Rating 1: 0/10    Objective:     Has the patient been evaluated by SLP for swallowing?   Yes  Keep patient NPO? No   Current Respiratory Status: nasal cannula      Pt was seen with a pitcher of ice water with a straw in it on her bedside table along with her lunch tray which also had thin liquids on it.  ST removed pitcher of water and thickened liquids on her bedside.  ST educated nursing on findings and importance of thickened liquids.   Skilled meal monitoring by ST for swallow assessment.  Min cueing provided for pt to perform safe swallow strategies.  Pt was educated on dysphagia, thickened liquids, and safe swallow strategies.     Assessment:     Kelly Mraiano is a 67 y.o. female with an SLP diagnosis of Dysphagia.  She presents with decreased laryngeal elevation and swallow coordination.    Goals:    SLP Goals        Problem: SLP Goal    Goal Priority Disciplines Outcome   SLP Goal     SLP Ongoing (interventions implemented as appropriate)   Description:  Long Term Goal:  Pt to tolerate least restrictive diet consistency safely and efficiently.    Short Term Goals:  1. Tongue Base Retraction and Laryngeal Elevation tasks x20 with min cues  2. Oral Motor tasks x20 with min cues  3. Recommend education on  swallow precautions and diet recommendations.                    Plan:     · Patient to be seen:  3 x/week   · Plan of Care expires:  02/26/18  · Plan of Care reviewed with:  patient   · SLP Follow-Up:  Yes       Discharge recommendations:  nursing facility, skilled     Time Tracking:     SLP Treatment Date:   02/19/18  Speech Start Time:  1130  Speech Stop Time:  1145     Speech Total Time (min):  15 min    Billable Minutes: Treatment Swallowing Dysfunction 15    Radha Mcgee CCC-SLP  02/19/2018

## 2018-02-19 NOTE — PLAN OF CARE
Problem: Patient Care Overview  Goal: Plan of Care Review      Outcome: Ongoing (interventions implemented as appropriate)  PATIENT WITH GREAT EFFORTS FOR ALL THERAPEUTIC ACTIVITIES. PATIENT DID WELL WITH T/FS OOB TO STANDING BALANCE PREGAIT ACTIVITIES AT MAX ASSIST X1 , 2ND ASSIST FOR SAFETY.

## 2018-02-19 NOTE — PLAN OF CARE
Problem: Occupational Therapy Goal  Goal: Occupational Therapy Goal  Outcome: Ongoing (interventions implemented as appropriate)  CONT WITH GOALS SET DURING EVAL.    Comments: CONT WITH GOALS SET DURING EVAL.

## 2018-02-19 NOTE — PLAN OF CARE
Problem: Patient Care Overview  Goal: Plan of Care Review      Outcome: Ongoing (interventions implemented as appropriate)  Recommendation/Intervention: 1. Continue current diet order    2. Continue oral nutrition supplement  Goals: 1. Intake of 75% of meals or better  Nutrition Goal Status: progressing towards goal  Communication of RD Recs: other (comment) (care plan)

## 2018-02-19 NOTE — CONSULTS
"  [facility] -   Adult Nutrition  Consult Note    SUMMARY     Recommendations  Recommendation/Intervention: 1. Continue current diet order    2. Continue oral nutrition supplement  Goals: 1. Intake of 75% of meals or better  Nutrition Goal Status: progressing towards goal  Communication of RD Recs: other (comment) (care plan)      Reason for Assessment  Reason for Assessment: RD follow-up         1. Pleural effusion on right    2. Bilateral cellulitis of lower leg    3. Cellulitis of left foot    4. Cellulitis of right foot    5. CHF (congestive heart failure)    6. Mass of breast, right      History reviewed. No pertinent past medical history.          General Information Comments: Patient with fair but improving intake, she is on mechincal soft diet with 2gNa 1500ml fluid restriction (no bread or rice, nectar thick liquids)    Nutrition Discharge Planning: Home on Cardiac diet with consistency per SLP    Nutrition Prescription Ordered    Current Diet Order: LowNa 1500ml fluid restriction Mechanical soft (no rice or bread, nectar thick liquids)  Oral supplement: Boost Plus 1 can TID    Evaluation of Received Nutrients/Fluid Intake                                                                                                     % Intake of Estimated Energy Needs: 75 - 100 %  % Meal Intake: 75%     Nutrition Risk Screen     Nutrition Risk Screen: no indicators present    Nutrition/Diet History          Food Preferences: No cultural/spiritual preferences noted                         Labs/Tests/Procedures/Meds       Pertinent Labs Reviewed: reviewed     Pertinent Medications Reviewed: reviewed       Physical Findings    Overall Physical Appearance: edematous        Skin: other (see comments) (cellulitis and leg wounds noted)    Anthropometrics    Temp: 98.4 °F (36.9 °C)     Height: 5' 4" (162.6 cm)  Weight Method: Bed Scale  Weight: 59.3 kg (130 lb 11.7 oz) (14L negative fluid balance since " admission)     Ideal Body Weight (IBW), Female: 120 lb     % Ideal Body Weight, Female (lb): 108.94 lb  BMI (Calculated): 22.5  BMI Grade: 18.5-24.9 - normal                            Estimated/Assessed Needs    Weight Used For Calorie Calculations: 59.3 kg (130 lb 11.7 oz)      Energy Calorie Requirements (kcal): 6849-2023  Energy Need Method: Chester-St Jeor (x1.2-1.3)  Weight Used For Protein Calculations: 59.3 kg (130 lb 11.7 oz)  Protein Requirements: 69-83 gm/d (1-1.2 gm/kg)  1.0 gm Protein (gm): 59.42  Fluid Requirements (mL): 1500 (fluid restriction per team)           RDA Method (mL): 1342               Assessment and Plan  Nutrition Problem  decreased nutrient needs (sodium)    Related to (etiology):   CHF       Signs and Symptoms (as evidenced by):   CHF dx with edema on admission     Interventions/Recommendations (treatment strategy):  1. Therapeutic Diet    Nutrition Diagnosis Status:   Continues        Monitor and Evaluation    Food and Nutrient Intake: food and beverage intake  Food and Nutrient Adminstration: diet order  Knowledge/Beliefs/Attitudes: food and nutrition knowledge/skill, beliefs and attitudes  Physical Activity and Function: nutrition-related ADLs and IADLs  Anthropometric Measurements: weight, weight change  Biochemical Data, Medical Tests and Procedures: electrolyte and renal panel, gastrointestinal profile, glucose/endocrine profile, inflammatory profile, lipid profile  Nutrition-Focused Physical Findings: overall appearance, extremities, muscles and bones, skin      Nutrition Follow-Up    RD Follow-up?: Yes (1x weekly)

## 2018-02-19 NOTE — SUBJECTIVE & OBJECTIVE
Interval History: Seen and examined.  Comfortably in bed.  No distress.    Objective:     Vital Signs (Most Recent):  Temp: 96.5 °F (35.8 °C) (02/19/18 1601)  Pulse: 85 (02/19/18 1704)  Resp: 18 (02/19/18 1601)  BP: (!) 100/54 (02/19/18 1601)  SpO2: 99 % (02/19/18 1601) Vital Signs (24h Range):  Temp:  [96.5 °F (35.8 °C)-98.4 °F (36.9 °C)] 96.5 °F (35.8 °C)  Pulse:  [76-93] 85  Resp:  [18-20] 18  SpO2:  [92 %-99 %] 99 %  BP: ()/(53-57) 100/54     Weight: 59.3 kg (130 lb 11.7 oz) (14L negative fluid balance since admission)  Body mass index is 22.44 kg/m².      Intake/Output Summary (Last 24 hours) at 02/19/18 1739  Last data filed at 02/19/18 1300   Gross per 24 hour   Intake              480 ml   Output                0 ml   Net              480 ml       Physical Exam   Constitutional: She is oriented to person, place, and time. She appears well-developed.   Frail   HENT:   Head: Normocephalic and atraumatic.   Eyes: EOM are normal.   Neck: Neck supple.   Cardiovascular:   Murmur heard.  S1 and S2 irregular   Pulmonary/Chest: Effort normal. No respiratory distress. She has rales. She exhibits no tenderness.   Bilateral rales the bases.   Abdominal: Soft. There is no tenderness.   Musculoskeletal: She exhibits no tenderness.   Neurological: She is alert and oriented to person, place, and time.   Skin: Skin is warm. There is pallor.   Psychiatric: She has a normal mood and affect.       Vents:  Oxygen Concentration (%): 28 (02/18/18 0835)    Lines/Drains/Airways     Peripherally Inserted Central Catheter Line                 PICC Double Lumen 02/10/18 1524 right basilic 9 days          Pressure Ulcer                 Pressure Injury 02/19/18 1045 Coccyx Stage 2 less than 1 day                Significant Labs:    CBC/Anemia Profile:    Recent Labs  Lab 02/18/18  0542 02/19/18  0451   WBC 5.40 6.04   HGB 12.1 12.1   HCT 38.9 39.7   * 97*   MCV 93 94   RDW 17.1* 17.2*        Chemistries:    Recent Labs  Lab  02/18/18  0542 02/19/18  0451    137   K 3.5 3.7   CL 94* 94*   CO2 43* 38*   BUN 18 20   CREATININE 0.5 0.6   CALCIUM 8.5* 8.7   ALBUMIN 2.4* 2.5*   PROT 5.6* 5.9*   BILITOT 0.6 0.6   ALKPHOS 69 73   ALT 23 22   AST 32 30   MG 1.7 1.9         Significant Imaging:  CXR: I have reviewed all pertinent results/findings within the past 24 hours and my personal findings are:  Cardiomegaly bilateral effusion right greater than left on 18 February.  Personally reviewed.

## 2018-02-19 NOTE — PT/OT/SLP PROGRESS
"Occupational Therapy  Treatment    Kelly Mariano   MRN: [id number]   Admitting Diagnosis: Bilateral cellulitis of lower leg    OT Date of Treatment: 02/18/18   OT Start Time: 1045  OT Stop Time: 1100  OT Total Time (min): 15 min    Billable Minutes:  Therapeutic Exercise 15    General Precautions: Standard, fall, aspiration  Orthopedic Precautions: N/A  Braces:           Subjective:  Communicated with NURSE & PATIENT prior to session.      Pain/Comfort  Pain Rating 1: 0/10    Objective:        Functional Mobility:  Bed Mobility:N/A       Transfers:N/A        Functional Ambulation: N/A    Activities of Daily Living:     Feeding adaptive equipment: N/A     UE adaptive equipment: N/A     LE adaptive equipment: N/A                    Bathing adaptive equipment: N/A    Balance:   Static Sit: N/A  Dynamic Sit: N/A  Static Stand: N/A  Dynamic stand: N/A    Therapeutic Activities and Exercises:  PT PERFORMED X2 SETS X10 REPS OF BUE RESISTANCE THERAPEUTIC EXERCISE     AM-PAC 6 CLICK ADL   How much help from another person does this patient currently need?   1 = Unable, Total/Dependent Assistance  2 = A lot, Maximum/Moderate Assistance  3 = A little, Minimum/Contact Guard/Supervision  4 = None, Modified [place]/Independent          AM-PAC Raw Score CMS "G-Code Modifier Level of Impairment Assistance   6 % Total / Unable   7 - 8 CM 80 - 100% Maximal Assist   9-13 CL 60 - 80% Moderate Assist   14 - 19 CK 40 - 60% Moderate Assist   20 - 22 CJ 20 - 40% Minimal Assist   23 CI 1-20% SBA / CGA   24 CH 0% Independent/ Mod I       Patient left WITH ALL LINES INTACT AND CALL LIGHT IN REACH.    ASSESSMENT:  Kelly Mariano is a 67 y.o. female with a medical diagnosis of Bilateral cellulitis of lower leg and presents with SELF CARE DEBILITY.    Rehab identified problem list/impairments: Rehab identified problem list/impairments: weakness, impaired endurance, impaired self care skills, impaired functional mobilty, gait instability, " impaired balance, impaired cognition, decreased lower extremity function, decreased safety awareness, pain    Rehab potential is good.    Activity tolerance: Good    Discharge recommendations: Discharge Facility/Level Of Care Needs: nursing facility, skilled     Barriers to discharge: Barriers to Discharge: Inaccessible home environment, Decreased caregiver support    Equipment recommendations: none     GOALS:    Occupational Therapy Goals        Problem: Occupational Therapy Goal    Goal Priority Disciplines Outcome Interventions   Occupational Therapy Goal     OT, PT/OT Ongoing (interventions implemented as appropriate)                    Plan:  Patient to be seen 3 x/week to address the above listed problems via self-care/home management, therapeutic activities, therapeutic exercises  Plan of Care expires: 02/24/18  Plan of Care reviewed with: patient         Alicia Avalos OT  02/18/2018

## 2018-02-19 NOTE — ASSESSMENT & PLAN NOTE
2/13 Switch to diamox, continue AVAPS  2/15 Noninvasive Positive Pressure Ventilation at night, check ABG in AM  2/17 continue diuretics , bronchodilators  2/18 Worsening pleural effusion   2/19 .  Chest x-ray in a.m. if pleural effusion continued to worsen might need a repeat thoracentesis.

## 2018-02-19 NOTE — PLAN OF CARE
02/19/18 1315   Medicare Message   Important Message from Medicare regarding Discharge Appeal Rights Given to patient/caregiver;Explained to patient/caregiver;Signed/date by patient/caregiver   Date IMM was signed 02/19/18   Time IMM was signed [id number]

## 2018-02-20 PROBLEM — N63.10 MASS OF BREAST, RIGHT: Status: RESOLVED | Noted: 2018-02-11 | Resolved: 2018-02-20

## 2018-02-20 PROBLEM — R53.81 PHYSICAL DEBILITY: Status: ACTIVE | Noted: 2018-02-20

## 2018-02-20 LAB
ALBUMIN SERPL BCP-MCNC: 2.6 G/DL
ALP SERPL-CCNC: 74 U/L
ALT SERPL W/O P-5'-P-CCNC: 21 U/L
ANION GAP SERPL CALC-SCNC: 5 MMOL/L
AST SERPL-CCNC: 28 U/L
BASOPHILS # BLD AUTO: 0.02 K/UL
BASOPHILS NFR BLD: 0.4 %
BILIRUB SERPL-MCNC: 0.5 MG/DL
BUN SERPL-MCNC: 19 MG/DL
CALCIUM SERPL-MCNC: 9 MG/DL
CHLORIDE SERPL-SCNC: 96 MMOL/L
CO2 SERPL-SCNC: 36 MMOL/L
CREAT SERPL-MCNC: 0.6 MG/DL
DIFFERENTIAL METHOD: ABNORMAL
EOSINOPHIL # BLD AUTO: 0 K/UL
EOSINOPHIL NFR BLD: 0.7 %
ERYTHROCYTE [DISTWIDTH] IN BLOOD BY AUTOMATED COUNT: 17.1 %
EST. GFR  (AFRICAN AMERICAN): >60 ML/MIN/1.73 M^2
EST. GFR  (NON AFRICAN AMERICAN): >60 ML/MIN/1.73 M^2
GLUCOSE SERPL-MCNC: 75 MG/DL
HCT VFR BLD AUTO: 40.1 %
HGB BLD-MCNC: 12.4 G/DL
LYMPHOCYTES # BLD AUTO: 1.1 K/UL
LYMPHOCYTES NFR BLD: 18.6 %
MAGNESIUM SERPL-MCNC: 1.9 MG/DL
MCH RBC QN AUTO: 28.6 PG
MCHC RBC AUTO-ENTMCNC: 30.9 G/DL
MCV RBC AUTO: 93 FL
MONOCYTES # BLD AUTO: 0.6 K/UL
MONOCYTES NFR BLD: 10.5 %
NEUTROPHILS # BLD AUTO: 4 K/UL
NEUTROPHILS NFR BLD: 69.8 %
PLATELET # BLD AUTO: 122 K/UL
PMV BLD AUTO: 12.2 FL
POCT GLUCOSE: 105 MG/DL (ref 70–110)
POCT GLUCOSE: 125 MG/DL (ref 70–110)
POCT GLUCOSE: 74 MG/DL (ref 70–110)
POCT GLUCOSE: 82 MG/DL (ref 70–110)
POCT GLUCOSE: 85 MG/DL (ref 70–110)
POTASSIUM SERPL-SCNC: 3.6 MMOL/L
PROT SERPL-MCNC: 6.2 G/DL
RBC # BLD AUTO: 4.33 M/UL
SODIUM SERPL-SCNC: 137 MMOL/L
VIT B1 SERPL-MCNC: 110 UG/L (ref 38–122)
WBC # BLD AUTO: 5.69 K/UL

## 2018-02-20 PROCEDURE — 25000003 PHARM REV CODE 250: Performed by: NURSE PRACTITIONER

## 2018-02-20 PROCEDURE — 99231 SBSQ HOSP IP/OBS SF/LOW 25: CPT | Mod: ,,, | Performed by: INTERNAL MEDICINE

## 2018-02-20 PROCEDURE — 63600175 PHARM REV CODE 636 W HCPCS: Performed by: NURSE PRACTITIONER

## 2018-02-20 PROCEDURE — 21400001 HC TELEMETRY ROOM

## 2018-02-20 PROCEDURE — 94761 N-INVAS EAR/PLS OXIMETRY MLT: CPT

## 2018-02-20 PROCEDURE — 25000003 PHARM REV CODE 250: Performed by: INTERNAL MEDICINE

## 2018-02-20 PROCEDURE — 25000003 PHARM REV CODE 250: Performed by: EMERGENCY MEDICINE

## 2018-02-20 PROCEDURE — 80053 COMPREHEN METABOLIC PANEL: CPT

## 2018-02-20 PROCEDURE — 83735 ASSAY OF MAGNESIUM: CPT

## 2018-02-20 PROCEDURE — 85025 COMPLETE CBC W/AUTO DIFF WBC: CPT

## 2018-02-20 PROCEDURE — 27000221 HC OXYGEN, UP TO 24 HOURS

## 2018-02-20 PROCEDURE — 63600175 PHARM REV CODE 636 W HCPCS: Performed by: EMERGENCY MEDICINE

## 2018-02-20 RX ORDER — CYANOCOBALAMIN 1000 UG/ML
1000 INJECTION, SOLUTION INTRAMUSCULAR; SUBCUTANEOUS ONCE
Status: COMPLETED | OUTPATIENT
Start: 2018-02-20 | End: 2018-02-20

## 2018-02-20 RX ORDER — SODIUM CHLORIDE 9 MG/ML
INJECTION, SOLUTION INTRAVENOUS ONCE
Status: COMPLETED | OUTPATIENT
Start: 2018-02-20 | End: 2018-02-20

## 2018-02-20 RX ORDER — METHYLPHENIDATE HYDROCHLORIDE 5 MG/1
5 TABLET ORAL 2 TIMES DAILY WITH MEALS
Status: DISCONTINUED | OUTPATIENT
Start: 2018-02-20 | End: 2018-02-24

## 2018-02-20 RX ADMIN — CLINDAMYCIN HYDROCHLORIDE 300 MG: 150 CAPSULE ORAL at 05:02

## 2018-02-20 RX ADMIN — Medication 100 MG: at 08:02

## 2018-02-20 RX ADMIN — HEPARIN SODIUM 5000 UNITS: 5000 INJECTION, SOLUTION INTRAVENOUS; SUBCUTANEOUS at 02:02

## 2018-02-20 RX ADMIN — METHYLPHENIDATE HYDROCHLORIDE 5 MG: 5 TABLET ORAL at 12:02

## 2018-02-20 RX ADMIN — SODIUM CHLORIDE: 0.9 INJECTION, SOLUTION INTRAVENOUS at 10:02

## 2018-02-20 RX ADMIN — CLINDAMYCIN HYDROCHLORIDE 300 MG: 150 CAPSULE ORAL at 02:02

## 2018-02-20 RX ADMIN — CYANOCOBALAMIN 1000 MCG: 1000 INJECTION, SOLUTION INTRAMUSCULAR; SUBCUTANEOUS at 12:02

## 2018-02-20 RX ADMIN — METOPROLOL SUCCINATE 50 MG: 50 TABLET, EXTENDED RELEASE ORAL at 08:02

## 2018-02-20 RX ADMIN — LISINOPRIL 5 MG: 5 TABLET ORAL at 08:02

## 2018-02-20 RX ADMIN — HEPARIN SODIUM 5000 UNITS: 5000 INJECTION, SOLUTION INTRAVENOUS; SUBCUTANEOUS at 10:02

## 2018-02-20 RX ADMIN — ACETAZOLAMIDE 250 MG: 250 TABLET ORAL at 10:02

## 2018-02-20 RX ADMIN — ANTI-FUNGAL POWDER MICONAZOLE NITRATE TALC FREE: 1.42 POWDER TOPICAL at 10:02

## 2018-02-20 RX ADMIN — FLUCONAZOLE 100 MG: 100 TABLET ORAL at 08:02

## 2018-02-20 RX ADMIN — ANTI-FUNGAL POWDER MICONAZOLE NITRATE TALC FREE: 1.42 POWDER TOPICAL at 08:02

## 2018-02-20 RX ADMIN — ACETAZOLAMIDE 250 MG: 250 TABLET ORAL at 02:02

## 2018-02-20 RX ADMIN — CLINDAMYCIN HYDROCHLORIDE 300 MG: 150 CAPSULE ORAL at 10:02

## 2018-02-20 RX ADMIN — THERA TABS 1 TABLET: TAB at 08:02

## 2018-02-20 RX ADMIN — Medication 1 TABLET: at 08:02

## 2018-02-20 RX ADMIN — HEPARIN SODIUM 5000 UNITS: 5000 INJECTION, SOLUTION INTRAVENOUS; SUBCUTANEOUS at 05:02

## 2018-02-20 RX ADMIN — ACETAZOLAMIDE 250 MG: 250 TABLET ORAL at 05:02

## 2018-02-20 NOTE — PT/OT/SLP PROGRESS
Physical Therapy      Patient Name:  Kelly Mariano   MRN:  [id number]    Patient not seen today secondary to stating she had a bad night, had diarrhea 3 x last night, declined therapy. Will follow-up next available visit.    Vesta Almaraz, PT                02/20/18         1002

## 2018-02-20 NOTE — ASSESSMENT & PLAN NOTE
IV Merrem and Vanco  Wound Care  Arterial US to evaluate blood flow for proper healing  2/12 - s.a. In blood, awaiting sensitivities, continued on vanc and repeat blood cultures    2/15 - wound care following, continued on antibiotics for MRSA bacteremia  On diflucan for tinea to peroneum  2/16 - on vanc,will follow cultures and discuss duration of tx with ID prior to d/c,     2/17- much better, Vanc changed to oral clindamycin.     2/18- improving, continue Clinda    2/19- improving

## 2018-02-20 NOTE — ASSESSMENT & PLAN NOTE
2/11 - oncology recommending biopsy of breast mass  2/12 - path specimen sent for eval of breast mass, and for ER/KS/Nyp3Lmh. Case discussed with gen. Surgery and hem/onc. Will consider any additional imaging for staging while inpt.   2/16 - pt will need path results and unclear where she will need follow up for breast ca     2/17-- not a candidate for any chemo except possibly Tamoxifen  Poor prognosis    2/18- await Biopsy result  2/19-- Benign tumor by biopsy-- needs SNf or rehab now

## 2018-02-20 NOTE — PROGRESS NOTES
[facility] -   Pulmonology  Progress Note    Patient Name: Kelly Mariano  MRN: [id number]  Admission Date: 2/9/2018  Hospital Length of Stay: 11 days  Code Status: Full Code  Attending Provider: Brittni Lagos MD  Primary Care Provider: No primary care provider on file.   Principal Problem: Bilateral cellulitis of lower leg    Subjective:   Seen and examined.  No shortness of breath, no distress.    Interval History: Review of Systems   HENT: Negative for sore throat.    Eyes: Negative.    Respiratory: Negative for cough, shortness of breath and wheezing.    Cardiovascular: Negative for chest pain and leg swelling.   Gastrointestinal: Positive for diarrhea.   Genitourinary: Negative for dysuria.   Musculoskeletal: Negative for falls.   Skin: Negative for itching.   Neurological: Positive for weakness. Negative for dizziness, seizures, loss of consciousness and headaches.   Endo/Heme/Allergies: Negative for environmental allergies.   Psychiatric/Behavioral: Negative for suicidal ideas.       Objective:     Vital Signs (Most Recent):  Temp: 97.8 °F (36.6 °C) (02/20/18 1104)  Pulse: 79 (02/20/18 1108)  Resp: 17 (02/20/18 1104)  BP: (!) 95/46 (02/20/18 1108)  SpO2: 97 % (02/20/18 1104) Vital Signs (24h Range):  Temp:  [96.5 °F (35.8 °C)-99 °F (37.2 °C)] 97.8 °F (36.6 °C)  Pulse:  [73-88] 79  Resp:  [16-18] 17  SpO2:  [95 %-100 %] 97 %  BP: ()/(46-66) 95/46     Weight: 57 kg (125 lb 10.6 oz)  Body mass index is 21.57 kg/m².      Intake/Output Summary (Last 24 hours) at 02/20/18 1543  Last data filed at 02/20/18 1400   Gross per 24 hour   Intake              360 ml   Output                0 ml   Net              360 ml       Physical Exam   Constitutional: She is oriented to person, place, and time.   Frail   HENT:   Head: Atraumatic.   Eyes: EOM are normal.   Neck: Neck supple.   Cardiovascular:   Murmur heard.  S1 and S2 irregular   Pulmonary/Chest: Effort normal. She has rales.   Breasts sounds decreased  bilaterally   Abdominal: Soft.   Musculoskeletal: She exhibits no edema.   Neurological: She is alert and oriented to person, place, and time.   Skin: Skin is warm.   Psychiatric: She has a normal mood and affect.       Vents:  Oxygen Concentration (%): 28 (02/20/18 0828)    Lines/Drains/Airways     Peripherally Inserted Central Catheter Line                 PICC Double Lumen 02/10/18 1524 right basilic 10 days          Pressure Ulcer                 Pressure Injury 02/19/18 1045 Coccyx Stage 2 1 day                Significant Labs:    CBC/Anemia Profile:    Recent Labs  Lab 02/19/18  0451 02/20/18  0453   WBC 6.04 5.69   HGB 12.1 12.4   HCT 39.7 40.1   PLT 97* 122*   MCV 94 93   RDW 17.2* 17.1*        Chemistries:    Recent Labs  Lab 02/19/18  0451 02/20/18  0453    137   K 3.7 3.6   CL 94* 96   CO2 38* 36*   BUN 20 19   CREATININE 0.6 0.6   CALCIUM 8.7 9.0   ALBUMIN 2.5* 2.6*   PROT 5.9* 6.2   BILITOT 0.6 0.5   ALKPHOS 73 74   ALT 22 21   AST 30 28   MG 1.9 1.9       Significant Imaging:  CXR: I have reviewed all pertinent results/findings within the past 24 hours and my personal findings are:  Stable right-sided pleural effusion      Assessment/Plan:     Acute on chronic respiratory failure with hypoxia and hypercapnia    2/13 Switch to diamox, continue AVAPS  2/15 Noninvasive Positive Pressure Ventilation at night, check ABG in AM  2/17 continue diuretics , bronchodilators  2/18 Worsening pleural effusion   2/19 .  Chest x-ray in a.m. if pleural effusion continued to worsen might need a repeat thoracentesis.  2/20 no need for repeat thoracentesis        Acute combined systolic and diastolic heart failure    Maximize cardiac treatment.        Pleural effusion on right    Stable chest x-ray today.    Diuresis.          Please recall if needed     Elvis Vásquez MD  Pulmonology  [facility] -

## 2018-02-20 NOTE — PROGRESS NOTES
[facility] Hospital Medicine  Progress Note    Patient Name: Kelly Mariano  MRN: [id number]  Patient Class: IP- Inpatient   Admission Date: 2/9/2018  Length of Stay: 11 days  Attending Physician: Brittni Lagos MD  Primary Care Provider: Primary Doctor No        Subjective:     Principal Problem:Bilateral cellulitis of lower leg    HPI:  Kelly Mariano is a 68 yo female who states she has not seen a physician since 2009. EMS brought her to the ED today as the patient reported generalized weakness that has worsened over the last 2 days. She had trouble getting off the toilet and when she di,  she had fallen. EMS stated living conditions were deplorable with cat feces on the ibeth. (Elderly Protective Services contacted). Pt is noted with cellulitis and wounds to the bilateral feet and lower legs. Pt describes anxiety attacks, SOB, leg swelling and difficulty walking. Vital signs showed tachycardia = 112, pt afebrile with controlled blood pressure. Labs indicate thrombocytopenia, metabolic alkalosis, troponin 0.114, BNP > 4900, initial lactic acid 2.3;  CXR shows pulmonary edema and a moderate right sided pleural effusion.    Hospital Course:  2/10- no fever, other vss. Pt feels weakness, leg swelling and sob improving.Blood cultures positive for staph, on vanc. and guillermo. MRI negative for osteo. ID consulted. CTA BLE to determine if adequate circulation. Per nurse, concerned for left breast deformity, concerning for malignancy, hemonc consulted.   2/11 - Legs swelling and weakness improved, no longer sob. Echo shows ejection fraction 10%, diuresing well on lasix. Per cards left heart cath or stress test when better. Seen by hem/onc this am, concern for breast ca, right pleural effusion drained today w/o complications, swallow study positive for aspiration, counseled patient. Continued on antibx for lower extremity cellulitis. CT legs with no obvious arterial occlusion, however CTA lower extremeties  describes Ulcerative plaque versus small focal dissection measuring 8 mm in the left common iliac artery which causes a mild to moderate grade focal stenosis just above the bifurcation into the left external and left internal iliac arteries.  Contacted Brett Puga, vascular surgeon, no intervention needed now. Recommending lower extremity leg wraps and follow up w/vascular in outpt.   2/12 - Patient feeling better, platelets trending down, crp elevated at 28, sed rate normal. Blood cultures positive for s. Aeureus, awaiting sensitivity.  Xray with tiny right pneumothorax after thoracentesis. General sx biopsied breast mass.   2/13 - Pt with alt mental status this am, alkalotic, possibly secondary to over-diuresis and thoracentesis, xray shows pleural effusion, returned possibly due to chf and/or met. Breast ca. Case discussed w/oncology and no additional imaging needed at this point. Blood cultures positive for MRSA, vanc continued  2/14 - increased lethargy, bicarb improved,chest xray unchanged,  awaiting breast biopsy path report, being diuresed w/acetozolamide, attempted to contact Xavier Rosenbaum, left vmail. Meeting with case management and elderly protection services to discuss contacting any family member of friend, ordered CT head/chest/abd/pelvis   2/15 - vss, including pulse ox on 2 Liters, mentation slightly improved, CT head/chest/abd/pelvis - head no acute processes, chest/abd/pelvis - possible pelvic mass cannot exclude bone mets  2/16 - ABG and platelets improved , cxray unchanged, cytology from thoracentesis negative for malignancy.   mentation drastically improved, pt able to re-state conversation w/hemonc this am. Pt wanting full work up and tx for breast ca at this point. Pt requesting nursing home placement, is demonstrating competency in decision making. However concern for grave disability due to such severe self-neglect in home environment, that she is better suited to live in a supervised  setting.     2/17- pt looks comfortable and more alert and responsive. No walking yet. Both legs covered in tight dressing which is CDI. CXR shows recurrence of R sided effusion, however no SOB and no malignancy seen on thoracentesis.    2/18- continues to improve slowly, repeat Blood Cx x 2 remain NGTD. Dressing is clean, eating drinking better, now more open to placement. Await Breast Biopsy    2/19- feels much better, leg wounds improved, getting OT/ PT/ St and improving, eating better, HCO3 improved after switching bumex to Diamox. Will get a repeat CXR in am.    2/10-- feels tired, lying in bed, states she walked three times yesterday-- hence exhausted. Still await any SNF accepting her. She refused PT/OT today. Repeat CXR today appears better. HCO3 improved on diamox. Legs look better.will give inj B 12 and start some ritalin as well.    Interval History: looks and feels tired. Still await placement.    Review of Systems   Constitutional: Positive for activity change, appetite change and fatigue. Negative for chills, diaphoresis, fever and unexpected weight change.   HENT: Negative for congestion, dental problem, ear discharge, ear pain, hearing loss, mouth sores, postnasal drip, sinus pressure, sore throat, tinnitus, trouble swallowing and voice change.    Eyes: Negative for photophobia, pain and visual disturbance.   Respiratory: Positive for shortness of breath. Negative for cough, chest tightness and wheezing.    Cardiovascular: Negative for chest pain, palpitations and leg swelling.   Gastrointestinal: Negative for abdominal distention, abdominal pain, blood in stool, constipation, diarrhea, nausea and vomiting.   Endocrine: Negative for cold intolerance, heat intolerance, polydipsia, polyphagia and polyuria.   Genitourinary: Negative for difficulty urinating, dysuria, flank pain, frequency, hematuria, urgency, vaginal bleeding and vaginal discharge.   Musculoskeletal: Negative for arthralgias, back pain,  gait problem, joint swelling and myalgias.   Skin: Negative for pallor and rash.   Allergic/Immunologic: Negative for immunocompromised state.   Neurological: Negative for dizziness, syncope, weakness, light-headedness, numbness and headaches.   Hematological: Negative for adenopathy. Does not bruise/bleed easily.   Psychiatric/Behavioral: Positive for confusion. Negative for agitation and dysphoric mood. The patient is not nervous/anxious.      Objective:     Vital Signs (Most Recent):  Temp: 97.8 °F (36.6 °C) (02/20/18 1104)  Pulse: 79 (02/20/18 1108)  Resp: 17 (02/20/18 1104)  BP: (!) 95/46 (02/20/18 1108)  SpO2: 97 % (02/20/18 1104) Vital Signs (24h Range):  Temp:  [96.5 °F (35.8 °C)-99 °F (37.2 °C)] 97.8 °F (36.6 °C)  Pulse:  [73-88] 79  Resp:  [16-20] 17  SpO2:  [95 %-100 %] 97 %  BP: ()/(46-66) 95/46     Weight: 57 kg (125 lb 10.6 oz)  Body mass index is 21.57 kg/m².    Intake/Output Summary (Last 24 hours) at 02/20/18 1112  Last data filed at 02/20/18 1000   Gross per 24 hour   Intake              480 ml   Output                0 ml   Net              480 ml      Physical Exam   Constitutional: She is oriented to person, place, and time. She appears well-developed.   Frail   HENT:   Head: Normocephalic and atraumatic.   Eyes: EOM are normal.   Neck: Neck supple.   Cardiovascular:   Murmur heard.  S1 and S2 irregular   Pulmonary/Chest: Effort normal. No respiratory distress. She has rales. She exhibits no tenderness.   Bilateral rales the bases.   Abdominal: Soft. There is no tenderness.   Musculoskeletal: She exhibits no tenderness.   Neurological: She is alert and oriented to person, place, and time.   Skin: Skin is warm. There is pallor.   Psychiatric: She has a normal mood and affect.   Nursing note and vitals reviewed.      Significant Labs:   BMP:   Recent Labs  Lab 02/20/18  0453   GLU 75      K 3.6   CL 96   CO2 36*   BUN 19   CREATININE 0.6   CALCIUM 9.0   MG 1.9     CBC:   Recent  Labs  Lab 02/19/18  0451 02/20/18  0453   WBC 6.04 5.69   HGB 12.1 12.4   HCT 39.7 40.1   PLT 97* 122*     All pertinent labs within the past 24 hours have been reviewed.    Significant Imaging: I have reviewed all pertinent imaging results/findings within the past 24 hours.    Assessment/Plan:      * Bilateral cellulitis of lower leg      IV Merrem and Vanco  Wound Care  Arterial US to evaluate blood flow for proper healing  2/12 - s.a. In blood, awaiting sensitivities, continued on vanc and repeat blood cultures    2/15 - wound care following, continued on antibiotics for MRSA bacteremia  On diflucan for tinea to peroneum  2/16 - on vanc,will follow cultures and discuss duration of tx with ID prior to d/c,     2/17- much better, Vanc changed to oral clindamycin.     2/18- improving, continue Clinda    2/19- improving    2/20- better        Acute combined systolic and diastolic heart failure    Telemetry monitoring, daily weights, low sodium diet, fluid restriction,    Lasix 20 mg IV twice  Cardiology consult  Lisinopril 2.5 mg  Elevated troponin likely due to congestive heart failure - trend  2/10 -   1 - Moderate left atrial enlargement.     2 - Eccentric hypertrophy.     3 - No wall motion abnormalities.     4 - Severely depressed left ventricular systolic function (EF 10-15%).     5 - Impaired LV relaxation, normal LAP (grade 1 diastolic dysfunction).     6 - Low normal to mildly depressed right ventricular systolic function .     7 - The estimated PA systolic pressure is 25 mmHg.     8 - Mild to moderate mitral regurgitation.     9 - Mild to moderate tricuspid regurgitation.     10 - Intermediate central venous pressure.     11 - Right pleural effusion.  12 - cards following  2/16 - case discussed w/cards, pt will need stress test and possibly ischemic work up prior to defibrillator placement. In meantime will optimize med management. Unclear if pulm effusion is due to heart failure as cytology showed no  malignancy.    2/17- needs further diuresis but due to contraction alkalosis may start Diamox.    2/18- HCO3 now 43, will switch Bumex and Zaroxolyn to Diamox    2/19- improving contraction alkalosis with >14 L removed so far    2/20- improved            Pleural effusion on right    Diuresis with IV lasix  Supplemental oxygen to maintain sat > 92 %  Will likely need thoracentesis, consult Pulmonology when needed  2/10 - moderate size, concerning for malignancy due to left breast deformity (metastic disease?)  2/12 - will follow path report analysis of pleural fluid  2/14 - will discuss w/pulm if repeat thoracentesis needed and follow up studies from previous    2/17- Transudative Effusion, neg for malignancy-- most likely related to CHF/ CMP.    2/18- continue Diuretics, now Diamox    2/19- repeat CXR in am-- if recurs, may need repeat thoracentesis    2/20- continue diuresis at present        Physical debility    Severe debility and weakness associated with severe PCM  Continue OT/PT and nutritional build up  Add Ritalin and give B12 IM             Acute on chronic respiratory failure with hypoxia and hypercapnia    2/17- Sec to CHF, Improving with diuresis and thoracentesis    2/18- improving, will check for Home O2 eval before discharge    2/19- expect further improvement if the pleural effusion resolves  2/20- continue diuresis          Protein-calorie malnutrition, moderate    Will start Folbic, MVI  Nutrition consult  Likely sec to severe CHf and Breast mass and poor oral intake PTA.  Needs Beneprotein and Boost supplements    2/18 - Expected to improve with improved intake  2/19- improving, getting stronger  2/20- add boost and beneprotein          VTE Risk Mitigation         Ordered     heparin (porcine) injection 5,000 Units  Every 8 hours     Route:  Subcutaneous        02/09/18 1749     Medium Risk of VTE  Once      02/09/18 1300              Brittni Lagos MD  Department of Hospital Medicine   [facility]

## 2018-02-20 NOTE — ASSESSMENT & PLAN NOTE
Will start Folbic, MVI  Nutrition consult  Likely sec to severe CHf and Breast mass and poor oral intake PTA.  Needs Beneprotein and Boost supplements    2/18 - Expected to improve with improved intake  2/19- improving, getting stronger

## 2018-02-20 NOTE — SUBJECTIVE & OBJECTIVE
Interval History: Review of Systems   HENT: Negative for sore throat.    Eyes: Negative.    Respiratory: Negative for cough, shortness of breath and wheezing.    Cardiovascular: Negative for chest pain and leg swelling.   Gastrointestinal: Positive for diarrhea.   Genitourinary: Negative for dysuria.   Musculoskeletal: Negative for falls.   Skin: Negative for itching.   Neurological: Positive for weakness. Negative for dizziness, seizures, loss of consciousness and headaches.   Endo/Heme/Allergies: Negative for environmental allergies.   Psychiatric/Behavioral: Negative for suicidal ideas.       Objective:     Vital Signs (Most Recent):  Temp: 97.8 °F (36.6 °C) (02/20/18 1104)  Pulse: 79 (02/20/18 1108)  Resp: 17 (02/20/18 1104)  BP: (!) 95/46 (02/20/18 1108)  SpO2: 97 % (02/20/18 1104) Vital Signs (24h Range):  Temp:  [96.5 °F (35.8 °C)-99 °F (37.2 °C)] 97.8 °F (36.6 °C)  Pulse:  [73-88] 79  Resp:  [16-18] 17  SpO2:  [95 %-100 %] 97 %  BP: ()/(46-66) 95/46     Weight: 57 kg (125 lb 10.6 oz)  Body mass index is 21.57 kg/m².      Intake/Output Summary (Last 24 hours) at 02/20/18 1543  Last data filed at 02/20/18 1400   Gross per 24 hour   Intake              360 ml   Output                0 ml   Net              360 ml       Physical Exam   Constitutional: She is oriented to person, place, and time.   Frail   HENT:   Head: Atraumatic.   Eyes: EOM are normal.   Neck: Neck supple.   Cardiovascular:   Murmur heard.  S1 and S2 irregular   Pulmonary/Chest: Effort normal. She has rales.   Breasts sounds decreased bilaterally   Abdominal: Soft.   Musculoskeletal: She exhibits no edema.   Neurological: She is alert and oriented to person, place, and time.   Skin: Skin is warm.   Psychiatric: She has a normal mood and affect.       Vents:  Oxygen Concentration (%): 28 (02/20/18 0828)    Lines/Drains/Airways     Peripherally Inserted Central Catheter Line                 PICC Double Lumen 02/10/18 1524 right basilic 10  days          Pressure Ulcer                 Pressure Injury 02/19/18 1045 Coccyx Stage 2 1 day                Significant Labs:    CBC/Anemia Profile:    Recent Labs  Lab 02/19/18  0451 02/20/18  0453   WBC 6.04 5.69   HGB 12.1 12.4   HCT 39.7 40.1   PLT 97* 122*   MCV 94 93   RDW 17.2* 17.1*        Chemistries:    Recent Labs  Lab 02/19/18  0451 02/20/18  0453    137   K 3.7 3.6   CL 94* 96   CO2 38* 36*   BUN 20 19   CREATININE 0.6 0.6   CALCIUM 8.7 9.0   ALBUMIN 2.5* 2.6*   PROT 5.9* 6.2   BILITOT 0.6 0.5   ALKPHOS 73 74   ALT 22 21   AST 30 28   MG 1.9 1.9       Significant Imaging:  CXR: I have reviewed all pertinent results/findings within the past 24 hours and my personal findings are:  Stable right-sided pleural effusion

## 2018-02-20 NOTE — SUBJECTIVE & OBJECTIVE
Interval History: looks and feels tired. Still await placement.    Review of Systems   Constitutional: Positive for activity change, appetite change and fatigue. Negative for chills, diaphoresis, fever and unexpected weight change.   HENT: Negative for congestion, dental problem, ear discharge, ear pain, hearing loss, mouth sores, postnasal drip, sinus pressure, sore throat, tinnitus, trouble swallowing and voice change.    Eyes: Negative for photophobia, pain and visual disturbance.   Respiratory: Positive for shortness of breath. Negative for cough, chest tightness and wheezing.    Cardiovascular: Negative for chest pain, palpitations and leg swelling.   Gastrointestinal: Negative for abdominal distention, abdominal pain, blood in stool, constipation, diarrhea, nausea and vomiting.   Endocrine: Negative for cold intolerance, heat intolerance, polydipsia, polyphagia and polyuria.   Genitourinary: Negative for difficulty urinating, dysuria, flank pain, frequency, hematuria, urgency, vaginal bleeding and vaginal discharge.   Musculoskeletal: Negative for arthralgias, back pain, gait problem, joint swelling and myalgias.   Skin: Negative for pallor and rash.   Allergic/Immunologic: Negative for immunocompromised state.   Neurological: Negative for dizziness, syncope, weakness, light-headedness, numbness and headaches.   Hematological: Negative for adenopathy. Does not bruise/bleed easily.   Psychiatric/Behavioral: Positive for confusion. Negative for agitation and dysphoric mood. The patient is not nervous/anxious.      Objective:     Vital Signs (Most Recent):  Temp: 97.8 °F (36.6 °C) (02/20/18 1104)  Pulse: 79 (02/20/18 1108)  Resp: 17 (02/20/18 1104)  BP: (!) 95/46 (02/20/18 1108)  SpO2: 97 % (02/20/18 1104) Vital Signs (24h Range):  Temp:  [96.5 °F (35.8 °C)-99 °F (37.2 °C)] 97.8 °F (36.6 °C)  Pulse:  [73-88] 79  Resp:  [16-20] 17  SpO2:  [95 %-100 %] 97 %  BP: ()/(46-66) 95/46     Weight: 57 kg (125 lb 10.6  oz)  Body mass index is 21.57 kg/m².    Intake/Output Summary (Last 24 hours) at 02/20/18 1112  Last data filed at 02/20/18 1000   Gross per 24 hour   Intake              480 ml   Output                0 ml   Net              480 ml      Physical Exam   Constitutional: She is oriented to person, place, and time. She appears well-developed.   Frail   HENT:   Head: Normocephalic and atraumatic.   Eyes: EOM are normal.   Neck: Neck supple.   Cardiovascular:   Murmur heard.  S1 and S2 irregular   Pulmonary/Chest: Effort normal. No respiratory distress. She has rales. She exhibits no tenderness.   Bilateral rales the bases.   Abdominal: Soft. There is no tenderness.   Musculoskeletal: She exhibits no tenderness.   Neurological: She is alert and oriented to person, place, and time.   Skin: Skin is warm. There is pallor.   Psychiatric: She has a normal mood and affect.   Nursing note and vitals reviewed.      Significant Labs:   BMP:   Recent Labs  Lab 02/20/18  0453   GLU 75      K 3.6   CL 96   CO2 36*   BUN 19   CREATININE 0.6   CALCIUM 9.0   MG 1.9     CBC:   Recent Labs  Lab 02/19/18  0451 02/20/18  0453   WBC 6.04 5.69   HGB 12.1 12.4   HCT 39.7 40.1   PLT 97* 122*     All pertinent labs within the past 24 hours have been reviewed.    Significant Imaging: I have reviewed all pertinent imaging results/findings within the past 24 hours.

## 2018-02-20 NOTE — ASSESSMENT & PLAN NOTE
2/17- Sec to CHF, Improving with diuresis and thoracentesis    2/18- improving, will check for Home O2 eval before discharge    2/19- expect further improvement if the pleural effusion resolves  2/20- continue diuresis

## 2018-02-20 NOTE — PT/OT/SLP PROGRESS
Occupational Therapy      Patient Name:  Kelly Mariano   MRN:  [id number]    Patient not seen today secondary to REFUSAL DESPITE MAX ENCOURAGEMENT. PT STATED SHE HAS DIARRHEA 3 TIMES LAST NIGHT AND IS NOT FEELING, WELL  . Will follow-up NEXT VISIT.    Lamar Deng OT  2/20/2018

## 2018-02-20 NOTE — ASSESSMENT & PLAN NOTE
Will start Folbic, MVI  Nutrition consult  Likely sec to severe CHf and Breast mass and poor oral intake PTA.  Needs Beneprotein and Boost supplements    2/18 - Expected to improve with improved intake  2/19- improving, getting stronger  2/20- add boost and beneprotein

## 2018-02-20 NOTE — ASSESSMENT & PLAN NOTE
Telemetry monitoring, daily weights, low sodium diet, fluid restriction,    Lasix 20 mg IV twice  Cardiology consult  Lisinopril 2.5 mg  Elevated troponin likely due to congestive heart failure - trend  2/10 -   1 - Moderate left atrial enlargement.     2 - Eccentric hypertrophy.     3 - No wall motion abnormalities.     4 - Severely depressed left ventricular systolic function (EF 10-15%).     5 - Impaired LV relaxation, normal LAP (grade 1 diastolic dysfunction).     6 - Low normal to mildly depressed right ventricular systolic function .     7 - The estimated PA systolic pressure is 25 mmHg.     8 - Mild to moderate mitral regurgitation.     9 - Mild to moderate tricuspid regurgitation.     10 - Intermediate central venous pressure.     11 - Right pleural effusion.  12 - cards following  2/16 - case discussed w/cards, pt will need stress test and possibly ischemic work up prior to defibrillator placement. In meantime will optimize med management. Unclear if pulm effusion is due to heart failure as cytology showed no malignancy.    2/17- needs further diuresis but due to contraction alkalosis may start Diamox.    2/18- HCO3 now 43, will switch Bumex and Zaroxolyn to Diamox    2/19- improving contraction alkalosis with >14 L removed so far    2/20- improved

## 2018-02-20 NOTE — ASSESSMENT & PLAN NOTE
IV Merrem and Vanco  Wound Care  Arterial US to evaluate blood flow for proper healing  2/12 - s.a. In blood, awaiting sensitivities, continued on vanc and repeat blood cultures    2/15 - wound care following, continued on antibiotics for MRSA bacteremia  On diflucan for tinea to peroneum  2/16 - on vanc,will follow cultures and discuss duration of tx with ID prior to d/c,     2/17- much better, Vanc changed to oral clindamycin.     2/18- improving, continue Clinda    2/19- improving    2/20- better

## 2018-02-20 NOTE — ASSESSMENT & PLAN NOTE
2/11 - oncology recommending biopsy of breast mass  2/12 - path specimen sent for eval of breast mass, and for ER/WA/[id number]. Case discussed with gen. Surgery and hem/onc. Will consider any additional imaging for staging while inpt.   2/16 - pt will need path results and unclear where she will need follow up for breast ca     2/17-- not a candidate for any chemo except possibly Tamoxifen  Poor prognosis    2/18- await Biopsy result  2/19-- Benign tumor by biopsy-- needs SNf or rehab now

## 2018-02-20 NOTE — ASSESSMENT & PLAN NOTE
2/13 Switch to diamox, continue AVAPS  2/15 Noninvasive Positive Pressure Ventilation at night, check ABG in AM  2/17 continue diuretics , bronchodilators  2/18 Worsening pleural effusion   2/19 .  Chest x-ray in a.m. if pleural effusion continued to worsen might need a repeat thoracentesis.  2/20 no need for repeat thoracentesis

## 2018-02-20 NOTE — ASSESSMENT & PLAN NOTE
Telemetry monitoring, daily weights, low sodium diet, fluid restriction,    Lasix 20 mg IV twice  Cardiology consult  Lisinopril 2.5 mg  Elevated troponin likely due to congestive heart failure - trend  2/10 -   1 - Moderate left atrial enlargement.     2 - Eccentric hypertrophy.     3 - No wall motion abnormalities.     4 - Severely depressed left ventricular systolic function (EF 10-15%).     5 - Impaired LV relaxation, normal LAP (grade 1 diastolic dysfunction).     6 - Low normal to mildly depressed right ventricular systolic function .     7 - The estimated PA systolic pressure is 25 mmHg.     8 - Mild to moderate mitral regurgitation.     9 - Mild to moderate tricuspid regurgitation.     10 - Intermediate central venous pressure.     11 - Right pleural effusion.  12 - cards following  2/16 - case discussed w/cards, pt will need stress test and possibly ischemic work up prior to defibrillator placement. In meantime will optimize med management. Unclear if pulm effusion is due to heart failure as cytology showed no malignancy.    2/17- needs further diuresis but due to contraction alkalosis may start Diamox.    2/18- HCO3 now 43, will switch Bumex and Zaroxolyn to Diamox    2/19- improving contraction alkalosis with >14 L removed so far

## 2018-02-20 NOTE — SUBJECTIVE & OBJECTIVE
Interval History: feels much better, leg wounds improved, getting OT/ PT/ St and improving, eating better, HCO3 improved after switching bumex to Diamox. Will get a repeat CXR in am.    Review of Systems   Constitutional: Positive for activity change, appetite change and fatigue. Negative for chills, diaphoresis, fever and unexpected weight change.   HENT: Negative for congestion, dental problem, ear discharge, ear pain, hearing loss, mouth sores, postnasal drip, sinus pressure, sore throat, tinnitus, trouble swallowing and voice change.    Eyes: Negative for photophobia, pain and visual disturbance.   Respiratory: Positive for shortness of breath. Negative for cough, chest tightness and wheezing.    Cardiovascular: Negative for chest pain, palpitations and leg swelling.   Gastrointestinal: Negative for abdominal distention, abdominal pain, blood in stool, constipation, diarrhea, nausea and vomiting.   Endocrine: Negative for cold intolerance, heat intolerance, polydipsia, polyphagia and polyuria.   Genitourinary: Negative for difficulty urinating, dysuria, flank pain, frequency, hematuria, urgency, vaginal bleeding and vaginal discharge.   Musculoskeletal: Negative for arthralgias, back pain, gait problem, joint swelling and myalgias.   Skin: Negative for pallor and rash.   Allergic/Immunologic: Negative for immunocompromised state.   Neurological: Negative for dizziness, syncope, weakness, light-headedness, numbness and headaches.   Hematological: Negative for adenopathy. Does not bruise/bleed easily.   Psychiatric/Behavioral: Positive for confusion. Negative for agitation and dysphoric mood. The patient is not nervous/anxious.      Objective:     Vital Signs (Most Recent):  Temp: 96.5 °F (35.8 °C) (02/19/18 1601)  Pulse: 85 (02/19/18 1704)  Resp: 18 (02/19/18 1601)  BP: (!) 100/54 (02/19/18 1601)  SpO2: 99 % (02/19/18 1601) Vital Signs (24h Range):  Temp:  [96.5 °F (35.8 °C)-98.4 °F (36.9 °C)] 96.5 °F (35.8  °C)  Pulse:  [76-93] 85  Resp:  [18-20] 18  SpO2:  [92 %-99 %] 99 %  BP: ()/(53-57) 100/54     Weight: 59.3 kg (130 lb 11.7 oz) (14L negative fluid balance since admission)  Body mass index is 22.44 kg/m².    Intake/Output Summary (Last 24 hours) at 02/19/18 1838  Last data filed at 02/19/18 1300   Gross per 24 hour   Intake              480 ml   Output                0 ml   Net              480 ml      Physical Exam   Constitutional: She is oriented to person, place, and time. She appears well-developed.   Frail   HENT:   Head: Normocephalic and atraumatic.   Eyes: EOM are normal.   Neck: Neck supple.   Cardiovascular:   Murmur heard.  S1 and S2 irregular   Pulmonary/Chest: Effort normal. No respiratory distress. She has rales. She exhibits no tenderness.   Bilateral rales the bases.   Abdominal: Soft. There is no tenderness.   Musculoskeletal: She exhibits no tenderness.   Neurological: She is alert and oriented to person, place, and time.   Skin: Skin is warm. There is pallor.   Psychiatric: She has a normal mood and affect.   Nursing note and vitals reviewed.      Significant Labs:   BMP:   Recent Labs  Lab 02/19/18  0451   GLU 79      K 3.7   CL 94*   CO2 38*   BUN 20   CREATININE 0.6   CALCIUM 8.7   MG 1.9     CBC:   Recent Labs  Lab 02/18/18  0542 02/19/18  0451   WBC 5.40 6.04   HGB 12.1 12.1   HCT 38.9 39.7   * 97*     All pertinent labs within the past 24 hours have been reviewed.    Significant Imaging: I have reviewed all pertinent imaging results/findings within the past 24 hours.

## 2018-02-20 NOTE — ASSESSMENT & PLAN NOTE
Diuresis with IV lasix  Supplemental oxygen to maintain sat > 92 %  Will likely need thoracentesis, consult Pulmonology when needed  2/10 - moderate size, concerning for malignancy due to left breast deformity (metastic disease?)  2/12 - will follow path report analysis of pleural fluid  2/14 - will discuss w/pulm if repeat thoracentesis needed and follow up studies from previous    2/17- Transudative Effusion, neg for malignancy-- most likely related to CHF/ CMP.    2/18- continue Diuretics, now Diamox    2/19- repeat CXR in am-- if recurs, may need repeat thoracentesis    2/20- continue diuresis at present

## 2018-02-20 NOTE — PROGRESS NOTES
[facility] Hospital Medicine  Progress Note    Patient Name: Kelly Mariano  MRN: [id number]  Patient Class: IP- Inpatient   Admission Date: 2/9/2018  Length of Stay: 10 days  Attending Physician: Brittni Lagos MD  Primary Care Provider: Primary Doctor No        Subjective:     Principal Problem:Bilateral cellulitis of lower leg    HPI:  Kelly Mariano is a 68 yo female who states she has not seen a physician since 2009. EMS brought her to the ED today as the patient reported generalized weakness that has worsened over the last 2 days. She had trouble getting off the toilet and when she di,  she had fallen. EMS stated living conditions were deplorable with cat feces on the ibeth. (Elderly Protective Services contacted). Pt is noted with cellulitis and wounds to the bilateral feet and lower legs. Pt describes anxiety attacks, SOB, leg swelling and difficulty walking. Vital signs showed tachycardia = 112, pt afebrile with controlled blood pressure. Labs indicate thrombocytopenia, metabolic alkalosis, troponin 0.114, BNP > 4900, initial lactic acid 2.3;  CXR shows pulmonary edema and a moderate right sided pleural effusion.    Hospital Course:  2/10- no fever, other vss. Pt feels weakness, leg swelling and sob improving.Blood cultures positive for staph, on vanc. and guillermo. MRI negative for osteo. ID consulted. CTA BLE to determine if adequate circulation. Per nurse, concerned for left breast deformity, concerning for malignancy, hemonc consulted.   2/11 - Legs swelling and weakness improved, no longer sob. Echo shows ejection fraction 10%, diuresing well on lasix. Per cards left heart cath or stress test when better. Seen by hem/onc this am, concern for breast ca, right pleural effusion drained today w/o complications, swallow study positive for aspiration, counseled patient. Continued on antibx for lower extremity cellulitis. CT legs with no obvious arterial occlusion, however CTA lower extremeties  describes Ulcerative plaque versus small focal dissection measuring 8 mm in the left common iliac artery which causes a mild to moderate grade focal stenosis just above the bifurcation into the left external and left internal iliac arteries.  Contacted Brett Puga, vascular surgeon, no intervention needed now. Recommending lower extremity leg wraps and follow up w/vascular in outpt.   2/12 - Patient feeling better, platelets trending down, crp elevated at 28, sed rate normal. Blood cultures positive for s. Aeureus, awaiting sensitivity.  Xray with tiny right pneumothorax after thoracentesis. General sx biopsied breast mass.   2/13 - Pt with alt mental status this am, alkalotic, possibly secondary to over-diuresis and thoracentesis, xray shows pleural effusion, returned possibly due to chf and/or met. Breast ca. Case discussed w/oncology and no additional imaging needed at this point. Blood cultures positive for MRSA, vanc continued  2/14 - increased lethargy, bicarb improved,chest xray unchanged,  awaiting breast biopsy path report, being diuresed w/acetozolamide, attempted to contact Xavier Rosenbaum, left vmail. Meeting with case management and elderly protection services to discuss contacting any family member of friend, ordered CT head/chest/abd/pelvis   2/15 - vss, including pulse ox on 2 Liters, mentation slightly improved, CT head/chest/abd/pelvis - head no acute processes, chest/abd/pelvis - possible pelvic mass cannot exclude bone mets  2/16 - ABG and platelets improved , cxray unchanged, cytology from thoracentesis negative for malignancy.   mentation drastically improved, pt able to re-state conversation w/hemonc this am. Pt wanting full work up and tx for breast ca at this point. Pt requesting nursing home placement, is demonstrating competency in decision making. However concern for grave disability due to such severe self-neglect in home environment, that she is better suited to live in a supervised  setting.     2/17- pt looks comfortable and more alert and responsive. No walking yet. Both legs covered in tight dressing which is CDI. CXR shows recurrence of R sided effusion, however no SOB and no malignancy seen on thoracentesis.    2/18- continues to improve slowly, repeat Blood Cx x 2 remain NGTD. Dressing is clean, eating drinking better, now more open to placement. Await Breast Biopsy    2/19- feels much better, leg wounds improved, getting OT/ PT/ St and improving, eating better, HCO3 improved after switching bumex to Diamox. Will get a repeat CXR in am.    Interval History: feels much better, leg wounds improved, getting OT/ PT/ St and improving, eating better, HCO3 improved after switching bumex to Diamox. Will get a repeat CXR in am.    Review of Systems   Constitutional: Positive for activity change, appetite change and fatigue. Negative for chills, diaphoresis, fever and unexpected weight change.   HENT: Negative for congestion, dental problem, ear discharge, ear pain, hearing loss, mouth sores, postnasal drip, sinus pressure, sore throat, tinnitus, trouble swallowing and voice change.    Eyes: Negative for photophobia, pain and visual disturbance.   Respiratory: Positive for shortness of breath. Negative for cough, chest tightness and wheezing.    Cardiovascular: Negative for chest pain, palpitations and leg swelling.   Gastrointestinal: Negative for abdominal distention, abdominal pain, blood in stool, constipation, diarrhea, nausea and vomiting.   Endocrine: Negative for cold intolerance, heat intolerance, polydipsia, polyphagia and polyuria.   Genitourinary: Negative for difficulty urinating, dysuria, flank pain, frequency, hematuria, urgency, vaginal bleeding and vaginal discharge.   Musculoskeletal: Negative for arthralgias, back pain, gait problem, joint swelling and myalgias.   Skin: Negative for pallor and rash.   Allergic/Immunologic: Negative for immunocompromised state.   Neurological:  Negative for dizziness, syncope, weakness, light-headedness, numbness and headaches.   Hematological: Negative for adenopathy. Does not bruise/bleed easily.   Psychiatric/Behavioral: Positive for confusion. Negative for agitation and dysphoric mood. The patient is not nervous/anxious.      Objective:     Vital Signs (Most Recent):  Temp: 96.5 °F (35.8 °C) (02/19/18 1601)  Pulse: 85 (02/19/18 1704)  Resp: 18 (02/19/18 1601)  BP: (!) 100/54 (02/19/18 1601)  SpO2: 99 % (02/19/18 1601) Vital Signs (24h Range):  Temp:  [96.5 °F (35.8 °C)-98.4 °F (36.9 °C)] 96.5 °F (35.8 °C)  Pulse:  [76-93] 85  Resp:  [18-20] 18  SpO2:  [92 %-99 %] 99 %  BP: ()/(53-57) 100/54     Weight: 59.3 kg (130 lb 11.7 oz) (14L negative fluid balance since admission)  Body mass index is 22.44 kg/m².    Intake/Output Summary (Last 24 hours) at 02/19/18 1838  Last data filed at 02/19/18 1300   Gross per 24 hour   Intake              480 ml   Output                0 ml   Net              480 ml      Physical Exam   Constitutional: She is oriented to person, place, and time. She appears well-developed.   Frail   HENT:   Head: Normocephalic and atraumatic.   Eyes: EOM are normal.   Neck: Neck supple.   Cardiovascular:   Murmur heard.  S1 and S2 irregular   Pulmonary/Chest: Effort normal. No respiratory distress. She has rales. She exhibits no tenderness.   Bilateral rales the bases.   Abdominal: Soft. There is no tenderness.   Musculoskeletal: She exhibits no tenderness.   Neurological: She is alert and oriented to person, place, and time.   Skin: Skin is warm. There is pallor.   Psychiatric: She has a normal mood and affect.   Nursing note and vitals reviewed.      Significant Labs:   BMP:   Recent Labs  Lab 02/19/18  0451   GLU 79      K 3.7   CL 94*   CO2 38*   BUN 20   CREATININE 0.6   CALCIUM 8.7   MG 1.9     CBC:   Recent Labs  Lab 02/18/18  0542 02/19/18  0451   WBC 5.40 6.04   HGB 12.1 12.1   HCT 38.9 39.7   * 97*     All  pertinent labs within the past 24 hours have been reviewed.    Significant Imaging: I have reviewed all pertinent imaging results/findings within the past 24 hours.    Assessment/Plan:      * Bilateral cellulitis of lower leg      IV Merrem and Vanco  Wound Care  Arterial US to evaluate blood flow for proper healing  2/12 - s.a. In blood, awaiting sensitivities, continued on vanc and repeat blood cultures    2/15 - wound care following, continued on antibiotics for MRSA bacteremia  On diflucan for tinea to peroneum  2/16 - on vanc,will follow cultures and discuss duration of tx with ID prior to d/c,     2/17- much better, Vanc changed to oral clindamycin.     2/18- improving, continue Clinda    2/19- improving        Acute combined systolic and diastolic heart failure    Telemetry monitoring, daily weights, low sodium diet, fluid restriction,    Lasix 20 mg IV twice  Cardiology consult  Lisinopril 2.5 mg  Elevated troponin likely due to congestive heart failure - trend  2/10 -   1 - Moderate left atrial enlargement.     2 - Eccentric hypertrophy.     3 - No wall motion abnormalities.     4 - Severely depressed left ventricular systolic function (EF 10-15%).     5 - Impaired LV relaxation, normal LAP (grade 1 diastolic dysfunction).     6 - Low normal to mildly depressed right ventricular systolic function .     7 - The estimated PA systolic pressure is 25 mmHg.     8 - Mild to moderate mitral regurgitation.     9 - Mild to moderate tricuspid regurgitation.     10 - Intermediate central venous pressure.     11 - Right pleural effusion.  12 - cards following  2/16 - case discussed w/cards, pt will need stress test and possibly ischemic work up prior to defibrillator placement. In meantime will optimize med management. Unclear if pulm effusion is due to heart failure as cytology showed no malignancy.    2/17- needs further diuresis but due to contraction alkalosis may start Diamox.    2/18- HCO3 now 43, will switch  Bumex and Zaroxolyn to Diamox    2/19- improving contraction alkalosis with >14 L removed so far            Pleural effusion on right    Diuresis with IV lasix  Supplemental oxygen to maintain sat > 92 %  Will likely need thoracentesis, consult Pulmonology when needed  2/10 - moderate size, concerning for malignancy due to left breast deformity (metastic disease?)  2/12 - will follow path report analysis of pleural fluid  2/14 - will discuss w/pulm if repeat thoracentesis needed and follow up studies from previous    2/17- Transudative Effusion, neg for malignancy-- most likely related to CHF/ CMP.    2/18- continue Diuretics, now Diamox    2/19- repeat CXR in am-- if recurs, may need repeat thoracentesis        Acute on chronic respiratory failure with hypoxia and hypercapnia    2/17- Sec to CHF, Improving with diuresis and thoracentesis    2/18- improving, will check for Home O2 eval before discharge    2/19- expect further improvement if the pleural effusion resolves          Mass of breast, right    2/11 - oncology recommending biopsy of breast mass  2/12 - path specimen sent for eval of breast mass, and for ER/[id number]. Case discussed with gen. Surgery and hem/onc. Will consider any additional imaging for staging while inpt.   2/16 - pt will need path results and unclear where she will need follow up for breast ca     2/17-- not a candidate for any chemo except possibly Tamoxifen  Poor prognosis    2/18- await Biopsy result  2/19-- Benign tumor by biopsy-- needs SNf or rehab now        Protein-calorie malnutrition, moderate    Will start Folbic, MVI  Nutrition consult  Likely sec to severe CHf and Breast mass and poor oral intake PTA.  Needs Beneprotein and Boost supplements    2/18 - Expected to improve with improved intake  2/19- improving, getting stronger          VTE Risk Mitigation         Ordered     heparin (porcine) injection 5,000 Units  Every 8 hours     Route:  Subcutaneous        02/09/18 7305      Medium Risk of VTE  Once      02/09/18 1300              Brittni Lagos MD  Department of Hospital Medicine   [facility]

## 2018-02-20 NOTE — ASSESSMENT & PLAN NOTE
Diuresis with IV lasix  Supplemental oxygen to maintain sat > 92 %  Will likely need thoracentesis, consult Pulmonology when needed  2/10 - moderate size, concerning for malignancy due to left breast deformity (metastic disease?)  2/12 - will follow path report analysis of pleural fluid  2/14 - will discuss w/pulm if repeat thoracentesis needed and follow up studies from previous    2/17- Transudative Effusion, neg for malignancy-- most likely related to CHF/ CMP.    2/18- continue Diuretics, now Diamox    2/19- repeat CXR in am-- if recurs, may need repeat thoracentesis

## 2018-02-20 NOTE — ASSESSMENT & PLAN NOTE
Severe debility and weakness associated with severe PCM  Continue OT/PT and nutritional build up  Add Ritalin and give B12 IM

## 2018-02-20 NOTE — ASSESSMENT & PLAN NOTE
2/17- Sec to CHF, Improving with diuresis and thoracentesis    2/18- improving, will check for Home O2 eval before discharge    2/19- expect further improvement if the pleural effusion resolves

## 2018-02-20 NOTE — PLAN OF CARE
Call received from Erna Admissions Coordinator at [facility]. Requested progress notes and current MAR faxed to [facility] via [facility].

## 2018-02-21 LAB
ALBUMIN SERPL BCP-MCNC: 2.6 G/DL
ALP SERPL-CCNC: 85 U/L
ALT SERPL W/O P-5'-P-CCNC: 22 U/L
ANION GAP SERPL CALC-SCNC: 5 MMOL/L
AST SERPL-CCNC: 28 U/L
BASOPHILS # BLD AUTO: 0.01 K/UL
BASOPHILS NFR BLD: 0.2 %
BILIRUB SERPL-MCNC: 0.5 MG/DL
BUN SERPL-MCNC: 18 MG/DL
CALCIUM SERPL-MCNC: 9.1 MG/DL
CHLORIDE SERPL-SCNC: 97 MMOL/L
CO2 SERPL-SCNC: 35 MMOL/L
CREAT SERPL-MCNC: 0.5 MG/DL
DIFFERENTIAL METHOD: ABNORMAL
EOSINOPHIL # BLD AUTO: 0 K/UL
EOSINOPHIL NFR BLD: 0.7 %
ERYTHROCYTE [DISTWIDTH] IN BLOOD BY AUTOMATED COUNT: 17 %
EST. GFR  (AFRICAN AMERICAN): >60 ML/MIN/1.73 M^2
EST. GFR  (NON AFRICAN AMERICAN): >60 ML/MIN/1.73 M^2
GLUCOSE SERPL-MCNC: 77 MG/DL
HCT VFR BLD AUTO: 39.3 %
HGB BLD-MCNC: 12.2 G/DL
LYMPHOCYTES # BLD AUTO: 1 K/UL
LYMPHOCYTES NFR BLD: 16.8 %
MAGNESIUM SERPL-MCNC: 1.8 MG/DL
MCH RBC QN AUTO: 28.4 PG
MCHC RBC AUTO-ENTMCNC: 31 G/DL
MCV RBC AUTO: 92 FL
MONOCYTES # BLD AUTO: 0.6 K/UL
MONOCYTES NFR BLD: 10.4 %
NEUTROPHILS # BLD AUTO: 4.2 K/UL
NEUTROPHILS NFR BLD: 71.9 %
PLATELET # BLD AUTO: 117 K/UL
PMV BLD AUTO: 11.9 FL
POCT GLUCOSE: 100 MG/DL (ref 70–110)
POCT GLUCOSE: 84 MG/DL (ref 70–110)
POCT GLUCOSE: 89 MG/DL (ref 70–110)
POTASSIUM SERPL-SCNC: 3.8 MMOL/L
PROT SERPL-MCNC: 6.2 G/DL
RBC # BLD AUTO: 4.29 M/UL
SODIUM SERPL-SCNC: 137 MMOL/L
WBC # BLD AUTO: 5.88 K/UL

## 2018-02-21 PROCEDURE — 94761 N-INVAS EAR/PLS OXIMETRY MLT: CPT

## 2018-02-21 PROCEDURE — 25000003 PHARM REV CODE 250: Performed by: FAMILY MEDICINE

## 2018-02-21 PROCEDURE — 97530 THERAPEUTIC ACTIVITIES: CPT

## 2018-02-21 PROCEDURE — 21400001 HC TELEMETRY ROOM

## 2018-02-21 PROCEDURE — 92526 ORAL FUNCTION THERAPY: CPT

## 2018-02-21 PROCEDURE — 25000003 PHARM REV CODE 250: Performed by: NURSE PRACTITIONER

## 2018-02-21 PROCEDURE — 63600175 PHARM REV CODE 636 W HCPCS: Performed by: EMERGENCY MEDICINE

## 2018-02-21 PROCEDURE — 86580 TB INTRADERMAL TEST: CPT | Performed by: EMERGENCY MEDICINE

## 2018-02-21 PROCEDURE — 27000221 HC OXYGEN, UP TO 24 HOURS

## 2018-02-21 PROCEDURE — 85025 COMPLETE CBC W/AUTO DIFF WBC: CPT

## 2018-02-21 PROCEDURE — 83735 ASSAY OF MAGNESIUM: CPT

## 2018-02-21 PROCEDURE — 80053 COMPREHEN METABOLIC PANEL: CPT

## 2018-02-21 PROCEDURE — 63600175 PHARM REV CODE 636 W HCPCS: Performed by: NURSE PRACTITIONER

## 2018-02-21 PROCEDURE — 25000003 PHARM REV CODE 250: Performed by: EMERGENCY MEDICINE

## 2018-02-21 RX ORDER — CALCIUM CARBONATE 200(500)MG
500 TABLET,CHEWABLE ORAL DAILY PRN
Status: DISCONTINUED | OUTPATIENT
Start: 2018-02-21 | End: 2018-02-26 | Stop reason: HOSPADM

## 2018-02-21 RX ADMIN — Medication 100 MG: at 10:02

## 2018-02-21 RX ADMIN — METHYLPHENIDATE HYDROCHLORIDE 5 MG: 5 TABLET ORAL at 07:02

## 2018-02-21 RX ADMIN — ANTI-FUNGAL POWDER MICONAZOLE NITRATE TALC FREE: 1.42 POWDER TOPICAL at 09:02

## 2018-02-21 RX ADMIN — THERA TABS 1 TABLET: TAB at 10:02

## 2018-02-21 RX ADMIN — CLINDAMYCIN HYDROCHLORIDE 300 MG: 150 CAPSULE ORAL at 09:02

## 2018-02-21 RX ADMIN — ACETAMINOPHEN 650 MG: 325 TABLET, FILM COATED ORAL at 09:02

## 2018-02-21 RX ADMIN — HEPARIN SODIUM 5000 UNITS: 5000 INJECTION, SOLUTION INTRAVENOUS; SUBCUTANEOUS at 02:02

## 2018-02-21 RX ADMIN — Medication 1 TABLET: at 10:02

## 2018-02-21 RX ADMIN — CLINDAMYCIN HYDROCHLORIDE 300 MG: 150 CAPSULE ORAL at 06:02

## 2018-02-21 RX ADMIN — ANTI-FUNGAL POWDER MICONAZOLE NITRATE TALC FREE: 1.42 POWDER TOPICAL at 10:02

## 2018-02-21 RX ADMIN — ACETAZOLAMIDE 250 MG: 250 TABLET ORAL at 09:02

## 2018-02-21 RX ADMIN — CLINDAMYCIN HYDROCHLORIDE 300 MG: 150 CAPSULE ORAL at 02:02

## 2018-02-21 RX ADMIN — ACETAZOLAMIDE 250 MG: 250 TABLET ORAL at 06:02

## 2018-02-21 RX ADMIN — METHYLPHENIDATE HYDROCHLORIDE 5 MG: 5 TABLET ORAL at 01:02

## 2018-02-21 RX ADMIN — ACETAZOLAMIDE 250 MG: 250 TABLET ORAL at 02:02

## 2018-02-21 RX ADMIN — CALCIUM CARBONATE (ANTACID) CHEW TAB 500 MG 500 MG: 500 CHEW TAB at 06:02

## 2018-02-21 RX ADMIN — HEPARIN SODIUM 5000 UNITS: 5000 INJECTION, SOLUTION INTRAVENOUS; SUBCUTANEOUS at 09:02

## 2018-02-21 RX ADMIN — TUBERCULIN PURIFIED PROTEIN DERIVATIVE 5 UNITS: 5 INJECTION, SOLUTION INTRADERMAL at 12:02

## 2018-02-21 RX ADMIN — HEPARIN SODIUM 5000 UNITS: 5000 INJECTION, SOLUTION INTRAVENOUS; SUBCUTANEOUS at 06:02

## 2018-02-21 RX ADMIN — FLUCONAZOLE 100 MG: 100 TABLET ORAL at 10:02

## 2018-02-21 NOTE — ASSESSMENT & PLAN NOTE
IV Merrem and Vanco  Wound Care  Arterial US to evaluate blood flow for proper healing  2/12 - s.a. In blood, awaiting sensitivities, continued on vanc and repeat blood cultures    2/15 - wound care following, continued on antibiotics for MRSA bacteremia  On diflucan for tinea to peroneum  2/16 - on vanc,will follow cultures and discuss duration of tx with ID prior to d/c,     2/17- much better, Vanc changed to oral clindamycin.     2/18- improving, continue Clinda    2/19- improving    2/20- better    2/21- much better, on oral clindamycin, may stop soon

## 2018-02-21 NOTE — PLAN OF CARE
Problem: Patient Care Overview  Goal: Plan of Care Review      Outcome: Ongoing (interventions implemented as appropriate)  PATIENT WITH LIMITED EFFORTS FOR THERAPEUTIC ACTIVITIES, DID WELL WITH SUPINE MARKELL EXERCISES, BED MOBILITY AT MIN ASSIST X1.

## 2018-02-21 NOTE — PLAN OF CARE
Problem: Patient Care Overview  Goal: Plan of Care Review      Outcome: Ongoing (interventions implemented as appropriate)  Pt on O2 tolerating well. No distress noted at this time.

## 2018-02-21 NOTE — ASSESSMENT & PLAN NOTE
Severe debility and weakness associated with severe PCM  Continue OT/PT and nutritional build up  Add Ritalin and give B12 IM  Remains severely disabled but just started on Ritalin

## 2018-02-21 NOTE — PLAN OF CARE
Left message for Kamran Mccall EPS Investigator to call for updates.    Call received from Ailyn Chang at [facility]. Requested path report and progress notes faxed via [employer]. Also faxed to the other 3 facilities that are reviewing patient for admission ( [facility], [facility], and [facility])    @ [phone]  Call received from Erna Robertson at [facility]. Additional notes, cxr, mar, wound care notes faxed via [facility]. [facility] is submitting today for insurance auth.

## 2018-02-21 NOTE — PT/OT/SLP PROGRESS
Physical Therapy  Treatment    Kelly Mariano   MRN: [id number]   Admitting Diagnosis: Bilateral cellulitis of lower leg    PT Received On: 02/21/18  PT Start Time: 1030     PT Stop Time: 1055    PT Total Time (min): 25 min       Billable Minutes:  Therapeutic Activity 25    Treatment Type: Treatment  PT/PTA: PTA     PTA Visit Number: 3       General Precautions: Standard, fall, contact, aspiration  Orthopedic Precautions: N/A   Braces: N/A         Subjective:  Communicated with NURSE AND EPIC Prior to session.  PATIENT AGREE TO TX NOW.    Pain/Comfort  Pain Rating 1: 2/10  Location - Side 1: Bilateral  Location - Orientation 1: lower  Location 1: leg  Pain Rating Post-Intervention 1: 2/10    Objective:   Patient found with: telemetry, peripheral IV, oxygen    Functional Mobility:  Bed Mobility:    ROLLING RIGHT TO LEFT, LEFT TO RIGHT AT VCS , PT UTILIZING BED RAILS FOR ROLLING ACTIVITY.    Transfers:   PT DECLINE T/FS.    Gait:    PT DECLINE GT.    Stairs:  N/A    Balance:   NOT ATTEMPTED DUE TO PATIENT ADAMANTLY DECLINED ACTIVITY.     Therapeutic Activities and Exercises:  MARKELL LE THERAPEUTIC EXERCISES ALL AVAILABLE PLANES, ROLLING RIGHT TO LEFT , LEFT TO RIGHT AT MIN ASSIST TO VCS FOR FOLLOW  THROUGH.    AM-PAC 6 CLICK MOBILITY  How much help from another person does this patient currently need?   1 = Unable, Total/Dependent Assistance  2 = A lot, Maximum/Moderate Assistance  3 = A little, Minimum/Contact Guard/Supervision  4 = None, Modified [place]/Independent    Turning over in bed (including adjusting bedclothes, sheets and blankets)?: 3  Sitting down on and standing up from a chair with arms (e.g., wheelchair, bedside commode, etc.): 3  Moving from lying on back to sitting on the side of the bed?: 3  Moving to and from a bed to a chair (including a wheelchair)?: 3  Need to walk in hospital room?: 3  Climbing 3-5 steps with a railing?: 1  Total Score: 16    AM-PAC Raw Score CMS G-Code Modifier Level of  Impairment Assistance   6 % Total / Unable   7 - 9 CM 80 - 100% Maximal Assist   10 - 14 CL 60 - 80% Moderate Assist   15 - 19 CK 40 - 60% Moderate Assist   20 - 22 CJ 20 - 40% Minimal Assist   23 CI 1-20% SBA / CGA   24 CH 0% Independent/ Mod I     Patient left supine with all lines intact, call button in reach and bed alarm on.    Assessment:  Kelly Mariano is a 67 y.o. female with a medical diagnosis of Bilateral cellulitis of lower leg .    Rehab identified problem list/impairments: Rehab identified problem list/impairments: weakness, impaired self care skills, impaired balance, impaired endurance, impaired functional mobilty, gait instability, decreased lower extremity function    Rehab potential is good.    Activity tolerance: Fair    Discharge recommendations: Discharge Facility/Level Of Care Needs: nursing facility, skilled     Barriers to discharge:      Equipment recommendations: Equipment Needed After Discharge: walker, rolling     GOALS:    Physical Therapy Goals        Problem: Physical Therapy Goal    Goal Priority Disciplines Outcome Goal Variances Interventions   Physical Therapy Goal     PT/OT, PT      Description:  LTGs to be met within 7 days (2/24/18):  1.  Patient will perform bed mobility with mod assist  2.  Patient will perform functional transfers with mod assist  3.  Patient will sit EOB x 10 mins with good static sitting balance and no c/o fatigue  4.  Patient will participate in gait assessment  5.  Patient will perform BLE therapeutic exercises 10 x 2 in all planes                    PLAN:    Patient to be seen 5 x/week  to address the above listed problems via gait training, therapeutic activities, therapeutic exercises  Plan of Care expires: 02/24/18  Plan of Care reviewed with: patient         Sully Malik, PTA  02/21/2018

## 2018-02-21 NOTE — ASSESSMENT & PLAN NOTE
2/17- Sec to CHF, Improving with diuresis and thoracentesis    2/18- improving, will check for Home O2 eval before discharge    2/19- expect further improvement if the pleural effusion resolves  2/20- continue diuresis  2/21- improving with improvement in CHF

## 2018-02-21 NOTE — SUBJECTIVE & OBJECTIVE
Interval History: Resting in bed quietly, did a little exercise with OT/ PT but not much. Started on Ritalin today to improve vigor and vitality and to improved appetite, await SNF placement.     Review of Systems   Constitutional: Positive for activity change, appetite change and fatigue. Negative for chills, diaphoresis, fever and unexpected weight change.   HENT: Negative for congestion, dental problem, ear discharge, ear pain, hearing loss, mouth sores, postnasal drip, sinus pressure, sore throat, tinnitus, trouble swallowing and voice change.    Eyes: Negative for photophobia, pain and visual disturbance.   Respiratory: Positive for shortness of breath. Negative for cough, chest tightness and wheezing.    Cardiovascular: Negative for chest pain, palpitations and leg swelling.   Gastrointestinal: Negative for abdominal distention, abdominal pain, blood in stool, constipation, diarrhea, nausea and vomiting.   Endocrine: Negative for cold intolerance, heat intolerance, polydipsia, polyphagia and polyuria.   Genitourinary: Negative for difficulty urinating, dysuria, flank pain, frequency, hematuria, urgency, vaginal bleeding and vaginal discharge.   Musculoskeletal: Negative for arthralgias, back pain, gait problem, joint swelling and myalgias.   Skin: Negative for pallor and rash.   Allergic/Immunologic: Negative for immunocompromised state.   Neurological: Negative for dizziness, syncope, weakness, light-headedness, numbness and headaches.   Hematological: Negative for adenopathy. Does not bruise/bleed easily.   Psychiatric/Behavioral: Positive for confusion. Negative for agitation and dysphoric mood. The patient is not nervous/anxious.      Objective:     Vital Signs (Most Recent):  Temp: 97.1 °F (36.2 °C) (02/21/18 1150)  Pulse: 80 (02/21/18 1150)  Resp: 18 (02/21/18 1150)  BP: (!) 101/52 (02/21/18 1150)  SpO2: 95 % (02/21/18 1150) Vital Signs (24h Range):  Temp:  [96.7 °F (35.9 °C)-98.9 °F (37.2 °C)] 97.1 °F  (36.2 °C)  Pulse:  [77-84] 80  Resp:  [18-20] 18  SpO2:  [88 %-100 %] 95 %  BP: ()/(43-58) 101/52     Weight: 58.3 kg (128 lb 8.5 oz)  Body mass index is 22.06 kg/m².    Intake/Output Summary (Last 24 hours) at 02/21/18 1412  Last data filed at 02/21/18 1151   Gross per 24 hour   Intake              480 ml   Output                0 ml   Net              480 ml      Physical Exam   Constitutional: She is oriented to person, place, and time.   Frail   HENT:   Head: Atraumatic.   Eyes: EOM are normal.   Neck: Neck supple.   Cardiovascular:   Murmur heard.  S1 and S2 irregular   Pulmonary/Chest: Effort normal. She has rales.   Breasts sounds decreased bilaterally   Abdominal: Soft.   Musculoskeletal: She exhibits no edema.   Neurological: She is alert and oriented to person, place, and time.   Skin: Skin is warm.   Psychiatric: She has a normal mood and affect.   Nursing note and vitals reviewed.      Significant Labs:   BMP:   Recent Labs  Lab 02/21/18  0516   GLU 77      K 3.8   CL 97   CO2 35*   BUN 18   CREATININE 0.5   CALCIUM 9.1   MG 1.8     CBC:   Recent Labs  Lab 02/20/18  0453 02/21/18  0516   WBC 5.69 5.88   HGB 12.4 12.2   HCT 40.1 39.3   * 117*     All pertinent labs within the past 24 hours have been reviewed.    Significant Imaging: I have reviewed all pertinent imaging results/findings within the past 24 hours.

## 2018-02-21 NOTE — PLAN OF CARE
Problem: SLP Goal  Goal: SLP Goal  Long Term Goal:  Pt to tolerate least restrictive diet consistency safely and efficiently.    Short Term Goals:  1. Tongue Base Retraction and Laryngeal Elevation tasks x20 with min cues  2. Oral Motor tasks x20 with min cues  3. Recommend education on swallow precautions and diet recommendations.   Outcome: Ongoing (interventions implemented as appropriate)  Pt cooperative and able to follow commands.

## 2018-02-21 NOTE — PT/OT/SLP PROGRESS
Speech Language Pathology Treatment    Patient Name:  Kelly Mariano   MRN:  [id number]  Admitting Diagnosis: Bilateral cellulitis of lower leg    Recommendations:                 General Recommendations:  Dysphagia therapy  Diet recommendations:  Mechanical soft, No Rice, No Bread, Liquid Diet Level: Nectar Thick   Aspiration Precautions: 1 bite/sip at a time, Alternating bites/sips, Assistance with thickening liquids, HOB to 90 degrees and Small bites/sips   General Precautions: Standard, fall, contact, aspiration  Communication strategies:  none    Subjective     Pt alert and cooperative. Pt was found with thin liquids at bedside. She was educated on need for thickener and risk of aspiration. Nsg was made aware.  Patient goals:to get back to her things     Pain/Comfort:  · Pain Rating 1: 0/10  · Pain Rating Post-Intervention 2: 0/10    Objective:     Has the patient been evaluated by SLP for swallowing?   Yes  Keep patient NPO? No   Current Respiratory Status: nasal cannula      Pt educated on dysphagia, diet recommendations and purpose of ex. She expressed understanding but needs reinforcement. Pt was following commands today, but was still mildly confused. She completed oral motor, pharyngeal/laryngeal ex x 15 each with mod cues required.     Assessment:     Kelly Mariano is a 67 y.o. female with an SLP diagnosis of Dysphagia.  She presents with impaired swallow.    Goals:    SLP Goals        Problem: SLP Goal    Goal Priority Disciplines Outcome   SLP Goal     SLP Ongoing (interventions implemented as appropriate)   Description:  Long Term Goal:  Pt to tolerate least restrictive diet consistency safely and efficiently.    Short Term Goals:  1. Tongue Base Retraction and Laryngeal Elevation tasks x20 with min cues  2. Oral Motor tasks x20 with min cues  3. Recommend education on swallow precautions and diet recommendations.                    Plan:     · Patient to be seen:  3 x/week   · Plan of Care expires:   02/26/18  · Plan of Care reviewed with:  patient   · SLP Follow-Up:  Yes       Discharge recommendations:  nursing facility, skilled   Barriers to Discharge:      Time Tracking:     SLP Treatment Date:   02/21/18  Speech Start Time:  0936  Speech Stop Time:  1000     Speech Total Time (min):  24 min    Billable Minutes: Treatment Swallowing Dysfunction 24    Anni Xie CCC-SLP  02/21/2018

## 2018-02-21 NOTE — PLAN OF CARE
Problem: Infection, Risk/Actual (Adult)  Goal: Identify Related Risk Factors and Signs and Symptoms  Related risk factors and signs and symptoms are identified upon initiation of Human Response Clinical Practice Guideline (CPG)   Outcome: Ongoing (interventions implemented as appropriate)  Reviewed plan of care with patient, verbalized understanding. Patient b/p was low during shift, received order to give 250 ml NS bolus, tolerated well. No falls, incidents this shift. Bed in lowest position, call light within reach, will continue to monitor.

## 2018-02-21 NOTE — ASSESSMENT & PLAN NOTE
Telemetry monitoring, daily weights, low sodium diet, fluid restriction,    Lasix 20 mg IV twice  Cardiology consult  Lisinopril 2.5 mg  Elevated troponin likely due to congestive heart failure - trend  2/10 -   1 - Moderate left atrial enlargement.     2 - Eccentric hypertrophy.     3 - No wall motion abnormalities.     4 - Severely depressed left ventricular systolic function (EF 10-15%).     5 - Impaired LV relaxation, normal LAP (grade 1 diastolic dysfunction).     6 - Low normal to mildly depressed right ventricular systolic function .     7 - The estimated PA systolic pressure is 25 mmHg.     8 - Mild to moderate mitral regurgitation.     9 - Mild to moderate tricuspid regurgitation.     10 - Intermediate central venous pressure.     11 - Right pleural effusion.  12 - cards following  2/16 - case discussed w/cards, pt will need stress test and possibly ischemic work up prior to defibrillator placement. In meantime will optimize med management. Unclear if pulm effusion is due to heart failure as cytology showed no malignancy.    2/17- needs further diuresis but due to contraction alkalosis may start Diamox.    2/18- HCO3 now 43, will switch Bumex and Zaroxolyn to Diamox    2/19- improving contraction alkalosis with >14 L removed so far    2/20- improved    2/21- continues to improved on Diamox

## 2018-02-21 NOTE — PROGRESS NOTES
[facility] Hospital Medicine  Progress Note    Patient Name: Kelly Mariano  MRN: [id number]  Patient Class: IP- Inpatient   Admission Date: 2/9/2018  Length of Stay: 12 days  Attending Physician: Brittni Lagos MD  Primary Care Provider: No primary care provider on file.        Subjective:     Principal Problem:Bilateral cellulitis of lower leg    HPI:  Kelly Mariano is a 68 yo female who states she has not seen a physician since 2009. EMS brought her to the ED today as the patient reported generalized weakness that has worsened over the last 2 days. She had trouble getting off the toilet and when she di,  she had fallen. EMS stated living conditions were deplorable with cat feces on the ibeth. (Elderly Protective Services contacted). Pt is noted with cellulitis and wounds to the bilateral feet and lower legs. Pt describes anxiety attacks, SOB, leg swelling and difficulty walking. Vital signs showed tachycardia = 112, pt afebrile with controlled blood pressure. Labs indicate thrombocytopenia, metabolic alkalosis, troponin 0.114, BNP > 4900, initial lactic acid 2.3;  CXR shows pulmonary edema and a moderate right sided pleural effusion.    Hospital Course:  2/10- no fever, other vss. Pt feels weakness, leg swelling and sob improving.Blood cultures positive for staph, on vanc. and guillermo. MRI negative for osteo. ID consulted. CTA BLE to determine if adequate circulation. Per nurse, concerned for left breast deformity, concerning for malignancy, hemonc consulted.   2/11 - Legs swelling and weakness improved, no longer sob. Echo shows ejection fraction 10%, diuresing well on lasix. Per cards left heart cath or stress test when better. Seen by hem/onc this am, concern for breast ca, right pleural effusion drained today w/o complications, swallow study positive for aspiration, counseled patient. Continued on antibx for lower extremity cellulitis. CT legs with no obvious arterial occlusion, however CTA lower  extremeties describes Ulcerative plaque versus small focal dissection measuring 8 mm in the left common iliac artery which causes a mild to moderate grade focal stenosis just above the bifurcation into the left external and left internal iliac arteries.  Contacted Brett Puga, vascular surgeon, no intervention needed now. Recommending lower extremity leg wraps and follow up w/vascular in outpt.   2/12 - Patient feeling better, platelets trending down, crp elevated at 28, sed rate normal. Blood cultures positive for s. Aeureus, awaiting sensitivity.  Xray with tiny right pneumothorax after thoracentesis. General sx biopsied breast mass.   2/13 - Pt with alt mental status this am, alkalotic, possibly secondary to over-diuresis and thoracentesis, xray shows pleural effusion, returned possibly due to chf and/or met. Breast ca. Case discussed w/oncology and no additional imaging needed at this point. Blood cultures positive for MRSA, vanc continued  2/14 - increased lethargy, bicarb improved,chest xray unchanged,  awaiting breast biopsy path report, being diuresed w/acetozolamide, attempted to contact Xavier Rosenbaum, left vmail. Meeting with case management and elderly protection services to discuss contacting any family member of friend, ordered CT head/chest/abd/pelvis   2/15 - vss, including pulse ox on 2 Liters, mentation slightly improved, CT head/chest/abd/pelvis - head no acute processes, chest/abd/pelvis - possible pelvic mass cannot exclude bone mets  2/16 - ABG and platelets improved , cxray unchanged, cytology from thoracentesis negative for malignancy.   mentation drastically improved, pt able to re-state conversation w/hemonc this am. Pt wanting full work up and tx for breast ca at this point. Pt requesting nursing home placement, is demonstrating competency in decision making. However concern for grave disability due to such severe self-neglect in home environment, that she is better suited to live in a  supervised setting.     2/17- pt looks comfortable and more alert and responsive. No walking yet. Both legs covered in tight dressing which is CDI. CXR shows recurrence of R sided effusion, however no SOB and no malignancy seen on thoracentesis.    2/18- continues to improve slowly, repeat Blood Cx x 2 remain NGTD. Dressing is clean, eating drinking better, now more open to placement. Await Breast Biopsy    2/19- feels much better, leg wounds improved, getting OT/ PT/ St and improving, eating better, HCO3 improved after switching bumex to Diamox. Will get a repeat CXR in am.    2/20-- feels tired, lying in bed, states she walked three times yesterday-- hence exhausted. Still await any SNF accepting her. She refused PT/OT today. Repeat CXR today appears better. HCO3 improved on diamox. Legs look better.will give inj B 12 and start some ritalin as well.  2/21- resting in bed quietly, did a little exercise with OT/ PT but not much. Started on Ritalin today to improve vigor and vitality and to improved appetite, await SNF placement.    Interval History: Resting in bed quietly, did a little exercise with OT/ PT but not much. Started on Ritalin today to improve vigor and vitality and to improved appetite, await SNF placement.     Review of Systems   Constitutional: Positive for activity change, appetite change and fatigue. Negative for chills, diaphoresis, fever and unexpected weight change.   HENT: Negative for congestion, dental problem, ear discharge, ear pain, hearing loss, mouth sores, postnasal drip, sinus pressure, sore throat, tinnitus, trouble swallowing and voice change.    Eyes: Negative for photophobia, pain and visual disturbance.   Respiratory: Positive for shortness of breath. Negative for cough, chest tightness and wheezing.    Cardiovascular: Negative for chest pain, palpitations and leg swelling.   Gastrointestinal: Negative for abdominal distention, abdominal pain, blood in stool, constipation,  diarrhea, nausea and vomiting.   Endocrine: Negative for cold intolerance, heat intolerance, polydipsia, polyphagia and polyuria.   Genitourinary: Negative for difficulty urinating, dysuria, flank pain, frequency, hematuria, urgency, vaginal bleeding and vaginal discharge.   Musculoskeletal: Negative for arthralgias, back pain, gait problem, joint swelling and myalgias.   Skin: Negative for pallor and rash.   Allergic/Immunologic: Negative for immunocompromised state.   Neurological: Negative for dizziness, syncope, weakness, light-headedness, numbness and headaches.   Hematological: Negative for adenopathy. Does not bruise/bleed easily.   Psychiatric/Behavioral: Positive for confusion. Negative for agitation and dysphoric mood. The patient is not nervous/anxious.      Objective:     Vital Signs (Most Recent):  Temp: 97.1 °F (36.2 °C) (02/21/18 1150)  Pulse: 80 (02/21/18 1150)  Resp: 18 (02/21/18 1150)  BP: (!) 101/52 (02/21/18 1150)  SpO2: 95 % (02/21/18 1150) Vital Signs (24h Range):  Temp:  [96.7 °F (35.9 °C)-98.9 °F (37.2 °C)] 97.1 °F (36.2 °C)  Pulse:  [77-84] 80  Resp:  [18-20] 18  SpO2:  [88 %-100 %] 95 %  BP: ()/(43-58) 101/52     Weight: 58.3 kg (128 lb 8.5 oz)  Body mass index is 22.06 kg/m².    Intake/Output Summary (Last 24 hours) at 02/21/18 1412  Last data filed at 02/21/18 1151   Gross per 24 hour   Intake              480 ml   Output                0 ml   Net              480 ml      Physical Exam   Constitutional: She is oriented to person, place, and time.   Frail   HENT:   Head: Atraumatic.   Eyes: EOM are normal.   Neck: Neck supple.   Cardiovascular:   Murmur heard.  S1 and S2 irregular   Pulmonary/Chest: Effort normal. She has rales.   Breasts sounds decreased bilaterally   Abdominal: Soft.   Musculoskeletal: She exhibits no edema.   Neurological: She is alert and oriented to person, place, and time.   Skin: Skin is warm.   Psychiatric: She has a normal mood and affect.   Nursing note and  vitals reviewed.      Significant Labs:   BMP:   Recent Labs  Lab 02/21/18  0516   GLU 77      K 3.8   CL 97   CO2 35*   BUN 18   CREATININE 0.5   CALCIUM 9.1   MG 1.8     CBC:   Recent Labs  Lab 02/20/18  0453 02/21/18  0516   WBC 5.69 5.88   HGB 12.4 12.2   HCT 40.1 39.3   * 117*     All pertinent labs within the past 24 hours have been reviewed.    Significant Imaging: I have reviewed all pertinent imaging results/findings within the past 24 hours.    Assessment/Plan:      * Bilateral cellulitis of lower leg      IV Merrem and Vanco  Wound Care  Arterial US to evaluate blood flow for proper healing  2/12 - s.a. In blood, awaiting sensitivities, continued on vanc and repeat blood cultures    2/15 - wound care following, continued on antibiotics for MRSA bacteremia  On diflucan for tinea to peroneum  2/16 - on vanc,will follow cultures and discuss duration of tx with ID prior to d/c,     2/17- much better, Vanc changed to oral clindamycin.     2/18- improving, continue Clinda    2/19- improving    2/20- better    2/21- much better, on oral clindamycin, may stop soon        Acute combined systolic and diastolic heart failure    Telemetry monitoring, daily weights, low sodium diet, fluid restriction,    Lasix 20 mg IV twice  Cardiology consult  Lisinopril 2.5 mg  Elevated troponin likely due to congestive heart failure - trend  2/10 -   1 - Moderate left atrial enlargement.     2 - Eccentric hypertrophy.     3 - No wall motion abnormalities.     4 - Severely depressed left ventricular systolic function (EF 10-15%).     5 - Impaired LV relaxation, normal LAP (grade 1 diastolic dysfunction).     6 - Low normal to mildly depressed right ventricular systolic function .     7 - The estimated PA systolic pressure is 25 mmHg.     8 - Mild to moderate mitral regurgitation.     9 - Mild to moderate tricuspid regurgitation.     10 - Intermediate central venous pressure.     11 - Right pleural effusion.  12 - cards  following  2/16 - case discussed w/cards, pt will need stress test and possibly ischemic work up prior to defibrillator placement. In meantime will optimize med management. Unclear if pulm effusion is due to heart failure as cytology showed no malignancy.    2/17- needs further diuresis but due to contraction alkalosis may start Diamox.    2/18- HCO3 now 43, will switch Bumex and Zaroxolyn to Diamox    2/19- improving contraction alkalosis with >14 L removed so far    2/20- improved    2/21- continues to improved on Diamox          Pleural effusion on right    Diuresis with IV lasix  Supplemental oxygen to maintain sat > 92 %  Will likely need thoracentesis, consult Pulmonology when needed  2/10 - moderate size, concerning for malignancy due to left breast deformity (metastic disease?)  2/12 - will follow path report analysis of pleural fluid  2/14 - will discuss w/pulm if repeat thoracentesis needed and follow up studies from previous    2/17- Transudative Effusion, neg for malignancy-- most likely related to CHF/ CMP.    2/18- continue Diuretics, now Diamox    2/19- repeat CXR in am-- if recurs, may need repeat thoracentesis    2/20- continue diuresis at present  2/21- improved on Diuretics        Physical debility    Severe debility and weakness associated with severe PCM  Continue OT/PT and nutritional build up  Add Ritalin and give B12 IM  Remains severely disabled but just started on Ritalin             Acute on chronic respiratory failure with hypoxia and hypercapnia    2/17- Sec to CHF, Improving with diuresis and thoracentesis    2/18- improving, will check for Home O2 eval before discharge    2/19- expect further improvement if the pleural effusion resolves  2/20- continue diuresis  2/21- improving with improvement in CHF        Protein-calorie malnutrition, moderate    Will start Folbic, MVI  Nutrition consult  Likely sec to severe CHf and Breast mass and poor oral intake PTA.  Needs Beneprotein and Boost  supplements    2/18 - Expected to improve with improved intake  2/19- improving, getting stronger  2/20- add boost and beneprotein  2/21- improving with improved oral intake.          VTE Risk Mitigation         Ordered     heparin (porcine) injection 5,000 Units  Every 8 hours     Route:  Subcutaneous        02/09/18 1749     Medium Risk of VTE  Once      02/09/18 1300              Brittni Lagos MD  Department of Hospital Medicine   [facility]

## 2018-02-21 NOTE — ASSESSMENT & PLAN NOTE
Diuresis with IV lasix  Supplemental oxygen to maintain sat > 92 %  Will likely need thoracentesis, consult Pulmonology when needed  2/10 - moderate size, concerning for malignancy due to left breast deformity (metastic disease?)  2/12 - will follow path report analysis of pleural fluid  2/14 - will discuss w/pulm if repeat thoracentesis needed and follow up studies from previous    2/17- Transudative Effusion, neg for malignancy-- most likely related to CHF/ CMP.    2/18- continue Diuretics, now Diamox    2/19- repeat CXR in am-- if recurs, may need repeat thoracentesis    2/20- continue diuresis at present  2/21- improved on Diuretics

## 2018-02-22 PROBLEM — L03.116 BILATERAL CELLULITIS OF LOWER LEG: Status: RESOLVED | Noted: 2018-02-09 | Resolved: 2018-02-22

## 2018-02-22 PROBLEM — L03.115 BILATERAL CELLULITIS OF LOWER LEG: Status: RESOLVED | Noted: 2018-02-09 | Resolved: 2018-02-22

## 2018-02-22 LAB
ALBUMIN SERPL BCP-MCNC: 2.7 G/DL
ALP SERPL-CCNC: 91 U/L
ALT SERPL W/O P-5'-P-CCNC: 19 U/L
ANION GAP SERPL CALC-SCNC: 7 MMOL/L
AST SERPL-CCNC: 24 U/L
BASOPHILS # BLD AUTO: 0.02 K/UL
BASOPHILS NFR BLD: 0.4 %
BILIRUB SERPL-MCNC: 0.5 MG/DL
BUN SERPL-MCNC: 21 MG/DL
CALCIUM SERPL-MCNC: 9.2 MG/DL
CHLORIDE SERPL-SCNC: 98 MMOL/L
CO2 SERPL-SCNC: 32 MMOL/L
CREAT SERPL-MCNC: 0.6 MG/DL
DIFFERENTIAL METHOD: ABNORMAL
EOSINOPHIL # BLD AUTO: 0.1 K/UL
EOSINOPHIL NFR BLD: 1.1 %
ERYTHROCYTE [DISTWIDTH] IN BLOOD BY AUTOMATED COUNT: 16.9 %
EST. GFR  (AFRICAN AMERICAN): >60 ML/MIN/1.73 M^2
EST. GFR  (NON AFRICAN AMERICAN): >60 ML/MIN/1.73 M^2
GLUCOSE SERPL-MCNC: 72 MG/DL
HCT VFR BLD AUTO: 39.5 %
HGB BLD-MCNC: 12.5 G/DL
LYMPHOCYTES # BLD AUTO: 0.9 K/UL
LYMPHOCYTES NFR BLD: 16.7 %
MAGNESIUM SERPL-MCNC: 1.8 MG/DL
MCH RBC QN AUTO: 28.9 PG
MCHC RBC AUTO-ENTMCNC: 31.6 G/DL
MCV RBC AUTO: 91 FL
MONOCYTES # BLD AUTO: 0.6 K/UL
MONOCYTES NFR BLD: 11.1 %
NEUTROPHILS # BLD AUTO: 3.9 K/UL
NEUTROPHILS NFR BLD: 70.7 %
PLATELET # BLD AUTO: 122 K/UL
PMV BLD AUTO: 12.1 FL
POCT GLUCOSE: 117 MG/DL (ref 70–110)
POCT GLUCOSE: 135 MG/DL (ref 70–110)
POCT GLUCOSE: 147 MG/DL (ref 70–110)
POCT GLUCOSE: 87 MG/DL (ref 70–110)
POTASSIUM SERPL-SCNC: 3.6 MMOL/L
PROT SERPL-MCNC: 6.3 G/DL
RBC # BLD AUTO: 4.32 M/UL
SODIUM SERPL-SCNC: 137 MMOL/L
WBC # BLD AUTO: 5.52 K/UL

## 2018-02-22 PROCEDURE — 85025 COMPLETE CBC W/AUTO DIFF WBC: CPT

## 2018-02-22 PROCEDURE — 97116 GAIT TRAINING THERAPY: CPT

## 2018-02-22 PROCEDURE — 27000221 HC OXYGEN, UP TO 24 HOURS

## 2018-02-22 PROCEDURE — 21400001 HC TELEMETRY ROOM

## 2018-02-22 PROCEDURE — 80053 COMPREHEN METABOLIC PANEL: CPT

## 2018-02-22 PROCEDURE — 97110 THERAPEUTIC EXERCISES: CPT

## 2018-02-22 PROCEDURE — 83735 ASSAY OF MAGNESIUM: CPT

## 2018-02-22 PROCEDURE — 94761 N-INVAS EAR/PLS OXIMETRY MLT: CPT

## 2018-02-22 PROCEDURE — 25000003 PHARM REV CODE 250: Performed by: NURSE PRACTITIONER

## 2018-02-22 PROCEDURE — 63600175 PHARM REV CODE 636 W HCPCS: Performed by: NURSE PRACTITIONER

## 2018-02-22 PROCEDURE — 25000003 PHARM REV CODE 250: Performed by: EMERGENCY MEDICINE

## 2018-02-22 PROCEDURE — 92526 ORAL FUNCTION THERAPY: CPT

## 2018-02-22 PROCEDURE — 25000003 PHARM REV CODE 250: Performed by: FAMILY MEDICINE

## 2018-02-22 RX ORDER — AMOXICILLIN 250 MG
2 CAPSULE ORAL DAILY
Status: DISCONTINUED | OUTPATIENT
Start: 2018-02-22 | End: 2018-02-26 | Stop reason: HOSPADM

## 2018-02-22 RX ORDER — POLYETHYLENE GLYCOL 3350 17 G/17G
17 POWDER, FOR SOLUTION ORAL DAILY
Status: DISCONTINUED | OUTPATIENT
Start: 2018-02-22 | End: 2018-02-26 | Stop reason: HOSPADM

## 2018-02-22 RX ORDER — ACETAZOLAMIDE 250 MG/1
250 TABLET ORAL 2 TIMES DAILY
Status: COMPLETED | OUTPATIENT
Start: 2018-02-22 | End: 2018-02-22

## 2018-02-22 RX ORDER — ALPRAZOLAM 0.25 MG/1
0.25 TABLET ORAL 2 TIMES DAILY PRN
Status: DISCONTINUED | OUTPATIENT
Start: 2018-02-22 | End: 2018-02-26 | Stop reason: HOSPADM

## 2018-02-22 RX ORDER — SIMETHICONE 80 MG
1 TABLET,CHEWABLE ORAL 3 TIMES DAILY PRN
Status: DISCONTINUED | OUTPATIENT
Start: 2018-02-22 | End: 2018-02-26 | Stop reason: HOSPADM

## 2018-02-22 RX ADMIN — CALCIUM CARBONATE (ANTACID) CHEW TAB 500 MG 500 MG: 500 CHEW TAB at 12:02

## 2018-02-22 RX ADMIN — METHYLPHENIDATE HYDROCHLORIDE 5 MG: 5 TABLET ORAL at 08:02

## 2018-02-22 RX ADMIN — STANDARDIZED SENNA CONCENTRATE AND DOCUSATE SODIUM 2 TABLET: 8.6; 5 TABLET, FILM COATED ORAL at 06:02

## 2018-02-22 RX ADMIN — ACETAZOLAMIDE 250 MG: 250 TABLET ORAL at 09:02

## 2018-02-22 RX ADMIN — POLYETHYLENE GLYCOL 3350 17 G: 17 POWDER, FOR SOLUTION ORAL at 06:02

## 2018-02-22 RX ADMIN — ALPRAZOLAM 0.25 MG: 0.25 TABLET ORAL at 06:02

## 2018-02-22 RX ADMIN — CLINDAMYCIN HYDROCHLORIDE 300 MG: 150 CAPSULE ORAL at 05:02

## 2018-02-22 RX ADMIN — Medication 100 MG: at 08:02

## 2018-02-22 RX ADMIN — ACETAZOLAMIDE 250 MG: 250 TABLET ORAL at 02:02

## 2018-02-22 RX ADMIN — ANTI-FUNGAL POWDER MICONAZOLE NITRATE TALC FREE: 1.42 POWDER TOPICAL at 09:02

## 2018-02-22 RX ADMIN — ACETAZOLAMIDE 250 MG: 250 TABLET ORAL at 05:02

## 2018-02-22 RX ADMIN — METHYLPHENIDATE HYDROCHLORIDE 5 MG: 5 TABLET ORAL at 12:02

## 2018-02-22 RX ADMIN — THERA TABS 1 TABLET: TAB at 08:02

## 2018-02-22 RX ADMIN — HEPARIN SODIUM 5000 UNITS: 5000 INJECTION, SOLUTION INTRAVENOUS; SUBCUTANEOUS at 05:02

## 2018-02-22 RX ADMIN — HEPARIN SODIUM 5000 UNITS: 5000 INJECTION, SOLUTION INTRAVENOUS; SUBCUTANEOUS at 09:02

## 2018-02-22 RX ADMIN — ANTI-FUNGAL POWDER MICONAZOLE NITRATE TALC FREE: 1.42 POWDER TOPICAL at 08:02

## 2018-02-22 RX ADMIN — FLUCONAZOLE 100 MG: 100 TABLET ORAL at 08:02

## 2018-02-22 RX ADMIN — HEPARIN SODIUM 5000 UNITS: 5000 INJECTION, SOLUTION INTRAVENOUS; SUBCUTANEOUS at 02:02

## 2018-02-22 RX ADMIN — CLINDAMYCIN HYDROCHLORIDE 300 MG: 150 CAPSULE ORAL at 02:02

## 2018-02-22 RX ADMIN — Medication 1 TABLET: at 08:02

## 2018-02-22 RX ADMIN — SIMETHICONE CHEW TAB 80 MG 80 MG: 80 TABLET ORAL at 04:02

## 2018-02-22 NOTE — PLAN OF CARE
Problem: Fall Risk (Adult)  Goal: Absence of Falls  Patient will demonstrate the desired outcomes by discharge/transition of care.   Outcome: Ongoing (interventions implemented as appropriate)  Reviewed plan of care with patient, verbalized understanding. Blood Glucose monitoring. No falls, incidents this shift. Bed in lowest position, call light within reach, will continue to monitor.

## 2018-02-22 NOTE — PLAN OF CARE
02/22/18 1030   Medicare Message   Important Message from Medicare regarding Discharge Appeal Rights Given to patient/caregiver;Explained to patient/caregiver;Signed/date by patient/caregiver   Date IMM was signed 02/22/18   Time IMM was signed 1030

## 2018-02-22 NOTE — PLAN OF CARE
Problem: SLP Goal  Goal: SLP Goal  Long Term Goal:  Pt to tolerate least restrictive diet consistency safely and efficiently.    Short Term Goals:  1. Tongue Base Retraction and Laryngeal Elevation tasks x20 with min cues  2. Oral Motor tasks x20 with min cues  3. Recommend education on swallow precautions and diet recommendations.   Outcome: Ongoing (interventions implemented as appropriate)  Pt tolerating nectar thick liquids without difficulty.

## 2018-02-22 NOTE — PT/OT/SLP PROGRESS
Physical Therapy  Treatment    Kelly Mariano   MRN: [id number]   Admitting Diagnosis: Bilateral cellulitis of lower leg    PT Received On: 02/22/18  PT Start Time: 1215     PT Stop Time: 1240    PT Total Time (min): 25 min       Billable Minutes:  Gait Training 15 and Therapeutic Exercise 10    Treatment Type: Treatment  PT/PTA: PTA     PTA Visit Number: 4       General Precautions: Standard, fall, aspiration, contact  Orthopedic Precautions: N/A   Braces: N/A         Subjective:  Communicated with NURSE, SHREYAS AND HITESH prior to session.  PATIENT AGREE TO TX NOW.    Pain/Comfort  Pain Rating 1: 0/10    Objective:   Patient found with: peripheral IV, oxygen, telemetry    Functional Mobility:  Bed Mobility:     SUPINE AND SIT, SIT TO SUPINE AT MOD TO MIN ASSIST X1.  Transfers:    SIT TO STAND , STAND T O SIT AT MOD TO MIN ASSIST X1.  Gait:    MOD ASSIST X1 WITH RW FORWARD, BACKWARD, SIDESTEPPING AT B/S X3 STEPS EACH DIRECTION.    Stairs:  N/A    Balance:   Static Sit: FAIR-: Maintains without assist but inconsistent   Dynamic Sit: FAIR+: Maintains balance through MINIMAL excursions of active trunk motion  Static Stand: POOR: Needs MODERATE assist to maintain  Dynamic stand: POOR: N/A     Therapeutic Activities and Exercises:  PREGT STEPPING BALANCE ACTIVITY , SUPINE , SHORTSEATED MARKELL LE THERAPEUTIC EXERCISES.    AM-PAC 6 CLICK MOBILITY  How much help from another person does this patient currently need?   1 = Unable, Total/Dependent Assistance  2 = A lot, Maximum/Moderate Assistance  3 = A little, Minimum/Contact Guard/Supervision  4 = None, Modified [place]/Independent    Turning over in bed (including adjusting bedclothes, sheets and blankets)?: 3  Sitting down on and standing up from a chair with arms (e.g., wheelchair, bedside commode, etc.): 3  Moving from lying on back to sitting on the side of the bed?: 3  Moving to and from a bed to a chair (including a wheelchair)?: 3  Climbing 3-5 steps with a railing?:  2    AM-PAC Raw Score CMS G-Code Modifier Level of Impairment Assistance   6 % Total / Unable   7 - 9 CM 80 - 100% Maximal Assist   10 - 14 CL 60 - 80% Moderate Assist   15 - 19 CK 40 - 60% Moderate Assist   20 - 22 CJ 20 - 40% Minimal Assist   23 CI 1-20% SBA / CGA   24 CH 0% Independent/ Mod I     Patient left HOB elevated with LUNCH NOW SERVED ,  all lines intact, call button in reach and bed alarm on.    Assessment:  Kelly Mariano is a 67 y.o. female with a medical diagnosis of Bilateral cellulitis of lower leg .    Rehab identified problem list/impairments: Rehab identified problem list/impairments: weakness, impaired self care skills, impaired balance, decreased coordination, decreased safety awareness, impaired endurance, impaired functional mobilty, impaired sensation, gait instability    Rehab potential is good.    Activity tolerance: Fair    Discharge recommendations: Discharge Facility/Level Of Care Needs: nursing facility, skilled     Barriers to discharge:      Equipment recommendations: Equipment Needed After Discharge: walker, rolling     GOALS:    Physical Therapy Goals        Problem: Physical Therapy Goal    Goal Priority Disciplines Outcome Goal Variances Interventions   Physical Therapy Goal     PT/OT, PT      Description:  LTGs to be met within 7 days (2/24/18):  1.  Patient will perform bed mobility with mod assist  2.  Patient will perform functional transfers with mod assist  3.  Patient will sit EOB x 10 mins with good static sitting balance and no c/o fatigue  4.  Patient will participate in gait assessment  5.  Patient will perform BLE therapeutic exercises 10 x 2 in all planes                    PLAN:    Patient to be seen 5 x/week  to address the above listed problems via gait training, therapeutic activities, therapeutic exercises  Plan of Care expires: 02/24/18  Plan of Care reviewed with: patient         Sully Malik, PTA  02/22/2018

## 2018-02-22 NOTE — PLAN OF CARE
Problem: Patient Care Overview  Goal: Plan of Care Review      Outcome: Ongoing (interventions implemented as appropriate)  PATIENT WITH GREAT EFFORTS FOR ALL THERAPEUTIC ACTIVITIES THIS TX . PATIENT MOD ASSIST TO MAX X1 FOR SIDESTEPPING, FORWARD , BACKWARDS PREGAIT ACTIVITY. PATIENT APPEAR TO HAVE INCREASED MOTIVATION FOR INCREASING HER FUNCTIONAL MOBILITY LEVEL AT THIS TIME. PATIENT PERFORM BED MOBILITY AT MIN ASSIST FOR T/FS TO EOB FROM SUPINE POSITION.

## 2018-02-22 NOTE — PLAN OF CARE
Problem: Patient Care Overview  Goal: Plan of Care Review      Outcome: Ongoing (interventions implemented as appropriate)  Pt AAO x 3.  VSS.  Pt remained afebrile throughout this shift.   Pt remained free of falls this shift.   Pt had no c/o pain this shift.  Plan of care reviewed. Patient verbalizes understanding.    Blood glucose monitored this shift.   Pt moving/turing independently.   Patient SR on monitor.   Bed low, side rails up x 2, wheels locked, call light in reach.   Patient instructed to call for assistance.   Hourly rounding completed.   Will continue to monitor.

## 2018-02-22 NOTE — PT/OT/SLP PROGRESS
Speech Language Pathology Treatment    Patient Name:  Kelly Mariano   MRN:  [id number]  Admitting Diagnosis: Bilateral cellulitis of lower leg    Recommendations:                 General Recommendations:  Dysphagia therapy  Diet recommendations:  Mechanical soft, No Bread, No Rice, Liquid Diet Level: Nectar Thick   Aspiration Precautions: 1 bite/sip at a time, Alternating bites/sips, HOB to 90 degrees and Small bites/sips   General Precautions: Standard, aspiration, fall, contact  Communication strategies:  none    Subjective     Pt attentive and cooperative  Patient goals: to take care of herself    Pain/Comfort:  · Pain Rating 1: 0/10  · Pain Rating 2: 0/10    Objective:     Has the patient been evaluated by SLP for swallowing?   Yes  Keep patient NPO? No   Current Respiratory Status: nasal cannula    Pt tolerated sips of nectar thick liquids via straw without difficulty. Pt had nectar thick liquids in her pitcher. She completed oral motor, tongue base retraction and laryngeal elevation ex x 15 each with min cues to imp rove swallow function.     Assessment:     Kelly Mariano is a 67 y.o. female with an SLP diagnosis of Dysphagia.  She presents with risk of aspiration.    Goals:    SLP Goals        Problem: SLP Goal    Goal Priority Disciplines Outcome   SLP Goal     SLP Ongoing (interventions implemented as appropriate)   Description:  Long Term Goal:  Pt to tolerate least restrictive diet consistency safely and efficiently.    Short Term Goals:  1. Tongue Base Retraction and Laryngeal Elevation tasks x20 with min cues  2. Oral Motor tasks x20 with min cues  3. Recommend education on swallow precautions and diet recommendations.                    Plan:     · Patient to be seen:  3 x/week   · Plan of Care expires:  02/26/18  · Plan of Care reviewed with:  patient   · SLP Follow-Up:  Yes       Discharge recommendations:  nursing facility, skilled   Barriers to Discharge:      Time Tracking:     SLP Treatment Date:    02/22/18  Speech Start Time:  1111  Speech Stop Time:  1128     Speech Total Time (min):  17 min    Billable Minutes: Treatment Swallowing Dysfunction 17    Anni Xie CCC-SLP  02/22/2018

## 2018-02-23 PROBLEM — J96.22 ACUTE ON CHRONIC RESPIRATORY FAILURE WITH HYPOXIA AND HYPERCAPNIA: Status: RESOLVED | Noted: 2018-02-13 | Resolved: 2018-02-23

## 2018-02-23 PROBLEM — J96.21 ACUTE ON CHRONIC RESPIRATORY FAILURE WITH HYPOXIA AND HYPERCAPNIA: Status: RESOLVED | Noted: 2018-02-13 | Resolved: 2018-02-23

## 2018-02-23 PROBLEM — L89.152 PRESSURE ULCER OF COCCYGEAL REGION, STAGE 2: Status: ACTIVE | Noted: 2018-02-23

## 2018-02-23 PROBLEM — L03.115 BILATERAL CELLULITIS OF LOWER LEG: Status: ACTIVE | Noted: 2018-02-23

## 2018-02-23 PROBLEM — L03.116 BILATERAL CELLULITIS OF LOWER LEG: Status: ACTIVE | Noted: 2018-02-23

## 2018-02-23 LAB
ALBUMIN SERPL BCP-MCNC: 2.6 G/DL
ALP SERPL-CCNC: 96 U/L
ALT SERPL W/O P-5'-P-CCNC: 17 U/L
ANION GAP SERPL CALC-SCNC: 6 MMOL/L
AST SERPL-CCNC: 22 U/L
BASOPHILS # BLD AUTO: 0.02 K/UL
BASOPHILS NFR BLD: 0.3 %
BILIRUB SERPL-MCNC: 0.4 MG/DL
BUN SERPL-MCNC: 20 MG/DL
CALCIUM SERPL-MCNC: 9.1 MG/DL
CHLORIDE SERPL-SCNC: 98 MMOL/L
CO2 SERPL-SCNC: 32 MMOL/L
CREAT SERPL-MCNC: 0.5 MG/DL
DIFFERENTIAL METHOD: ABNORMAL
EOSINOPHIL # BLD AUTO: 0.1 K/UL
EOSINOPHIL NFR BLD: 0.8 %
ERYTHROCYTE [DISTWIDTH] IN BLOOD BY AUTOMATED COUNT: 17 %
EST. GFR  (AFRICAN AMERICAN): >60 ML/MIN/1.73 M^2
EST. GFR  (NON AFRICAN AMERICAN): >60 ML/MIN/1.73 M^2
GLUCOSE SERPL-MCNC: 96 MG/DL
HCT VFR BLD AUTO: 38.4 %
HGB BLD-MCNC: 12.2 G/DL
LYMPHOCYTES # BLD AUTO: 1 K/UL
LYMPHOCYTES NFR BLD: 16.1 %
MAGNESIUM SERPL-MCNC: 1.9 MG/DL
MCH RBC QN AUTO: 28.9 PG
MCHC RBC AUTO-ENTMCNC: 31.8 G/DL
MCV RBC AUTO: 91 FL
MONOCYTES # BLD AUTO: 0.6 K/UL
MONOCYTES NFR BLD: 9.6 %
NEUTROPHILS # BLD AUTO: 4.6 K/UL
NEUTROPHILS NFR BLD: 73.2 %
PLATELET # BLD AUTO: 138 K/UL
PMV BLD AUTO: 11.3 FL
POCT GLUCOSE: 104 MG/DL (ref 70–110)
POCT GLUCOSE: 215 MG/DL (ref 70–110)
POTASSIUM SERPL-SCNC: 4 MMOL/L
PROT SERPL-MCNC: 6.2 G/DL
RBC # BLD AUTO: 4.22 M/UL
SODIUM SERPL-SCNC: 136 MMOL/L
TB INDURATION 48 - 72 HR READ: 0 MM
WBC # BLD AUTO: 6.22 K/UL

## 2018-02-23 PROCEDURE — 97116 GAIT TRAINING THERAPY: CPT

## 2018-02-23 PROCEDURE — 80053 COMPREHEN METABOLIC PANEL: CPT

## 2018-02-23 PROCEDURE — 27000221 HC OXYGEN, UP TO 24 HOURS

## 2018-02-23 PROCEDURE — 83735 ASSAY OF MAGNESIUM: CPT

## 2018-02-23 PROCEDURE — 25000003 PHARM REV CODE 250: Performed by: EMERGENCY MEDICINE

## 2018-02-23 PROCEDURE — 96372 THER/PROPH/DIAG INJ SC/IM: CPT

## 2018-02-23 PROCEDURE — 25000003 PHARM REV CODE 250: Performed by: FAMILY MEDICINE

## 2018-02-23 PROCEDURE — 63600175 PHARM REV CODE 636 W HCPCS: Performed by: NURSE PRACTITIONER

## 2018-02-23 PROCEDURE — 25000003 PHARM REV CODE 250: Performed by: INTERNAL MEDICINE

## 2018-02-23 PROCEDURE — 94761 N-INVAS EAR/PLS OXIMETRY MLT: CPT

## 2018-02-23 PROCEDURE — 85025 COMPLETE CBC W/AUTO DIFF WBC: CPT

## 2018-02-23 PROCEDURE — 21400001 HC TELEMETRY ROOM

## 2018-02-23 PROCEDURE — 97530 THERAPEUTIC ACTIVITIES: CPT

## 2018-02-23 PROCEDURE — 25000003 PHARM REV CODE 250: Performed by: NURSE PRACTITIONER

## 2018-02-23 RX ORDER — FUROSEMIDE 10 MG/ML
20 INJECTION INTRAMUSCULAR; INTRAVENOUS DAILY
Status: DISCONTINUED | OUTPATIENT
Start: 2018-02-24 | End: 2018-02-25

## 2018-02-23 RX ADMIN — ACETAMINOPHEN 650 MG: 325 TABLET, FILM COATED ORAL at 08:02

## 2018-02-23 RX ADMIN — STANDARDIZED SENNA CONCENTRATE AND DOCUSATE SODIUM 2 TABLET: 8.6; 5 TABLET, FILM COATED ORAL at 09:02

## 2018-02-23 RX ADMIN — METOPROLOL SUCCINATE 50 MG: 50 TABLET, EXTENDED RELEASE ORAL at 09:02

## 2018-02-23 RX ADMIN — METHYLPHENIDATE HYDROCHLORIDE 5 MG: 5 TABLET ORAL at 08:02

## 2018-02-23 RX ADMIN — ANTI-FUNGAL POWDER MICONAZOLE NITRATE TALC FREE: 1.42 POWDER TOPICAL at 09:02

## 2018-02-23 RX ADMIN — METHYLPHENIDATE HYDROCHLORIDE 5 MG: 5 TABLET ORAL at 01:02

## 2018-02-23 RX ADMIN — HEPARIN SODIUM 5000 UNITS: 5000 INJECTION, SOLUTION INTRAVENOUS; SUBCUTANEOUS at 01:02

## 2018-02-23 RX ADMIN — HEPARIN SODIUM 5000 UNITS: 5000 INJECTION, SOLUTION INTRAVENOUS; SUBCUTANEOUS at 09:02

## 2018-02-23 RX ADMIN — Medication 100 MG: at 09:02

## 2018-02-23 RX ADMIN — Medication 1 TABLET: at 09:02

## 2018-02-23 RX ADMIN — HEPARIN SODIUM 5000 UNITS: 5000 INJECTION, SOLUTION INTRAVENOUS; SUBCUTANEOUS at 05:02

## 2018-02-23 RX ADMIN — THERA TABS 1 TABLET: TAB at 09:02

## 2018-02-23 NOTE — ASSESSMENT & PLAN NOTE
Will start Folbic, MVI  Nutrition consult  Likely sec to severe CHf and Breast mass and poor oral intake PTA.  Needs Beneprotein and Boost supplements    2/18 - Expected to improve with improved intake  2/19- improving, getting stronger  2/20- add boost and beneprotein  2/21- improving with improved oral intake.  2/22- slowly improving  2/23- improving

## 2018-02-23 NOTE — PROGRESS NOTES
[facility] Hospital Medicine  Progress Note    Patient Name: Kelly Mariano  MRN: [id number]  Patient Class: IP- Inpatient   Admission Date: 2/9/2018  Length of Stay: 14 days  Attending Physician: Brittni Lagos MD  Primary Care Provider: No primary care provider on file.        Subjective:     Principal Problem:Acute combined systolic and diastolic heart failure    HPI:  Kelly Mariano is a 66 yo female who states she has not seen a physician since 2009. EMS brought her to the ED today as the patient reported generalized weakness that has worsened over the last 2 days. She had trouble getting off the toilet and when she di,  she had fallen. EMS stated living conditions were deplorable with cat feces on the ibeth. (Elderly Protective Services contacted). Pt is noted with cellulitis and wounds to the bilateral feet and lower legs. Pt describes anxiety attacks, SOB, leg swelling and difficulty walking. Vital signs showed tachycardia = 112, pt afebrile with controlled blood pressure. Labs indicate thrombocytopenia, metabolic alkalosis, troponin 0.114, BNP > 4900, initial lactic acid 2.3;  CXR shows pulmonary edema and a moderate right sided pleural effusion.    Hospital Course:  2/10- no fever, other vss. Pt feels weakness, leg swelling and sob improving.Blood cultures positive for staph, on vanc. and guillermo. MRI negative for osteo. ID consulted. CTA BLE to determine if adequate circulation. Per nurse, concerned for left breast deformity, concerning for malignancy, hemonc consulted.   2/11 - Legs swelling and weakness improved, no longer sob. Echo shows ejection fraction 10%, diuresing well on lasix. Per cards left heart cath or stress test when better. Seen by hem/onc this am, concern for breast ca, right pleural effusion drained today w/o complications, swallow study positive for aspiration, counseled patient. Continued on antibx for lower extremity cellulitis. CT legs with no obvious arterial occlusion,  however CTA lower extremeties describes Ulcerative plaque versus small focal dissection measuring 8 mm in the left common iliac artery which causes a mild to moderate grade focal stenosis just above the bifurcation into the left external and left internal iliac arteries.  Contacted Brett Puga, vascular surgeon, no intervention needed now. Recommending lower extremity leg wraps and follow up w/vascular in outpt.   2/12 - Patient feeling better, platelets trending down, crp elevated at 28, sed rate normal. Blood cultures positive for s. Aeureus, awaiting sensitivity.  Xray with tiny right pneumothorax after thoracentesis. General sx biopsied breast mass.   2/13 - Pt with alt mental status this am, alkalotic, possibly secondary to over-diuresis and thoracentesis, xray shows pleural effusion, returned possibly due to chf and/or met. Breast ca. Case discussed w/oncology and no additional imaging needed at this point. Blood cultures positive for MRSA, vanc continued  2/14 - increased lethargy, bicarb improved,chest xray unchanged,  awaiting breast biopsy path report, being diuresed w/acetozolamide, attempted to contact Xavier Rosenbaum, left vmail. Meeting with case management and elderly protection services to discuss contacting any family member of friend, ordered CT head/chest/abd/pelvis   2/15 - vss, including pulse ox on 2 Liters, mentation slightly improved, CT head/chest/abd/pelvis - head no acute processes, chest/abd/pelvis - possible pelvic mass cannot exclude bone mets  2/16 - ABG and platelets improved , cxray unchanged, cytology from thoracentesis negative for malignancy.   mentation drastically improved, pt able to re-state conversation w/hemonc this am. Pt wanting full work up and tx for breast ca at this point. Pt requesting nursing home placement, is demonstrating competency in decision making. However concern for grave disability due to such severe self-neglect in home environment, that she is better suited  to live in a supervised setting.     2/17- pt looks comfortable and more alert and responsive. No walking yet. Both legs covered in tight dressing which is CDI. CXR shows recurrence of R sided effusion, however no SOB and no malignancy seen on thoracentesis.    2/18- continues to improve slowly, repeat Blood Cx x 2 remain NGTD. Dressing is clean, eating drinking better, now more open to placement. Await Breast Biopsy    2/19- feels much better, leg wounds improved, getting OT/ PT/ St and improving, eating better, HCO3 improved after switching bumex to Diamox. Will get a repeat CXR in am.    2/20-- feels tired, lying in bed, states she walked three times yesterday-- hence exhausted. Still await any SNF accepting her. She refused PT/OT today. Repeat CXR today appears better. HCO3 improved on diamox. Legs look better.will give inj B 12 and start some ritalin as well.  2/21- resting in bed quietly, did a little exercise with OT/ PT but not much. Started on Ritalin today to improve vigor and vitality and to improved appetite, await SNF placement.    2/22- feels better except some abd bloating, gassiness, has had a regular BM in a couple of days. Remains in bed mostly, await SNF placement.   2/23- looks much better, walking in the hallway with PT. Await placement authorization from her insurance.    Interval History: looks better, walking with PT.    Review of Systems   Constitutional: Positive for activity change, appetite change and fatigue. Negative for chills, diaphoresis, fever and unexpected weight change.   HENT: Negative for congestion, dental problem, ear discharge, ear pain, hearing loss, mouth sores, postnasal drip, sinus pressure, sore throat, tinnitus, trouble swallowing and voice change.    Eyes: Negative for photophobia, pain and visual disturbance.   Respiratory: Positive for shortness of breath. Negative for cough, chest tightness and wheezing.    Cardiovascular: Negative for chest pain, palpitations and  leg swelling.   Gastrointestinal: Negative for abdominal distention, abdominal pain, blood in stool, constipation, diarrhea, nausea and vomiting.   Endocrine: Negative for cold intolerance, heat intolerance, polydipsia, polyphagia and polyuria.   Genitourinary: Negative for difficulty urinating, dysuria, flank pain, frequency, hematuria, urgency, vaginal bleeding and vaginal discharge.   Musculoskeletal: Negative for arthralgias, back pain, gait problem, joint swelling and myalgias.   Skin: Negative for pallor and rash.   Allergic/Immunologic: Negative for immunocompromised state.   Neurological: Negative for dizziness, syncope, weakness, light-headedness, numbness and headaches.   Hematological: Negative for adenopathy. Does not bruise/bleed easily.   Psychiatric/Behavioral: Positive for confusion. Negative for agitation and dysphoric mood. The patient is not nervous/anxious.      Objective:     Vital Signs (Most Recent):  Temp: 97.4 °F (36.3 °C) (02/23/18 1115)  Pulse: 85 (02/23/18 1115)  Resp: 20 (02/23/18 1115)  BP: (!) 97/54 (02/23/18 1115)  SpO2: 100 % (02/23/18 1134) Vital Signs (24h Range):  Temp:  [97.4 °F (36.3 °C)-97.8 °F (36.6 °C)] 97.4 °F (36.3 °C)  Pulse:  [85-99] 85  Resp:  [18-20] 20  SpO2:  [95 %-100 %] 100 %  BP: ()/(46-56) 97/54     Weight: 58.3 kg (128 lb 8.5 oz)  Body mass index is 22.06 kg/m².  No intake or output data in the 24 hours ending 02/23/18 1546   Physical Exam   Constitutional: She is oriented to person, place, and time.   Frail   HENT:   Head: Atraumatic.   Eyes: EOM are normal.   Neck: Neck supple.   Cardiovascular:   Murmur heard.  S1 and S2 irregular   Pulmonary/Chest: Effort normal. She has rales.   Breasts sounds decreased bilaterally   Abdominal: Soft.   Musculoskeletal: She exhibits no edema.   Neurological: She is alert and oriented to person, place, and time.   Skin: Skin is warm.   Psychiatric: She has a normal mood and affect. Her behavior is normal.   Nursing note  and vitals reviewed.      Significant Labs:   BMP:   Recent Labs  Lab 02/23/18  0531   GLU 96      K 4.0   CL 98   CO2 32*   BUN 20   CREATININE 0.5   CALCIUM 9.1   MG 1.9     CBC:   Recent Labs  Lab 02/22/18  0516 02/23/18  0530   WBC 5.52 6.22   HGB 12.5 12.2   HCT 39.5 38.4   * 138*     All pertinent labs within the past 24 hours have been reviewed.    Significant Imaging: I have reviewed all pertinent imaging results/findings within the past 24 hours.    Assessment/Plan:      * Acute combined systolic and diastolic heart failure    Telemetry monitoring, daily weights, low sodium diet, fluid restriction,    Lasix 20 mg IV twice  Cardiology consult  Lisinopril 2.5 mg  Elevated troponin likely due to congestive heart failure - trend  2/10 -   1 - Moderate left atrial enlargement.     2 - Eccentric hypertrophy.     3 - No wall motion abnormalities.     4 - Severely depressed left ventricular systolic function (EF 10-15%).     5 - Impaired LV relaxation, normal LAP (grade 1 diastolic dysfunction).     6 - Low normal to mildly depressed right ventricular systolic function .     7 - The estimated PA systolic pressure is 25 mmHg.     8 - Mild to moderate mitral regurgitation.     9 - Mild to moderate tricuspid regurgitation.     10 - Intermediate central venous pressure.     11 - Right pleural effusion.  12 - cards following  2/16 - case discussed w/cards, pt will need stress test and possibly ischemic work up prior to defibrillator placement. In meantime will optimize med management. Unclear if pulm effusion is due to heart failure as cytology showed no malignancy.    2/17- needs further diuresis but due to contraction alkalosis may start Diamox.    2/18- HCO3 now 43, will switch Bumex and Zaroxolyn to Diamox    2/19- improving contraction alkalosis with >14 L removed so far    2/20- improved    2/21- continues to improve on Diamox  2/22- improved, will d/c Diamox  2/23- looks well, much better, still  quiet weak and needs Pt/OT with a walker  Await placement        Pleural effusion on right    Diuresis with IV lasix  Supplemental oxygen to maintain sat > 92 %  Will likely need thoracentesis, consult Pulmonology when needed  2/10 - moderate size, concerning for malignancy due to left breast deformity (metastic disease?)  2/12 - will follow path report analysis of pleural fluid  2/14 - will discuss w/pulm if repeat thoracentesis needed and follow up studies from previous    2/17- Transudative Effusion, neg for malignancy-- most likely related to CHF/ CMP.    2/18- continue Diuretics, now Diamox    2/19- repeat CXR in am-- if recurs, may need repeat thoracentesis    2/20- continue diuresis at present  2/21- improved on Diuretics    2/22- improved, repeat CXR in am    2/23-- improved, will resume Lasix        Physical debility    Severe debility and weakness associated with severe PCM  Continue OT/PT and nutritional build up  Add Ritalin and give B12 IM  Remains severely disabled but just started on Ritalin    Appears better, await SNF placement     improving          Protein-calorie malnutrition, moderate    Will start Folbic, MVI  Nutrition consult  Likely sec to severe CHf and Breast mass and poor oral intake PTA.  Needs Beneprotein and Boost supplements    2/18 - Expected to improve with improved intake  2/19- improving, getting stronger  2/20- add boost and beneprotein  2/21- improving with improved oral intake.  2/22- slowly improving  2/23- improving          VTE Risk Mitigation         Ordered     heparin (porcine) injection 5,000 Units  Every 8 hours     Route:  Subcutaneous        02/09/18 1749     Medium Risk of VTE  Once      02/09/18 1300              Brittni Lagos MD  Department of Hospital Medicine   [facility]

## 2018-02-23 NOTE — PROGRESS NOTES
02/23/18 0930   Handoff Report   Given To ENOC Wells   Pain/Comfort Assessments   Pain Assessment Performed Yes       Number Scale   Presence of Pain complains of pain/discomfort   Location other (see comments)  (buttock/coccyx)   Pain Rating: Rest 4   Nonverbal Indicators of Pain moaning   Pain Management Interventions position adjusted   Skin   Skin WDL ex   Skin Color Pale   Skin Temperature warm   Skin Moisture Dry   Skin Elasticity Brisk   Harjit Risk Assessment   Sensory Perception 4-->no impairment   Moisture 3-->occasionally moist   Activity 1-->bedfast   Mobility 2-->very limited   Nutrition 2-->probably inadequate   Friction and Shear 1-->problem   Harjit Score 13       Wound 02/09/18 Ulceration lower Leg   Date First Assessed: 02/09/18   Pre-existing: Yes  Wound Type: Ulceration  Side: Left  Orientation: lower  Location: Leg   Wound WDL ex   Dressing Appearance Dry;Intact   Drainage Amount Scant   Drainage Characteristics/Odor Serous   Appearance Red;Moist   Periwound Area Dry;Intact   Care Cleansed with:;Soap and water   Dressing Hydrofiber;Silver;Foam   Periwound Care Cleansed with pH balanced cleanser;Dry periwound area maintained;Moisturizer applied   Dressing Change Due 02/26/18       Wound 02/09/18 Ulceration lower Leg   Date First Assessed: 02/09/18   Pre-existing: Yes  Wound Type: Ulceration  Side: Right  Orientation: lower  Location: Leg   Wound WDL ex   Dressing Appearance Dry;Intact   Drainage Amount Scant   Drainage Characteristics/Odor Serous   Appearance Pink;Moist   Tissue loss description Full thickness   Periwound Area Dry;Intact   Wound Edges Open   Care Cleansed with:;Soap and water;Applied:;Skin Barrier   Dressing Hydrofiber;Silver;Foam   Periwound Care Cleansed with pH balanced cleanser;Dry periwound area maintained;Moisturizer applied   Dressing Change Due 02/26/18       Wound 02/12/18 2100 Incontinence associated dermatitis midline Buttocks   Date First Assessed/Time First  Assessed: 02/12/18 2100   Pre-existing: Yes  Wound Type: Incontinence associated dermatitis  Side: Left  Orientation: midline  Location: Buttocks   Wound WDL ex   Drainage Amount None   Appearance Red;Moist   Tissue loss description Partial thickness   Periwound Area Redness;Moist   Wound Edges Open   Care Cleansed with:;Soap and water;Applied:;Skin Barrier  (critic aid paste)   Dressing Removed       Pressure Injury 02/19/18 1045 Coccyx Stage 2   Date First Assessed/Time First Assessed: 02/19/18 1045   Location: Coccyx  Is this injury device related?: No  Staging: Stage 2  Wound Length (cm): 3  Wound Width (cm): 3   Staging 2   Dressing Appearance Intact;Moist drainage   Drainage Amount Small   Drainage Characteristics/Odor Serosanguineous   Appearance Red;Moist   Tissue loss description Partial thickness   Periwound Area Denuded;Moist;Redness   Wound Edges Jagged   Wound Length (cm) 3   Wound Width (cm) 3   Care Cleansed with:;Soap and water;Applied:;Skin Barrier  (critic aid paste)   Dressing Removed  (critic aid paste)   Skin Interventions   Pressure Reduction Devices Pressure-redistributing mattress utilized;Foam padding utilized   Pressure Reduction Techniques frequent weight shift encouraged;heels elevated off bed;positioned off wounds   Skin Protection Adhesive use limited;Incontinence pads;Skin sealant/moisture barrier   Positioning   Body Position side-lying, right   Head of Bed (HOB) HOB at 20 degrees   Positioning/Transfer Devices pillows;wedge;in use     Follow up visit with Ms. Mariano for Stage 2 pressure injury to coccyx, IAD, and BLE ulcerations. Patient found lying on right side in bed, c/o pain to bilateral buttock/sacrum at this time and primary nurse ENOC Wells notified. Diaper removed from patient at this time, soiled with urine.  Sacral foam dressing removed at this time. Incontinence care provided with easi cleanse foam wipes and patted dry.  IAD again noted with red moist irritation to  perineum, sarah-rectal, and gluteal fold, with multiple areas of partial thickness tissue loss and red moist wound beds. Critic aid Paste barrier applied in thin layer to affected areas.  Stage 2 pressure injury again noted to coccygeal region with moist red wound bed. This wound is aggrivated by moisture from incontinence, as well as foam dressing. Critic aid Paste barrier applied to wound in thin layer as well.  Recommend no sacral foam dressing and NO BRIEFS/DIAPERS for this patient.    Dressing removed from BLE.  BLE dry and flaky with healing VLU noted to RLE.  Cleansed with easi cleanse foam wipes and patted dry. sween cream applied to BLE.  Aquacel AG applied to open wound beds x3 noted to RLE and covered with Aquacel foam pro dressing to secure.  Continued care per updated orders.

## 2018-02-23 NOTE — ASSESSMENT & PLAN NOTE
Will start Folbic, MVI  Nutrition consult  Likely sec to severe CHf and Breast mass and poor oral intake PTA.  Needs Beneprotein and Boost supplements    2/18 - Expected to improve with improved intake  2/19- improving, getting stronger  2/20- add boost and beneprotein  2/21- improving with improved oral intake.  2/22- slowly improving

## 2018-02-23 NOTE — PLAN OF CARE
Problem: Patient Care Overview  Goal: Plan of Care Review      Outcome: Ongoing (interventions implemented as appropriate)  PATIENT DID WELL WITH SHORT GT DISTANCE 10'X2 IN ROOM TODAY AT MIN TO MOD ASSIST X1, 2ND ASSIST FOR SAFETY WITH RW.

## 2018-02-23 NOTE — PLAN OF CARE
Problem: Patient Care Overview  Goal: Plan of Care Review      Outcome: Ongoing (interventions implemented as appropriate)  Patient doing well this shift, sleeping comfortably through night. Incontinence care done, barrier cream applied each time. Turned every 2 hours and positioned with wedge. Blood glucose monitoring before bed and in morning. Plan of care reviewed with patient, verbalized understanding. Bed in low position, lighting adjusted for sleep, call light in reach, door left open for easy visualization. VSS, no distress noted, remains free of injury or falls. Will continue to monitor.

## 2018-02-23 NOTE — PT/OT/SLP PROGRESS
Physical Therapy  Treatment    Kelly Mariano   MRN: [id number]   Admitting Diagnosis: Acute combined systolic and diastolic heart failure    PT Received On: 02/23/18  PT Start Time: 0940     PT Stop Time: 1005    PT Total Time (min): 25 min       Billable Minutes:  Gait Training 15 and Therapeutic Activity 10    Treatment Type: Treatment  PT/PTA: PTA     PTA Visit Number: 5       General Precautions: Standard, fall, aspiration, contact  Orthopedic Precautions: N/A   Braces: N/A         Subjective:  Communicated with NURSE, DYLLAN AND EPIC  prior to session.  PATINET AGREE TO TX NOW.    Pain/Comfort  Pain Rating 1: 0/10    Objective:   Patient found with: peripheral IV, oxygen, telemetry    Functional Mobility:  Bed Mobility:    SUPINE TO SIT , SIT TO SUPINE AT MIN ASSIST X1    Transfers:   SIT TO STAND, STAND TO SIT AT MIN ASSISTX1 , CUES FOR SAFETY WITH T/FS.    Gait:    AMBULATE 10'X2 IN ROOM AT MIN ASSIST X1, 2ND ASSIST FOR SAFETY WITH Rw, CUES TO MAINTAIN BODY ALIGNMENT WITH AD.    Stairs:  N/A    Balance:   Static Sit: FAIR+: Able to take MINIMAL challenges from all directions  Dynamic Sit: FAIR+: Maintains balance through MINIMAL excursions of active trunk motion  Static Stand: POOR: Needs MODERATE assist to maintain  Dynamic stand: POOR: N/A     Therapeutic Activities and Exercises:  Gt, T/FS, MARKELL LE EXERCISES SHORTSEATED.    AM-PAC 6 CLICK MOBILITY  How much help from another person does this patient currently need?   1 = Unable, Total/Dependent Assistance  2 = A lot, Maximum/Moderate Assistance  3 = A little, Minimum/Contact Guard/Supervision  4 = None, Modified [place]/Independent    Turning over in bed (including adjusting bedclothes, sheets and blankets)?: 3  Sitting down on and standing up from a chair with arms (e.g., wheelchair, bedside commode, etc.): 3  Moving from lying on back to sitting on the side of the bed?: 3  Moving to and from a bed to a chair (including a wheelchair)?: 3  Need to walk in  hospital room?: 2  Climbing 3-5 steps with a railing?:  (NOT ATTEMPTED)    AM-PAC Raw Score CMS G-Code Modifier Level of Impairment Assistance   6 % Total / Unable   7 - 9 CM 80 - 100% Maximal Assist   10 - 14 CL 60 - 80% Moderate Assist   15 - 19 CK 40 - 60% Moderate Assist   20 - 22 CJ 20 - 40% Minimal Assist   23 CI 1-20% SBA / CGA   24 CH 0% Independent/ Mod I     Patient left up in chair with all lines intact and call button in reach, NURSING NOTIFIED.    Assessment:  Kelly Mariano is a 67 y.o. female with a medical diagnosis of Acute combined systolic and diastolic heart failure .    Rehab identified problem list/impairments: Rehab identified problem list/impairments: weakness, impaired self care skills, impaired balance, impaired endurance, impaired functional mobilty, gait instability    Rehab potential is good.    Activity tolerance: Fair    Discharge recommendations: Discharge Facility/Level Of Care Needs: nursing facility, skilled     Barriers to discharge:      Equipment recommendations: Equipment Needed After Discharge: walker, rolling     GOALS:    Physical Therapy Goals        Problem: Physical Therapy Goal    Goal Priority Disciplines Outcome Goal Variances Interventions   Physical Therapy Goal     PT/OT, PT      Description:  LTGs to be met within 7 days (2/24/18):  1.  Patient will perform bed mobility with mod assist  2.  Patient will perform functional transfers with mod assist  3.  Patient will sit EOB x 10 mins with good static sitting balance and no c/o fatigue  4.  Patient will participate in gait assessment  5.  Patient will perform BLE therapeutic exercises 10 x 2 in all planes                    PLAN:    Patient to be seen 5 x/week  to address the above listed problems via gait training, therapeutic activities, therapeutic exercises  Plan of Care expires: 02/24/18  Plan of Care reviewed with: patient         Sully Malik, PTA  02/23/2018

## 2018-02-23 NOTE — ASSESSMENT & PLAN NOTE
Severe debility and weakness associated with severe PCM  Continue OT/PT and nutritional build up  Add Ritalin and give B12 IM  Remains severely disabled but just started on Ritalin    Appears better, await SNF placement     improving

## 2018-02-23 NOTE — ASSESSMENT & PLAN NOTE
Severe debility and weakness associated with severe PCM  Continue OT/PT and nutritional build up  Add Ritalin and give B12 IM  Remains severely disabled but just started on Ritalin    Appears better, await SNF placement

## 2018-02-23 NOTE — PROGRESS NOTES
[facility] Hospital Medicine  Progress Note    Patient Name: Kelly Mariano  MRN: [id number]  Patient Class: IP- Inpatient   Admission Date: 2/9/2018  Length of Stay: 13 days  Attending Physician: Brittni Lagos MD  Primary Care Provider: No primary care provider on file.        Subjective:     Principal Problem:Acute combined systolic and diastolic heart failure    HPI:  Kelly Mariano is a 68 yo female who states she has not seen a physician since 2009. EMS brought her to the ED today as the patient reported generalized weakness that has worsened over the last 2 days. She had trouble getting off the toilet and when she di,  she had fallen. EMS stated living conditions were deplorable with cat feces on the ibeth. (Elderly Protective Services contacted). Pt is noted with cellulitis and wounds to the bilateral feet and lower legs. Pt describes anxiety attacks, SOB, leg swelling and difficulty walking. Vital signs showed tachycardia = 112, pt afebrile with controlled blood pressure. Labs indicate thrombocytopenia, metabolic alkalosis, troponin 0.114, BNP > 4900, initial lactic acid 2.3;  CXR shows pulmonary edema and a moderate right sided pleural effusion.    Hospital Course:  2/10- no fever, other vss. Pt feels weakness, leg swelling and sob improving.Blood cultures positive for staph, on vanc. and guillermo. MRI negative for osteo. ID consulted. CTA BLE to determine if adequate circulation. Per nurse, concerned for left breast deformity, concerning for malignancy, hemonc consulted.   2/11 - Legs swelling and weakness improved, no longer sob. Echo shows ejection fraction 10%, diuresing well on lasix. Per cards left heart cath or stress test when better. Seen by hem/onc this am, concern for breast ca, right pleural effusion drained today w/o complications, swallow study positive for aspiration, counseled patient. Continued on antibx for lower extremity cellulitis. CT legs with no obvious arterial occlusion,  however CTA lower extremeties describes Ulcerative plaque versus small focal dissection measuring 8 mm in the left common iliac artery which causes a mild to moderate grade focal stenosis just above the bifurcation into the left external and left internal iliac arteries.  Contacted Brett Puga, vascular surgeon, no intervention needed now. Recommending lower extremity leg wraps and follow up w/vascular in outpt.   2/12 - Patient feeling better, platelets trending down, crp elevated at 28, sed rate normal. Blood cultures positive for s. Aeureus, awaiting sensitivity.  Xray with tiny right pneumothorax after thoracentesis. General sx biopsied breast mass.   2/13 - Pt with alt mental status this am, alkalotic, possibly secondary to over-diuresis and thoracentesis, xray shows pleural effusion, returned possibly due to chf and/or met. Breast ca. Case discussed w/oncology and no additional imaging needed at this point. Blood cultures positive for MRSA, vanc continued  2/14 - increased lethargy, bicarb improved,chest xray unchanged,  awaiting breast biopsy path report, being diuresed w/acetozolamide, attempted to contact Xavier Rosenbaum, left vmail. Meeting with case management and elderly protection services to discuss contacting any family member of friend, ordered CT head/chest/abd/pelvis   2/15 - vss, including pulse ox on 2 Liters, mentation slightly improved, CT head/chest/abd/pelvis - head no acute processes, chest/abd/pelvis - possible pelvic mass cannot exclude bone mets  2/16 - ABG and platelets improved , cxray unchanged, cytology from thoracentesis negative for malignancy.   mentation drastically improved, pt able to re-state conversation w/hemonc this am. Pt wanting full work up and tx for breast ca at this point. Pt requesting nursing home placement, is demonstrating competency in decision making. However concern for grave disability due to such severe self-neglect in home environment, that she is better suited  to live in a supervised setting.     2/17- pt looks comfortable and more alert and responsive. No walking yet. Both legs covered in tight dressing which is CDI. CXR shows recurrence of R sided effusion, however no SOB and no malignancy seen on thoracentesis.    2/18- continues to improve slowly, repeat Blood Cx x 2 remain NGTD. Dressing is clean, eating drinking better, now more open to placement. Await Breast Biopsy    2/19- feels much better, leg wounds improved, getting OT/ PT/ St and improving, eating better, HCO3 improved after switching bumex to Diamox. Will get a repeat CXR in am.    2/20-- feels tired, lying in bed, states she walked three times yesterday-- hence exhausted. Still await any SNF accepting her. She refused PT/OT today. Repeat CXR today appears better. HCO3 improved on diamox. Legs look better.will give inj B 12 and start some ritalin as well.  2/21- resting in bed quietly, did a little exercise with OT/ PT but not much. Started on Ritalin today to improve vigor and vitality and to improved appetite, await SNF placement.    2/22- feels better except some abd bloating, gassiness, has had a regular BM in a couple of days. Remains in bed mostly, await SNF placement.     Interval History: feels better except some abd bloating, gassiness, has had a regular BM in a couple of days. Remains in bed mostly, await SNF placement.     Review of Systems   Constitutional: Positive for activity change, appetite change and fatigue. Negative for chills, diaphoresis, fever and unexpected weight change.   HENT: Negative for congestion, dental problem, ear discharge, ear pain, hearing loss, mouth sores, postnasal drip, sinus pressure, sore throat, tinnitus, trouble swallowing and voice change.    Eyes: Negative for photophobia, pain and visual disturbance.   Respiratory: Positive for shortness of breath. Negative for cough, chest tightness and wheezing.    Cardiovascular: Negative for chest pain, palpitations and  leg swelling.   Gastrointestinal: Negative for abdominal distention, abdominal pain, blood in stool, constipation, diarrhea, nausea and vomiting.   Endocrine: Negative for cold intolerance, heat intolerance, polydipsia, polyphagia and polyuria.   Genitourinary: Negative for difficulty urinating, dysuria, flank pain, frequency, hematuria, urgency, vaginal bleeding and vaginal discharge.   Musculoskeletal: Negative for arthralgias, back pain, gait problem, joint swelling and myalgias.   Skin: Negative for pallor and rash.   Allergic/Immunologic: Negative for immunocompromised state.   Neurological: Negative for dizziness, syncope, weakness, light-headedness, numbness and headaches.   Hematological: Negative for adenopathy. Does not bruise/bleed easily.   Psychiatric/Behavioral: Positive for confusion. Negative for agitation and dysphoric mood. The patient is not nervous/anxious.      Objective:     Vital Signs (Most Recent):  Temp: 98.2 °F (36.8 °C) (02/22/18 1532)  Pulse: 94 (02/22/18 1746)  Resp: 18 (02/22/18 1532)  BP: (!) 90/53 (02/22/18 1532)  SpO2: 96 % (02/22/18 1532) Vital Signs (24h Range):  Temp:  [97.1 °F (36.2 °C)-98.2 °F (36.8 °C)] 98.2 °F (36.8 °C)  Pulse:  [] 94  Resp:  [18-20] 18  SpO2:  [90 %-100 %] 96 %  BP: ()/(47-53) 90/53     Weight: 58.3 kg (128 lb 8.5 oz)  Body mass index is 22.06 kg/m².    Intake/Output Summary (Last 24 hours) at 02/22/18 1803  Last data filed at 02/22/18 1200   Gross per 24 hour   Intake              240 ml   Output                0 ml   Net              240 ml      Physical Exam   Constitutional: She is oriented to person, place, and time.   Frail   HENT:   Head: Atraumatic.   Eyes: EOM are normal.   Neck: Neck supple.   Cardiovascular:   Murmur heard.  S1 and S2 irregular   Pulmonary/Chest: Effort normal. She has rales.   Breasts sounds decreased bilaterally   Abdominal: Soft.   Musculoskeletal: She exhibits no edema.   Neurological: She is alert and oriented to  person, place, and time.   Skin: Skin is warm.   Psychiatric: She has a normal mood and affect.   Nursing note and vitals reviewed.      Significant Labs:   BMP:   Recent Labs  Lab 02/22/18  0516   GLU 72      K 3.6   CL 98   CO2 32*   BUN 21   CREATININE 0.6   CALCIUM 9.2   MG 1.8     CBC:   Recent Labs  Lab 02/21/18  0516 02/22/18  0516   WBC 5.88 5.52   HGB 12.2 12.5   HCT 39.3 39.5   * 122*     CMP:   Recent Labs  Lab 02/21/18  0516 02/22/18  0516    137   K 3.8 3.6   CL 97 98   CO2 35* 32*   GLU 77 72   BUN 18 21   CREATININE 0.5 0.6   CALCIUM 9.1 9.2   PROT 6.2 6.3   ALBUMIN 2.6* 2.7*   BILITOT 0.5 0.5   ALKPHOS 85 91   AST 28 24   ALT 22 19   ANIONGAP 5* 7*   EGFRNONAA >60 >60     All pertinent labs within the past 24 hours have been reviewed.    Significant Imaging: I have reviewed all pertinent imaging results/findings within the past 24 hours.    Assessment/Plan:      * Acute combined systolic and diastolic heart failure    Telemetry monitoring, daily weights, low sodium diet, fluid restriction,    Lasix 20 mg IV twice  Cardiology consult  Lisinopril 2.5 mg  Elevated troponin likely due to congestive heart failure - trend  2/10 -   1 - Moderate left atrial enlargement.     2 - Eccentric hypertrophy.     3 - No wall motion abnormalities.     4 - Severely depressed left ventricular systolic function (EF 10-15%).     5 - Impaired LV relaxation, normal LAP (grade 1 diastolic dysfunction).     6 - Low normal to mildly depressed right ventricular systolic function .     7 - The estimated PA systolic pressure is 25 mmHg.     8 - Mild to moderate mitral regurgitation.     9 - Mild to moderate tricuspid regurgitation.     10 - Intermediate central venous pressure.     11 - Right pleural effusion.  12 - cards following  2/16 - case discussed w/cards, pt will need stress test and possibly ischemic work up prior to defibrillator placement. In meantime will optimize med management. Unclear if pulm  effusion is due to heart failure as cytology showed no malignancy.    2/17- needs further diuresis but due to contraction alkalosis may start Diamox.    2/18- HCO3 now 43, will switch Bumex and Zaroxolyn to Diamox    2/19- improving contraction alkalosis with >14 L removed so far    2/20- improved    2/21- continues to improve on Diamox  2/22- improved, will d/c Diamox          Acute on chronic respiratory failure with hypoxia and hypercapnia    2/17- Sec to CHF, Improving with diuresis and thoracentesis    2/18- improving, will check for Home O2 eval before discharge    2/19- expect further improvement if the pleural effusion resolves  2/20- continue diuresis  2/21- improving with improvement in CHF    2/22- improved        Pleural effusion on right    Diuresis with IV lasix  Supplemental oxygen to maintain sat > 92 %  Will likely need thoracentesis, consult Pulmonology when needed  2/10 - moderate size, concerning for malignancy due to left breast deformity (metastic disease?)  2/12 - will follow path report analysis of pleural fluid  2/14 - will discuss w/pulm if repeat thoracentesis needed and follow up studies from previous    2/17- Transudative Effusion, neg for malignancy-- most likely related to CHF/ CMP.    2/18- continue Diuretics, now Diamox    2/19- repeat CXR in am-- if recurs, may need repeat thoracentesis    2/20- continue diuresis at present  2/21- improved on Diuretics    2/22- improved, repeat CXR in am        Physical debility    Severe debility and weakness associated with severe PCM  Continue OT/PT and nutritional build up  Add Ritalin and give B12 IM  Remains severely disabled but just started on Ritalin    Appears better, await SNF placement             Protein-calorie malnutrition, moderate    Will start Folbic, MVI  Nutrition consult  Likely sec to severe CHf and Breast mass and poor oral intake PTA.  Needs Beneprotein and Boost supplements    2/18 - Expected to improve with improved  intake  2/19- improving, getting stronger  2/20- add boost and beneprotein  2/21- improving with improved oral intake.  2/22- slowly improving          VTE Risk Mitigation         Ordered     heparin (porcine) injection 5,000 Units  Every 8 hours     Route:  Subcutaneous        02/09/18 1749     Medium Risk of VTE  Once      02/09/18 1300              Brittni Lagos MD  Department of Hospital Medicine   [facility]

## 2018-02-23 NOTE — SUBJECTIVE & OBJECTIVE
Interval History: looks better, walking with PT.    Review of Systems   Constitutional: Positive for activity change, appetite change and fatigue. Negative for chills, diaphoresis, fever and unexpected weight change.   HENT: Negative for congestion, dental problem, ear discharge, ear pain, hearing loss, mouth sores, postnasal drip, sinus pressure, sore throat, tinnitus, trouble swallowing and voice change.    Eyes: Negative for photophobia, pain and visual disturbance.   Respiratory: Positive for shortness of breath. Negative for cough, chest tightness and wheezing.    Cardiovascular: Negative for chest pain, palpitations and leg swelling.   Gastrointestinal: Negative for abdominal distention, abdominal pain, blood in stool, constipation, diarrhea, nausea and vomiting.   Endocrine: Negative for cold intolerance, heat intolerance, polydipsia, polyphagia and polyuria.   Genitourinary: Negative for difficulty urinating, dysuria, flank pain, frequency, hematuria, urgency, vaginal bleeding and vaginal discharge.   Musculoskeletal: Negative for arthralgias, back pain, gait problem, joint swelling and myalgias.   Skin: Negative for pallor and rash.   Allergic/Immunologic: Negative for immunocompromised state.   Neurological: Negative for dizziness, syncope, weakness, light-headedness, numbness and headaches.   Hematological: Negative for adenopathy. Does not bruise/bleed easily.   Psychiatric/Behavioral: Positive for confusion. Negative for agitation and dysphoric mood. The patient is not nervous/anxious.      Objective:     Vital Signs (Most Recent):  Temp: 97.4 °F (36.3 °C) (02/23/18 1115)  Pulse: 85 (02/23/18 1115)  Resp: 20 (02/23/18 1115)  BP: (!) 97/54 (02/23/18 1115)  SpO2: 100 % (02/23/18 1134) Vital Signs (24h Range):  Temp:  [97.4 °F (36.3 °C)-97.8 °F (36.6 °C)] 97.4 °F (36.3 °C)  Pulse:  [85-99] 85  Resp:  [18-20] 20  SpO2:  [95 %-100 %] 100 %  BP: ()/(46-56) 97/54     Weight: 58.3 kg (128 lb 8.5 oz)  Body  mass index is 22.06 kg/m².  No intake or output data in the 24 hours ending 02/23/18 1546   Physical Exam   Constitutional: She is oriented to person, place, and time.   Frail   HENT:   Head: Atraumatic.   Eyes: EOM are normal.   Neck: Neck supple.   Cardiovascular:   Murmur heard.  S1 and S2 irregular   Pulmonary/Chest: Effort normal. She has rales.   Breasts sounds decreased bilaterally   Abdominal: Soft.   Musculoskeletal: She exhibits no edema.   Neurological: She is alert and oriented to person, place, and time.   Skin: Skin is warm.   Psychiatric: She has a normal mood and affect. Her behavior is normal.   Nursing note and vitals reviewed.      Significant Labs:   BMP:   Recent Labs  Lab 02/23/18  0531   GLU 96      K 4.0   CL 98   CO2 32*   BUN 20   CREATININE 0.5   CALCIUM 9.1   MG 1.9     CBC:   Recent Labs  Lab 02/22/18  0516 02/23/18  0530   WBC 5.52 6.22   HGB 12.5 12.2   HCT 39.5 38.4   * 138*     All pertinent labs within the past 24 hours have been reviewed.    Significant Imaging: I have reviewed all pertinent imaging results/findings within the past 24 hours.

## 2018-02-23 NOTE — ASSESSMENT & PLAN NOTE
2/17- Sec to CHF, Improving with diuresis and thoracentesis    2/18- improving, will check for Home O2 eval before discharge    2/19- expect further improvement if the pleural effusion resolves  2/20- continue diuresis  2/21- improving with improvement in CHF    2/22- improved

## 2018-02-23 NOTE — ASSESSMENT & PLAN NOTE
Diuresis with IV lasix  Supplemental oxygen to maintain sat > 92 %  Will likely need thoracentesis, consult Pulmonology when needed  2/10 - moderate size, concerning for malignancy due to left breast deformity (metastic disease?)  2/12 - will follow path report analysis of pleural fluid  2/14 - will discuss w/pulm if repeat thoracentesis needed and follow up studies from previous    2/17- Transudative Effusion, neg for malignancy-- most likely related to CHF/ CMP.    2/18- continue Diuretics, now Diamox    2/19- repeat CXR in am-- if recurs, may need repeat thoracentesis    2/20- continue diuresis at present  2/21- improved on Diuretics    2/22- improved, repeat CXR in am    2/23-- improved, will resume Lasix

## 2018-02-23 NOTE — PLAN OF CARE
Per Erna Admissions Liaison at [facility], she spoke with a rep [facility] indicated that they loaded the information in the wrong format and will need to correct the request. They also indicated that they will not be able to correct this until Monday. Erna stated that they told her that they will have an authorization for skilled services for [facility] Monday. Avoidable day emailed to MICHELLE Dept.

## 2018-02-23 NOTE — PT/OT/SLP PROGRESS
"Occupational Therapy      Patient Name:  Kelly Mariano   MRN:  [id number]    Patient not seen today secondary to pt reported " I over did it today." pt adamantly refused." Will follow-up on later date    Reanna Todd OT  2/23/2018   1650  "

## 2018-02-23 NOTE — ASSESSMENT & PLAN NOTE
IV Merrem and Vanco  Wound Care  Arterial US to evaluate blood flow for proper healing  2/12 - s.a. In blood, awaiting sensitivities, continued on vanc and repeat blood cultures    2/15 - wound care following, continued on antibiotics for MRSA bacteremia  On diflucan for tinea to peroneum  2/16 - on vanc,will follow cultures and discuss duration of tx with ID prior to d/c,     2/17- much better, Vanc changed to oral clindamycin.     2/18- improving, continue Clinda    2/19- improving    2/20- better    2/21- much better, on oral clindamycin, may stop soon  2/22- resolved- d/c clindamycin

## 2018-02-23 NOTE — ASSESSMENT & PLAN NOTE
Diuresis with IV lasix  Supplemental oxygen to maintain sat > 92 %  Will likely need thoracentesis, consult Pulmonology when needed  2/10 - moderate size, concerning for malignancy due to left breast deformity (metastic disease?)  2/12 - will follow path report analysis of pleural fluid  2/14 - will discuss w/pulm if repeat thoracentesis needed and follow up studies from previous    2/17- Transudative Effusion, neg for malignancy-- most likely related to CHF/ CMP.    2/18- continue Diuretics, now Diamox    2/19- repeat CXR in am-- if recurs, may need repeat thoracentesis    2/20- continue diuresis at present  2/21- improved on Diuretics    2/22- improved, repeat CXR in am

## 2018-02-23 NOTE — PROGRESS NOTES
Patent complains of gas pain and stated she didn't know when her last bowel movement has taken place. Notified provider and medication was ordered.

## 2018-02-23 NOTE — SUBJECTIVE & OBJECTIVE
Interval History: feels better except some abd bloating, gassiness, has had a regular BM in a couple of days. Remains in bed mostly, await SNF placement.     Review of Systems   Constitutional: Positive for activity change, appetite change and fatigue. Negative for chills, diaphoresis, fever and unexpected weight change.   HENT: Negative for congestion, dental problem, ear discharge, ear pain, hearing loss, mouth sores, postnasal drip, sinus pressure, sore throat, tinnitus, trouble swallowing and voice change.    Eyes: Negative for photophobia, pain and visual disturbance.   Respiratory: Positive for shortness of breath. Negative for cough, chest tightness and wheezing.    Cardiovascular: Negative for chest pain, palpitations and leg swelling.   Gastrointestinal: Negative for abdominal distention, abdominal pain, blood in stool, constipation, diarrhea, nausea and vomiting.   Endocrine: Negative for cold intolerance, heat intolerance, polydipsia, polyphagia and polyuria.   Genitourinary: Negative for difficulty urinating, dysuria, flank pain, frequency, hematuria, urgency, vaginal bleeding and vaginal discharge.   Musculoskeletal: Negative for arthralgias, back pain, gait problem, joint swelling and myalgias.   Skin: Negative for pallor and rash.   Allergic/Immunologic: Negative for immunocompromised state.   Neurological: Negative for dizziness, syncope, weakness, light-headedness, numbness and headaches.   Hematological: Negative for adenopathy. Does not bruise/bleed easily.   Psychiatric/Behavioral: Positive for confusion. Negative for agitation and dysphoric mood. The patient is not nervous/anxious.      Objective:     Vital Signs (Most Recent):  Temp: 98.2 °F (36.8 °C) (02/22/18 1532)  Pulse: 94 (02/22/18 1746)  Resp: 18 (02/22/18 1532)  BP: (!) 90/53 (02/22/18 1532)  SpO2: 96 % (02/22/18 1532) Vital Signs (24h Range):  Temp:  [97.1 °F (36.2 °C)-98.2 °F (36.8 °C)] 98.2 °F (36.8 °C)  Pulse:  [] 94  Resp:   [18-20] 18  SpO2:  [90 %-100 %] 96 %  BP: ()/(47-53) 90/53     Weight: 58.3 kg (128 lb 8.5 oz)  Body mass index is 22.06 kg/m².    Intake/Output Summary (Last 24 hours) at 02/22/18 1803  Last data filed at 02/22/18 1200   Gross per 24 hour   Intake              240 ml   Output                0 ml   Net              240 ml      Physical Exam   Constitutional: She is oriented to person, place, and time.   Frail   HENT:   Head: Atraumatic.   Eyes: EOM are normal.   Neck: Neck supple.   Cardiovascular:   Murmur heard.  S1 and S2 irregular   Pulmonary/Chest: Effort normal. She has rales.   Breasts sounds decreased bilaterally   Abdominal: Soft.   Musculoskeletal: She exhibits no edema.   Neurological: She is alert and oriented to person, place, and time.   Skin: Skin is warm.   Psychiatric: She has a normal mood and affect.   Nursing note and vitals reviewed.      Significant Labs:   BMP:   Recent Labs  Lab 02/22/18  0516   GLU 72      K 3.6   CL 98   CO2 32*   BUN 21   CREATININE 0.6   CALCIUM 9.2   MG 1.8     CBC:   Recent Labs  Lab 02/21/18  0516 02/22/18  0516   WBC 5.88 5.52   HGB 12.2 12.5   HCT 39.3 39.5   * 122*     CMP:   Recent Labs  Lab 02/21/18  0516 02/22/18  0516    137   K 3.8 3.6   CL 97 98   CO2 35* 32*   GLU 77 72   BUN 18 21   CREATININE 0.5 0.6   CALCIUM 9.1 9.2   PROT 6.2 6.3   ALBUMIN 2.6* 2.7*   BILITOT 0.5 0.5   ALKPHOS 85 91   AST 28 24   ALT 22 19   ANIONGAP 5* 7*   EGFRNONAA >60 >60     All pertinent labs within the past 24 hours have been reviewed.    Significant Imaging: I have reviewed all pertinent imaging results/findings within the past 24 hours.

## 2018-02-23 NOTE — ASSESSMENT & PLAN NOTE
Telemetry monitoring, daily weights, low sodium diet, fluid restriction,    Lasix 20 mg IV twice  Cardiology consult  Lisinopril 2.5 mg  Elevated troponin likely due to congestive heart failure - trend  2/10 -   1 - Moderate left atrial enlargement.     2 - Eccentric hypertrophy.     3 - No wall motion abnormalities.     4 - Severely depressed left ventricular systolic function (EF 10-15%).     5 - Impaired LV relaxation, normal LAP (grade 1 diastolic dysfunction).     6 - Low normal to mildly depressed right ventricular systolic function .     7 - The estimated PA systolic pressure is 25 mmHg.     8 - Mild to moderate mitral regurgitation.     9 - Mild to moderate tricuspid regurgitation.     10 - Intermediate central venous pressure.     11 - Right pleural effusion.  12 - cards following  2/16 - case discussed w/cards, pt will need stress test and possibly ischemic work up prior to defibrillator placement. In meantime will optimize med management. Unclear if pulm effusion is due to heart failure as cytology showed no malignancy.    2/17- needs further diuresis but due to contraction alkalosis may start Diamox.    2/18- HCO3 now 43, will switch Bumex and Zaroxolyn to Diamox    2/19- improving contraction alkalosis with >14 L removed so far    2/20- improved    2/21- continues to improve on Diamox  2/22- improved, will d/c Diamox

## 2018-02-23 NOTE — ASSESSMENT & PLAN NOTE
Telemetry monitoring, daily weights, low sodium diet, fluid restriction,    Lasix 20 mg IV twice  Cardiology consult  Lisinopril 2.5 mg  Elevated troponin likely due to congestive heart failure - trend  2/10 -   1 - Moderate left atrial enlargement.     2 - Eccentric hypertrophy.     3 - No wall motion abnormalities.     4 - Severely depressed left ventricular systolic function (EF 10-15%).     5 - Impaired LV relaxation, normal LAP (grade 1 diastolic dysfunction).     6 - Low normal to mildly depressed right ventricular systolic function .     7 - The estimated PA systolic pressure is 25 mmHg.     8 - Mild to moderate mitral regurgitation.     9 - Mild to moderate tricuspid regurgitation.     10 - Intermediate central venous pressure.     11 - Right pleural effusion.  12 - cards following  2/16 - case discussed w/cards, pt will need stress test and possibly ischemic work up prior to defibrillator placement. In meantime will optimize med management. Unclear if pulm effusion is due to heart failure as cytology showed no malignancy.    2/17- needs further diuresis but due to contraction alkalosis may start Diamox.    2/18- HCO3 now 43, will switch Bumex and Zaroxolyn to Diamox    2/19- improving contraction alkalosis with >14 L removed so far    2/20- improved    2/21- continues to improve on Diamox  2/22- improved, will d/c Diamox  2/23- looks well, much better, still quiet weak and needs Pt/OT with a walker  Await placement

## 2018-02-23 NOTE — ASSESSMENT & PLAN NOTE
2/17- Sec to CHF, Improving with diuresis and thoracentesis    2/18- improving, will check for Home O2 eval before discharge    2/19- expect further improvement if the pleural effusion resolves  2/20- continue diuresis  2/21- improving with improvement in CHF    2/22- improved    2/23- resolved, pulse Ox 96 % on RA

## 2018-02-24 PROBLEM — L03.115 BILATERAL CELLULITIS OF LOWER LEG: Status: RESOLVED | Noted: 2018-02-23 | Resolved: 2018-02-24

## 2018-02-24 PROBLEM — L89.152 PRESSURE ULCER OF COCCYGEAL REGION, STAGE 2: Status: RESOLVED | Noted: 2018-02-23 | Resolved: 2018-02-24

## 2018-02-24 PROBLEM — L03.116 BILATERAL CELLULITIS OF LOWER LEG: Status: RESOLVED | Noted: 2018-02-23 | Resolved: 2018-02-24

## 2018-02-24 PROCEDURE — 63600175 PHARM REV CODE 636 W HCPCS: Performed by: NURSE PRACTITIONER

## 2018-02-24 PROCEDURE — 25000003 PHARM REV CODE 250: Performed by: INTERNAL MEDICINE

## 2018-02-24 PROCEDURE — 96372 THER/PROPH/DIAG INJ SC/IM: CPT

## 2018-02-24 PROCEDURE — 25000003 PHARM REV CODE 250: Performed by: EMERGENCY MEDICINE

## 2018-02-24 PROCEDURE — 27000221 HC OXYGEN, UP TO 24 HOURS

## 2018-02-24 PROCEDURE — 25000003 PHARM REV CODE 250: Performed by: NURSE PRACTITIONER

## 2018-02-24 PROCEDURE — 97110 THERAPEUTIC EXERCISES: CPT

## 2018-02-24 PROCEDURE — 25000003 PHARM REV CODE 250: Performed by: FAMILY MEDICINE

## 2018-02-24 PROCEDURE — 94761 N-INVAS EAR/PLS OXIMETRY MLT: CPT

## 2018-02-24 PROCEDURE — 21400001 HC TELEMETRY ROOM

## 2018-02-24 RX ORDER — DICYCLOMINE HYDROCHLORIDE 10 MG/1
10 CAPSULE ORAL 4 TIMES DAILY PRN
Status: DISCONTINUED | OUTPATIENT
Start: 2018-02-24 | End: 2018-02-26 | Stop reason: HOSPADM

## 2018-02-24 RX ORDER — MIDODRINE HYDROCHLORIDE 2.5 MG/1
2.5 TABLET ORAL
Status: DISCONTINUED | OUTPATIENT
Start: 2018-02-24 | End: 2018-02-26 | Stop reason: HOSPADM

## 2018-02-24 RX ADMIN — HEPARIN SODIUM 5000 UNITS: 5000 INJECTION, SOLUTION INTRAVENOUS; SUBCUTANEOUS at 09:02

## 2018-02-24 RX ADMIN — METOPROLOL SUCCINATE 50 MG: 50 TABLET, EXTENDED RELEASE ORAL at 08:02

## 2018-02-24 RX ADMIN — METHYLPHENIDATE HYDROCHLORIDE 5 MG: 5 TABLET ORAL at 12:02

## 2018-02-24 RX ADMIN — ANTI-FUNGAL POWDER MICONAZOLE NITRATE TALC FREE: 1.42 POWDER TOPICAL at 08:02

## 2018-02-24 RX ADMIN — ACETAMINOPHEN 650 MG: 325 TABLET, FILM COATED ORAL at 01:02

## 2018-02-24 RX ADMIN — SIMETHICONE CHEW TAB 80 MG 80 MG: 80 TABLET ORAL at 08:02

## 2018-02-24 RX ADMIN — Medication 1 TABLET: at 08:02

## 2018-02-24 RX ADMIN — FUROSEMIDE 20 MG: 10 INJECTION, SOLUTION INTRAMUSCULAR; INTRAVENOUS at 08:02

## 2018-02-24 RX ADMIN — THERA TABS 1 TABLET: TAB at 08:02

## 2018-02-24 RX ADMIN — ACETAMINOPHEN 650 MG: 325 TABLET, FILM COATED ORAL at 05:02

## 2018-02-24 RX ADMIN — ACETAMINOPHEN 650 MG: 325 TABLET, FILM COATED ORAL at 08:02

## 2018-02-24 RX ADMIN — Medication 100 MG: at 08:02

## 2018-02-24 RX ADMIN — HEPARIN SODIUM 5000 UNITS: 5000 INJECTION, SOLUTION INTRAVENOUS; SUBCUTANEOUS at 05:02

## 2018-02-24 RX ADMIN — DICYCLOMINE HYDROCHLORIDE 10 MG: 10 CAPSULE ORAL at 05:02

## 2018-02-24 RX ADMIN — HEPARIN SODIUM 5000 UNITS: 5000 INJECTION, SOLUTION INTRAVENOUS; SUBCUTANEOUS at 01:02

## 2018-02-24 RX ADMIN — MIDODRINE HYDROCHLORIDE 2.5 MG: 2.5 TABLET ORAL at 03:02

## 2018-02-24 RX ADMIN — CALCIUM CARBONATE (ANTACID) CHEW TAB 500 MG 500 MG: 500 CHEW TAB at 01:02

## 2018-02-24 NOTE — PLAN OF CARE
Problem: Patient Care Overview  Goal: Plan of Care Review      Outcome: Ongoing (interventions implemented as appropriate)  Patient performed exs in bed. She reports low BP and diarrhea today. Resume per POC and functional mobility tomorrow.

## 2018-02-24 NOTE — PT/OT/SLP PROGRESS
Occupational Therapy      Patient Name:  Kelly Mariano   MRN:  [id number]    Patient not seen today secondary to toileting on bed pan x2 attempts (0938 and 1220). Will follow-up will reassess next visit.    Reanna Todd OT  2/24/2018

## 2018-02-24 NOTE — PROGRESS NOTES
[facility] Hospital Medicine  Progress Note    Patient Name: Kelly Mariano  MRN: [id number]  Patient Class: IP- Inpatient   Admission Date: 2/9/2018  Length of Stay: 15 days  Attending Physician: Brittni Lagos MD  Primary Care Provider: No primary care provider on file.        Subjective:     Principal Problem:Acute combined systolic and diastolic heart failure    HPI:  Kelly Mariano is a 68 yo female who states she has not seen a physician since 2009. EMS brought her to the ED today as the patient reported generalized weakness that has worsened over the last 2 days. She had trouble getting off the toilet and when she di,  she had fallen. EMS stated living conditions were deplorable with cat feces on the ibeth. (Elderly Protective Services contacted). Pt is noted with cellulitis and wounds to the bilateral feet and lower legs. Pt describes anxiety attacks, SOB, leg swelling and difficulty walking. Vital signs showed tachycardia = 112, pt afebrile with controlled blood pressure. Labs indicate thrombocytopenia, metabolic alkalosis, troponin 0.114, BNP > 4900, initial lactic acid 2.3;  CXR shows pulmonary edema and a moderate right sided pleural effusion.    Hospital Course:  2/10- no fever, other vss. Pt feels weakness, leg swelling and sob improving.Blood cultures positive for staph, on vanc. and guillermo. MRI negative for osteo. ID consulted. CTA BLE to determine if adequate circulation. Per nurse, concerned for left breast deformity, concerning for malignancy, hemonc consulted.   2/11 - Legs swelling and weakness improved, no longer sob. Echo shows ejection fraction 10%, diuresing well on lasix. Per cards left heart cath or stress test when better. Seen by hem/onc this am, concern for breast ca, right pleural effusion drained today w/o complications, swallow study positive for aspiration, counseled patient. Continued on antibx for lower extremity cellulitis. CT legs with no obvious arterial occlusion,  however CTA lower extremeties describes Ulcerative plaque versus small focal dissection measuring 8 mm in the left common iliac artery which causes a mild to moderate grade focal stenosis just above the bifurcation into the left external and left internal iliac arteries.  Contacted Brett Puga, vascular surgeon, no intervention needed now. Recommending lower extremity leg wraps and follow up w/vascular in outpt.   2/12 - Patient feeling better, platelets trending down, crp elevated at 28, sed rate normal. Blood cultures positive for s. Aeureus, awaiting sensitivity.  Xray with tiny right pneumothorax after thoracentesis. General sx biopsied breast mass.   2/13 - Pt with alt mental status this am, alkalotic, possibly secondary to over-diuresis and thoracentesis, xray shows pleural effusion, returned possibly due to chf and/or met. Breast ca. Case discussed w/oncology and no additional imaging needed at this point. Blood cultures positive for MRSA, vanc continued  2/14 - increased lethargy, bicarb improved,chest xray unchanged,  awaiting breast biopsy path report, being diuresed w/acetozolamide, attempted to contact Xavier Rosenbaum, left vmail. Meeting with case management and elderly protection services to discuss contacting any family member of friend, ordered CT head/chest/abd/pelvis   2/15 - vss, including pulse ox on 2 Liters, mentation slightly improved, CT head/chest/abd/pelvis - head no acute processes, chest/abd/pelvis - possible pelvic mass cannot exclude bone mets  2/16 - ABG and platelets improved , cxray unchanged, cytology from thoracentesis negative for malignancy.   mentation drastically improved, pt able to re-state conversation w/hemonc this am. Pt wanting full work up and tx for breast ca at this point. Pt requesting nursing home placement, is demonstrating competency in decision making. However concern for grave disability due to such severe self-neglect in home environment, that she is better suited  to live in a supervised setting.     2/17- pt looks comfortable and more alert and responsive. No walking yet. Both legs covered in tight dressing which is CDI. CXR shows recurrence of R sided effusion, however no SOB and no malignancy seen on thoracentesis.    2/18- continues to improve slowly, repeat Blood Cx x 2 remain NGTD. Dressing is clean, eating drinking better, now more open to placement. Await Breast Biopsy    2/19- feels much better, leg wounds improved, getting OT/ PT/ St and improving, eating better, HCO3 improved after switching bumex to Diamox. Will get a repeat CXR in am.    2/20-- feels tired, lying in bed, states she walked three times yesterday-- hence exhausted. Still await any SNF accepting her. She refused PT/OT today. Repeat CXR today appears better. HCO3 improved on diamox. Legs look better.will give inj B 12 and start some ritalin as well.  2/21- resting in bed quietly, did a little exercise with OT/ PT but not much. Started on Ritalin today to improve vigor and vitality and to improved appetite, await SNF placement.    2/22- feels better except some abd bloating, gassiness, has had a regular BM in a couple of days. Remains in bed mostly, await SNF placement.   2/23- looks much better, walking in the hallway with PT. Await placement authorization from her insurance.  2/24- looks well but c/o abd pain despite the large BM yesterday and two more BMs today, improved after Ritalin held and recurred with the Ritalin again. However her BP improved with Ritalin. So Ritalin stopped and replaced with Midodrin and Bentyl added for abd pain prn.     Interval History: looks well but c/o abd pain despite the large BM yesterday and two more BMs today, improved after Ritalin held and recurred with the Ritalin again. However her BP improved with Ritalin. So Ritalin stopped and replaced with Midodrin and Bentyl added for abd pain prn.     Review of Systems   Constitutional: Positive for activity change,  appetite change and fatigue. Negative for chills, diaphoresis, fever and unexpected weight change.   HENT: Negative for congestion, dental problem, ear discharge, ear pain, hearing loss, mouth sores, postnasal drip, sinus pressure, sore throat, tinnitus, trouble swallowing and voice change.    Eyes: Negative for photophobia, pain and visual disturbance.   Respiratory: Positive for shortness of breath. Negative for cough, chest tightness and wheezing.    Cardiovascular: Negative for chest pain, palpitations and leg swelling.   Gastrointestinal: Negative for abdominal distention, abdominal pain, blood in stool, constipation, diarrhea, nausea and vomiting.   Endocrine: Negative for cold intolerance, heat intolerance, polydipsia, polyphagia and polyuria.   Genitourinary: Negative for difficulty urinating, dysuria, flank pain, frequency, hematuria, urgency, vaginal bleeding and vaginal discharge.   Musculoskeletal: Negative for arthralgias, back pain, gait problem, joint swelling and myalgias.   Skin: Negative for pallor and rash.   Allergic/Immunologic: Negative for immunocompromised state.   Neurological: Negative for dizziness, syncope, weakness, light-headedness, numbness and headaches.   Hematological: Negative for adenopathy. Does not bruise/bleed easily.   Psychiatric/Behavioral: Positive for confusion. Negative for agitation and dysphoric mood. The patient is not nervous/anxious.      Objective:     Vital Signs (Most Recent):  Temp: 98.2 °F (36.8 °C) (02/24/18 1148)  Pulse: 88 (02/24/18 1148)  Resp: 16 (02/24/18 1148)  BP: (!) 90/52 (02/24/18 1148)  SpO2: 96 % (02/24/18 1148) Vital Signs (24h Range):  Temp:  [97.1 °F (36.2 °C)-98.2 °F (36.8 °C)] 98.2 °F (36.8 °C)  Pulse:  [70-89] 88  Resp:  [16-18] 16  SpO2:  [95 %-100 %] 96 %  BP: ()/(39-72) 90/52     Weight: 54.8 kg (120 lb 13 oz)  Body mass index is 20.74 kg/m².    Intake/Output Summary (Last 24 hours) at 02/24/18 6877  Last data filed at 02/24/18 1000    Gross per 24 hour   Intake              240 ml   Output                0 ml   Net              240 ml      Physical Exam   Constitutional: She is oriented to person, place, and time.   Frail   HENT:   Head: Atraumatic.   Eyes: EOM are normal.   Neck: Neck supple.   Cardiovascular:   Murmur heard.  S1 and S2 irregular   Pulmonary/Chest: Effort normal and breath sounds normal.   Abdominal: Soft.   Musculoskeletal: She exhibits no edema.   Neurological: She is alert and oriented to person, place, and time.   Skin: Skin is warm.   Psychiatric: She has a normal mood and affect. Her behavior is normal.   Nursing note and vitals reviewed.      Significant Labs:   BMP:   Recent Labs  Lab 02/23/18  0531   GLU 96      K 4.0   CL 98   CO2 32*   BUN 20   CREATININE 0.5   CALCIUM 9.1   MG 1.9     CBC:   Recent Labs  Lab 02/23/18  0530   WBC 6.22   HGB 12.2   HCT 38.4   *     All pertinent labs within the past 24 hours have been reviewed.    Significant Imaging: I have reviewed all pertinent imaging results/findings within the past 24 hours.    Assessment/Plan:      * Acute combined systolic and diastolic heart failure    Telemetry monitoring, daily weights, low sodium diet, fluid restriction,    Lasix 20 mg IV twice  Cardiology consult  Lisinopril 2.5 mg  Elevated troponin likely due to congestive heart failure - trend  2/10 -   1 - Moderate left atrial enlargement.     2 - Eccentric hypertrophy.     3 - No wall motion abnormalities.     4 - Severely depressed left ventricular systolic function (EF 10-15%).     5 - Impaired LV relaxation, normal LAP (grade 1 diastolic dysfunction).     6 - Low normal to mildly depressed right ventricular systolic function .     7 - The estimated PA systolic pressure is 25 mmHg.     8 - Mild to moderate mitral regurgitation.     9 - Mild to moderate tricuspid regurgitation.     10 - Intermediate central venous pressure.     11 - Right pleural effusion.  12 - cards following  2/16 -  case discussed w/cards, pt will need stress test and possibly ischemic work up prior to defibrillator placement. In meantime will optimize med management. Unclear if pulm effusion is due to heart failure as cytology showed no malignancy.    2/17- needs further diuresis but due to contraction alkalosis may start Diamox.    2/18- HCO3 now 43, will switch Bumex and Zaroxolyn to Diamox    2/19- improving contraction alkalosis with >14 L removed so far    2/20- improved    2/21- continues to improve on Diamox  2/22- improved, will d/c Diamox  2/23- looks well, much better, still quiet weak and needs Pt/OT with a walker  Await placement    2/24- resume Lasix-- switch to Demadex        Pleural effusion on right    Diuresis with IV lasix  Supplemental oxygen to maintain sat > 92 %  Will likely need thoracentesis, consult Pulmonology when needed  2/10 - moderate size, concerning for malignancy due to left breast deformity (metastic disease?)  2/12 - will follow path report analysis of pleural fluid  2/14 - will discuss w/pulm if repeat thoracentesis needed and follow up studies from previous    2/17- Transudative Effusion, neg for malignancy-- most likely related to CHF/ CMP.    2/18- continue Diuretics, now Diamox    2/19- repeat CXR in am-- if recurs, may need repeat thoracentesis    2/20- continue diuresis at present  2/21- improved on Diuretics    2/22- improved, repeat CXR in am    2/23-- improved, will resume Lasix    2/24- start Demadex        Physical debility    Severe debility and weakness associated with severe PCM  Continue OT/PT and nutritional build up  Add Ritalin and give B12 IM  Remains severely disabled but just started on Ritalin    Appears better, await SNF placement     improving    Continue OT/PT        Protein-calorie malnutrition, moderate    Will start Folbic, MVI  Nutrition consult  Likely sec to severe CHf and Breast mass and poor oral intake PTA.  Needs Beneprotein and Boost supplements    2/18 -  Expected to improve with improved intake  2/19- improving, getting stronger  2/20- add boost and beneprotein  2/21- improving with improved oral intake.  2/22- slowly improving  2/23- improving    2/24- improving, eating well, will switch Ritalin to Midodrine as it is causing her abd pain          VTE Risk Mitigation         Ordered     heparin (porcine) injection 5,000 Units  Every 8 hours     Route:  Subcutaneous        02/09/18 4359     Medium Risk of VTE  Once      02/09/18 1300              Brittni Lagos MD  Department of Hospital Medicine   [facility]

## 2018-02-24 NOTE — ASSESSMENT & PLAN NOTE
Severe debility and weakness associated with severe PCM  Continue OT/PT and nutritional build up  Add Ritalin and give B12 IM  Remains severely disabled but just started on Ritalin    Appears better, await SNF placement     improving    Continue OT/PT

## 2018-02-24 NOTE — ASSESSMENT & PLAN NOTE
Telemetry monitoring, daily weights, low sodium diet, fluid restriction,    Lasix 20 mg IV twice  Cardiology consult  Lisinopril 2.5 mg  Elevated troponin likely due to congestive heart failure - trend  2/10 -   1 - Moderate left atrial enlargement.     2 - Eccentric hypertrophy.     3 - No wall motion abnormalities.     4 - Severely depressed left ventricular systolic function (EF 10-15%).     5 - Impaired LV relaxation, normal LAP (grade 1 diastolic dysfunction).     6 - Low normal to mildly depressed right ventricular systolic function .     7 - The estimated PA systolic pressure is 25 mmHg.     8 - Mild to moderate mitral regurgitation.     9 - Mild to moderate tricuspid regurgitation.     10 - Intermediate central venous pressure.     11 - Right pleural effusion.  12 - cards following  2/16 - case discussed w/cards, pt will need stress test and possibly ischemic work up prior to defibrillator placement. In meantime will optimize med management. Unclear if pulm effusion is due to heart failure as cytology showed no malignancy.    2/17- needs further diuresis but due to contraction alkalosis may start Diamox.    2/18- HCO3 now 43, will switch Bumex and Zaroxolyn to Diamox    2/19- improving contraction alkalosis with >14 L removed so far    2/20- improved    2/21- continues to improve on Diamox  2/22- improved, will d/c Diamox  2/23- looks well, much better, still quiet weak and needs Pt/OT with a walker  Await placement    2/24- resume Lasix-- switch to Demadex

## 2018-02-24 NOTE — PLAN OF CARE
Problem: Patient Care Overview  Goal: Plan of Care Review      Outcome: Ongoing (interventions implemented as appropriate)  Pt. Is aware of plan of care to continue monitoring labs, vitals, respiratory status and that facility placement for rehab is in progress.

## 2018-02-24 NOTE — SUBJECTIVE & OBJECTIVE
Interval History: looks well but c/o abd pain despite the large BM yesterday and two more BMs today, improved after Ritalin held and recurred with the Ritalin again. However her BP improved with Ritalin. So Ritalin stopped and replaced with Midodrin and Bentyl added for abd pain prn.     Review of Systems   Constitutional: Positive for activity change, appetite change and fatigue. Negative for chills, diaphoresis, fever and unexpected weight change.   HENT: Negative for congestion, dental problem, ear discharge, ear pain, hearing loss, mouth sores, postnasal drip, sinus pressure, sore throat, tinnitus, trouble swallowing and voice change.    Eyes: Negative for photophobia, pain and visual disturbance.   Respiratory: Positive for shortness of breath. Negative for cough, chest tightness and wheezing.    Cardiovascular: Negative for chest pain, palpitations and leg swelling.   Gastrointestinal: Negative for abdominal distention, abdominal pain, blood in stool, constipation, diarrhea, nausea and vomiting.   Endocrine: Negative for cold intolerance, heat intolerance, polydipsia, polyphagia and polyuria.   Genitourinary: Negative for difficulty urinating, dysuria, flank pain, frequency, hematuria, urgency, vaginal bleeding and vaginal discharge.   Musculoskeletal: Negative for arthralgias, back pain, gait problem, joint swelling and myalgias.   Skin: Negative for pallor and rash.   Allergic/Immunologic: Negative for immunocompromised state.   Neurological: Negative for dizziness, syncope, weakness, light-headedness, numbness and headaches.   Hematological: Negative for adenopathy. Does not bruise/bleed easily.   Psychiatric/Behavioral: Positive for confusion. Negative for agitation and dysphoric mood. The patient is not nervous/anxious.      Objective:     Vital Signs (Most Recent):  Temp: 98.2 °F (36.8 °C) (02/24/18 1148)  Pulse: 88 (02/24/18 1148)  Resp: 16 (02/24/18 1148)  BP: (!) 90/52 (02/24/18 1148)  SpO2: 96 %  (02/24/18 1148) Vital Signs (24h Range):  Temp:  [97.1 °F (36.2 °C)-98.2 °F (36.8 °C)] 98.2 °F (36.8 °C)  Pulse:  [70-89] 88  Resp:  [16-18] 16  SpO2:  [95 %-100 %] 96 %  BP: ()/(39-72) 90/52     Weight: 54.8 kg (120 lb 13 oz)  Body mass index is 20.74 kg/m².    Intake/Output Summary (Last 24 hours) at 02/24/18 1427  Last data filed at 02/24/18 1000   Gross per 24 hour   Intake              240 ml   Output                0 ml   Net              240 ml      Physical Exam   Constitutional: She is oriented to person, place, and time.   Frail   HENT:   Head: Atraumatic.   Eyes: EOM are normal.   Neck: Neck supple.   Cardiovascular:   Murmur heard.  S1 and S2 irregular   Pulmonary/Chest: Effort normal and breath sounds normal.   Abdominal: Soft.   Musculoskeletal: She exhibits no edema.   Neurological: She is alert and oriented to person, place, and time.   Skin: Skin is warm.   Psychiatric: She has a normal mood and affect. Her behavior is normal.   Nursing note and vitals reviewed.      Significant Labs:   BMP:   Recent Labs  Lab 02/23/18  0531   GLU 96      K 4.0   CL 98   CO2 32*   BUN 20   CREATININE 0.5   CALCIUM 9.1   MG 1.9     CBC:   Recent Labs  Lab 02/23/18  0530   WBC 6.22   HGB 12.2   HCT 38.4   *     All pertinent labs within the past 24 hours have been reviewed.    Significant Imaging: I have reviewed all pertinent imaging results/findings within the past 24 hours.

## 2018-02-24 NOTE — ASSESSMENT & PLAN NOTE
Will start Folbic, MVI  Nutrition consult  Likely sec to severe CHf and Breast mass and poor oral intake PTA.  Needs Beneprotein and Boost supplements    2/18 - Expected to improve with improved intake  2/19- improving, getting stronger  2/20- add boost and beneprotein  2/21- improving with improved oral intake.  2/22- slowly improving  2/23- improving    2/24- improving, eating well, will switch Ritalin to Midodrine as it is causing her abd pain

## 2018-02-24 NOTE — ASSESSMENT & PLAN NOTE
Diuresis with IV lasix  Supplemental oxygen to maintain sat > 92 %  Will likely need thoracentesis, consult Pulmonology when needed  2/10 - moderate size, concerning for malignancy due to left breast deformity (metastic disease?)  2/12 - will follow path report analysis of pleural fluid  2/14 - will discuss w/pulm if repeat thoracentesis needed and follow up studies from previous    2/17- Transudative Effusion, neg for malignancy-- most likely related to CHF/ CMP.    2/18- continue Diuretics, now Diamox    2/19- repeat CXR in am-- if recurs, may need repeat thoracentesis    2/20- continue diuresis at present  2/21- improved on Diuretics    2/22- improved, repeat CXR in am    2/23-- improved, will resume Lasix    2/24- start Demadex

## 2018-02-24 NOTE — PT/OT/SLP PROGRESS
Physical Therapy      Patient Name:  Kelly Mariano   MRN:  [id number]    S: Patient reports not feeling well today.  O: Patient performed B LE AROM and isometrics in bed. She also reports sitting on EOB for breakfast. PT educated patient on OOB with assist only. Pt. Seen from 12:10-12:20.   A: Limited by low BP and diarrhea  P: Cont per POC    Edilberto Arrington, PT

## 2018-02-24 NOTE — PLAN OF CARE
Problem: Patient Care Overview  Goal: Plan of Care Review      Outcome: Ongoing (interventions implemented as appropriate)  Plan of care reviewed with patient, verbalized understanding. On 2 L nasal cannula. Perineal wound care done when incontinence care was provided. Bed in low position, call light in reach, bed alarm set. Turned every two hours on wedge. VSS, remains free of injury or trauma. Will continue to monitor.

## 2018-02-24 NOTE — PLAN OF CARE
Problem: Patient Care Overview  Goal: Plan of Care Review      Outcome: Ongoing (interventions implemented as appropriate)  Plan of care discussed with patient, and patient verbalized understanding.  Patient remained AOx4, 2L Nc, and NSR with some PVCs and trigeminy during the day shift. Used the foam wedge to turn the patient. Patient remained free of falls, accidents, and trama during the day shift.  Bed is in the lowest position and call light is within reach - Continue to monitor.

## 2018-02-24 NOTE — PT/OT/SLP PROGRESS
Speech Language Pathology      Kelly Mariano  MRN: [id number]    Patient not seen today secondary to reported low blood pressure and decreased alertness level  . Will follow-up tomorrow.    Miesha Santos CCC-SLP  02/24/2018

## 2018-02-25 PROCEDURE — 21400001 HC TELEMETRY ROOM

## 2018-02-25 PROCEDURE — 25000003 PHARM REV CODE 250: Performed by: INTERNAL MEDICINE

## 2018-02-25 PROCEDURE — 25000003 PHARM REV CODE 250: Performed by: NURSE PRACTITIONER

## 2018-02-25 PROCEDURE — 92526 ORAL FUNCTION THERAPY: CPT

## 2018-02-25 PROCEDURE — 94761 N-INVAS EAR/PLS OXIMETRY MLT: CPT

## 2018-02-25 PROCEDURE — 63600175 PHARM REV CODE 636 W HCPCS: Performed by: NURSE PRACTITIONER

## 2018-02-25 PROCEDURE — 97110 THERAPEUTIC EXERCISES: CPT

## 2018-02-25 PROCEDURE — 25000003 PHARM REV CODE 250: Performed by: EMERGENCY MEDICINE

## 2018-02-25 PROCEDURE — 97116 GAIT TRAINING THERAPY: CPT

## 2018-02-25 PROCEDURE — 25000003 PHARM REV CODE 250: Performed by: FAMILY MEDICINE

## 2018-02-25 PROCEDURE — 27000221 HC OXYGEN, UP TO 24 HOURS

## 2018-02-25 PROCEDURE — 96372 THER/PROPH/DIAG INJ SC/IM: CPT

## 2018-02-25 RX ORDER — TORSEMIDE 10 MG/1
20 TABLET ORAL DAILY
Status: DISCONTINUED | OUTPATIENT
Start: 2018-02-26 | End: 2018-02-26 | Stop reason: HOSPADM

## 2018-02-25 RX ORDER — MIDODRINE HYDROCHLORIDE 2.5 MG/1
2.5 TABLET ORAL ONCE
Status: COMPLETED | OUTPATIENT
Start: 2018-02-25 | End: 2018-02-25

## 2018-02-25 RX ADMIN — Medication 100 MG: at 09:02

## 2018-02-25 RX ADMIN — ANTI-FUNGAL POWDER MICONAZOLE NITRATE TALC FREE: 1.42 POWDER TOPICAL at 11:02

## 2018-02-25 RX ADMIN — METOPROLOL SUCCINATE 50 MG: 50 TABLET, EXTENDED RELEASE ORAL at 09:02

## 2018-02-25 RX ADMIN — HEPARIN SODIUM 5000 UNITS: 5000 INJECTION, SOLUTION INTRAVENOUS; SUBCUTANEOUS at 09:02

## 2018-02-25 RX ADMIN — THERA TABS 1 TABLET: TAB at 09:02

## 2018-02-25 RX ADMIN — Medication 1 TABLET: at 09:02

## 2018-02-25 RX ADMIN — DICYCLOMINE HYDROCHLORIDE 10 MG: 10 CAPSULE ORAL at 09:02

## 2018-02-25 RX ADMIN — ACETAMINOPHEN 650 MG: 325 TABLET, FILM COATED ORAL at 08:02

## 2018-02-25 RX ADMIN — MIDODRINE HYDROCHLORIDE 2.5 MG: 2.5 TABLET ORAL at 01:02

## 2018-02-25 RX ADMIN — FUROSEMIDE 20 MG: 10 INJECTION, SOLUTION INTRAMUSCULAR; INTRAVENOUS at 09:02

## 2018-02-25 RX ADMIN — MIDODRINE HYDROCHLORIDE 2.5 MG: 2.5 TABLET ORAL at 08:02

## 2018-02-25 RX ADMIN — CALCIUM CARBONATE (ANTACID) CHEW TAB 500 MG 500 MG: 500 CHEW TAB at 01:02

## 2018-02-25 RX ADMIN — HEPARIN SODIUM 5000 UNITS: 5000 INJECTION, SOLUTION INTRAVENOUS; SUBCUTANEOUS at 05:02

## 2018-02-25 RX ADMIN — DICYCLOMINE HYDROCHLORIDE 10 MG: 10 CAPSULE ORAL at 04:02

## 2018-02-25 RX ADMIN — ACETAMINOPHEN 650 MG: 325 TABLET, FILM COATED ORAL at 01:02

## 2018-02-25 RX ADMIN — HEPARIN SODIUM 5000 UNITS: 5000 INJECTION, SOLUTION INTRAVENOUS; SUBCUTANEOUS at 01:02

## 2018-02-25 RX ADMIN — ANTI-FUNGAL POWDER MICONAZOLE NITRATE TALC FREE: 1.42 POWDER TOPICAL at 08:02

## 2018-02-25 RX ADMIN — MIDODRINE HYDROCHLORIDE 2.5 MG: 2.5 TABLET ORAL at 05:02

## 2018-02-25 NOTE — ASSESSMENT & PLAN NOTE
Telemetry monitoring, daily weights, low sodium diet, fluid restriction,    Lasix 20 mg IV twice  Cardiology consult  Lisinopril 2.5 mg  Elevated troponin likely due to congestive heart failure - trend  2/10 -   1 - Moderate left atrial enlargement.     2 - Eccentric hypertrophy.     3 - No wall motion abnormalities.     4 - Severely depressed left ventricular systolic function (EF 10-15%).     5 - Impaired LV relaxation, normal LAP (grade 1 diastolic dysfunction).     6 - Low normal to mildly depressed right ventricular systolic function .     7 - The estimated PA systolic pressure is 25 mmHg.     8 - Mild to moderate mitral regurgitation.     9 - Mild to moderate tricuspid regurgitation.     10 - Intermediate central venous pressure.     11 - Right pleural effusion.  12 - cards following  2/16 - case discussed w/cards, pt will need stress test and possibly ischemic work up prior to defibrillator placement. In meantime will optimize med management. Unclear if pulm effusion is due to heart failure as cytology showed no malignancy.    2/17- needs further diuresis but due to contraction alkalosis may start Diamox.    2/18- HCO3 now 43, will switch Bumex and Zaroxolyn to Diamox    2/19- improving contraction alkalosis with >14 L removed so far    2/20- improved    2/21- continues to improve on Diamox  2/22- improved, will d/c Diamox  2/23- looks well, much better, still quiet weak and needs Pt/OT with a walker  Await placement    2/24- resume Lasix-- switch to Demadex    2/25- stable, BP better on Midodrine          Await placement

## 2018-02-25 NOTE — ASSESSMENT & PLAN NOTE
Severe debility and weakness associated with severe PCM  Continue OT/PT and nutritional build up  Add Ritalin and give B12 IM  Remains severely disabled but just started on Ritalin    Appears better, await SNF placement     improving    Continue OT/PT    Await placement

## 2018-02-25 NOTE — ASSESSMENT & PLAN NOTE
Diuresis with IV lasix  Supplemental oxygen to maintain sat > 92 %  Will likely need thoracentesis, consult Pulmonology when needed  2/10 - moderate size, concerning for malignancy due to left breast deformity (metastic disease?)  2/12 - will follow path report analysis of pleural fluid  2/14 - will discuss w/pulm if repeat thoracentesis needed and follow up studies from previous    2/17- Transudative Effusion, neg for malignancy-- most likely related to CHF/ CMP.    2/18- continue Diuretics, now Diamox    2/19- repeat CXR in am-- if recurs, may need repeat thoracentesis    2/20- continue diuresis at present  2/21- improved on Diuretics    2/22- improved, repeat CXR in am    2/23-- improved, will resume Lasix    2/24- start Demadex    2/25- stable

## 2018-02-25 NOTE — PT/OT/SLP PROGRESS
"Physical Therapy  Treatment    Kelly Mariano   MRN: [id number]   Admitting Diagnosis: Acute combined systolic and diastolic heart failure    PT Received On: 02/25/18  PT Start Time: 0900     PT Stop Time: 0925    PT Total Time (min): 25 min       Billable Minutes:  Gait Training 15 and Therapeutic Exercise 10       PT/PTA: PTA     PTA Visit Number: 1       General Precautions: Standard, fall, aspiration  Orthopedic Precautions: N/A   Braces:           Subjective:  Communicated with epic, nurse-Lakeisha and her CNA-Mary prior to session.  "Oh, I haven't walked to the bathroom before!" (pt was happy to be moving more)     Pain/Comfort  Pain Rating 1: 0/10 (soreness in her tail bone from a bed sore)    Objective:   Patient found with: peripheral IV, oxygen, telemetry    Functional Mobility:  Bed Mobility:   SUPINE TO SIT : MIN A       Transfers:   SIT TO STAND:MIN A  STAND TO SIT: MIN A    Gait:    10 FEET X 2, TO THE RESTROOM THEN BACK. MIN A FOR BALANCE. RW USED FOR BALANCE AND SUPPORT.     Stairs:          Therapeutic Activities and Exercises:  PER NURSING THE PATIENTS BP WAS LOW BUT THAT IS HER NORMAL. WEANED OFF HER OXYGEN THIS AM BUT THEY ARE KEEPING HER ON 1 LPM , OKAY TO REMOVE FOR AMBULATION.  PT WORKED ON 5-10 REPS OF ACTIVE, SEATED: AP, TKE, HF AND HIP ABDUCTION FOR STRENGTHENING.      AM-PAC 6 CLICK MOBILITY  How much help from another person does this patient currently need?   1 = Unable, Total/Dependent Assistance  2 = A lot, Maximum/Moderate Assistance  3 = A little, Minimum/Contact Guard/Supervision  4 = None, Modified [place]/Independent    Turning over in bed (including adjusting bedclothes, sheets and blankets)?: 3  Sitting down on and standing up from a chair with arms (e.g., wheelchair, bedside commode, etc.): 3  Moving from lying on back to sitting on the side of the bed?: 3  Moving to and from a bed to a chair (including a wheelchair)?: 3  Need to walk in hospital room?: 3  Climbing 3-5 steps " with a railing?: 1  Total Score: 16    AM-PAC Raw Score CMS G-Code Modifier Level of Impairment Assistance   6 % Total / Unable   7 - 9 CM 80 - 100% Maximal Assist   10 - 14 CL 60 - 80% Moderate Assist   15 - 19 CK 40 - 60% Moderate Assist   20 - 22 CJ 20 - 40% Minimal Assist   23 CI 1-20% SBA / CGA   24 CH 0% Independent/ Mod I     Patient left up in chair with all lines intact and cna notified.    Assessment:  Kelly Mariano is a 67 y.o. female with a medical diagnosis of Acute combined systolic and diastolic heart failure and presents with IMPROVED ACTIVITY TOLERANCE TODAY. SHE IS VERY COOPERATIVE AND MOTIVATED.     Rehab identified problem list/impairments: Rehab identified problem list/impairments: weakness, impaired endurance, gait instability, impaired functional mobilty, impaired self care skills, impaired balance, decreased safety awareness, decreased ROM, impaired skin, pain, impaired coordination    Rehab potential is good.    Activity tolerance: Good    Discharge recommendations: Discharge Facility/Level Of Care Needs: nursing facility, skilled     Barriers to discharge:      Equipment recommendations: Equipment Needed After Discharge: walker, rolling     GOALS:    Physical Therapy Goals        Problem: Physical Therapy Goal    Goal Priority Disciplines Outcome Goal Variances Interventions   Physical Therapy Goal     PT/OT, PT      Description:  LTGs to be met within 7 days (2/24/18):  1.  Patient will perform bed mobility with mod assist  2.  Patient will perform functional transfers with mod assist  3.  Patient will sit EOB x 10 mins with good static sitting balance and no c/o fatigue  4.  Patient will participate in gait assessment  5.  Patient will perform BLE therapeutic exercises 10 x 2 in all planes                    PLAN:    Patient to be seen 5 x/week  to address the above listed problems via gait training, therapeutic activities, therapeutic exercises  Plan of Care expires:  (updated by  Edilberto Koehler PTA on 2/25)  Plan of Care reviewed with: patient         Rizwana Brandon, PTA  02/25/2018

## 2018-02-25 NOTE — SUBJECTIVE & OBJECTIVE
Interval History: resting comfortably, abd pain and BP improved. Await placement.     Review of Systems   Constitutional: Positive for activity change and fatigue. Negative for appetite change, chills, diaphoresis, fever and unexpected weight change.   HENT: Negative for congestion, dental problem, ear discharge, ear pain, hearing loss, mouth sores, postnasal drip, sinus pressure, sore throat, tinnitus, trouble swallowing and voice change.    Eyes: Negative for photophobia, pain and visual disturbance.   Respiratory: Negative for cough, chest tightness, shortness of breath and wheezing.    Cardiovascular: Negative for chest pain, palpitations and leg swelling.   Gastrointestinal: Negative for abdominal distention, abdominal pain, blood in stool, constipation, diarrhea, nausea and vomiting.   Endocrine: Negative for cold intolerance, heat intolerance, polydipsia, polyphagia and polyuria.   Genitourinary: Negative for difficulty urinating, dysuria, flank pain, frequency, hematuria, urgency, vaginal bleeding and vaginal discharge.   Musculoskeletal: Negative for arthralgias, back pain, gait problem, joint swelling and myalgias.   Skin: Negative for pallor and rash.   Allergic/Immunologic: Negative for immunocompromised state.   Neurological: Negative for dizziness, syncope, weakness, light-headedness, numbness and headaches.   Hematological: Negative for adenopathy. Does not bruise/bleed easily.   Psychiatric/Behavioral: Negative for agitation, confusion and dysphoric mood. The patient is not nervous/anxious.      Objective:     Vital Signs (Most Recent):  Temp: 97.6 °F (36.4 °C) (02/25/18 1117)  Pulse: 85 (02/25/18 1117)  Resp: 18 (02/25/18 1117)  BP: (!) 101/52 (02/25/18 1117)  SpO2: (!) 93 % (02/25/18 1117) Vital Signs (24h Range):  Temp:  [97.5 °F (36.4 °C)-98.1 °F (36.7 °C)] 97.6 °F (36.4 °C)  Pulse:  [70-85] 85  Resp:  [16-18] 18  SpO2:  [93 %-99 %] 93 %  BP: ()/(43-52) 101/52     Weight: 55.9 kg (123 lb  3.8 oz)  Body mass index is 21.15 kg/m².    Intake/Output Summary (Last 24 hours) at 02/25/18 1517  Last data filed at 02/25/18 1300   Gross per 24 hour   Intake              954 ml   Output                0 ml   Net              954 ml      Physical Exam   Constitutional: She is oriented to person, place, and time.   Frail   HENT:   Head: Atraumatic.   Eyes: EOM are normal.   Neck: Neck supple.   Cardiovascular:   Murmur heard.  S1 and S2 irregular   Pulmonary/Chest: Effort normal and breath sounds normal.   Abdominal: Soft.   Musculoskeletal: She exhibits no edema.   Neurological: She is alert and oriented to person, place, and time.   Skin: Skin is warm.   Psychiatric: She has a normal mood and affect. Her behavior is normal.   Nursing note and vitals reviewed.      Significant Labs: BMP:   CBC:   All pertinent labs within the past 24 hours have been reviewed.    Significant Imaging: I have reviewed all pertinent imaging results/findings within the past 24 hours.

## 2018-02-25 NOTE — PLAN OF CARE
Problem: Patient Care Overview  Goal: Plan of Care Review      Outcome: Ongoing (interventions implemented as appropriate)  Pt required MIN a with bed mobility and transfers. Ambulated 10 feeet x 2 trails with a RW. MIN A

## 2018-02-25 NOTE — ASSESSMENT & PLAN NOTE
Will start Folbic, MVI  Nutrition consult  Likely sec to severe CHf and Breast mass and poor oral intake PTA.  Needs Beneprotein and Boost supplements    2/18 - Expected to improve with improved intake  2/19- improving, getting stronger  2/20- add boost and beneprotein  2/21- improving with improved oral intake.  2/22- slowly improving  2/23- improving    2/24- improving, eating well, will switch Ritalin to Midodrine as it is causing her abd pain  2/25- improving

## 2018-02-25 NOTE — PROGRESS NOTES
[facility] Hospital Medicine  Progress Note    Patient Name: Kelly Mariano  MRN: [id number]  Patient Class: IP- Inpatient   Admission Date: 2/9/2018  Length of Stay: 16 days  Attending Physician: Brittni Lagos MD  Primary Care Provider: No primary care provider on file.        Subjective:     Principal Problem:Acute combined systolic and diastolic heart failure    HPI:  Kelly Mariano is a 68 yo female who states she has not seen a physician since 2009. EMS brought her to the ED today as the patient reported generalized weakness that has worsened over the last 2 days. She had trouble getting off the toilet and when she di,  she had fallen. EMS stated living conditions were deplorable with cat feces on the ibeth. (Elderly Protective Services contacted). Pt is noted with cellulitis and wounds to the bilateral feet and lower legs. Pt describes anxiety attacks, SOB, leg swelling and difficulty walking. Vital signs showed tachycardia = 112, pt afebrile with controlled blood pressure. Labs indicate thrombocytopenia, metabolic alkalosis, troponin 0.114, BNP > 4900, initial lactic acid 2.3;  CXR shows pulmonary edema and a moderate right sided pleural effusion.    Hospital Course:  2/10- no fever, other vss. Pt feels weakness, leg swelling and sob improving.Blood cultures positive for staph, on vanc. and guillermo. MRI negative for osteo. ID consulted. CTA BLE to determine if adequate circulation. Per nurse, concerned for left breast deformity, concerning for malignancy, hemonc consulted.   2/11 - Legs swelling and weakness improved, no longer sob. Echo shows ejection fraction 10%, diuresing well on lasix. Per cards left heart cath or stress test when better. Seen by hem/onc this am, concern for breast ca, right pleural effusion drained today w/o complications, swallow study positive for aspiration, counseled patient. Continued on antibx for lower extremity cellulitis. CT legs with no obvious arterial occlusion,  however CTA lower extremeties describes Ulcerative plaque versus small focal dissection measuring 8 mm in the left common iliac artery which causes a mild to moderate grade focal stenosis just above the bifurcation into the left external and left internal iliac arteries.  Contacted Brett Puga, vascular surgeon, no intervention needed now. Recommending lower extremity leg wraps and follow up w/vascular in outpt.   2/12 - Patient feeling better, platelets trending down, crp elevated at 28, sed rate normal. Blood cultures positive for s. Aeureus, awaiting sensitivity.  Xray with tiny right pneumothorax after thoracentesis. General sx biopsied breast mass.   2/13 - Pt with alt mental status this am, alkalotic, possibly secondary to over-diuresis and thoracentesis, xray shows pleural effusion, returned possibly due to chf and/or met. Breast ca. Case discussed w/oncology and no additional imaging needed at this point. Blood cultures positive for MRSA, vanc continued  2/14 - increased lethargy, bicarb improved,chest xray unchanged,  awaiting breast biopsy path report, being diuresed w/acetozolamide, attempted to contact Xavier Rosenbaum, left vmail. Meeting with case management and elderly protection services to discuss contacting any family member of friend, ordered CT head/chest/abd/pelvis   2/15 - vss, including pulse ox on 2 Liters, mentation slightly improved, CT head/chest/abd/pelvis - head no acute processes, chest/abd/pelvis - possible pelvic mass cannot exclude bone mets  2/16 - ABG and platelets improved , cxray unchanged, cytology from thoracentesis negative for malignancy.   mentation drastically improved, pt able to re-state conversation w/hemonc this am. Pt wanting full work up and tx for breast ca at this point. Pt requesting nursing home placement, is demonstrating competency in decision making. However concern for grave disability due to such severe self-neglect in home environment, that she is better suited  to live in a supervised setting.     2/17- pt looks comfortable and more alert and responsive. No walking yet. Both legs covered in tight dressing which is CDI. CXR shows recurrence of R sided effusion, however no SOB and no malignancy seen on thoracentesis.    2/18- continues to improve slowly, repeat Blood Cx x 2 remain NGTD. Dressing is clean, eating drinking better, now more open to placement. Await Breast Biopsy    2/19- feels much better, leg wounds improved, getting OT/ PT/ St and improving, eating better, HCO3 improved after switching bumex to Diamox. Will get a repeat CXR in am.    2/20-- feels tired, lying in bed, states she walked three times yesterday-- hence exhausted. Still await any SNF accepting her. She refused PT/OT today. Repeat CXR today appears better. HCO3 improved on diamox. Legs look better.will give inj B 12 and start some ritalin as well.  2/21- resting in bed quietly, did a little exercise with OT/ PT but not much. Started on Ritalin today to improve vigor and vitality and to improved appetite, await SNF placement.    2/22- feels better except some abd bloating, gassiness, has had a regular BM in a couple of days. Remains in bed mostly, await SNF placement.   2/23- looks much better, walking in the hallway with PT. Await placement authorization from her insurance.  2/24- looks well but c/o abd pain despite the large BM yesterday and two more BMs today, improved after Ritalin held and recurred with the Ritalin again. However her BP improved with Ritalin. So Ritalin stopped and replaced with Midodrin and Bentyl added for abd pain prn.   2/25- resting comfortably, abd pain and BP improved. Await placement.    Interval History: resting comfortably, abd pain and BP improved. Await placement.     Review of Systems   Constitutional: Positive for activity change and fatigue. Negative for appetite change, chills, diaphoresis, fever and unexpected weight change.   HENT: Negative for congestion,  dental problem, ear discharge, ear pain, hearing loss, mouth sores, postnasal drip, sinus pressure, sore throat, tinnitus, trouble swallowing and voice change.    Eyes: Negative for photophobia, pain and visual disturbance.   Respiratory: Negative for cough, chest tightness, shortness of breath and wheezing.    Cardiovascular: Negative for chest pain, palpitations and leg swelling.   Gastrointestinal: Negative for abdominal distention, abdominal pain, blood in stool, constipation, diarrhea, nausea and vomiting.   Endocrine: Negative for cold intolerance, heat intolerance, polydipsia, polyphagia and polyuria.   Genitourinary: Negative for difficulty urinating, dysuria, flank pain, frequency, hematuria, urgency, vaginal bleeding and vaginal discharge.   Musculoskeletal: Negative for arthralgias, back pain, gait problem, joint swelling and myalgias.   Skin: Negative for pallor and rash.   Allergic/Immunologic: Negative for immunocompromised state.   Neurological: Negative for dizziness, syncope, weakness, light-headedness, numbness and headaches.   Hematological: Negative for adenopathy. Does not bruise/bleed easily.   Psychiatric/Behavioral: Negative for agitation, confusion and dysphoric mood. The patient is not nervous/anxious.      Objective:     Vital Signs (Most Recent):  Temp: 97.6 °F (36.4 °C) (02/25/18 1117)  Pulse: 85 (02/25/18 1117)  Resp: 18 (02/25/18 1117)  BP: (!) 101/52 (02/25/18 1117)  SpO2: (!) 93 % (02/25/18 1117) Vital Signs (24h Range):  Temp:  [97.5 °F (36.4 °C)-98.1 °F (36.7 °C)] 97.6 °F (36.4 °C)  Pulse:  [70-85] 85  Resp:  [16-18] 18  SpO2:  [93 %-99 %] 93 %  BP: ()/(43-52) 101/52     Weight: 55.9 kg (123 lb 3.8 oz)  Body mass index is 21.15 kg/m².    Intake/Output Summary (Last 24 hours) at 02/25/18 1517  Last data filed at 02/25/18 1300   Gross per 24 hour   Intake              954 ml   Output                0 ml   Net              954 ml      Physical Exam   Constitutional: She is  oriented to person, place, and time.   Frail   HENT:   Head: Atraumatic.   Eyes: EOM are normal.   Neck: Neck supple.   Cardiovascular:   Murmur heard.  S1 and S2 irregular   Pulmonary/Chest: Effort normal and breath sounds normal.   Abdominal: Soft.   Musculoskeletal: She exhibits no edema.   Neurological: She is alert and oriented to person, place, and time.   Skin: Skin is warm.   Psychiatric: She has a normal mood and affect. Her behavior is normal.   Nursing note and vitals reviewed.      Significant Labs: BMP:   CBC:   All pertinent labs within the past 24 hours have been reviewed.    Significant Imaging: I have reviewed all pertinent imaging results/findings within the past 24 hours.    Assessment/Plan:      * Acute combined systolic and diastolic heart failure    Telemetry monitoring, daily weights, low sodium diet, fluid restriction,    Lasix 20 mg IV twice  Cardiology consult  Lisinopril 2.5 mg  Elevated troponin likely due to congestive heart failure - trend  2/10 -   1 - Moderate left atrial enlargement.     2 - Eccentric hypertrophy.     3 - No wall motion abnormalities.     4 - Severely depressed left ventricular systolic function (EF 10-15%).     5 - Impaired LV relaxation, normal LAP (grade 1 diastolic dysfunction).     6 - Low normal to mildly depressed right ventricular systolic function .     7 - The estimated PA systolic pressure is 25 mmHg.     8 - Mild to moderate mitral regurgitation.     9 - Mild to moderate tricuspid regurgitation.     10 - Intermediate central venous pressure.     11 - Right pleural effusion.  12 - cards following  2/16 - case discussed w/cards, pt will need stress test and possibly ischemic work up prior to defibrillator placement. In meantime will optimize med management. Unclear if pulm effusion is due to heart failure as cytology showed no malignancy.    2/17- needs further diuresis but due to contraction alkalosis may start Diamox.    2/18- HCO3 now 43, will switch  Bumex and Zaroxolyn to Diamox    2/19- improving contraction alkalosis with >14 L removed so far    2/20- improved    2/21- continues to improve on Diamox  2/22- improved, will d/c Diamox  2/23- looks well, much better, still quiet weak and needs Pt/OT with a walker  Await placement    2/24- resume Lasix-- switch to Demadex    2/25- stable, BP better on Midodrine          Await placement        Pleural effusion on right    Diuresis with IV lasix  Supplemental oxygen to maintain sat > 92 %  Will likely need thoracentesis, consult Pulmonology when needed  2/10 - moderate size, concerning for malignancy due to left breast deformity (metastic disease?)  2/12 - will follow path report analysis of pleural fluid  2/14 - will discuss w/pulm if repeat thoracentesis needed and follow up studies from previous    2/17- Transudative Effusion, neg for malignancy-- most likely related to CHF/ CMP.    2/18- continue Diuretics, now Diamox    2/19- repeat CXR in am-- if recurs, may need repeat thoracentesis    2/20- continue diuresis at present  2/21- improved on Diuretics    2/22- improved, repeat CXR in am    2/23-- improved, will resume Lasix    2/24- start Demadex    2/25- stable        Physical debility    Severe debility and weakness associated with severe PCM  Continue OT/PT and nutritional build up  Add Ritalin and give B12 IM  Remains severely disabled but just started on Ritalin    Appears better, await SNF placement     improving    Continue OT/PT    Await placement        Protein-calorie malnutrition, moderate    Will start Folbic, MVI  Nutrition consult  Likely sec to severe CHf and Breast mass and poor oral intake PTA.  Needs Beneprotein and Boost supplements    2/18 - Expected to improve with improved intake  2/19- improving, getting stronger  2/20- add boost and beneprotein  2/21- improving with improved oral intake.  2/22- slowly improving  2/23- improving    2/24- improving, eating well, will switch Ritalin to  Midodrine as it is causing her abd pain  2/25- improving          VTE Risk Mitigation         Ordered     heparin (porcine) injection 5,000 Units  Every 8 hours     Route:  Subcutaneous        02/09/18 1749     Medium Risk of VTE  Once      02/09/18 1300              Brittni Lagos MD  Department of Hospital Medicine   [facility]

## 2018-02-25 NOTE — PLAN OF CARE
Problem: Pressure Ulcer Risk (Harjit Scale) (Adult,Obstetrics,Pediatric)  Intervention: Turn/Reposition Often  Patient awake and alert resting in bed free from falls and injury, no labored breathing noted, skin warm and dry, frequent repositioning done,  sinus rhythm on monitor, pain controlled with prn medication, POC reviewed with patient,  bed low locked, call light within reach, will continue to monitor

## 2018-02-25 NOTE — PLAN OF CARE
Problem: SLP Goal  Goal: SLP Goal  Long Term Goal:  Pt to tolerate least restrictive diet consistency safely and efficiently.    Short Term Goals:  1. Tongue Base Retraction and Laryngeal Elevation tasks x20 with min cues  2. Oral Motor tasks x20 with min cues  3. Recommend education on swallow precautions and diet recommendations.     Pt tolerated S.T for swallowing today.

## 2018-02-25 NOTE — PT/OT/SLP PROGRESS
Speech Language Pathology Treatment    Patient Name:  Kelly Mariano   MRN:  [id number]  Admitting Diagnosis: Acute combined systolic and diastolic heart failure    Recommendations:                 General Recommendations:  speech tx  Diet recommendations:  Mechanical soft, No Bread, No Rice, Liquid Diet Level: Nectar Thick   Aspiration Precautions: in place  General Precautions: Standard, aspiration, fall, contact  Communication strategies:  Verbal    Subjective     Pt sitting up in bed and willing to work with S.T.  Patient goals: to get better    Pain/Comfort:  · 0/10    Objective:     Has the patient been evaluated by SLP for swallowing?   Yes  Keep patient NPO? No   Current Respiratory Status: nasal cannula      Pt completed 20 reps of oral motor, tongue base retraction and laryngeal elevation exercises with min-mod cues. Swallow precautions reviewed with the pt    Assessment:     Kelly Mariano is a 67 y.o. female with an SLP diagnosis of Dysphagia.  She completed exercises to improve swallowing with cues with increased ROM, strength or lingual and labial musculature.   She did need breaks due to some increased work of breathing.   Swallow precautions reviewed and continues on nectar thick liquids.    Goals:    SLP Goals        Problem: SLP Goal    Goal Priority Disciplines Outcome   SLP Goal     SLP Ongoing (interventions implemented as appropriate)   Description:  Long Term Goal:  Pt to tolerate least restrictive diet consistency safely and efficiently.    Short Term Goals:  1. Tongue Base Retraction and Laryngeal Elevation tasks x20 with min cues  2. Oral Motor tasks x20 with min cues  3. Recommend education on swallow precautions and diet recommendations.                    Plan:     · Patient to be seen:  3 x/week   · Plan of Care expires:  02/26/18  · Plan of Care reviewed with:  patient   · SLP Follow-Up:  Yes       Discharge recommendations:  nursing facility, skilled   Barriers to Discharge:  Assistance      Time Tracking:     SLP Treatment Date:   02/11/18  Speech Start Time:  1015  Speech Stop Time:  1045     Speech Total Time (min):  30 min    Billable Minutes: 30    Miesha Santos CCC-RICH  02/25/2018

## 2018-02-26 VITALS
DIASTOLIC BLOOD PRESSURE: 50 MMHG | RESPIRATION RATE: 20 BRPM | BODY MASS INDEX: 20.78 KG/M2 | OXYGEN SATURATION: 96 % | TEMPERATURE: 96 F | HEIGHT: 64 IN | WEIGHT: 121.69 LBS | HEART RATE: 95 BPM | SYSTOLIC BLOOD PRESSURE: 99 MMHG

## 2018-02-26 PROBLEM — I50.41 ACUTE COMBINED SYSTOLIC AND DIASTOLIC HEART FAILURE: Status: RESOLVED | Noted: 2018-02-09 | Resolved: 2018-02-26

## 2018-02-26 PROCEDURE — 25000003 PHARM REV CODE 250: Performed by: EMERGENCY MEDICINE

## 2018-02-26 PROCEDURE — 94761 N-INVAS EAR/PLS OXIMETRY MLT: CPT

## 2018-02-26 PROCEDURE — 97116 GAIT TRAINING THERAPY: CPT

## 2018-02-26 PROCEDURE — 97530 THERAPEUTIC ACTIVITIES: CPT

## 2018-02-26 PROCEDURE — 92526 ORAL FUNCTION THERAPY: CPT

## 2018-02-26 PROCEDURE — 99232 SBSQ HOSP IP/OBS MODERATE 35: CPT | Mod: ,,, | Performed by: INTERNAL MEDICINE

## 2018-02-26 PROCEDURE — 27000221 HC OXYGEN, UP TO 24 HOURS

## 2018-02-26 PROCEDURE — 63600175 PHARM REV CODE 636 W HCPCS: Performed by: NURSE PRACTITIONER

## 2018-02-26 PROCEDURE — 25000003 PHARM REV CODE 250: Performed by: INTERNAL MEDICINE

## 2018-02-26 PROCEDURE — 25000003 PHARM REV CODE 250: Performed by: NURSE PRACTITIONER

## 2018-02-26 PROCEDURE — 25000003 PHARM REV CODE 250: Performed by: FAMILY MEDICINE

## 2018-02-26 PROCEDURE — 97803 MED NUTRITION INDIV SUBSEQ: CPT

## 2018-02-26 RX ORDER — MIDODRINE HYDROCHLORIDE 2.5 MG/1
2.5 TABLET ORAL
Qty: 90 TABLET | Refills: 11 | Status: SHIPPED | OUTPATIENT
Start: 2018-02-26 | End: 2019-02-26

## 2018-02-26 RX ORDER — LANOLIN ALCOHOL/MO/W.PET/CERES
100 CREAM (GRAM) TOPICAL DAILY
COMMUNITY
Start: 2018-02-27

## 2018-02-26 RX ORDER — TORSEMIDE 20 MG/1
20 TABLET ORAL DAILY
Qty: 30 TABLET | Refills: 11 | Status: SHIPPED | OUTPATIENT
Start: 2018-02-27 | End: 2019-02-27

## 2018-02-26 RX ORDER — LISINOPRIL 5 MG/1
5 TABLET ORAL DAILY
Qty: 90 TABLET | Refills: 3 | Status: SHIPPED | OUTPATIENT
Start: 2018-02-27 | End: 2019-02-27

## 2018-02-26 RX ORDER — NAPROXEN SODIUM 220 MG/1
81 TABLET, FILM COATED ORAL DAILY
Refills: 0 | COMMUNITY
Start: 2018-02-26 | End: 2019-02-26

## 2018-02-26 RX ORDER — METOPROLOL SUCCINATE 50 MG/1
50 TABLET, EXTENDED RELEASE ORAL DAILY
Qty: 30 TABLET | Refills: 11 | Status: SHIPPED | OUTPATIENT
Start: 2018-02-27 | End: 2019-02-27

## 2018-02-26 RX ADMIN — TORSEMIDE 20 MG: 10 TABLET ORAL at 08:02

## 2018-02-26 RX ADMIN — Medication 100 MG: at 08:02

## 2018-02-26 RX ADMIN — ACETAMINOPHEN 650 MG: 325 TABLET, FILM COATED ORAL at 03:02

## 2018-02-26 RX ADMIN — SIMETHICONE CHEW TAB 80 MG 80 MG: 80 TABLET ORAL at 10:02

## 2018-02-26 RX ADMIN — THERA TABS 1 TABLET: TAB at 08:02

## 2018-02-26 RX ADMIN — MIDODRINE HYDROCHLORIDE 2.5 MG: 2.5 TABLET ORAL at 11:02

## 2018-02-26 RX ADMIN — Medication 1 TABLET: at 08:02

## 2018-02-26 RX ADMIN — LISINOPRIL 5 MG: 5 TABLET ORAL at 08:02

## 2018-02-26 RX ADMIN — ANTI-FUNGAL POWDER MICONAZOLE NITRATE TALC FREE: 1.42 POWDER TOPICAL at 08:02

## 2018-02-26 RX ADMIN — HEPARIN SODIUM 5000 UNITS: 5000 INJECTION, SOLUTION INTRAVENOUS; SUBCUTANEOUS at 05:02

## 2018-02-26 RX ADMIN — MIDODRINE HYDROCHLORIDE 2.5 MG: 2.5 TABLET ORAL at 08:02

## 2018-02-26 RX ADMIN — ACETAMINOPHEN 650 MG: 325 TABLET, FILM COATED ORAL at 08:02

## 2018-02-26 NOTE — PT/OT/SLP PROGRESS
Physical Therapy  Treatment    Kelly Mariano   MRN: [id number]   Admitting Diagnosis: Acute combined systolic and diastolic heart failure    PT Received On: 02/26/18  PT Start Time: 1120     PT Stop Time: 1145    PT Total Time (min): 25 min       Billable Minutes:  Gait Training 10 and Therapeutic Activity 15    Treatment Type: Treatment  PT/PTA: PT     PTA Visit Number: 1       General Precautions: Standard, fall, aspiration  Orthopedic Precautions: N/A   Braces: N/A         Subjective:  Communicated with EPIC AND NURSE CRYSTAL prior to session.  PT AGREES TO THERAPY    Pain/Comfort  Pain Rating 1: 0/10    Objective:   Patient found with: telemetry, oxygen    Functional Mobility:  Bed Mobility: EVENS    Transfers: EVENS     Gait: 2 X 12 FEET WITH RW, SLOW PACE, HUNCHED POSTURE, ANXIOUS.    Therapeutic Activities and Exercises:  PT SAT ON SIDE OF BED 7 MINS, TO BETSEY SOCKS AND GOWN, AND TO PREPARE FOR GAIT.     AM-PAC 6 CLICK MOBILITY  How much help from another person does this patient currently need?   1 = Unable, Total/Dependent Assistance  2 = A lot, Maximum/Moderate Assistance  3 = A little, Minimum/Contact Guard/Supervision  4 = None, Modified Bennett/Independent    Turning over in bed (including adjusting bedclothes, sheets and blankets)?: 3  Sitting down on and standing up from a chair with arms (e.g., wheelchair, bedside commode, etc.): 3  Moving from lying on back to sitting on the side of the bed?: 3  Moving to and from a bed to a chair (including a wheelchair)?: 3  Need to walk in hospital room?: 3  Climbing 3-5 steps with a railing?: 1  Total Score: 16    AM-PAC Raw Score CMS G-Code Modifier Level of Impairment Assistance   6 % Total / Unable   7 - 9 CM 80 - 100% Maximal Assist   10 - 14 CL 60 - 80% Moderate Assist   15 - 19 CK 40 - 60% Moderate Assist   20 - 22 CJ 20 - 40% Minimal Assist   23 CI 1-20% SBA / CGA   24 CH 0% Independent/ Mod I     Patient left up in chair with all lines intact, call  button in reach, NURSE notified and FRIEND present.    Assessment:  Kelly Mariano is a 67 y.o. female with a medical diagnosis of Acute combined systolic and diastolic heart failure and presents with IMPAIRED MOBILITY AND WILL NEED CONT P.T.    Rehab identified problem list/impairments: Rehab identified problem list/impairments: weakness, impaired endurance, gait instability, impaired functional mobilty, impaired self care skills, impaired balance, decreased coordination    Rehab potential is good.    Activity tolerance: Good    Discharge recommendations: Discharge Facility/Level Of Care Needs: nursing facility, skilled     Barriers to discharge:  NONE    Equipment recommendations: Equipment Needed After Discharge: walker, rolling     GOALS:    Physical Therapy Goals        Problem: Physical Therapy Goal    Goal Priority Disciplines Outcome Goal Variances Interventions   Physical Therapy Goal     PT/OT, PT Ongoing (interventions implemented as appropriate)     Description:  LTGs to be met within 7 days (2/24/18):  1.  Patient will perform bed mobility with mod assist  2.  Patient will perform functional transfers with mod assist  3.  Patient will sit EOB x 10 mins with good static sitting balance and no c/o fatigue  4.  Patient will participate in gait assessment  5.  Patient will perform BLE therapeutic exercises 10 x 2 in all planes                    PLAN:    Patient to be seen 5 x/week  to address the above listed problems via gait training, therapeutic activities, therapeutic exercises  Plan of Care expires: 03/04/18  Plan of Care reviewed with: patient, friend    PT G-Codes  Score: 16    Vesta lAmaraz PT  02/26/2018

## 2018-02-26 NOTE — DISCHARGE INSTRUCTIONS
There was blood in urine.  Cause is unclear and you need to get it rechecked at primary care 2-3 days after hospital discharge    You are aspirating (food and drink going into lungs sometimes, instead of stomach).  You need to address with primary care, find out if ongoing therapy is needed.  This puts you at a very high risk of getting pneumonia (lung infection).  If you get cough, fever or chest pain, go to the Er.

## 2018-02-26 NOTE — SUBJECTIVE & OBJECTIVE
Interval History:    No interval adverse events    Review of Systems   Constitutional: Positive for malaise/fatigue.   HENT: Negative.    Eyes: Negative.    Respiratory: Negative.    Cardiovascular: Negative.    Gastrointestinal: Negative.    Genitourinary: Negative.    Musculoskeletal: Negative.    Skin: Negative.    Neurological: Positive for weakness.   Endo/Heme/Allergies: Negative.    Psychiatric/Behavioral: Negative.        Objective:     Vital Signs (Most Recent):  Temp: 96.4 °F (35.8 °C) (02/26/18 1136)  Pulse: 91 (02/26/18 1136)  Resp: 18 (02/26/18 1136)  BP: 98/63 (02/26/18 1136)  SpO2: 96 % (02/26/18 1136) Vital Signs (24h Range):  Temp:  [96.4 °F (35.8 °C)-98.3 °F (36.8 °C)] 96.4 °F (35.8 °C)  Pulse:  [78-97] 91  Resp:  [16-20] 18  SpO2:  [93 %-99 %] 96 %  BP: ()/(49-63) 98/63     Weight: 55.2 kg (121 lb 11.1 oz)  Body mass index is 20.89 kg/m².      Intake/Output Summary (Last 24 hours) at 02/26/18 1152  Last data filed at 02/26/18 0900   Gross per 24 hour   Intake              717 ml   Output              200 ml   Net              517 ml       Physical Exam   Constitutional: She is oriented to person, place, and time. She appears well-developed and well-nourished.   HENT:   Head: Normocephalic and atraumatic.   Nose: Nose normal.   Eyes: EOM are normal. Pupils are equal, round, and reactive to light.   Neck: Normal range of motion. Neck supple.   Cardiovascular: Normal rate, regular rhythm and normal heart sounds.    Pulmonary/Chest: Effort normal. She has decreased breath sounds in the right lower field and the left lower field. She has no wheezes. She has no rhonchi. She has no rales.           Abdominal: Soft. Bowel sounds are normal.   Musculoskeletal: Normal range of motion.   Neurological: She is alert and oriented to person, place, and time. No cranial nerve deficit.   Skin: Skin is warm and dry. Capillary refill takes 2 to 3 seconds.   Psychiatric: She has a normal mood and affect.    Nursing note and vitals reviewed.      Vents:  Oxygen Concentration (%): 24 (02/26/18 0830)    Lines/Drains/Airways     Peripherally Inserted Central Catheter Line                 PICC Double Lumen 02/10/18 1524 right basilic 15 days          Pressure Ulcer                 Pressure Injury 02/19/18 1045 Coccyx Stage 2 7 days                Significant Labs:    CBC/Anemia Profile:  No results for input(s): WBC, HGB, HCT, PLT, MCV, RDW, IRON, FERRITIN, RETIC, FOLATE, [id number], OCCULTBLOOD in the last 48 hours.     Chemistries:  No results for input(s): NA, K, CL, CO2, BUN, CREATININE, CALCIUM, ALBUMIN, PROT, BILITOT, ALKPHOS, ALT, AST, GLUCOSE, MG, PHOS in the last 48 hours.    All pertinent labs within the past 24 hours have been reviewed.    Significant Imaging:  I have reviewed all pertinent imaging results/findings within the past 24 hours.     CXR today  Comparison: The previous radiograph from 02/22/2018.    Findings: There is a right PICC in place with the tip terminating at the superior cavoatrial junction.  Cardiomediastinal silhouette is within normal limits for AP technique. There is continued improved aeration of the right lung with trace right-sided pleural fluid. No new pulmonary opacity.  Osseous structures are unchanged.   Impression      Continued improved aeration of the right lung with trace right pleural fluid.

## 2018-02-26 NOTE — PLAN OF CARE
Message received from Christie Petit , She received the authorization from [facility] for SNF. Auth # [id number]. Update to [facility] Admissions at [facility] .    Secure chat sent to Dr Fang requesting discharge orders.       @ [phone] spoke with Dr Fang requested discharge orders if appropriate.    Message left for Kate Vergara  at [facility] and Kamran Mccall EPS Investigator.

## 2018-02-26 NOTE — PROGRESS NOTES
[facility] here to transport pt. Dc packet provided to [facility] at this time

## 2018-02-26 NOTE — PROGRESS NOTES
Called report to keegan STUBBS at [facility]. Tele monitor removed   PICC line removed by Abhay STUBBS. Catheter remained intact.

## 2018-02-26 NOTE — PLAN OF CARE
Problem: Physical Therapy Goal  Goal: Physical Therapy Goal  LTGs to be met within 7 days (2/24/18):  1.  Patient will perform bed mobility with mod assist  2.  Patient will perform functional transfers with mod assist  3.  Patient will sit EOB x 10 mins with good static sitting balance and no c/o fatigue  4.  Patient will participate in gait assessment  5.  Patient will perform BLE therapeutic exercises 10 x 2 in all planes   Outcome: Ongoing (interventions implemented as appropriate)  Pt ambulated 2  X 12 feet with RW, slow pace, anxious and with hunched posture

## 2018-02-26 NOTE — PROGRESS NOTES
[facility] -   Critical Care Medicine  Progress Note    Patient Name: Kelly Mariano  MRN: [id number]  Admission Date: 2/9/2018  Hospital Length of Stay: 17 days  Code Status: Full Code  Attending Provider: Lester Fang MD  Primary Care Provider: No primary care provider on file.   Principal Problem: Acute combined systolic and diastolic heart failure    Subjective:     HPI:  67 year old female who  has no past medical history on file admitted for bilateral cellulits, CHF and moderate right sided pleural effusion.  She had  thoracentesis ×1  1600 mls: -ve for malignancy, Lymphocytes    Hospital/ICU Course:  Admitted to telemetry. IV antibiotics. IV diuresis. Cardiology consulted. Wound care consulted.  Thoracentesis at bedside today.  2/13 Worsening resp failure due to hypoxmia - placed on AVAPS today  2/15 Tolerating Noninvasive Positive Pressure Ventilation at night. Still very weak  2/16  Noninvasive Positive Pressure Ventilation - adjusted  2/17 Improved mentation but still too weak to sit on side of bed  2/18 Still very weak, unable to get out of bed  2/19 comfortable in bed, no distress on 2 L oxygen.  2/20 comfortable in bed, no distress on 2 L oxygen, c/o diarrhea overnight   2/26: No interval concerns    Interval History:    No interval adverse events    Review of Systems   Constitutional: Positive for malaise/fatigue.   HENT: Negative.    Eyes: Negative.    Respiratory: Negative.    Cardiovascular: Negative.    Gastrointestinal: Negative.    Genitourinary: Negative.    Musculoskeletal: Negative.    Skin: Negative.    Neurological: Positive for weakness.   Endo/Heme/Allergies: Negative.    Psychiatric/Behavioral: Negative.        Objective:     Vital Signs (Most Recent):  Temp: 96.4 °F (35.8 °C) (02/26/18 1136)  Pulse: 91 (02/26/18 1136)  Resp: 18 (02/26/18 1136)  BP: 98/63 (02/26/18 1136)  SpO2: 96 % (02/26/18 1136) Vital Signs (24h Range):  Temp:  [96.4 °F (35.8 °C)-98.3 °F (36.8 °C)] 96.4  °F (35.8 °C)  Pulse:  [78-97] 91  Resp:  [16-20] 18  SpO2:  [93 %-99 %] 96 %  BP: ()/(49-63) 98/63     Weight: 55.2 kg (121 lb 11.1 oz)  Body mass index is 20.89 kg/m².      Intake/Output Summary (Last 24 hours) at 02/26/18 1152  Last data filed at 02/26/18 0900   Gross per 24 hour   Intake              717 ml   Output              200 ml   Net              517 ml       Physical Exam   Constitutional: She is oriented to person, place, and time. She appears well-developed and well-nourished.   HENT:   Head: Normocephalic and atraumatic.   Nose: Nose normal.   Eyes: EOM are normal. Pupils are equal, round, and reactive to light.   Neck: Normal range of motion. Neck supple.   Cardiovascular: Normal rate, regular rhythm and normal heart sounds.    Pulmonary/Chest: Effort normal. She has decreased breath sounds in the right lower field and the left lower field. She has no wheezes. She has no rhonchi. She has no rales.           Abdominal: Soft. Bowel sounds are normal.   Musculoskeletal: Normal range of motion.   Neurological: She is alert and oriented to person, place, and time. No cranial nerve deficit.   Skin: Skin is warm and dry. Capillary refill takes 2 to 3 seconds.   Psychiatric: She has a normal mood and affect.   Nursing note and vitals reviewed.      Vents:  Oxygen Concentration (%): 24 (02/26/18 0830)    Lines/Drains/Airways     Peripherally Inserted Central Catheter Line                 PICC Double Lumen 02/10/18 1524 right basilic 15 days          Pressure Ulcer                 Pressure Injury 02/19/18 1045 Coccyx Stage 2 7 days                Significant Labs:    CBC/Anemia Profile:  No results for input(s): WBC, HGB, HCT, PLT, MCV, RDW, IRON, FERRITIN, RETIC, FOLATE, [id number], OCCULTBLOOD in the last 48 hours.     Chemistries:  No results for input(s): NA, K, CL, CO2, BUN, CREATININE, CALCIUM, ALBUMIN, PROT, BILITOT, ALKPHOS, ALT, AST, GLUCOSE, MG, PHOS in the last 48 hours.    All pertinent labs  within the past 24 hours have been reviewed.    Significant Imaging:  I have reviewed all pertinent imaging results/findings within the past 24 hours.     CXR today  Comparison: The previous radiograph from 02/22/2018.    Findings: There is a right PICC in place with the tip terminating at the superior cavoatrial junction.  Cardiomediastinal silhouette is within normal limits for AP technique. There is continued improved aeration of the right lung with trace right-sided pleural fluid. No new pulmonary opacity.  Osseous structures are unchanged.   Impression      Continued improved aeration of the right lung with trace right pleural fluid.           Assessment/Plan:     Pulmonary   Pleural effusion on right    Stable chest x-ray today.  Improved  Path -ve for malignancy  optimize fluid managment        Cardiac/Vascular   * Acute combined systolic and diastolic heart failure    Maximize cardiac treatment.  PRELOAD and Afterload reduction          SIGN OFF     I have reviewed all labs and imaging studies and compared to previous results. I have also discussed labs with all the teams in the medical care of the patient and my plan is outlined below        Juan Singh MD  Critical Care Medicine  [facility] -

## 2018-02-26 NOTE — NURSING
PICC line dressing change done using sterile technique.  Pt. Tolerated well.  Next dressing change due in 7 days or sooner if needed.

## 2018-02-26 NOTE — PT/OT/SLP PROGRESS
Speech Language Pathology Treatment    Patient Name:  Kelly Mariano   MRN:  [id number]  Admitting Diagnosis: Acute combined systolic and diastolic heart failure    Recommendations:                 General Recommendations:  Dysphagia therapy  Diet recommendations:  Mechanical soft, No Bread, No Rice, Liquid Diet Level: Nectar Thick   Aspiration Precautions: 1 bite/sip at a time, Alternating bites/sips, Assistance with thickening liquids, HOB to 90 degrees and Small bites/sips   General Precautions: Standard, aspiration, fall  Communication strategies:  none    Subjective     Pt cooperative  Patient goals: to go to the [facility] home    Pain/Comfort:  · Pain Rating 1: 0/10  · Pain Rating Post-Intervention 2: 0/10    Objective:     Has the patient been evaluated by SLP for swallowing?   Yes  Keep patient NPO? No   Current Respiratory Status: nasal cannula      Pt completed oral motor, tongue base retraction and laryngeal elevation ex x 20 each with min cues to improve swallow function.    Assessment:     Kelly Mariano is a 67 y.o. female with an SLP diagnosis of Dysphagia.  She presents with impaired swallow.    Goals:    SLP Goals        Problem: SLP Goal    Goal Priority Disciplines Outcome   SLP Goal     SLP Ongoing (interventions implemented as appropriate)   Description:  Long Term Goal:  Pt to tolerate least restrictive diet consistency safely and efficiently.    Short Term Goals:  1. Tongue Base Retraction and Laryngeal Elevation tasks x20 with min cues  2. Oral Motor tasks x20 with min cues  3. Recommend education on swallow precautions and diet recommendations.                    Plan:     · Patient to be seen:  3 x/week   · Plan of Care expires:  02/26/18  · Plan of Care reviewed with:  patient   · SLP Follow-Up:  Yes       Discharge recommendations:  nursing facility, skilled   Barriers to Discharge:  None    Time Tracking:     SLP Treatment Date:   02/26/18  Speech Start Time:  0906  Speech Stop Time:  0926     Speech  Total Time (min):  20 min    Billable Minutes: Treatment Swallowing Dysfunction 20    Anni Xie CCC-SLP  02/26/2018

## 2018-02-26 NOTE — PLAN OF CARE
Problem: SLP Goal  Goal: SLP Goal  Long Term Goal:  Pt to tolerate least restrictive diet consistency safely and efficiently.    Short Term Goals:  1. Tongue Base Retraction and Laryngeal Elevation tasks x20 with min cues  2. Oral Motor tasks x20 with min cues  3. Recommend education on swallow precautions and diet recommendations.   Outcome: Ongoing (interventions implemented as appropriate)  Pt completed swallow ex.

## 2018-02-26 NOTE — PLAN OF CARE
Spoke with Erna at [facility], she contacted [facility] this am and they have requested additional therapy and progress notes . Notes faxed via vzaar.

## 2018-02-26 NOTE — ASSESSMENT & PLAN NOTE
Diuresis with IV lasix  Supplemental oxygen to maintain sat > 92 %  Will likely need thoracentesis, consult Pulmonology when needed  2/10 - moderate size, concerning for malignancy due to left breast deformity (metastic disease?)  2/12 - will follow path report analysis of pleural fluid  2/14 - will discuss w/pulm if repeat thoracentesis needed and follow up studies from previous    2/17- Transudative Effusion, neg for malignancy-- most likely related to CHF/ CMP.    2/18- continue Diuretics, now Diamox    2/19- repeat CXR in am-- if recurs, may need repeat thoracentesis    2/20- continue diuresis at present  2/21- improved on Diuretics    2/22- improved, repeat CXR in am    2/23-- improved, will resume Lasix    2/24- start Demadex    2/25- stable    2/26 - Monitor

## 2018-02-26 NOTE — PLAN OF CARE
Problem: Pressure Ulcer Risk (Harjit Scale) (Adult,Obstetrics,Pediatric)  Intervention: Turn/Reposition Often  Patient resting in bed arouses to verbal and tactile stimlui, able to verbalize needs,  free from falls and injury, O2 @ 3L via NC no s/s distress noted ,sinus rhythm on monitor, pain controlled with prn medication,weight shift assistance and incontinence care provided, POC reviewed with patient,  bed low locked, call light within reach, will continue to monitor

## 2018-02-26 NOTE — DISCHARGE SUMMARY
[facility] Hospital Medicine  Discharge Summary      Patient Name: Kelly Mariano  MRN: [id number]  Admission Date: 2/9/2018  Hospital Length of Stay: 17 days  Discharge Date and Time: No discharge date for patient encounter.  Attending Physician: Lester Fang MD   Discharging Provider: Lester Fang MD  Primary Care Provider: No primary care provider on file.      HPI:   Kelly Mariano is a 68 yo female who states she has not seen a physician since 2009. EMS brought her to the ED today as the patient reported generalized weakness that has worsened over the last 2 days. She had trouble getting off the toilet and when she di,  she had fallen. EMS stated living conditions were deplorable with cat feces on the ibeth. (Elderly Protective Services contacted). Pt is noted with cellulitis and wounds to the bilateral feet and lower legs. Pt describes anxiety attacks, SOB, leg swelling and difficulty walking. Vital signs showed tachycardia = 112, pt afebrile with controlled blood pressure. Labs indicate thrombocytopenia, metabolic alkalosis, troponin 0.114, BNP > 4900, initial lactic acid 2.3;  CXR shows pulmonary edema and a moderate right sided pleural effusion.    * No surgery found *      Hospital Course:   2/10- no fever, other vss. Pt feels weakness, leg swelling and sob improving.Blood cultures positive for staph, on vanc. and guillermo. MRI negative for osteo. ID consulted. CTA BLE to determine if adequate circulation. Per nurse, concerned for left breast deformity, concerning for malignancy, hemonc consulted.   2/11 - Legs swelling and weakness improved, no longer sob. Echo shows ejection fraction 10%, diuresing well on lasix. Per cards left heart cath or stress test when better. Seen by hem/onc this am, concern for breast ca, right pleural effusion drained today w/o complications, swallow study positive for aspiration, counseled patient. Continued on antibx for lower extremity cellulitis. CT legs  with no obvious arterial occlusion, however CTA lower extremeties describes Ulcerative plaque versus small focal dissection measuring 8 mm in the left common iliac artery which causes a mild to moderate grade focal stenosis just above the bifurcation into the left external and left internal iliac arteries.  Contacted Brett Puga, vascular surgeon, no intervention needed now. Recommending lower extremity leg wraps and follow up w/vascular in outpt.   2/12 - Patient feeling better, platelets trending down, crp elevated at 28, sed rate normal. Blood cultures positive for s. Aeureus, awaiting sensitivity.  Xray with tiny right pneumothorax after thoracentesis. General sx biopsied breast mass.   2/13 - Pt with alt mental status this am, alkalotic, possibly secondary to over-diuresis and thoracentesis, xray shows pleural effusion, returned possibly due to chf and/or met. Breast ca. Case discussed w/oncology and no additional imaging needed at this point. Blood cultures positive for MRSA, vanc continued  2/14 - increased lethargy, bicarb improved,chest xray unchanged,  awaiting breast biopsy path report, being diuresed w/acetozolamide, attempted to contact Xavier Rosenbaum, left vmail. Meeting with case management and elderly protection services to discuss contacting any family member of friend, ordered CT head/chest/abd/pelvis   2/15 - vss, including pulse ox on 2 Liters, mentation slightly improved, CT head/chest/abd/pelvis - head no acute processes, chest/abd/pelvis - possible pelvic mass cannot exclude bone mets  2/16 - ABG and platelets improved , cxray unchanged, cytology from thoracentesis negative for malignancy.   mentation drastically improved, pt able to re-state conversation w/hemonc this am. Pt wanting full work up and tx for breast ca at this point. Pt requesting nursing home placement, is demonstrating competency in decision making. However concern for grave disability due to such severe self-neglect in home  environment, that she is better suited to live in a supervised setting.     2/17- pt looks comfortable and more alert and responsive. No walking yet. Both legs covered in tight dressing which is CDI. CXR shows recurrence of R sided effusion, however no SOB and no malignancy seen on thoracentesis.    2/18- continues to improve slowly, repeat Blood Cx x 2 remain NGTD. Dressing is clean, eating drinking better, now more open to placement. Await Breast Biopsy    2/19- feels much better, leg wounds improved, getting OT/ PT/ St and improving, eating better, HCO3 improved after switching bumex to Diamox. Will get a repeat CXR in am.    2/20-- feels tired, lying in bed, states she walked three times yesterday-- hence exhausted. Still await any SNF accepting her. She refused PT/OT today. Repeat CXR today appears better. HCO3 improved on diamox. Legs look better.will give inj B 12 and start some ritalin as well.  2/21- resting in bed quietly, did a little exercise with OT/ PT but not much. Started on Ritalin today to improve vigor and vitality and to improved appetite, await SNF placement.    2/22- feels better except some abd bloating, gassiness, has had a regular BM in a couple of days. Remains in bed mostly, await SNF placement.   2/23- looks much better, walking in the hallway with PT. Await placement authorization from her insurance.  2/24- looks well but c/o abd pain despite the large BM yesterday and two more BMs today, improved after Ritalin held and recurred with the Ritalin again. However her BP improved with Ritalin. So Ritalin stopped and replaced with Midodrin and Bentyl added for abd pain prn.   2/25- resting comfortably, abd pain and BP improved. Await placement.    Patient is 68 yo female admitted to hospital for weakness , Breast Mass, EF 10%. Patient underwent a biopsy of the breast mass with plan for sx followup. Cardiology evaluated patient while in house. Patient with b/l le cellulitis which improved over  the course of her stay. Patient seen by PT/OT recommending SNF placement. Patient to benefit from ongoing support at SNF. Patient seen and examined today stating she is improving steadily. Patient deemed stable for discharge to SNF.     Consults:   Consults         Status Ordering Provider     Inpatient consult to Cardiology  Once     Provider:  Albino Hernandez MD    Completed MATHEUS NO     Inpatient consult to General Surgery  Once     Provider:  (Not yet assigned)    Completed VISHAL MARTINEZ     Inpatient consult to Infectious Diseases  Once     Provider:  Xavier Castillo MD    Acknowledged VISHAL MARTINEZ     Inpatient consult to Oncology  Once     Provider:  Alberto Purvis MD    Acknowledged VISHAL MARTINEZ     [facility]  Once     Provider:  (Not yet assigned)    Acknowledged VISHAL MARTINEZ     Inpatient consult to Pulmonology  Once     Provider:  Ivan Alfonso MD    Acknowledged VISHAL MARTINEZ     Inpatient consult to Registered Dietitian/Nutritionist  Once     Provider:  (Not yet assigned)    Completed MATHEUS NO     Inpatient consult to Social Work  Once     Provider:  (Not yet assigned)    Completed MATHEUS NO     Inpatient consult to Social Work  Once     Provider:  (Not yet assigned)    Completed VISHAL MARTINEZ     Pharmacy to dose Vancomycin consult  Once     Provider:  (Not yet assigned)    Acknowledged HEBER RICE JR          Pleural effusion on right    Diuresis with IV lasix  Supplemental oxygen to maintain sat > 92 %  Will likely need thoracentesis, consult Pulmonology when needed  2/10 - moderate size, concerning for malignancy due to left breast deformity (metastic disease?)  2/12 - will follow path report analysis of pleural fluid  2/14 - will discuss w/pulm if repeat thoracentesis needed and follow up studies from previous    2/17- Transudative Effusion, neg for malignancy-- most likely related to CHF/ CMP.    2/18- continue  Diuretics, now Diamox    2/19- repeat CXR in am-- if recurs, may need repeat thoracentesis    2/20- continue diuresis at present  2/21- improved on Diuretics    2/22- improved, repeat CXR in am    2/23-- improved, will resume Lasix    2/24- start Demadex    2/25- stable    2/26 - Monitor          Final Active Diagnoses:    Diagnosis Date Noted POA    Pleural effusion on right [J90] 02/09/2018 Yes      Problems Resolved During this Admission:    Diagnosis Date Noted Date Resolved POA    PRINCIPAL PROBLEM:  Acute combined systolic and diastolic heart failure [I50.41] 02/09/2018 02/26/2018 Yes    Bilateral cellulitis of lower leg [L03.116, L03.115] 02/23/2018 02/24/2018 Yes    Pressure ulcer of coccygeal region, stage 2 [L89.152] 02/23/2018 02/24/2018 Yes    Pressure ulcer of coccygeal region, stage 2 [L89.152] 02/19/2018 02/19/2018 Yes    Acute on chronic respiratory failure with hypoxia and hypercapnia [J96.21, J96.22] 02/13/2018 02/23/2018 Yes    Altered mental status [R41.82] 02/13/2018 02/19/2018 Yes    Mass of breast, right [N63.10] 02/11/2018 02/20/2018 Yes    Elevated troponin [R74.8] 02/11/2018 02/18/2018 Yes    Bacteremia due to Staphylococcus aureus [R78.81] 02/11/2018 02/18/2018 Yes    Bilateral cellulitis of lower leg [L03.116, L03.115] 02/09/2018 02/22/2018 Yes    Subacute osteomyelitis of multiple sites [M86.29] 02/09/2018 02/17/2018 No       Discharged Condition: stable    Disposition: Skilled Nursing Facility    Follow Up:  Follow-up Information     Brett Puga MD. Call in 1 day.    Specialty:  General Surgery  Why:  make appt for out pt follow up.  If this clniic does not accept your insurance, find another vascular doc  Contact information:  [address]  [address]  VASCULAR CLINIC  [facility] [address]  [phone]             Hossein Perdomo MD In 2 weeks.    Specialties:  General Surgery, Bariatrics  Why:  To discuss breast biopsy results  Contact information:  [address] [place] [address]  [phone]             [facility].    Why:  SNF  Contact information:  [place]  [phone]                 Patient Instructions:     Diet Cardiac     Activity as tolerated         Significant Diagnostic Studies: Labs: BMP: No results for input(s): GLU, NA, K, CL, CO2, BUN, CREATININE, CALCIUM, MG in the last 48 hours., CMP No results for input(s): NA, K, CL, CO2, GLU, BUN, CREATININE, CALCIUM, PROT, ALBUMIN, BILITOT, ALKPHOS, AST, ALT, ANIONGAP, ESTGFRAFRICA, EGFRNONAA in the last 48 hours., CBC No results for input(s): WBC, HGB, HCT, PLT in the last 48 hours. and All labs within the past 24 hours have been reviewed    Pending Diagnostic Studies:     Procedure Component Value Units Date/Time    VANCOMYCIN, TROUGH before 3rd dose [[id number]] Collected:  02/16/18 0745    Order Status:  Sent Lab Status:  In process Updated:  02/16/18 0828    Specimen:  Blood from Blood          Medications:  Reconciled Home Medications:   Current Discharge Medication List      START taking these medications    Details   aspirin 81 MG Chew Take 1 tablet (81 mg total) by mouth once daily.  Refills: 0      lisinopril (PRINIVIL,ZESTRIL) 5 MG tablet Take 1 tablet (5 mg total) by mouth once daily.  Qty: 90 tablet, Refills: 3      metoprolol succinate (TOPROL-XL) 50 MG 24 hr tablet Take 1 tablet (50 mg total) by mouth once daily.  Qty: 30 tablet, Refills: 11      midodrine (PROAMATINE) 2.5 MG Tab Take 1 tablet (2.5 mg total) by mouth 3 (three) times daily with meals.  Qty: 90 tablet, Refills: 11      thiamine 100 MG tablet Take 1 tablet (100 mg total) by mouth once daily.      torsemide (DEMADEX) 20 MG Tab Take 1 tablet (20 mg total) by mouth once daily.  Qty: 30 tablet, Refills: 11             Indwelling Lines/Drains at time of discharge:   Lines/Drains/Airways     Pressure Ulcer                 Pressure Injury 02/19/18 1045 Coccyx Stage 2 7 days                Time spent on the discharge of  patient: 35 minutes  Patient was seen and examined on the date of discharge and determined to be suitable for discharge.         Lester Fang MD  Department of Hospital Medicine  [facility]

## 2018-02-26 NOTE — PT/OT/SLP PROGRESS
"Occupational Therapy  Treatment    Kelly Mariano   MRN: [id number]   Admitting Diagnosis: Acute combined systolic and diastolic heart failure    OT Date of Treatment: 02/26/18   OT Start Time: 1100  OT Stop Time: 1129  OT Total Time (min): 29 min    Billable Minutes:  Therapeutic Activity 29 minutes    General Precautions: Standard, fall, aspiration  Orthopedic Precautions: N/A  Braces:           Subjective:  Communicated with nurse Hang and epic chart review prior to session.    Pain/Comfort  Pain Rating 1: 0/10    Objective:  Patient found with: telemetry, oxygen     Functional Mobility:  Bed Mobility:       Transfers:        Functional Ambulation: pt ambulated 10 feet x 1 seated restrea    Activities of Daily Living:     Feeding adaptive equipment: na     UE adaptive equipment: na     LE adaptive equipment: mof a with juan diego on socks                    Bathing adaptive equipment: na    Balance:   Static Sit: FAIR+: Able to take MINIMAL challenges from all directions  Dynamic Sit: FAIR+: Maintains balance through MINIMAL excursions of active trunk motion  Static Stand: POOR+: Needs MINIMAL assist to maintain  Dynamic stand: poor+    Therapeutic Activities and Exercises:  Pt requested to get off bed pan  AM-PAC 6 CLICK ADL   How much help from another person does this patient currently need?   1 = Unable, Total/Dependent Assistance  2 = A lot, Maximum/Moderate Assistance  3 = A little, Minimum/Contact Guard/Supervision  4 = None, Modified [place]/Independent    Putting on and taking off regular lower body clothing? : 2  Bathing (including washing, rinsing, drying)?: 2  Toileting, which includes using toilet, bedpan, or urinal? : 2  Putting on and taking off regular upper body clothing?: 2  Taking care of personal grooming such as brushing teeth?: 3  Eating meals?: 4  Total Score: 15     AM-PAC Raw Score CMS "G-Code Modifier Level of Impairment Assistance   6 % Total / Unable   7 - 8 CM 80 - 100% Maximal " Assist   9-13 CL 60 - 80% Moderate Assist   14 - 19 CK 40 - 60% Moderate Assist   20 - 22 CJ 20 - 40% Minimal Assist   23 CI 1-20% SBA / CGA   24 CH 0% Independent/ Mod I       Patient left up in chair with all lines intact, call button in reach, nurse notified and friend present    ASSESSMENT:  Kelly Mariano is a 67 y.o. female with a medical diagnosis of Acute combined systolic and diastolic heart failure and presents with debility and generalized weakness.    Rehab identified problem list/impairments: Rehab identified problem list/impairments: weakness, impaired functional mobilty, impaired balance, decreased upper extremity function, decreased safety awareness, impaired endurance, impaired self care skills, gait instability, decreased coordination, decreased lower extremity function, pain, decreased ROM    Rehab potential is good.    Activity tolerance: Fair    Discharge recommendations: Discharge Facility/Level Of Care Needs: nursing facility, skilled     Barriers to discharge: Barriers to Discharge: Decreased caregiver support    Equipment recommendations: walker, rolling     GOALS:    Occupational Therapy Goals        Problem: Occupational Therapy Goal    Goal Priority Disciplines Outcome Interventions   Occupational Therapy Goal     OT, PT/OT Ongoing (interventions implemented as appropriate)    Description:  ot goals to be met by 3-5-18  1.  Min a with ue dressing  2. Min a with le dressing  3. sba with bsc t/f's   4. Pt will tolerate 1 set x 10 reps b ue rom exercise                    Plan:  Patient to be seen 1 x/week, 3 x/week, 2 x/week to address the above listed problems via self-care/home management, therapeutic exercises, therapeutic activities  Plan of Care expires: 02/26/18  Plan of Care reviewed with: patient, other (see comments) (casem manager; nadia; friend)         Reanna Todd OT  02/26/2018

## 2018-02-26 NOTE — PROGRESS NOTES
[facility]   Adult Nutrition  Progress Note    SUMMARY     Recommendations  Recommendation/Intervention: 1. Continue current diet and oral supplements. 2. Will continue to encourage PO intake.   Goals:  Intake of 75% of meals or better   Nutrition Goal Status: goal met   Communication of RD Recs: POC, sticky note    Reason for Assessment  Reason for Assessment: RD follow-up   Dx:  1. Pleural effusion on right    2. Bilateral cellulitis of lower leg    3. Cellulitis of left foot    4. Cellulitis of right foot    5. CHF (congestive heart failure)    6. Mass of breast, right    7. Acute combined systolic and diastolic heart failure        History reviewed. No pertinent past medical history.     General Information Comments: Appetite is improving (PO intake ~ 75 %).  Patient on O2. Awaiting SNF placement.    Nutrition Discharge Planning: Cardiac diet with mechanical soft consistency per SLP    Nutrition Prescription Ordered    Current Diet Order: LowNa 1500ml fluid restriction Mechanical soft (no rice or bread, nectar thick liquids)  Oral supplement: Boost plus all meals + beneprotein all meals     Evaluation of Received Nutrients/Fluid Intake      Intake/Output Summary (Last 24 hours) at 02/26/18 0925  Last data filed at 02/25/18 2020   Gross per 24 hour   Intake              477 ml   Output              200 ml   Net              277 ml        % Intake of Estimated Energy Needs: 75 - 100 %  % Meal Intake: 75%     Nutrition Risk Screen     Nutrition Risk Screen: no indicators present    Nutrition/Diet History       Food Preferences: None reported including Catholic or cultural        Labs/Tests/Procedures/Meds       Pertinent Labs Reviewed: reviewed   BMP  Lab Results   Component Value Date     02/23/2018    K 4.0 02/23/2018    CL 98 02/23/2018    CO2 32 (H) 02/23/2018    BUN 20 02/23/2018    CREATININE 0.5 02/23/2018    CALCIUM 9.1 02/23/2018    ANIONGAP 6 (L) 02/23/2018    ESTGFRAFRICA >60  "02/23/2018    EGFRNONAA >60 02/23/2018     Lab Results   Component Value Date    CALCIUM 9.1 02/23/2018    PHOS 2.7 02/11/2018     Lab Results   Component Value Date    ALBUMIN 2.6 (L) 02/23/2018        Pertinent Medications Reviewed: reviewed       Physical Findings    Overall Physical Appearance: edematous   Oral: teeth absent   Skin: leg wounds, Pressure Injury coccyx stage II     Anthropometrics    Temp: 97.9 °F (36.6 °C)     Height: 5' 4" (162.6 cm)  Weight Method: Bed Scale  Weight: 55.2 kg (121 lb 11.1 oz)     Ideal Body Weight (IBW), Female: 120 lb     % Ideal Body Weight, Female (lb): 108.94 lb  BMI (Calculated): 22.5  BMI Grade: 18.5-24.9 - normal       Estimated/Assessed Needs    Weight Used For Calorie Calculations: 55.2 kg (121 lb 11.1 oz)   Energy Calorie Requirements (kcal): 1656 - 1932  Energy Need Method: kcal/kg (30 - 35 kcal/kg)  Weight Used For Protein Calculations: 55.2 kg (121 lb 11.1 oz)  Protein Requirements: 66.24 - 77.28 gm/d (1.2 - 1.4 gm/kg)  Fluid Requirements (mL): 1500 (fluid restriction per team)                 Assessment and Plan  Acute combined systolic and diastolic heart failure     Nutrition Diagnosis:  Decreased nutrient needs (Na)     Related to (etiology):   CHF     Signs and Symptoms (as evidenced by):   Pt with edema 2/2 CHF     Interventions/Recommendations (treatment strategy):  Low sodium diet with 1500ml fluid restriction     Nutrition Diagnosis Status:   Continues     Nutrition Problem  Nutrient increased needs (protein)    Related to (etiology):   Wound healing     Signs and Symptoms (as evidenced by):   Leg wounds and Pressure injury coccyx stage II    Interventions/Recommendations (treatment strategy):  See above    Nutrition Diagnosis Status:   New        Monitor and Evaluation    Food and Nutrient Intake: food and beverage intake  Food and Nutrient Adminstration: diet order  Knowledge/Beliefs/Attitudes: food and nutrition knowledge/skill, beliefs and " attitudes  Physical Activity and Function: nutrition-related ADLs and IADLs  Anthropometric Measurements: weight, weight change  Biochemical Data, Medical Tests and Procedures: electrolyte and renal panel, gastrointestinal profile, glucose/endocrine profile, inflammatory profile, lipid profile  Nutrition-Focused Physical Findings: overall appearance,  skin      Nutrition Follow-Up    RD Follow-up?: Yes (1x weekly)

## 2018-02-26 NOTE — PLAN OF CARE
Discharge orders noted , faxed to [facility] via [facility].   Spoke with Erna at [facility], # for report is [phone] ( 3rd floor nurse ) She is sending the  now to  patient. Reminded to bring oxygen. Update to Sana primary nurse.   [address], 
[id number], [address] [facility] 
[phone] Patient: Jim Tyson 
MRN: [id number] EDK:2/91/[id number] Visit Information Date & Time Provider Department Dept. Phone Encounter #  
 2/12/2018  2:00 PM Jose Gasca MD [facility] [phone] [id number] Your Appointments 5/11/2018  2:00 PM  
[facility] ([address]) Appt Note: 4 mo f/u  
 [address] [facility] [address] [place] [address],[address] [address] [phone]  
  
   
 [address] [address]  
  
    
 9/4/2018  3:00 PM  
Follow Up with Jose Gasca MD  
[facility] [address] [place] [address]) Appt Note: Follow up $0CP tdb 2/12/18  
 [address] [place] [address], [address] [address]  
[address] [facility], [address] [place] [address], [address] [address] [facility] [address] [address] Upcoming Health Maintenance Date Due MICROALBUMIN Q1 4/5/2017 EYE EXAM RETINAL OR DILATED Q1 11/3/2017 BREAST CANCER SCRN MAMMOGRAM 2/16/2018 MEDICARE YEARLY EXAM 2/16/2018 HEMOGLOBIN A1C Q6M 7/12/2018 GLAUCOMA SCREENING Q2Y 11/3/2018 FOOT EXAM Q1 1/12/2019 LIPID PANEL Q1 1/12/2019 DTaP/Tdap/Td series (2 - Td) 3/1/2027 Allergies as of 2/12/2018  Review Complete On: 2/12/2018 By: Jose Gasca MD  
 No Known Allergies Current Immunizations  Reviewed on 8/2/2017 Name Date Influenza High Dose Vaccine PF 1/12/2018  1:47 PM, 10/5/2016, 9/25/2015 Pneumococcal Conjugate (PCV-13) 3/1/2017 10:48 AM  
 Pneumococcal Polysaccharide (PPSV-23) 11/5/2010 Td 10/2/2015 Zoster Vaccine, Live 11/5/2010 Not reviewed this visit You Were Diagnosed With   
  
 Codes Comments  Diabetic peripheral neuropathy associated with type 2 diabetes mellitus ([facility]    -  Primary ICD-10-CM: E11.42 
 ICD-9-CM: 250.60, 357.2 Stenosis of both internal carotid arteries     ICD-10-CM: I65.23 ICD-9-CM: 433.10, 433.30 Cerebral microvascular disease     ICD-10-CM: I67.9 ICD-9-CM: 437.9 RLS (restless legs syndrome)     ICD-10-CM: G25.81 ICD-9-CM: 333.94 Vitals BP Pulse Height(growth percentile) Weight(growth percentile) SpO2 BMI  
 110/62 97 5' (1.524 m) 137 lb (62.1 kg) 97% 26.76 kg/m2 OB Status Smoking Status Postmenopausal Former Smoker Vitals History BMI and BSA Data Body Mass Index Body Surface Area  
 26.76 kg/m 2 1.62 m 2 Preferred Pharmacy Pharmacy Name Phone [facility] [address] [phone] Your Updated Medication List  
  
   
This list is accurate as of: 2/12/18  2:43 PM.  Always use your most recent med list.  
  
  
  
  
 busPIRone 5 mg tablet Commonly known as:  BUSPAR Take 1 Tab by mouth two (2) times a day. glimepiride 2 mg tablet Commonly known as:  AMARYL Take 1 Tab by mouth every morning. glucose blood VI test strips strip Commonly known as:  RELION PRIME TEST STRIPS  
by Does Not Apply route two (2) times a day. Use 1 strip each time to test glucose 2 times a day. E11.9 LUTEIN PO Take  by mouth.  
  
 magnesium 250 mg Tab Take 500 mg by mouth nightly. melatonin 5 mg Cap capsule Take 5 mg by mouth nightly. metFORMIN 500 mg tablet Commonly known as:  GLUCOPHAGE Take 1 Tab by mouth two (2) times daily (with meals). rOPINIRole 3 mg Tab tab Commonly known as:  Quirino [place] Take 1 Tab by mouth two (2) times a day. vit B Cmplx 3-FA-Vit C-Biotin 1- mg-mg-mcg tablet Commonly known as:  NEPHRO RAJEEV RX Take 1 Tab by mouth daily. Indications: with stress relief B 12 VITAMIN D3 1,000 unit tablet Generic drug:  cholecalciferol Take  by mouth daily. zinc 50 mg Tab tablet Take  by mouth daily. Prescriptions Sent to Pharmacy Refills  
 rOPINIRole (REQUIP) 3 mg tab tab 11 Sig: Take 1 Tab by mouth two (2) times a day. Class: Normal  
 Pharmacy: [facility] [address], [address]  #: [phone] Route: Oral  
  
Introducing [facility]! [facility] introduces [employer] patient portal. Now you can access parts of your medical record, email your doctor's office, and request medication refills online. 1. In your internet browser, go to [email or web address] 2. Click on the First Time User? Click Here link in the Sign In box. You will see the New Member Sign Up page. 3. Enter your [employer] Access Code exactly as it appears below. You will not need to use this code after youve completed the sign-up process. If you do not sign up before the expiration date, you must request a new code. · [employer] Access Code: [email or web address] Expires: 4/12/2018  1:42 PM 
 
4. Enter the last four digits of your Social Security Number (xxxx) and Date of Birth (mm/dd/yyyy) as indicated and click Submit. You will be taken to the next sign-up page. 5. Create a [employer] ID. This will be your [employer] login ID and cannot be changed, so think of one that is secure and easy to remember. 6. Create a [employer] password. You can change your password at any time. 7. Enter your Password Reset Question and Answer. This can be used at a later time if you forget your password. 8. Enter your e-mail address. You will receive e-mail notification when new information is available in [address] 9. Click Sign Up. You can now view and download portions of your medical record. 10. Click the Download Summary menu link to download a portable copy of your medical information. If you have questions, please visit the Frequently Asked Questions section of the [employer] website. Remember, [employer] is NOT to be used for urgent needs. For medical emergencies, dial 911. Now available from your iPhone and Android! Please provide this summary of care documentation to your next provider. Your primary care clinician is listed as Naomie Weeks. If you have any questions after today's visit, please call [phone].

## 2018-02-26 NOTE — PLAN OF CARE
Problem: Patient Care Overview  Goal: Plan of Care Review      Outcome: Ongoing (interventions implemented as appropriate)  Recommendations  Recommendation/Intervention: 1. Continue current diet and oral supplements. 2. Will continue to encourage PO intake.   Goals:  Intake of 75% of meals or better   Nutrition Goal Status: goal met   Communication of RD Recs: POC, sticky note

## 2018-02-26 NOTE — PLAN OF CARE
02/26/18 1230   Medicare Message   Important Message from Medicare regarding Discharge Appeal Rights Given to patient/caregiver;Explained to patient/caregiver;Signed/date by patient/caregiver   Date IMM was signed 02/26/18   Time IMM was signed 1230

## 2018-02-27 ENCOUNTER — PATIENT OUTREACH (OUTPATIENT)
Dept: ADMINISTRATIVE | Facility: CLINIC | Age: 68
End: 2018-02-27

## 2018-02-27 ENCOUNTER — TELEPHONE (OUTPATIENT)
Dept: SURGERY | Facility: CLINIC | Age: 68
End: 2018-02-27

## 2018-02-27 NOTE — TELEPHONE ENCOUNTER
Called [facility] and was able to get an appointment scheduled for the patient for 03/05/2018 @ 12:00

## 2018-02-27 NOTE — TELEPHONE ENCOUNTER
----- Message from Hossein Perdomo MD sent at 2/21/2018  1:27 PM CST -----  Apparently she is still admitted to the hospital we will have to try and make sure she follows up with us in clinic when she is discharged.  ----- Message -----  From: Bren Carpenter MA  Sent: 2/21/2018   1:20 PM  To: Hossein Perdomo MD    I have tried to contact patient, but the number is not correct in the chart  ----- Message -----  From: Hossein Perdomo MD  Sent: 2/21/2018   1:09 PM  To: Conor Catalan Staff    Patient needs to be seen in clinic to discuss breast biopsy results and potential surgery.  She is a establish patient that we saw in the hospital.

## 2018-02-27 NOTE — PLAN OF CARE
02/27/18 0838   Final Note   Assessment Type Final Discharge Note   Discharge Disposition [facility]  ([facility])

## 2018-03-05 ENCOUNTER — TELEPHONE (OUTPATIENT)
Dept: SURGERY | Facility: CLINIC | Age: 68
End: 2018-03-05

## 2018-03-05 DIAGNOSIS — N63.10 LARGE MASS OF RIGHT BREAST: Primary | ICD-10-CM

## 2018-03-05 NOTE — TELEPHONE ENCOUNTER
[facility] is aware that the patient's insurance is [facility] Also, aware that a referral is being put in for the patient to see the general surgeon at [facility].   Number for the nursing home needs to be on the referral to schedule the appointment, as the number in the chart for the patient is wrong.    [facility]  [phone]

## 2018-03-15 LAB — FUNGUS SPEC CULT: NORMAL

## 2018-04-15 LAB
ACID FAST MOD KINY STN SPEC: NORMAL
MYCOBACTERIUM SPEC QL CULT: NORMAL

## 2018-04-17 ENCOUNTER — TELEPHONE (OUTPATIENT)
Dept: SURGERY | Facility: CLINIC | Age: 68
End: 2018-04-17

## 2018-04-17 NOTE — TELEPHONE ENCOUNTER
Informed Ms. Ceballos that the referral was faxed to General Surgery at [facility]

## 2018-04-17 NOTE — TELEPHONE ENCOUNTER
----- Message from Deidra Wood LPN sent at 4/17/2018 11:15 AM CDT -----  Regarding: Call DON at [facility]  Please call the DON at [facility] in regards to the above patient   @ [phone]

## 2020-02-12 NOTE — PLAN OF CARE
Problem: Patient Care Overview  Goal: Plan of Care Review      Outcome: Ongoing (interventions implemented as appropriate)  Plan of care reviewed with patient, verbalized understanding. Patient sleeping comfortably through shift. Multiple bowel movements, perineum cleansed and barrier cream applied when changing. Blood glucose monitored before bed and in am. Turned every 2 hours with wedge placement. Contact precautions maintained. Bed in low position, nonskid socks on feet, bed alarm set, lighting adjusted, door left open for easy visualization. VSS, no distress noted, remains free of injury or trauma. Will continue to monitor.       Yes

## 2020-08-15 NOTE — SUBJECTIVE & OBJECTIVE
Interval History: Improved function    Review of Systems   All other systems reviewed and are negative.    Objective:     Vital Signs (Most Recent):  Temp: 97.8 °F (36.6 °C) (02/17/18 1245)  Pulse: 88 (02/17/18 1245)  Resp: 18 (02/17/18 1245)  BP: 108/64 (02/17/18 1255)  SpO2: (!) 94 % (02/17/18 1245) Vital Signs (24h Range):  Temp:  [97.1 °F (36.2 °C)-98.9 °F (37.2 °C)] 97.8 °F (36.6 °C)  Pulse:  [82-91] 88  Resp:  [18] 18  SpO2:  [90 %-100 %] 94 %  BP: ()/(50-73) 108/64     Weight: 64.6 kg (142 lb 6.7 oz)  Body mass index is 24.45 kg/m².     SpO2: (!) 94 %  O2 Device (Oxygen Therapy): nasal cannula      Intake/Output Summary (Last 24 hours) at 02/17/18 1521  Last data filed at 02/17/18 1200   Gross per 24 hour   Intake              670 ml   Output             2650 ml   Net            -1980 ml       Lines/Drains/Airways     Peripherally Inserted Central Catheter Line                 PICC Double Lumen 02/10/18 1524 right basilic 6 days                Physical Exam   Constitutional: No distress.   Appears disheveled, frail   HENT:   Head: Normocephalic and atraumatic.   Eyes: Pupils are equal, round, and reactive to light. Right eye exhibits no discharge.   Neck: Neck supple. No JVD present.   Cardiovascular: Normal rate, regular rhythm, S2 normal and normal heart sounds.  Exam reveals no gallop and no friction rub.    No murmur heard.  Pulmonary/Chest: Effort normal. No respiratory distress. She has no wheezes.   Diminished at bases   Abdominal: Soft. Bowel sounds are normal. She exhibits no distension. There is no tenderness. There is no rebound and no guarding.   +Truncal edema   Neurological:   Confused   Skin: Skin is warm. She is not diaphoretic.   BLE wrapped and dressed   Nursing note and vitals reviewed.      Significant Labs:   All pertinent lab results from the last 24 hours have been reviewed. and   Recent Lab Results       02/17/18  1136 02/17/18  0533 02/17/18  0530 02/16/18  2105 02/16/18  1616       Albumin   2.5(L)       Alkaline Phosphatase   71       ALT   19       Anion Gap   6(L)       AST   28       Baso #   0.02       Basophil%   0.3       Total Bilirubin   0.6  Comment:  For infants and newborns, interpretation of results should be based  on gestational age, weight and in agreement with clinical  observations.  Premature Infant recommended reference ranges:  Up to 24 hours.............<8.0 mg/dL  Up to 48 hours............<12.0 mg/dL  3-5 days..................<15.0 mg/dL  6-29 days.................<15.0 mg/dL         BUN, Bld   18       Calcium   8.6(L)       Chloride   96       CO2   37(H)       Creatinine   0.5       Differential Method   Automated       eGFR if    >60       eGFR if non    >60  Comment:  Calculation used to obtain the estimated glomerular filtration  rate (eGFR) is the CKD-EPI equation.          Eos #   0.1       Eosinophil%   0.8       Glucose   81       Gran # (ANC)   4.8       Gran%   79.1(H)       Hematocrit   41.1       Hemoglobin   12.6       Lymph #   0.8(L)       Lymph%   12.7(L)       Magnesium   1.9       MCH   28.6       MCHC   30.7(L)       MCV   93       Mono #   0.4       Mono%   7.1       MPV   11.1       Platelets   115(L)       POCT Glucose 113(H) 82  118(H) 117(H)     Potassium   3.7       Total Protein   5.9(L)       RBC   4.41       RDW   17.3(H)       Sodium   139       WBC   6.05                       Significant Imaging: Echocardiogram:   2D echo with color flow doppler:   Results for orders placed or performed during the hospital encounter of 02/09/18   2D echo with color flow doppler   Result Value Ref Range    EF 10 (A) 55 - 65    Mitral Valve Regurgitation MILD TO MODERATE     Diastolic Dysfunction Yes (A)     Est. PA Systolic Pressure 24.81     Tricuspid Valve Regurgitation MILD TO MODERATE     and X-Ray: CXR: X-Ray Chest 1 View (CXR):   Results for orders placed or performed during the hospital encounter of 02/09/18    X-Ray Chest 1 View    Narrative    One-view chest x-ray    Clinical History: Rectal effusion; recent thoracentesis    Finding: Comparison was made to prior examination performed at 5:25 AM on 2/11/2018. There's been interval development of a tiny right apical pneumothorax. There is moderate cardiomegaly. There is a mild amount of interstitial and alveolar opacities seen in both lungs. There is opacification of the costophrenic angles bilaterally. A right PICC remains in place. There is partial visualization of anterior spinal fusion hardware in the cervical spine.    Impression        1. There's been interval development of a tiny right apical pneumothorax. This is consistent with the patient's recent thoracentesis.  2. The findings are characteristic of moderate cardiomegaly with mild bilateral pulmonary edema.  3. There is opacification of the costophrenic angles bilaterally. These are characteristic of tiny pleural effusions.  4. Surgical changes      Electronically signed by: ASHWIN RAYMOND MD  Date:     02/11/18  Time:    16:28       No

## 2021-09-29 NOTE — SUBJECTIVE & OBJECTIVE
History reviewed. No pertinent past medical history.    Past Surgical History:   Procedure Laterality Date    SPINAL FUSION         Review of patient's allergies indicates:   Allergen Reactions    Pcn [penicillins] Itching       No current facility-administered medications on file prior to encounter.      No current outpatient prescriptions on file prior to encounter.     Family History     None        Social History Main Topics    Smoking status: Former Smoker     Types: Cigarettes    Smokeless tobacco: Not on file    Alcohol use Yes      Comment: occasional    Drug use: No    Sexual activity: Not on file     Review of Systems   Constitution: Negative. Negative for weight gain.   HENT: Negative.    Eyes: Negative.    Cardiovascular: Negative.  Negative for chest pain, leg swelling and palpitations.   Respiratory: Negative.  Negative for shortness of breath.    Endocrine: Negative.    Hematologic/Lymphatic: Negative.    Skin: Negative.    Musculoskeletal: Negative for muscle weakness.   Gastrointestinal: Negative.    Genitourinary: Negative.    Neurological: Negative.  Negative for dizziness.   Psychiatric/Behavioral: Negative.    Allergic/Immunologic: Negative.      Objective:     Vital Signs (Most Recent):  Temp: 97.6 °F (36.4 °C) (02/10/18 1952)  Pulse: 102 (02/10/18 1952)  Resp: (!) 22 (02/10/18 1952)  BP: 122/65 (02/10/18 1952)  SpO2: (!) 93 % (02/10/18 1952) Vital Signs (24h Range):  Temp:  [96.3 °F (35.7 °C)-97.7 °F (36.5 °C)] 97.6 °F (36.4 °C)  Pulse:  [] 102  Resp:  [18-22] 22  SpO2:  [93 %-99 %] 93 %  BP: (112-132)/(56-67) 122/65     Weight: 69.4 kg (153 lb)  Body mass index is 26.26 kg/m².    SpO2: (!) 93 %  O2 Device (Oxygen Therapy): room air      Intake/Output Summary (Last 24 hours) at 02/10/18 2056  Last data filed at 02/10/18 1200   Gross per 24 hour   Intake              360 ml   Output                0 ml   Net              360 ml       Lines/Drains/Airways     Peripherally Inserted  Central Catheter Line                 PICC Double Lumen 02/10/18 1524 right basilic less than 1 day          Drain                 Urethral Catheter 02/09/18 1045 Latex 16 Fr. 1 day          Peripheral Intravenous Line                 Peripheral IV - Single Lumen 02/09/18 Right Antecubital 1 day                Physical Exam   Constitutional: She is oriented to person, place, and time. She appears well-developed and well-nourished.   HENT:   Head: Normocephalic and atraumatic.   Eyes: Conjunctivae and EOM are normal. Pupils are equal, round, and reactive to light.   Neck: Normal range of motion. Neck supple.   Cardiovascular: Normal rate, regular rhythm, normal heart sounds and intact distal pulses.    Pulmonary/Chest: Effort normal and breath sounds normal.   Abdominal: Soft. Bowel sounds are normal.   Musculoskeletal: Normal range of motion.   Neurological: She is alert and oriented to person, place, and time.   Skin: Skin is warm and dry.   Psychiatric: She has a normal mood and affect.   Nursing note and vitals reviewed.      Significant Labs: All pertinent lab results from the last 24 hours have been reviewed.    Significant Imaging: X-Ray: CXR: X-Ray Chest 1 View (CXR): No results found for this visit on 02/09/18.   electronic
